# Patient Record
Sex: FEMALE | Race: WHITE | Employment: OTHER | URBAN - METROPOLITAN AREA
[De-identification: names, ages, dates, MRNs, and addresses within clinical notes are randomized per-mention and may not be internally consistent; named-entity substitution may affect disease eponyms.]

---

## 2017-04-04 ENCOUNTER — OFFICE VISIT (OUTPATIENT)
Dept: ORTHOPEDIC SURGERY | Age: 62
End: 2017-04-04

## 2017-04-04 VITALS
DIASTOLIC BLOOD PRESSURE: 98 MMHG | WEIGHT: 159.2 LBS | HEIGHT: 66 IN | HEART RATE: 76 BPM | BODY MASS INDEX: 25.58 KG/M2 | TEMPERATURE: 97.2 F | SYSTOLIC BLOOD PRESSURE: 136 MMHG

## 2017-04-04 DIAGNOSIS — M19.171 POST-TRAUMATIC OSTEOARTHRITIS OF RIGHT FOOT: Primary | ICD-10-CM

## 2017-04-04 RX ORDER — ASCORBIC ACID 500 MG
1000 TABLET ORAL DAILY
COMMUNITY

## 2017-04-04 RX ORDER — CHOLECALCIFEROL TAB 125 MCG (5000 UNIT) 125 MCG
1000 TAB ORAL DAILY
COMMUNITY

## 2017-04-04 RX ORDER — ATENOLOL 25 MG/1
25 TABLET ORAL DAILY
COMMUNITY
End: 2017-04-28 | Stop reason: DRUGHIGH

## 2017-04-04 RX ORDER — GUAIFENESIN 100 MG/5ML
81 LIQUID (ML) ORAL DAILY
COMMUNITY
End: 2020-10-07

## 2017-04-04 RX ORDER — DOXYCYCLINE 40 MG/1
40 CAPSULE ORAL
COMMUNITY
End: 2017-05-16 | Stop reason: SDUPTHER

## 2017-04-04 RX ORDER — GLUCOSAMINE/CHONDROITIN/C/MANG 500-400 MG
CAPSULE ORAL DAILY
COMMUNITY
End: 2018-11-06

## 2017-04-04 RX ORDER — MICONAZOLE NITRATE 2 %
CREAM WITH APPLICATOR VAGINAL DAILY
COMMUNITY

## 2017-04-04 RX ORDER — RIZATRIPTAN BENZOATE 10 MG/1
10 TABLET, ORALLY DISINTEGRATING ORAL
COMMUNITY
End: 2017-05-16 | Stop reason: SDUPTHER

## 2017-04-04 RX ORDER — LANOLIN ALCOHOL/MO/W.PET/CERES
2500 CREAM (GRAM) TOPICAL DAILY
COMMUNITY
End: 2018-11-06

## 2017-04-04 RX ORDER — ROSUVASTATIN CALCIUM 10 MG/1
10 TABLET, COATED ORAL
COMMUNITY
End: 2017-07-28

## 2017-04-04 RX ORDER — BISMUTH SUBSALICYLATE 262 MG
1 TABLET,CHEWABLE ORAL DAILY
COMMUNITY

## 2017-04-04 NOTE — MR AVS SNAPSHOT
Visit Information Date & Time Provider Department Dept. Phone Encounter #  
 4/4/2017 10:30 AM Yadira Arango, [address] [facility] [phone] [id number] Upcoming Health Maintenance Date Due Hepatitis C Screening 1955 DTaP/Tdap/Td series (1 - Tdap) 3/5/1976 PAP AKA CERVICAL CYTOLOGY 3/5/1976 BREAST CANCER SCRN MAMMOGRAM 3/5/2005 FOBT Q 1 YEAR AGE 50-75 3/5/2005 ZOSTER VACCINE AGE 60> 3/5/2015 INFLUENZA AGE 9 TO ADULT 8/1/2016 Allergies as of 4/4/2017  Never Reviewed Severity Noted Reaction Type Reactions Penicillin G  04/04/2017    Hives Current Immunizations  Never Reviewed No immunizations on file. Not reviewed this visit Vitals BP Pulse Temp Height(growth percentile) Weight(growth percentile) BMI  
 (!) 136/98 76 97.2 °F (36.2 °C) (Oral) 5' 6\" (1.676 m) 159 lb 3.2 oz (72.2 kg) 25.7 kg/m2 Smoking Status Never Smoker BMI and BSA Data Body Mass Index Body Surface Area 25.7 kg/m 2 1.83 m 2 Your Updated Medication List  
  
   
This list is accurate as of: 4/4/17 11:05 AM.  Always use your most recent med list.  
  
  
  
  
 ascorbic acid (vitamin C) 500 mg tablet Commonly known as:  VITAMIN C Take 1,000 mg by mouth two (2) times a day. aspirin 81 mg chewable tablet Take 81 mg by mouth daily. atenolol 25 mg tablet Commonly known as:  TENORMIN Take 25 mg by mouth daily. cholecalciferol (VITAMIN D3) 5,000 unit Tab tablet Commonly known as:  VITAMIN D3 Take  by mouth daily. Citracal + D tablet Generic drug:  calcium citrate-vitamin D3 Take  by mouth two (2) times a day. CRESTOR 10 mg tablet Generic drug:  rosuvastatin Take 10 mg by mouth nightly. cyanocobalamin 500 mcg tablet Commonly known as:  VITAMIN B12 Take 2,500 mcg by mouth daily. GLUCOSAMINE 1500 COMPLEX 500-400 mg capsule Generic drug:  glucosamine-chondroit-vit c-mn Take  by mouth daily. MAXALT-MLT 10 mg disintegrating tablet Generic drug:  rizatriptan Take 10 mg by mouth once as needed for Migraine. multivitamin tablet Commonly known as:  ONE A DAY Take 1 Tab by mouth daily. ORACEA 40 mg capsule Generic drug:  doxycycline monohydrate Take 40 mg by mouth every morning. PROBIOTIC 4X 10-15 mg Tbec Generic drug:  B.infantis-B.ani-B.long-B.bifi Take  by mouth. Patient Instructions Please follow up in 3 weeks. You are advised to contact us if your condition worsens. Foot Pain: Care Instructions Your Care Instructions Foot injuries that cause pain and swelling are fairly common. Almost all sports or home repair projects can cause a misstep that ends up as foot pain. Normal wear and tear, especially as you get older, also can cause foot pain. Most minor foot injuries will heal on their own, and home treatment is usually all you need to do. If you have a severe injury, you may need tests and treatment. Follow-up care is a key part of your treatment and safety. Be sure to make and go to all appointments, and call your doctor if you are having problems. Its also a good idea to know your test results and keep a list of the medicines you take. How can you care for yourself at home? · Take pain medicines exactly as directed. ¨ If the doctor gave you a prescription medicine for pain, take it as prescribed. ¨ If you are not taking a prescription pain medicine, ask your doctor if you can take an over-the-counter medicine. · Rest and protect your foot. Take a break from any activity that may cause pain. · Put ice or a cold pack on your foot for 10 to 20 minutes at a time. Put a thin cloth between the ice and your skin. · Prop up the sore foot on a pillow when you ice it or anytime you sit or lie down during the next 3 days.  Try to keep it above the level of your heart. This will help reduce swelling. · Your doctor may recommend that you wrap your foot with an elastic bandage. Keep your foot wrapped for as long as your doctor advises. · If your doctor recommends crutches, use them as directed. · Wear roomy footwear. · As soon as pain and swelling end, begin gentle exercises of your foot. Your doctor can tell you which exercises will help. When should you call for help? Call 911 anytime you think you may need emergency care. For example, call if: 
· Your foot turns pale, white, blue, or cold. Call your doctor now or seek immediate medical care if: 
· You cannot move or stand on your foot. · Your foot looks twisted or out of its normal position. · Your foot is not stable when you step down. · You have signs of infection, such as: 
¨ Increased pain, swelling, warmth, or redness. ¨ Red streaks leading from the sore area. ¨ Pus draining from a place on your foot. ¨ A fever. · Your foot is numb or tingly. Watch closely for changes in your health, and be sure to contact your doctor if: 
· You do not get better as expected. · You have bruises from an injury that last longer than 2 weeks. Where can you learn more? Go to http://kendrick-emerald.info/. Enter M569 in the search box to learn more about \"Foot Pain: Care Instructions. \" Current as of: May 23, 2016 Content Version: 11.2 © 5875-5372 [employer]. Care instructions adapted under license by [employer] (which disclaims liability or warranty for this information). If you have questions about a medical condition or this instruction, always ask your healthcare professional. [address] any warranty or liability for your use of this information. Introducing [facility]! Madhav Madsen introduces [employer] patient portal. Now you can access parts of your medical record, email your doctor's office, and request medication refills online. 1. In your internet browser, go to [email or web address] 2. Click on the First Time User? Click Here link in the Sign In box. You will see the New Member Sign Up page. 3. Enter your Re[employer] Access Code exactly as it appears below. You will not need to use this code after youve completed the sign-up process. If you do not sign up before the expiration date, you must request a new code. · [employer] Access Code: [email or web address] Expires: 7/3/2017 11:05 AM 
 
4. Enter the last four digits of your Social Security Number (xxxx) and Date of Birth (mm/dd/yyyy) as indicated and click Submit. You will be taken to the next sign-up page. 5. Create a Metal[employer] ID. This will be your Re[employer]Mu login ID and cannot be changed, so think of one that is secure and easy to remember. 6. Create a Re[employer]Mu password. You can change your password at any time. 7. Enter your Password Reset Question and Answer. This can be used at a later time if you forget your password. 8. Enter your e-mail address. You will receive e-mail notification when new information is available in [address] 9. Click Sign Up. You can now view and download portions of your medical record. 10. Click the Download Summary menu link to download a portable copy of your medical information. If you have questions, please visit the Frequently Asked Questions section of the [employer] website. Remember, [employer] is NOT to be used for urgent needs. For medical emergencies, dial 911. Now available from your iPhone and Android! Please provide this summary of care documentation to your next provider. If you have any questions after today's visit, please call [phone].

## 2017-04-04 NOTE — PATIENT INSTRUCTIONS
Please follow up in 3 weeks. You are advised to contact us if your condition worsens. Foot Pain: Care Instructions  Your Care Instructions  Foot injuries that cause pain and swelling are fairly common. Almost all sports or home repair projects can cause a misstep that ends up as foot pain. Normal wear and tear, especially as you get older, also can cause foot pain. Most minor foot injuries will heal on their own, and home treatment is usually all you need to do. If you have a severe injury, you may need tests and treatment. Follow-up care is a key part of your treatment and safety. Be sure to make and go to all appointments, and call your doctor if you are having problems. Its also a good idea to know your test results and keep a list of the medicines you take. How can you care for yourself at home? · Take pain medicines exactly as directed. ¨ If the doctor gave you a prescription medicine for pain, take it as prescribed. ¨ If you are not taking a prescription pain medicine, ask your doctor if you can take an over-the-counter medicine. · Rest and protect your foot. Take a break from any activity that may cause pain. · Put ice or a cold pack on your foot for 10 to 20 minutes at a time. Put a thin cloth between the ice and your skin. · Prop up the sore foot on a pillow when you ice it or anytime you sit or lie down during the next 3 days. Try to keep it above the level of your heart. This will help reduce swelling. · Your doctor may recommend that you wrap your foot with an elastic bandage. Keep your foot wrapped for as long as your doctor advises. · If your doctor recommends crutches, use them as directed. · Wear roomy footwear. · As soon as pain and swelling end, begin gentle exercises of your foot. Your doctor can tell you which exercises will help. When should you call for help? Call 911 anytime you think you may need emergency care.  For example, call if:  · Your foot turns pale, white, blue, or cold. Call your doctor now or seek immediate medical care if:  · You cannot move or stand on your foot. · Your foot looks twisted or out of its normal position. · Your foot is not stable when you step down. · You have signs of infection, such as:  ¨ Increased pain, swelling, warmth, or redness. ¨ Red streaks leading from the sore area. ¨ Pus draining from a place on your foot. ¨ A fever. · Your foot is numb or tingly. Watch closely for changes in your health, and be sure to contact your doctor if:  · You do not get better as expected. · You have bruises from an injury that last longer than 2 weeks. Where can you learn more? Go to http://kendrick-emerald.info/. Enter V963 in the search box to learn more about \"Foot Pain: Care Instructions. \"  Current as of: May 23, 2016  Content Version: 11.2  © 3536-8776 [employer]. Care instructions adapted under license by [employer] (which disclaims liability or warranty for this information). If you have questions about a medical condition or this instruction, always ask your healthcare professional. [place] [address] any warranty or liability for your use of this information.

## 2017-04-04 NOTE — PROGRESS NOTES
AMBULATORY PROGRESS NOTE      Patient: Negra Greene             MRN: [id number]     SSN: [id number] Body mass index is 25.7 kg/(m^2). YOB: 1955     AGE: 58 y.o. EX: female    PCP: No primary care provider on file. IMPRESSION/DIAGNOSIS AND TREATMENT PLAN     DIAGNOSES  1. Post-traumatic osteoarthritis of right foot         Nanette Luevano understands her diagnoses and the proposed plan. Plan:    1) Continue to wear the left tall CAM boot (do not wear while sleeping)  2) Intermittent standing and walking for showering  3) Anticipate : hard soled shoe on next OV    RTO - 3 weeks; XR at next visit (LEFT FOOT)    HPI AND EXAMINATION     Nanette Luevano IS A 58 y.o. female who presents to my outpatient office complaining of: prior left foot fusions. Patient states she had left first MTP joint fusion and second TMT joint fusion as well as a second hammer toe procedure on 1/16/2017. Patient states she has been using a walking boot all day, and she does not currently have pain. She is currently 11 weeks and one day; surgery was completed by Dr. Jose Franco in [place], [place]. She is here to follow up with her previous procedure and assess her progress. Patient has intermittent standing and walking when showering. As such, my overall impression is a 26-year-old female who is seen here today having had surgery in [place] by Amor Fish M.D. She had undergone a left great toe first MTP joint arthrodesis, a left second tarsometatarsal joint arthrodesis, as well as a left number two toe hammertoe procedure. She arrives here with her . She has been wearing a tall CAM walker boot. She admits to having no pain in her foot at all to the left foot. She is quite pleased with her overall alignment having stated that she has had at least two or three surgeries on her left foot in the past, as well as three or four surgeries on her right foot.    She had a CD/DVD, for which she had x-rays done on March 21, 2017, that she brought with her. These films show a dorsal plate with an interfragmentary screw at the first MTP joint, as well as a dorsal plate traversing the second TMT joint. The forefoot part of her films to assess her number two toe were hard to visualize. Only the lateral film did capture the second toe in its entirety. Patient and her  recently moved here to be closer to their daughter. Notes surgical h/o six procedure on her feet. ANKLE/FOOT left    Psychiatry: Alert, Oriented x 3; Speech normal in context and clarity,            Memory intact grossly, no involuntary movements - tremors, no dementia  Gait: slow  Tenderness: moderate tenderness across the first MTP joint and third TMT joint  Cutaneous: Well healed surgical scars at the great toe, midfoot, and number two toe. Joint Motion: Ankle and Hindfoot motion normal; reduced forefoot motion s/p fusion procedures  Joint / Tendon Stability: No Ankle or Subtalar instability or joint laxity. No peroneal sublux ability or dislocation  Alignment: Pes Planus moderate and Semi Rigid  Neuro Motor/Sensory: Diminished sensation at the medial sesamoid location near the plantar medial portion of her great toe  Vascular: NL foot/ankle pulses  Lymphatics: No extremity lymphedema, No calf swelling, no tenderness to calf muscles. CHART REVIEW     No past medical history on file. Current Outpatient Prescriptions   Medication Sig    doxycycline monohydrate (ORACEA) 40 mg capsule Take 40 mg by mouth every morning.  atenolol (TENORMIN) 25 mg tablet Take 25 mg by mouth daily.  rizatriptan (MAXALT-MLT) 10 mg disintegrating tablet Take 10 mg by mouth once as needed for Migraine.  rosuvastatin (CRESTOR) 10 mg tablet Take 10 mg by mouth nightly.  ascorbic acid, vitamin C, (VITAMIN C) 500 mg tablet Take 1,000 mg by mouth two (2) times a day.     calcium citrate-vitamin D3 (CITRACAL + D) tablet Take  by mouth two (2) times a day.  multivitamin (ONE A DAY) tablet Take 1 Tab by mouth daily.  cholecalciferol, VITAMIN D3, (VITAMIN D3) 5,000 unit tab tablet Take  by mouth daily.  cyanocobalamin (VITAMIN B12) 500 mcg tablet Take 2,500 mcg by mouth daily.  aspirin 81 mg chewable tablet Take 81 mg by mouth daily.  glucosamine-chondroit-vit c-mn (GLUCOSAMINE 1500 COMPLEX) 500-400 mg capsule Take  by mouth daily.  B.infantis-B.ani-B.long-B.bifi (PROBIOTIC 4X) 10-15 mg TbEC Take  by mouth. No current facility-administered medications for this visit. Allergies   Allergen Reactions    Penicillin G Hives     Past Surgical History:   Procedure Laterality Date    FOOT/TOES SURGERY PROC UNLISTED      HX BACK SURGERY      HX KNEE ARTHROSCOPY       Social History     Occupational History    Not on file. Social History Main Topics    Smoking status: Never Smoker    Smokeless tobacco: Never Used    Alcohol use No    Drug use: Yes    Sexual activity: Not on file     No family history on file. REVIEW OF SYSTEMS : 4/4/2017  ALL BELOW ARE Negative except : SEE HPI       Constitutional: Negative for fever, chills and weight loss. Neg Weigh Loss  Cardiovascular: Negative for chest pain, claudication and leg swelling. SOB, KIM   Gastrointestinal: Negative for  pain, N/V/D/C, Blood in stool or urine,dysuria, hematuria,        Incontinence, pelvic pain  Musculoskeletal: see HPI. Neurological: Negative for dizziness and weakness. Negative for headaches,Visual Changes, Confusion, Seizures,   Psychiatric/Behavioral: Negative for depression, memory loss and substance abuse. Extremities:  Negative for  hair changes, rash or skin lesion changes. Hematologic: Negative for Bleeding problems, bruising, pallor or swollen lymph nodes.   Peripheral Vascular: No calf pain, vascular vein tenderness to calf pain              No calf throbbing, posterior knee throbbing pain    DIAGNOSTIC IMAGING     No notes on file    Written by Chip Putnam, as dictated by Graham Stewart MD. I, , Graham Stewart MD, confirm that all documentation is accurate.

## 2017-04-10 ENCOUNTER — HOSPITAL ENCOUNTER (OUTPATIENT)
Dept: LAB | Age: 62
Discharge: HOME | End: 2017-04-10
Payer: COMMERCIAL

## 2017-04-10 ENCOUNTER — OFFICE VISIT (OUTPATIENT)
Dept: FAMILY MEDICINE CLINIC | Age: 62
End: 2017-04-10

## 2017-04-10 VITALS
HEART RATE: 62 BPM | BODY MASS INDEX: 25.39 KG/M2 | WEIGHT: 158 LBS | TEMPERATURE: 95.8 F | SYSTOLIC BLOOD PRESSURE: 145 MMHG | HEIGHT: 66 IN | OXYGEN SATURATION: 100 % | RESPIRATION RATE: 18 BRPM | DIASTOLIC BLOOD PRESSURE: 95 MMHG

## 2017-04-10 DIAGNOSIS — Z11.59 NEED FOR HEPATITIS C SCREENING TEST: ICD-10-CM

## 2017-04-10 DIAGNOSIS — M25.511 RIGHT SHOULDER PAIN, UNSPECIFIED CHRONICITY: Primary | ICD-10-CM

## 2017-04-10 DIAGNOSIS — G43.909 MIGRAINE WITHOUT STATUS MIGRAINOSUS, NOT INTRACTABLE, UNSPECIFIED MIGRAINE TYPE: ICD-10-CM

## 2017-04-10 DIAGNOSIS — M19.071 PRIMARY OSTEOARTHRITIS OF RIGHT FOOT: ICD-10-CM

## 2017-04-10 PROCEDURE — 86803 HEPATITIS C AB TEST: CPT | Performed by: FAMILY MEDICINE

## 2017-04-10 NOTE — PROGRESS NOTES
Chief Complaint   Patient presents with    Establish Care    Medication Refill    Shoulder Pain     right x 6 weeks      1. Have you been to the ER, urgent care clinic since your last visit? Hospitalized since your last visit? No    2. Have you seen or consulted any other health care providers outside of the [address] [place] [address] since your last visit? Include any pap smears or colon screening. No     HPI  Camila Manzano comes in to establish care. 1) Shoulder pain: patient has right shoulder pain. Ongoing for past 6 weeks. She has been doing a lot of movement and lifting with the extremity. Pain at the [facility] joint anterior aspect and is mainly with activity. No swelling, no trauma. Patient requests ortho referral. Will request xray of the shoulder and refer to ortho for evaluation. 2) Foot surgery: patient is s/p left foot ORIF and is seen by the specialist. She is in a cam walker boot and healing is good. Not in pain. 3) Dermatology: patient has rosacea and takes doxycycline. She has some medication. Will refill as needed. 4) Migraine headache: takes atenolol for migraine prophylaxis. Also on maxalt as needed. Past Medical History  Past Medical History:   Diagnosis Date    Arthritis     Hx of migraines        Surgical History  Past Surgical History:   Procedure Laterality Date    FOOT/TOES SURGERY PROC UNLISTED      HX BACK SURGERY      HX KNEE ARTHROSCOPY      HX PARTIAL HYSTERECTOMY          Medications  Current Outpatient Prescriptions   Medication Sig Dispense Refill    doxycycline monohydrate (ORACEA) 40 mg capsule Take 40 mg by mouth every morning.  atenolol (TENORMIN) 25 mg tablet Take 25 mg by mouth daily.  rizatriptan (MAXALT-MLT) 10 mg disintegrating tablet Take 10 mg by mouth once as needed for Migraine.  ascorbic acid, vitamin C, (VITAMIN C) 500 mg tablet Take 1,000 mg by mouth two (2) times a day.       calcium citrate-vitamin D3 (CITRACAL + D) tablet Take  by mouth two (2) times a day.  multivitamin (ONE A DAY) tablet Take 1 Tab by mouth daily.  cholecalciferol, VITAMIN D3, (VITAMIN D3) 5,000 unit tab tablet Take  by mouth daily.  cyanocobalamin (VITAMIN B12) 500 mcg tablet Take 2,500 mcg by mouth daily.  aspirin 81 mg chewable tablet Take 81 mg by mouth daily.  glucosamine-chondroit-vit c-mn (GLUCOSAMINE 1500 COMPLEX) 500-400 mg capsule Take  by mouth daily.  B.infantis-B.ani-B.long-B.bifi (PROBIOTIC 4X) 10-15 mg TbEC Take  by mouth.  rosuvastatin (CRESTOR) 10 mg tablet Take 10 mg by mouth nightly. Allergies  Allergies   Allergen Reactions    Penicillin G Hives       Family History  No family history on file. Social History  Social History     Social History    Marital status:      Spouse name: N/A    Number of children: N/A    Years of education: N/A     Occupational History    Not on file. Social History Main Topics    Smoking status: Never Smoker    Smokeless tobacco: Never Used    Alcohol use No    Drug use:  Yes    Sexual activity: Yes     Partners: Male     Other Topics Concern     Service No    Blood Transfusions Yes    Caffeine Concern No    Occupational Exposure No    Hobby Hazards No    Sleep Concern No    Stress Concern No    Weight Concern No    Special Diet No    Back Care No    Exercise Yes    Bike Helmet No    Seat Belt Yes    Self-Exams Yes     Social History Narrative       Review of Systems  Review of Systems - History obtained from the patient  General ROS:negative for - chills or fever  Psychological ROS: negative  Ophthalmic ROS: negative  ENT ROS: negative for - nasal congestion, nasal discharge, sneezing or sore throat  Allergy and Immunology ROS: negative  Respiratory ROS: no cough, shortness of breath, or wheezing  Cardiovascular ROS: no chest pain or dyspnea on exertion  Gastrointestinal ROS: no abdominal pain, change in bowel habits, or black or bloody stools  Genito-Urinary ROS: no dysuria, trouble voiding, or hematuria  Musculoskeletal ROS: positive for - pain in shoulder - right  Neurological ROS: positive for - headaches  Dermatological ROS: positive for - rosacea    Vital Signs  Visit Vitals    BP (!) 145/95 (BP 1 Location: Right arm, BP Patient Position: Sitting)    Pulse 62    Temp 95.8 °F (35.4 °C) (Oral)    Resp 18    Ht 5' 6\" (1.676 m)    Wt 158 lb (71.7 kg)    LMP  (Approximate)    SpO2 100%    BMI 25.5 kg/m2         Physical Exam  Physical Examination: General appearance - alert, well appearing, and in no distress, oriented to person, place, and time and acyanotic, in no respiratory distress  Mental status - alert, oriented to person, place, and time, normal mood, behavior, speech, dress, motor activity, and thought processes  Mouth - mucous membranes moist, pharynx normal without lesions  Neck - supple, no significant adenopathy  Chest - clear to auscultation, no wheezes, rales or rhonchi, symmetric air entry, no tachypnea, retractions or cyanosis  Heart - normal rate, regular rhythm, S1, S2 heard, systolic murmur  Back exam - limited range of motion  Neurological - alert, oriented, normal speech, no focal findings or movement disorder noted  Musculoskeletal - limited ROM right shoulder due to discomfort, pain on palpaiton at the [facility] joint, no swellingor crepitus  Extremities - no pedal edema noted    Diagnostics  Orders Placed This Encounter    XR SHOULDER RT AP/LAT MIN 2 V     Standing Status:   Future     Standing Expiration Date:   4/11/2018     Order Specific Question:   Reason for Exam     Answer:   shoulder pain     Order Specific Question:   Is Patient Allergic to Contrast Dye? Answer:   Unknown    HEPATITIS C AB     Standing Status:   Future     Standing Expiration Date:   4/11/2018         Results  No results found for this or any previous visit. ASSESSMENT and PLAN    ICD-10-CM ICD-9-CM    1.  Right shoulder pain, unspecified chronicity M25.511 719.41 XR SHOULDER RT AP/LAT MIN 2 V      REFERRAL TO ORTHOPEDICS   2. Need for hepatitis C screening test Z11.59 V73.89 HEPATITIS C AB      UT COLLECTION VENOUS BLOOD,VENIPUNCTURE   3. Migraine without status migrainosus, not intractable, unspecified migraine type G43.909 346.90    4. Primary osteoarthritis of right foot M19.071 715.17      current treatment plan is effective, no change in therapy  lab results and schedule of future lab studies reviewed with patient  reviewed diet, exercise and weight control  reviewed medications and side effects in detail  radiology results and schedule of future radiology studies reviewed with patient      I have discussed the diagnosis with the patient and the intended plan of care as seen in the above orders. The patient has received an after-visit summary and questions were answered concerning future plans. I have discussed medication, side effects, and warnings with the patient in detail. The patient verbalized understanding and is in agreement with the plan of care. The patient will contact the office with any additional concerns.     Bree Hernandez MD

## 2017-04-10 NOTE — PATIENT INSTRUCTIONS
Shoulder Pain: Care Instructions  Your Care Instructions    You can hurt your shoulder by using it too much during an activity, such as fishing or baseball. It can also happen as part of the everyday wear and tear of getting older. Shoulder injuries can be slow to heal, but your shoulder should get better with time. Your doctor may recommend a sling to rest your shoulder. If you have injured your shoulder, you may need testing and treatment. Follow-up care is a key part of your treatment and safety. Be sure to make and go to all appointments, and call your doctor if you are having problems. It's also a good idea to know your test results and keep a list of the medicines you take. How can you care for yourself at home? · Take pain medicines exactly as directed. ¨ If the doctor gave you a prescription medicine for pain, take it as prescribed. ¨ If you are not taking a prescription pain medicine, ask your doctor if you can take an over-the-counter medicine. ¨ Do not take two or more pain medicines at the same time unless the doctor told you to. Many pain medicines contain acetaminophen, which is Tylenol. Too much acetaminophen (Tylenol) can be harmful. · If your doctor recommends that you wear a sling, use it as directed. Do not take it off before your doctor tells you to. · Put ice or a cold pack on the sore area for 10 to 20 minutes at a time. Put a thin cloth between the ice and your skin. · If there is no swelling, you can put moist heat, a heating pad, or a warm cloth on your shoulder. Some doctors suggest alternating between hot and cold. · Rest your shoulder for a few days. If your doctor recommends it, you can then begin gentle exercise of the shoulder, but do not lift anything heavy. When should you call for help? Call 911 anytime you think you may need emergency care. For example, call if:  · You have chest pain or pressure. This may occur with:  ¨ Sweating. ¨ Shortness of breath.   ¨ Nausea or vomiting. ¨ Pain that spreads from the chest to the neck, jaw, or one or both shoulders or arms. ¨ Dizziness or lightheadedness. ¨ A fast or uneven pulse. After calling 911, chew 1 adult-strength aspirin. Wait for an ambulance. Do not try to drive yourself. · Your arm or hand is cool or pale or changes color. Call your doctor now or seek immediate medical care if:  · You have signs of infection, such as:  ¨ Increased pain, swelling, warmth, or redness in your shoulder. ¨ Red streaks leading from a place on your shoulder. ¨ Pus draining from an area of your shoulder. ¨ Swollen lymph nodes in your neck, armpits, or groin. ¨ A fever. Watch closely for changes in your health, and be sure to contact your doctor if:  · You cannot use your shoulder. · Your shoulder does not get better as expected. Where can you learn more? Go to http://kendrickDATYemerald.info/. Enter V030 in the search box to learn more about \"Shoulder Pain: Care Instructions. \"  Current as of: May 23, 2016  Content Version: 11.2  © 4022-8329 [employer]. Care instructions adapted under license by [employer] (which disclaims liability or warranty for this information). If you have questions about a medical condition or this instruction, always ask your healthcare professional. [address] any warranty or liability for your use of this information. Shoulder Stretches: Exercises  Your Care Instructions  Here are some examples of exercises for your shoulder. Start each exercise slowly. Ease off the exercise if you start to have pain. Your doctor or physical therapist will tell you when you can start these exercises and which ones will work best for you. How to do the exercises  Note: These exercises should cause you to feel a gentle stretch, but no pain. Shoulder stretch    1.  a doorway and place one arm against the door frame.  Your elbow should be a little higher than your shoulder. 2. Relax your shoulders as you lean forward, allowing your chest and shoulder muscles to stretch. You can also turn your body slightly away from your arm to stretch the muscles even more. 3. Hold for 15 to 30 seconds. 4. Repeat 2 to 4 times with each arm. Shoulder and chest stretch    Shoulder and chest stretch  1. While sitting, relax your upper body so you slump slightly in your chair. 2. As you breathe in, straighten your back and open your arms out to the sides. 3. Gently pull your shoulder blades back and downward. 4. Hold for 15 to 30 seconds as your breathe normally. 5. Repeat 2 to 4 times. Overhead stretch    1. Reach up over your head with both arms. 2. Hold for 15 to 30 seconds. 3. Repeat 2 to 4 times. Follow-up care is a key part of your treatment and safety. Be sure to make and go to all appointments, and call your doctor if you are having problems. It's also a good idea to know your test results and keep a list of the medicines you take. Where can you learn more? Go to http://kendrick-emerald.info/. Enter S254 in the search box to learn more about \"Shoulder Stretches: Exercises. \"  Current as of: May 23, 2016  Content Version: 11.2  © 6894-7944 [employer]. Care instructions adapted under license by [employer] (which disclaims liability or warranty for this information). If you have questions about a medical condition or this instruction, always ask your healthcare professional. [place] [address] any warranty or liability for your use of this information.

## 2017-04-10 NOTE — MR AVS SNAPSHOT
Visit Information Date & Time Provider Department Dept. Phone Encounter #  
 4/10/2017  9:15 AM Hesed Yohana Cartwright MD [facility] [phone] [id number] Follow-up Instructions Return in about 1 month (around 5/10/2017), or if symptoms worsen or fail to improve, for shoulder pain. Your Appointments 4/26/2017  9:10 AM  
Follow Up with Vitaliy Ordaz MD  
[facility] Appt Note: LT FT 3 [address] [facility], [address] [facility]  
[phone] [address] [place] [address] Upcoming Health Maintenance Date Due Hepatitis C Screening 1955 DTaP/Tdap/Td series (1 - Tdap) 3/5/1976 PAP AKA CERVICAL CYTOLOGY 3/5/1976 FOBT Q 1 YEAR AGE 50-75 3/5/2005 BREAST CANCER SCRN MAMMOGRAM 3/16/2019 Allergies as of 4/10/2017  Review Complete On: 4/10/2017 By: Analy Box MD  
  
 Severity Noted Reaction Type Reactions Penicillin G  04/04/2017    Hives Current Immunizations  Never Reviewed No immunizations on file. Not reviewed this visit You Were Diagnosed With   
  
 Codes Comments Right shoulder pain, unspecified chronicity    -  Primary ICD-10-CM: M25.511 ICD-9-CM: 719.41 Need for hepatitis C screening test     ICD-10-CM: Z11.59 
ICD-9-CM: V73.89 Vitals BP Pulse Temp Resp Height(growth percentile) Weight(growth percentile) (!) 145/95 (BP 1 Location: Right arm, BP Patient Position: Sitting) 62 95.8 °F (35.4 °C) (Oral) 18 5' 6\" (1.676 m) 158 lb (71.7 kg) LMP SpO2 BMI Smoking Status (Approximate) 100% 25.5 kg/m2 Never Smoker Vitals History BMI and BSA Data Body Mass Index Body Surface Area 25.5 kg/m 2 1.83 m 2 Your Updated Medication List  
  
   
This list is accurate as of: 4/10/17 10:00 AM.  Always use your most recent med list.  
  
  
  
  
 ascorbic acid (vitamin C) 500 mg tablet Commonly known as:  VITAMIN C Take 1,000 mg by mouth two (2) times a day. aspirin 81 mg chewable tablet Take 81 mg by mouth daily. atenolol 25 mg tablet Commonly known as:  TENORMIN Take 25 mg by mouth daily. cholecalciferol (VITAMIN D3) 5,000 unit Tab tablet Commonly known as:  VITAMIN D3 Take  by mouth daily. Citracal + D tablet Generic drug:  calcium citrate-vitamin D3 Take  by mouth two (2) times a day. CRESTOR 10 mg tablet Generic drug:  rosuvastatin Take 10 mg by mouth nightly. cyanocobalamin 500 mcg tablet Commonly known as:  VITAMIN B12 Take 2,500 mcg by mouth daily. GLUCOSAMINE 1500 COMPLEX 500-400 mg capsule Generic drug:  glucosamine-chondroit-vit c-mn Take  by mouth daily. MAXALT-MLT 10 mg disintegrating tablet Generic drug:  rizatriptan Take 10 mg by mouth once as needed for Migraine. multivitamin tablet Commonly known as:  ONE A DAY Take 1 Tab by mouth daily. ORACEA 40 mg capsule Generic drug:  doxycycline monohydrate Take 40 mg by mouth every morning. PROBIOTIC 4X 10-15 mg Tbec Generic drug:  B.infantis-B.ani-B.long-B.bifi Take  by mouth. We Performed the Following REFERRAL TO ORTHOPEDICS [SOZ466 Custom] Comments:  
 Right shoulder pain Follow-up Instructions Return in about 1 month (around 5/10/2017), or if symptoms worsen or fail to improve, for shoulder pain. To-Do List   
 04/10/2017 Lab:  HEPATITIS C AB   
  
 04/10/2017 Imaging:  XR SHOULDER RT AP/LAT MIN 2 V Referral Information Referral ID Referred By Referred To  
  
 [id number] Amelia NASCIMENTO Not Available Visits Status Start Date End Date 1 New Request 4/10/17 4/10/18 If your referral has a status of pending review or denied, additional information will be sent to support the outcome of this decision. Patient Instructions Shoulder Pain: Care Instructions Your Care Instructions You can hurt your shoulder by using it too much during an activity, such as fishing or baseball. It can also happen as part of the everyday wear and tear of getting older. Shoulder injuries can be slow to heal, but your shoulder should get better with time. Your doctor may recommend a sling to rest your shoulder. If you have injured your shoulder, you may need testing and treatment. Follow-up care is a key part of your treatment and safety. Be sure to make and go to all appointments, and call your doctor if you are having problems. It's also a good idea to know your test results and keep a list of the medicines you take. How can you care for yourself at home? · Take pain medicines exactly as directed. ¨ If the doctor gave you a prescription medicine for pain, take it as prescribed. ¨ If you are not taking a prescription pain medicine, ask your doctor if you can take an over-the-counter medicine. ¨ Do not take two or more pain medicines at the same time unless the doctor told you to. Many pain medicines contain acetaminophen, which is Tylenol. Too much acetaminophen (Tylenol) can be harmful. · If your doctor recommends that you wear a sling, use it as directed. Do not take it off before your doctor tells you to. · Put ice or a cold pack on the sore area for 10 to 20 minutes at a time. Put a thin cloth between the ice and your skin. · If there is no swelling, you can put moist heat, a heating pad, or a warm cloth on your shoulder. Some doctors suggest alternating between hot and cold. · Rest your shoulder for a few days. If your doctor recommends it, you can then begin gentle exercise of the shoulder, but do not lift anything heavy. When should you call for help? Call 911 anytime you think you may need emergency care. For example, call if: 
· You have chest pain or pressure. This may occur with: ¨ Sweating. ¨ Shortness of breath. ¨ Nausea or vomiting. ¨ Pain that spreads from the chest to the neck, jaw, or one or both shoulders or arms. ¨ Dizziness or lightheadedness. ¨ A fast or uneven pulse. After calling 911, chew 1 adult-strength aspirin. Wait for an ambulance. Do not try to drive yourself. · Your arm or hand is cool or pale or changes color. Call your doctor now or seek immediate medical care if: 
· You have signs of infection, such as: 
¨ Increased pain, swelling, warmth, or redness in your shoulder. ¨ Red streaks leading from a place on your shoulder. ¨ Pus draining from an area of your shoulder. ¨ Swollen lymph nodes in your neck, armpits, or groin. ¨ A fever. Watch closely for changes in your health, and be sure to contact your doctor if: 
· You cannot use your shoulder. · Your shoulder does not get better as expected. Where can you learn more? Go to http://kendrick-emerald.info/. Enter B855 in the search box to learn more about \"Shoulder Pain: Care Instructions. \" Current as of: May 23, 2016 Content Version: 11.2 © 4808-8190 [employer]. Care instructions adapted under license by [employer] (which disclaims liability or warranty for this information). If you have questions about a medical condition or this instruction, always ask your healthcare professional. [place] [address] any warranty or liability for your use of this information. Shoulder Stretches: Exercises Your Care Instructions Here are some examples of exercises for your shoulder. Start each exercise slowly. Ease off the exercise if you start to have pain. Your doctor or physical therapist will tell you when you can start these exercises and which ones will work best for you. How to do the exercises Note: These exercises should cause you to feel a gentle stretch, but no pain. Shoulder stretch 1.  a doorway and place one arm against the door frame. Your elbow should be a little higher than your shoulder. 2. Relax your shoulders as you lean forward, allowing your chest and shoulder muscles to stretch. You can also turn your body slightly away from your arm to stretch the muscles even more. 3. Hold for 15 to 30 seconds. 4. Repeat 2 to 4 times with each arm. Shoulder and chest stretch Shoulder and chest stretch 1. While sitting, relax your upper body so you slump slightly in your chair. 2. As you breathe in, straighten your back and open your arms out to the sides. 3. Gently pull your shoulder blades back and downward. 4. Hold for 15 to 30 seconds as your breathe normally. 5. Repeat 2 to 4 times. Overhead stretch 1. Reach up over your head with both arms. 2. Hold for 15 to 30 seconds. 3. Repeat 2 to 4 times. Follow-up care is a key part of your treatment and safety. Be sure to make and go to all appointments, and call your doctor if you are having problems. It's also a good idea to know your test results and keep a list of the medicines you take. Where can you learn more? Go to http://kendrick-emerald.info/. Enter S254 in the search box to learn more about \"Shoulder Stretches: Exercises. \" Current as of: May 23, 2016 Content Version: 11.2 © 2103-8558 [employer]. Care instructions adapted under license by [employer] (which disclaims liability or warranty for this information). If you have questions about a medical condition or this instruction, always ask your healthcare professional. [address] any warranty or liability for your use of this information. Introducing [facility]! Noreen Ayers introduces Scannx patient portal. Now you can access parts of your medical record, email your doctor's office, and request medication refills online.    
 
1. In your internet browser, go to [email or web address] 2. Click on the First Time User? Click Here link in the Sign In box. You will see the New Member Sign Up page. 3. Enter your Jive Bike Access Code exactly as it appears below. You will not need to use this code after youve completed the sign-up process. If you do not sign up before the expiration date, you must request a new code. · Jive Bike Access Code: [email or web address] Expires: 7/3/2017 11:05 AM 
 
4. Enter the last four digits of your Social Security Number (xxxx) and Date of Birth (mm/dd/yyyy) as indicated and click Submit. You will be taken to the next sign-up page. 5. Create a 365Scorest ID. This will be your Jive Bike login ID and cannot be changed, so think of one that is secure and easy to remember. 6. Create a Jive Bike password. You can change your password at any time. 7. Enter your Password Reset Question and Answer. This can be used at a later time if you forget your password. 8. Enter your e-mail address. You will receive e-mail notification when new information is available in [address] 9. Click Sign Up. You can now view and download portions of your medical record. 10. Click the Download Summary menu link to download a portable copy of your medical information. If you have questions, please visit the Frequently Asked Questions section of the Jive Bike website. Remember, Jive Bike is NOT to be used for urgent needs. For medical emergencies, dial 911. Now available from your iPhone and Android! Please provide this summary of care documentation to your next provider. If you have any questions after today's visit, please call [phone].

## 2017-04-11 LAB
HCV AB SER IA-ACNC: 0.09 INDEX
HCV AB SERPL QL IA: NEGATIVE
HCV COMMENT,HCGAC: NORMAL

## 2017-04-17 ENCOUNTER — OFFICE VISIT (OUTPATIENT)
Dept: ORTHOPEDIC SURGERY | Age: 62
End: 2017-04-17

## 2017-04-17 VITALS
SYSTOLIC BLOOD PRESSURE: 152 MMHG | HEIGHT: 66 IN | WEIGHT: 159.8 LBS | HEART RATE: 61 BPM | DIASTOLIC BLOOD PRESSURE: 95 MMHG | BODY MASS INDEX: 25.68 KG/M2 | TEMPERATURE: 96.1 F

## 2017-04-17 DIAGNOSIS — G89.29 CHRONIC RIGHT SHOULDER PAIN: Primary | ICD-10-CM

## 2017-04-17 DIAGNOSIS — M25.511 CHRONIC RIGHT SHOULDER PAIN: Primary | ICD-10-CM

## 2017-04-17 NOTE — PROGRESS NOTES
Patient: Kaylyn Ordonez                MRN: [id number]       SSN: [id number]  YOB: 1955        AGE: 58 y.o. SEX: female  Body mass index is 25.79 kg/(m^2). PCP: No primary care provider on file. 04/17/17      Chief Complaint   Patient presents with    Shoulder Pain     Right       HISTORY OF PRESENT ILLNESS: Kaylyn Ordonez is a 58 y.o. female who presents to the office for 6 week hx of right shoulder pain. The only activity she cant think of that would contribute to it would be she moved here in December and was packing her things. She was recently on crutches because of a foot surgery. She has had no previous trauma. The pain occassionally radiates into her arm. Ibuprofen helps the pain but also causes stomach upset. Review of Systems   Constitutional: Negative. HENT: Negative. Eyes: Negative. Respiratory: Negative. Cardiovascular: Negative. Gastrointestinal: Negative. Genitourinary: Negative. Musculoskeletal: Positive for joint pain (right shoulder). Skin: Negative. Neurological: Negative. Endo/Heme/Allergies: Negative. Psychiatric/Behavioral: Negative. Social History     Social History    Marital status:      Spouse name: N/A    Number of children: N/A    Years of education: N/A     Occupational History    Not on file. Social History Main Topics    Smoking status: Never Smoker    Smokeless tobacco: Never Used    Alcohol use No    Drug use:  Yes    Sexual activity: Yes     Partners: Male     Other Topics Concern     Service No    Blood Transfusions Yes    Caffeine Concern No    Occupational Exposure No    Hobby Hazards No    Sleep Concern No    Stress Concern No    Weight Concern No    Special Diet No    Back Care No    Exercise Yes    Bike Helmet No    Seat Belt Yes    Self-Exams Yes     Social History Narrative        Past Medical History:   Diagnosis Date    Arthritis     Hx of migraines Allergies   Allergen Reactions    Penicillin G Hives         Current Outpatient Prescriptions   Medication Sig    doxycycline monohydrate (ORACEA) 40 mg capsule Take 40 mg by mouth every morning.  atenolol (TENORMIN) 25 mg tablet Take 25 mg by mouth daily.  rizatriptan (MAXALT-MLT) 10 mg disintegrating tablet Take 10 mg by mouth once as needed for Migraine.  ascorbic acid, vitamin C, (VITAMIN C) 500 mg tablet Take 1,000 mg by mouth two (2) times a day.  calcium citrate-vitamin D3 (CITRACAL + D) tablet Take  by mouth two (2) times a day.  multivitamin (ONE A DAY) tablet Take 1 Tab by mouth daily.  cholecalciferol, VITAMIN D3, (VITAMIN D3) 5,000 unit tab tablet Take  by mouth daily.  cyanocobalamin (VITAMIN B12) 500 mcg tablet Take 2,500 mcg by mouth daily.  aspirin 81 mg chewable tablet Take 81 mg by mouth daily.  glucosamine-chondroit-vit c-mn (GLUCOSAMINE 1500 COMPLEX) 500-400 mg capsule Take  by mouth daily.  B.infantis-B.ani-B.long-B.bifi (PROBIOTIC 4X) 10-15 mg TbEC Take  by mouth.  rosuvastatin (CRESTOR) 10 mg tablet Take 10 mg by mouth nightly. No current facility-administered medications for this visit. Physical Exam  Constitutional: she is oriented to person, place, and time and well-developed, well-nourished, and in no distress. No distress. HENT:   Head: Normocephalic and atraumatic. Right Ear: Hearing normal.   Left Ear: Hearing normal.   Nose: Nose normal.   Eyes: Conjunctivae, EOM and lids are normal. Pupils are equal, round, and reactive to light. Neck: Trachea normal.   Pulmonary/Chest: Effort normal and breath sounds normal. No respiratory distress. Abdominal: Soft. Neurological: she is alert and oriented to person, place, and time. Skin: Skin is warm, dry and intact. she is not diaphoretic. Psychiatric: Affect normal.   Nursing note and vitals reviewed.     Ortho Exam   Shows full flexion and abduction of the right shoulder with a level 4 pain. External rotation is normal and causes slight pain. Internal rotation and is normal and causes a level 4 pain. RADIOGRAPHS:   2 views of the shoulder show no bony abnormalities. IMPRESSION & PLAN: Because of the ,movinbg and crutches and pain at night while sleeping,  Rip concerned she may have a rotator cuff tear. We will proceed with an MRI. I will see her back after the results of the MRI. Written by Cara Elkins, as dictated by Dr. Middleton Rather, Dr. Desi Ortiz, confirm that all documentation is accurate.

## 2017-04-20 ENCOUNTER — HOSPITAL ENCOUNTER (OUTPATIENT)
Age: 62
Discharge: HOME | End: 2017-04-20
Attending: ORTHOPAEDIC SURGERY
Payer: COMMERCIAL

## 2017-04-20 DIAGNOSIS — G89.29 CHRONIC RIGHT SHOULDER PAIN: ICD-10-CM

## 2017-04-20 DIAGNOSIS — M25.511 CHRONIC RIGHT SHOULDER PAIN: ICD-10-CM

## 2017-04-20 PROCEDURE — 73221 MRI JOINT UPR EXTREM W/O DYE: CPT

## 2017-04-24 ENCOUNTER — OFFICE VISIT (OUTPATIENT)
Dept: ORTHOPEDIC SURGERY | Age: 62
End: 2017-04-24

## 2017-04-24 VITALS
BODY MASS INDEX: 25.75 KG/M2 | SYSTOLIC BLOOD PRESSURE: 143 MMHG | HEIGHT: 66 IN | HEART RATE: 58 BPM | TEMPERATURE: 95.5 F | DIASTOLIC BLOOD PRESSURE: 104 MMHG | WEIGHT: 160.2 LBS

## 2017-04-24 DIAGNOSIS — M75.51 BURSITIS OF RIGHT SHOULDER: ICD-10-CM

## 2017-04-24 DIAGNOSIS — M25.511 CHRONIC RIGHT SHOULDER PAIN: Primary | ICD-10-CM

## 2017-04-24 DIAGNOSIS — G89.29 CHRONIC RIGHT SHOULDER PAIN: Primary | ICD-10-CM

## 2017-04-24 RX ORDER — TRIAMCINOLONE ACETONIDE 40 MG/ML
60 INJECTION, SUSPENSION INTRA-ARTICULAR; INTRAMUSCULAR ONCE
Qty: 2 ML | Refills: 0
Start: 2017-04-24 | End: 2017-04-24

## 2017-04-24 RX ORDER — LIDOCAINE HYDROCHLORIDE 10 MG/ML
6 INJECTION INFILTRATION; PERINEURAL ONCE
Qty: 6 ML | Refills: 0
Start: 2017-04-24 | End: 2017-04-24

## 2017-04-24 NOTE — PROGRESS NOTES
Patient: Dudley Enamorado                MRN: [id number]       SSN: [id number]  YOB: 1955        AGE: 58 y.o. SEX: female  Body mass index is 25.86 kg/(m^2). PCP: PROVIDER UNKNOWN  04/24/17      Chief Complaint   Patient presents with    Shoulder Pain     Right       HISTORY OF PRESENT ILLNESS: Dudley Enamorado is a 58 y.o. female who presents to the office for management of her right shoulder pain and to review her MRI results. Switching to the aleve has helped the reduce the pain and recureduced the stomach upset. MRI shows a surface tear of the supraspinatus but no full thickness tear. Review of Systems   Constitutional: Negative. HENT: Negative. Eyes: Negative. Respiratory: Negative. Cardiovascular: Negative. Gastrointestinal: Negative. Genitourinary: Negative. Musculoskeletal: Positive for joint pain (right shoulder). Skin: Negative. Neurological: Negative. Endo/Heme/Allergies: Negative. Psychiatric/Behavioral: Negative. Social History     Social History    Marital status:      Spouse name: N/A    Number of children: N/A    Years of education: N/A     Occupational History    Not on file. Social History Main Topics    Smoking status: Never Smoker    Smokeless tobacco: Never Used    Alcohol use No    Drug use:  Yes    Sexual activity: Yes     Partners: Male     Other Topics Concern     Service No    Blood Transfusions Yes    Caffeine Concern No    Occupational Exposure No    Hobby Hazards No    Sleep Concern No    Stress Concern No    Weight Concern No    Special Diet No    Back Care No    Exercise Yes    Bike Helmet No    Seat Belt Yes    Self-Exams Yes     Social History Narrative        Past Medical History:   Diagnosis Date    Arthritis     Hx of migraines         Allergies   Allergen Reactions    Penicillin G Hives         Current Outpatient Prescriptions   Medication Sig    doxycycline monohydrate (ORACEA) 40 mg capsule Take 40 mg by mouth every morning.  atenolol (TENORMIN) 25 mg tablet Take 25 mg by mouth daily.  rizatriptan (MAXALT-MLT) 10 mg disintegrating tablet Take 10 mg by mouth once as needed for Migraine.  ascorbic acid, vitamin C, (VITAMIN C) 500 mg tablet Take 1,000 mg by mouth two (2) times a day.  calcium citrate-vitamin D3 (CITRACAL + D) tablet Take  by mouth two (2) times a day.  multivitamin (ONE A DAY) tablet Take 1 Tab by mouth daily.  cholecalciferol, VITAMIN D3, (VITAMIN D3) 5,000 unit tab tablet Take  by mouth daily.  cyanocobalamin (VITAMIN B12) 500 mcg tablet Take 2,500 mcg by mouth daily.  aspirin 81 mg chewable tablet Take 81 mg by mouth daily.  glucosamine-chondroit-vit c-mn (GLUCOSAMINE 1500 COMPLEX) 500-400 mg capsule Take  by mouth daily.  B.infantis-B.ani-B.long-B.bifi (PROBIOTIC 4X) 10-15 mg TbEC Take  by mouth.  rosuvastatin (CRESTOR) 10 mg tablet Take 10 mg by mouth nightly. No current facility-administered medications for this visit. Physical Exam  Constitutional: she is oriented to person, place, and time and well-developed, well-nourished, and in no distress. No distress. HENT:   Head: Normocephalic and atraumatic. Right Ear: Hearing normal.   Left Ear: Hearing normal.   Nose: Nose normal.   Eyes: Conjunctivae, EOM and lids are normal. Pupils are equal, round, and reactive to light. Neck: Trachea normal.   Pulmonary/Chest: Effort normal and breath sounds normal. No respiratory distress. Abdominal: Soft. Neurological: she is alert and oriented to person, place, and time. Skin: Skin is warm, dry and intact. she is not diaphoretic. Psychiatric: Affect normal.   Nursing note and vitals reviewed. Ortho Exam   Still shows full flexion and abduction with a level 4 pain. Internal and external are similarly  unchanged and less painful.      Procedure: After timeout and under sterile conditions, patient's right shoulder was injected with 6 cc of Xylocaine and 1.5 cc of Kenalog. [facility]  OFFICE PROCEDURE PROGRESS NOTE        Chart reviewed for the following:   Cheli Viveros MD, have reviewed the History, Physical and updated the Allergic reactions for Nanette C [address] performed immediately prior to start of procedure:   Cheli Viveros MD, have performed the following reviews on [address] prior to the start of the procedure:            * Patient was identified by name and date of birth   * Agreement on procedure being performed was verified  * Risks and Benefits explained to the patient  * Procedure site verified and marked as necessary  * Patient was positioned for comfort  * Consent was signed and verified     Time: 7:49 AM        Date of procedure: 4/24/2017    Procedure performed by: Vianney Hall MD    Provider assisted by: None     Patient assisted by: self    How tolerated by patient: tolerated the procedure well with no complications    Comments: none      RADIOGRAPHS: MRI Results (most recent):    Results from [place] encounter on 04/20/17   MRI SHOULDER RT WO CONT   Narrative MR right shoulder. TECHNIQUE: Coronal, sagittal T1 and T2 fat-saturated, axial T2 fat-saturated and  axial proton density images of the shoulder were obtained without the  administration of IV contrast.    INDICATIONS: Pain, decreased range of motion. COMPARISONS: Right shoulder x-ray 4/17. FINDINGS:   Mild degenerative changes of the acromioclavicular joint. Downsloping of the  acromioclavicular joint impinging on underlying supraspinatus. Mild degenerative changes of the glenohumeral joint. Mild/moderate glenohumeral joint effusion. Trace fluid within the subacromial subdeltoid bursae. Moderate supraspinatus tendinosis. Partial-thickness undersurface tear of  anterior supraspinatus tendon at its insertion.  Probable 3 mm partial thickness  tear at the insertion of the infraspinatus tendon. Teres minor tendon is intact. Mild subscapularis tendinosis. No atrophy or abnormal signal within the muscle bellies. Abnormal signal and irregularity of the superior labrum representing tear. Mild  increased signal of the inferior labrum likely related to degeneration. Moderate biceps tenosynovitis. Impression IMPRESSION:    1. Partial thickness undersurface tear of anterior supraspinatus tendon at its  insertion. 2. Probable small partial thickness tear of infraspinatus tendon. 3. Tear of the superior labrum. 4. Mild/moderate osteoarthritis of the acromioclavicular and glenohumeral  joints. Thank you for this referral.          IMPRESSION & PLAN:  Subacromial bursitis of the shoulder as a result of her packing and unpacking with a recent move. After discussing treatment she agrees to undergo a cortisone injection. She will continue with the Aleve. I will see her back prn. Written by Rand Ortega, as dictated by Dr. Christal Onofre, Dr. Zia Shanks, confirm that all documentation is accurate.

## 2017-04-26 ENCOUNTER — OFFICE VISIT (OUTPATIENT)
Dept: ORTHOPEDIC SURGERY | Age: 62
End: 2017-04-26

## 2017-04-26 ENCOUNTER — TELEPHONE (OUTPATIENT)
Dept: ORTHOPEDIC SURGERY | Age: 62
End: 2017-04-26

## 2017-04-26 VITALS
HEART RATE: 72 BPM | TEMPERATURE: 96.9 F | DIASTOLIC BLOOD PRESSURE: 107 MMHG | HEIGHT: 66 IN | SYSTOLIC BLOOD PRESSURE: 163 MMHG

## 2017-04-26 DIAGNOSIS — M79.672 LEFT FOOT PAIN: ICD-10-CM

## 2017-04-26 DIAGNOSIS — M19.072 PRIMARY OSTEOARTHRITIS OF LEFT FOOT: Primary | ICD-10-CM

## 2017-04-26 NOTE — PROGRESS NOTES
AMBULATORY PROGRESS NOTE      Patient: Camila Manzano             MRN: [id number]     SSN: [id number] There is no height or weight on file to calculate BMI. YOB: 1955     AGE: 58 y.o. EX: female    PCP: PROVIDER UNKNOWN    IMPRESSION/DIAGNOSIS AND TREATMENT PLAN     DIAGNOSES  1. Primary osteoarthritis of left foot    2. Left foot pain        Orders Placed This Encounter    [[id number]] Foot Min 3V      Nanette Noyola understands her diagnoses and the proposed plan. Plan:    1) Discontinue the left tall CAM boot  2) Wear supportive tennis shoe and create report card of activities  3) Walking within the home with her [employer] shoes    RTO - 3 weeks    HPI AND EXAMINATION     Nanette Luevano IS A 58 y.o. female who presents to my outpatient office for follow up of left foot post-traumatic osteoarthritis. Patient had a procedure At last visit, I recommended the continuation of the left tall CAM boot, and I anticipated a left black hard-soled shoe at the next office visit. The patient presents to the office today wearing a left tall CAM boot. Patient states that she she is ready to be transition to the black hard-soled shoe. She states that she does not have pain currently provided a rating of 0/10. Patient is happy with the current treatment plan. Notes surgical h/o six procedures on her feet.     ANKLE/FOOT left     Psychiatry: Alert, Oriented x 3; Speech normal in context and clarity   Memory intact grossly, no involuntary movements - tremors, no dementia  Gait: slow  Tenderness: No tenderness across the first MTP joint and third TMT joint  Cutaneous: Well healed surgical scars at the great toe, midfoot, and number two toe. Joint Motion: Ankle and Hindfoot motion normal; reduced forefoot motion s/p fusion procedures  Joint / Tendon Stability: No Ankle or Subtalar instability or joint laxity.     No peroneal sublux ability or dislocation  Alignment: Pes Planus moderate and Semi Rigid  Neuro Motor/Sensory: Diminished sensation at the medial sesamoid location near the plantar medial portion of her great toe  Vascular: NL foot/ankle pulses  Lymphatics: No extremity lymphedema, No calf swelling, no tenderness to calf muscles. CHART REVIEW     Past Medical History:   Diagnosis Date    Arthritis     Hx of migraines      Current Outpatient Prescriptions   Medication Sig    doxycycline monohydrate (ORACEA) 40 mg capsule Take 40 mg by mouth every morning.  atenolol (TENORMIN) 25 mg tablet Take 25 mg by mouth daily.  rizatriptan (MAXALT-MLT) 10 mg disintegrating tablet Take 10 mg by mouth once as needed for Migraine.  rosuvastatin (CRESTOR) 10 mg tablet Take 10 mg by mouth nightly.  ascorbic acid, vitamin C, (VITAMIN C) 500 mg tablet Take 1,000 mg by mouth two (2) times a day.  calcium citrate-vitamin D3 (CITRACAL + D) tablet Take  by mouth two (2) times a day.  multivitamin (ONE A DAY) tablet Take 1 Tab by mouth daily.  cholecalciferol, VITAMIN D3, (VITAMIN D3) 5,000 unit tab tablet Take  by mouth daily.  cyanocobalamin (VITAMIN B12) 500 mcg tablet Take 2,500 mcg by mouth daily.  aspirin 81 mg chewable tablet Take 81 mg by mouth daily.  glucosamine-chondroit-vit c-mn (GLUCOSAMINE 1500 COMPLEX) 500-400 mg capsule Take  by mouth daily.  B.infantis-B.ani-B.long-B.bifi (PROBIOTIC 4X) 10-15 mg TbEC Take  by mouth. No current facility-administered medications for this visit. Allergies   Allergen Reactions    Penicillin G Hives     Past Surgical History:   Procedure Laterality Date    FOOT/TOES SURGERY PROC UNLISTED      HX BACK SURGERY      HX KNEE ARTHROSCOPY      HX PARTIAL HYSTERECTOMY       Social History     Occupational History    Not on file. Social History Main Topics    Smoking status: Never Smoker    Smokeless tobacco: Never Used    Alcohol use No    Drug use: Yes    Sexual activity: Yes     Partners: Male     History reviewed.  No pertinent family history. REVIEW OF SYSTEMS : 4/26/2017  ALL BELOW ARE Negative except : SEE HPI       Constitutional: Negative for fever, chills and weight loss. Neg Weigh Loss  Cardiovascular: Negative for chest pain, claudication and leg swelling. SOB, KIM   Gastrointestinal: Negative for  pain, N/V/D/C, Blood in stool or urine,dysuria, hematuria,        Incontinence, pelvic pain  Musculoskeletal: see HPI. Neurological: Negative for dizziness and weakness. Negative for headaches,Visual Changes, Confusion, Seizures,   Psychiatric/Behavioral: Negative for depression, memory loss and substance abuse. Extremities:  Negative for  hair changes, rash or skin lesion changes. Hematologic: Negative for Bleeding problems, bruising, pallor or swollen lymph nodes. Peripheral Vascular: No calf pain, vascular vein tenderness to calf pain              No calf throbbing, posterior knee throbbing pain    DIAGNOSTIC IMAGING       Dictation on: 04/26/2017 10:16 AM by: Rama Markham [[id number]]         Written by Miranda Ac, as dictated by Ceasar Moody MD. IDr., Ceasar Moody MD, confirm that all documentation is accurate.

## 2017-04-26 NOTE — TELEPHONE ENCOUNTER
PATIENT CALLED FOR DR. NORTON. PATIENT SAW  TODAY 4/26/17. SAID SHE FORGOT TO ASK DR. NORTON IF IT IS OKAY FOR HER TO WALK FOR EXERCISE. PATIENT TEL. [phone].

## 2017-04-26 NOTE — MR AVS SNAPSHOT
Visit Information Date & Time Provider Department Dept. Phone Encounter #  
 4/26/2017  9:10 AM Vitaliy Ordaz MD [facility] [phone] [id number] Your Appointments 4/28/2017  9:15 AM  
Follow Up with Karlee Cartwright MD  
[facility] ([facility]) Appt Note: f/u for shoulder pain [address] [place] [address] [address]  
  
   
 [address] [facility] Upcoming Health Maintenance Date Due DTaP/Tdap/Td series (1 - Tdap) 3/5/1976 PAP AKA CERVICAL CYTOLOGY 3/5/1976 FOBT Q 1 YEAR AGE 50-75 3/5/2005 BREAST CANCER SCRN MAMMOGRAM 3/16/2019 Allergies as of 4/26/2017  Review Complete On: 4/26/2017 By: Marylen Marcus Severity Noted Reaction Type Reactions Penicillin G  04/04/2017    Hives Current Immunizations  Never Reviewed No immunizations on file. Not reviewed this visit You Were Diagnosed With   
  
 Codes Comments Primary osteoarthritis of left foot    -  Primary ICD-10-CM: V56.884 ICD-9-CM: 715.17 Left foot pain     ICD-10-CM: A19.770 ICD-9-CM: 729.5 Vitals BP Pulse Temp Height(growth percentile) LMP Smoking Status (!) 163/107 72 96.9 °F (36.1 °C) (Oral) 5' 6\" (1.676 m) (Approximate) Never Smoker Your Updated Medication List  
  
   
This list is accurate as of: 4/26/17 10:12 AM.  Always use your most recent med list.  
  
  
  
  
 ascorbic acid (vitamin C) 500 mg tablet Commonly known as:  VITAMIN C Take 1,000 mg by mouth two (2) times a day. aspirin 81 mg chewable tablet Take 81 mg by mouth daily. atenolol 25 mg tablet Commonly known as:  TENORMIN Take 25 mg by mouth daily. cholecalciferol (VITAMIN D3) 5,000 unit Tab tablet Commonly known as:  VITAMIN D3 Take  by mouth daily. Citracal + D tablet Generic drug:  calcium citrate-vitamin D3  
 Take  by mouth two (2) times a day. CRESTOR 10 mg tablet Generic drug:  rosuvastatin Take 10 mg by mouth nightly. cyanocobalamin 500 mcg tablet Commonly known as:  VITAMIN B12 Take 2,500 mcg by mouth daily. GLUCOSAMINE 1500 COMPLEX 500-400 mg capsule Generic drug:  glucosamine-chondroit-vit c-mn Take  by mouth daily. MAXALT-MLT 10 mg disintegrating tablet Generic drug:  rizatriptan Take 10 mg by mouth once as needed for Migraine. multivitamin tablet Commonly known as:  ONE A DAY Take 1 Tab by mouth daily. ORACEA 40 mg capsule Generic drug:  doxycycline monohydrate Take 40 mg by mouth every morning. PROBIOTIC 4X 10-15 mg Tbec Generic drug:  B.infantis-B.ani-B.long-B.bifi Take  by mouth. We Performed the Following AMB POC XRAY, FOOT; COMPLETE, 3+ VIEW [16298 CPT(R)] Patient Instructions Please follow up in 3 weeks. You are advised to contact us if your condition worsens. Foot Pain: Care Instructions Your Care Instructions Foot injuries that cause pain and swelling are fairly common. Almost all sports or home repair projects can cause a misstep that ends up as foot pain. Normal wear and tear, especially as you get older, also can cause foot pain. Most minor foot injuries will heal on their own, and home treatment is usually all you need to do. If you have a severe injury, you may need tests and treatment. Follow-up care is a key part of your treatment and safety. Be sure to make and go to all appointments, and call your doctor if you are having problems. Its also a good idea to know your test results and keep a list of the medicines you take. How can you care for yourself at home? · Take pain medicines exactly as directed. ¨ If the doctor gave you a prescription medicine for pain, take it as prescribed.  
¨ If you are not taking a prescription pain medicine, ask your doctor if you can take an over-the-counter medicine. · Rest and protect your foot. Take a break from any activity that may cause pain. · Put ice or a cold pack on your foot for 10 to 20 minutes at a time. Put a thin cloth between the ice and your skin. · Prop up the sore foot on a pillow when you ice it or anytime you sit or lie down during the next 3 days. Try to keep it above the level of your heart. This will help reduce swelling. · Your doctor may recommend that you wrap your foot with an elastic bandage. Keep your foot wrapped for as long as your doctor advises. · If your doctor recommends crutches, use them as directed. · Wear roomy footwear. · As soon as pain and swelling end, begin gentle exercises of your foot. Your doctor can tell you which exercises will help. When should you call for help? Call 911 anytime you think you may need emergency care. For example, call if: 
· Your foot turns pale, white, blue, or cold. Call your doctor now or seek immediate medical care if: 
· You cannot move or stand on your foot. · Your foot looks twisted or out of its normal position. · Your foot is not stable when you step down. · You have signs of infection, such as: 
¨ Increased pain, swelling, warmth, or redness. ¨ Red streaks leading from the sore area. ¨ Pus draining from a place on your foot. ¨ A fever. · Your foot is numb or tingly. Watch closely for changes in your health, and be sure to contact your doctor if: 
· You do not get better as expected. · You have bruises from an injury that last longer than 2 weeks. Where can you learn more? Go to http://kendrick-emerald.info/. Enter R961 in the search box to learn more about \"Foot Pain: Care Instructions. \" Current as of: May 23, 2016 Content Version: 11.2 © 3059-9024 Tindie.  Care instructions adapted under license by [employer] (which disclaims liability or warranty for this information). If you have questions about a medical condition or this instruction, always ask your healthcare professional. [address] any warranty or liability for your use of this information. Introducing \[facility] [place] [facility]\"" & [facility] SERVICES! Angelique Thompson introduces Nephera patient portal. Now you can access parts of your medical record, email your doctor's office, and request medication refills online. 1. In your internet browser, go to [email or web address] 2. Click on the First Time User? Click Here link in the Sign In box. You will see the New Member Sign Up page. 3. Enter your Nephera Access Code exactly as it appears below. You will not need to use this code after youve completed the sign-up process. If you do not sign up before the expiration date, you must request a new code. · Nephera Access Code: [email or web address] Expires: 7/3/2017 11:05 AM 
 
4. Enter the last four digits of your Social Security Number (xxxx) and Date of Birth (mm/dd/yyyy) as indicated and click Submit. You will be taken to the next sign-up page. 5. Create a Nephera ID. This will be your Nephera login ID and cannot be changed, so think of one that is secure and easy to remember. 6. Create a Nephera password. You can change your password at any time. 7. Enter your Password Reset Question and Answer. This can be used at a later time if you forget your password. 8. Enter your e-mail address. You will receive e-mail notification when new information is available in [address] 9. Click Sign Up. You can now view and download portions of your medical record. 10. Click the Download Summary menu link to download a portable copy of your medical information. If you have questions, please visit the Frequently Asked Questions section of the Nephera website. Remember, Nephera is NOT to be used for urgent needs. For medical emergencies, dial 911. Now available from your iPhone and Android! Please provide this summary of care documentation to your next provider. Your primary care clinician is listed as PROVIDER UNKNOWN. If you have any questions after today's visit, please call [phone].

## 2017-04-26 NOTE — PROCEDURES
DIAGNOSTIC STUDIES: X-rays of the left foot, three views, non-weightbearing: These films reveal postop changes from a left great toe first MTP joint arthrodesis, as well as a left number two MTP joint arthrodesis. The proposed arthrodesis to the left number two MTP looks solid on all three views. The MTP proposed arthrodesis site and the MTP shows still a slight lucency to the lateral portion of this proposed arthrodesis site. There is no hardware failure. The toe is in good position. In the lateral film, the proposed arthrodesis site at the MTP looks quite good. IMPRESSION: Status post midfoot fusion, selective midfoot number two TMT fusion with great toe MTP joint arthrodesis.

## 2017-04-26 NOTE — PATIENT INSTRUCTIONS
Please follow up in 3 weeks. You are advised to contact us if your condition worsens. Foot Pain: Care Instructions  Your Care Instructions  Foot injuries that cause pain and swelling are fairly common. Almost all sports or home repair projects can cause a misstep that ends up as foot pain. Normal wear and tear, especially as you get older, also can cause foot pain. Most minor foot injuries will heal on their own, and home treatment is usually all you need to do. If you have a severe injury, you may need tests and treatment. Follow-up care is a key part of your treatment and safety. Be sure to make and go to all appointments, and call your doctor if you are having problems. Its also a good idea to know your test results and keep a list of the medicines you take. How can you care for yourself at home? · Take pain medicines exactly as directed. ¨ If the doctor gave you a prescription medicine for pain, take it as prescribed. ¨ If you are not taking a prescription pain medicine, ask your doctor if you can take an over-the-counter medicine. · Rest and protect your foot. Take a break from any activity that may cause pain. · Put ice or a cold pack on your foot for 10 to 20 minutes at a time. Put a thin cloth between the ice and your skin. · Prop up the sore foot on a pillow when you ice it or anytime you sit or lie down during the next 3 days. Try to keep it above the level of your heart. This will help reduce swelling. · Your doctor may recommend that you wrap your foot with an elastic bandage. Keep your foot wrapped for as long as your doctor advises. · If your doctor recommends crutches, use them as directed. · Wear roomy footwear. · As soon as pain and swelling end, begin gentle exercises of your foot. Your doctor can tell you which exercises will help. When should you call for help? Call 911 anytime you think you may need emergency care.  For example, call if:  · Your foot turns pale, white, blue, or cold. Call your doctor now or seek immediate medical care if:  · You cannot move or stand on your foot. · Your foot looks twisted or out of its normal position. · Your foot is not stable when you step down. · You have signs of infection, such as:  ¨ Increased pain, swelling, warmth, or redness. ¨ Red streaks leading from the sore area. ¨ Pus draining from a place on your foot. ¨ A fever. · Your foot is numb or tingly. Watch closely for changes in your health, and be sure to contact your doctor if:  · You do not get better as expected. · You have bruises from an injury that last longer than 2 weeks. Where can you learn more? Go to http://kendrick-emerald.info/. Enter A899 in the search box to learn more about \"Foot Pain: Care Instructions. \"  Current as of: May 23, 2016  Content Version: 11.2  © 5144-0244 [employer]unglass. Care instructions adapted under license by [employer] (which disclaims liability or warranty for this information). If you have questions about a medical condition or this instruction, always ask your healthcare professional. [place] [address] any warranty or liability for your use of this information.

## 2017-04-28 ENCOUNTER — OFFICE VISIT (OUTPATIENT)
Dept: FAMILY MEDICINE CLINIC | Age: 62
End: 2017-04-28

## 2017-04-28 VITALS
RESPIRATION RATE: 18 BRPM | OXYGEN SATURATION: 100 % | DIASTOLIC BLOOD PRESSURE: 93 MMHG | HEIGHT: 66 IN | HEART RATE: 66 BPM | BODY MASS INDEX: 25.23 KG/M2 | WEIGHT: 157 LBS | SYSTOLIC BLOOD PRESSURE: 156 MMHG

## 2017-04-28 DIAGNOSIS — M79.672 LEFT FOOT PAIN: ICD-10-CM

## 2017-04-28 DIAGNOSIS — G43.909 MIGRAINE WITHOUT STATUS MIGRAINOSUS, NOT INTRACTABLE, UNSPECIFIED MIGRAINE TYPE: Primary | ICD-10-CM

## 2017-04-28 DIAGNOSIS — M25.511 RIGHT SHOULDER PAIN, UNSPECIFIED CHRONICITY: ICD-10-CM

## 2017-04-28 DIAGNOSIS — R03.0 ELEVATED BP WITHOUT DIAGNOSIS OF HYPERTENSION: ICD-10-CM

## 2017-04-28 DIAGNOSIS — E78.5 DYSLIPIDEMIA: ICD-10-CM

## 2017-04-28 RX ORDER — ATENOLOL 50 MG/1
50 TABLET ORAL DAILY
Qty: 90 TAB | Refills: 3 | Status: SHIPPED | OUTPATIENT
Start: 2017-04-28 | End: 2017-05-16 | Stop reason: SDUPTHER

## 2017-04-28 NOTE — PATIENT INSTRUCTIONS
Migraine Headache: Care Instructions  Your Care Instructions  Migraines are painful, throbbing headaches that often start on one side of the head. They may cause nausea and vomiting and make you sensitive to light, sound, or smell. Without treatment, migraines can last from 4 hours to a few days. Medicines can help prevent migraines or stop them after they have started. Your doctor can help you find which ones work best for you. Follow-up care is a key part of your treatment and safety. Be sure to make and go to all appointments, and call your doctor if you are having problems. It's also a good idea to know your test results and keep a list of the medicines you take. How can you care for yourself at home? · Do not drive if you have taken a prescription pain medicine. · Rest in a quiet, dark room until your headache is gone. Close your eyes, and try to relax or go to sleep. Don't watch TV or read. · Put a cold, moist cloth or cold pack on the painful area for 10 to 20 minutes at a time. Put a thin cloth between the cold pack and your skin. · Use a warm, moist towel or a heating pad set on low to relax tight shoulder and neck muscles. · Have someone gently massage your neck and shoulders. · Take your medicines exactly as prescribed. Call your doctor if you think you are having a problem with your medicine. You will get more details on the specific medicines your doctor prescribes. · Be careful not to take pain medicine more often than the instructions allow. You could get worse or more frequent headaches when the medicine wears off. To prevent migraines  · Keep a headache diary so you can figure out what triggers your headaches. Avoiding triggers may help you prevent headaches. Record when each headache began, how long it lasted, and what the pain was like.  (Was it throbbing, aching, stabbing, or dull?) Write down any other symptoms you had with the headache, such as nausea, flashing lights or dark spots, or sensitivity to bright light or loud noise. Note if the headache occurred near your period. List anything that might have triggered the headache. Triggers may include certain foods (chocolate, cheese, wine) or odors, smoke, bright light, stress, or lack of sleep. · If your doctor has prescribed medicine for your migraines, take it as directed. You may have medicine that you take only when you get a migraine and medicine that you take all the time to help prevent migraines. ¨ If your doctor has prescribed medicine for when you get a headache, take it at the first sign of a migraine, unless your doctor has given you other instructions. ¨ If your doctor has prescribed medicine to prevent migraines, take it exactly as prescribed. Call your doctor if you think you are having a problem with your medicine. · Find healthy ways to deal with stress. Migraines are most common during or right after stressful times. Take time to relax before and after you do something that has caused a migraine in the past.  · Try to keep your muscles relaxed by keeping good posture. Check your jaw, face, neck, and shoulder muscles for tension. Try to relax them. When you sit at a desk, change positions often. And make sure to stretch for 30 seconds each hour. · Get plenty of sleep and exercise. · Eat meals on a regular schedule. Avoid foods and drinks that often trigger migraines. These include chocolate, alcohol (especially red wine and port), aspartame, monosodium glutamate (MSG), and some additives found in foods (such as hot dogs, hilario, cold cuts, aged cheeses, and pickled foods). · Limit caffeine. Don't drink too much coffee, tea, or soda. But don't quit caffeine suddenly. That can also give you migraines. · Do not smoke or allow others to smoke around you. If you need help quitting, talk to your doctor about stop-smoking programs and medicines. These can increase your chances of quitting for good.   · If you are taking birth control pills or hormone therapy, talk to your doctor about whether they are triggering your migraines. When should you call for help? Call 911 anytime you think you may need emergency care. For example, call if:  · You have signs of a stroke. These may include:  ¨ Sudden numbness, paralysis, or weakness in your face, arm, or leg, especially on only one side of your body. ¨ Sudden vision changes. ¨ Sudden trouble speaking. ¨ Sudden confusion or trouble understanding simple statements. ¨ Sudden problems with walking or balance. ¨ A sudden, severe headache that is different from past headaches. Call your doctor now or seek immediate medical care if:  · You have new or worse nausea and vomiting. · You have a new or higher fever. · Your headache gets much worse. Watch closely for changes in your health, and be sure to contact your doctor if:  · You are not getting better after 2 days (48 hours). Where can you learn more? Go to http://kendrick-emerald.info/. Enter L514 in the search box to learn more about \"Migraine Headache: Care Instructions. \"  Current as of: October 14, 2016  Content Version: 11.2  © 9889-5781 [employer]. Care instructions adapted under license by [employer] (which disclaims liability or warranty for this information). If you have questions about a medical condition or this instruction, always ask your healthcare professional. [address] any warranty or liability for your use of this information. Elevated Blood Pressure: Care Instructions  Your Care Instructions    Blood pressure is a measure of how hard the blood pushes against the walls of your arteries. It's normal for blood pressure to go up and down throughout the day. But if it stays up over time, you have high blood pressure. Two numbers tell you your blood pressure. The first number is the systolic pressure.  It shows how hard the blood pushes when your heart is pumping. The second number is the diastolic pressure. It shows how hard the blood pushes between heartbeats, when your heart is relaxed and filling with blood. An ideal blood pressure in adults is less than 120/80 (say \"120 over 80\"). High blood pressure is 140/90 or higher. You have high blood pressure if your top number is 140 or higher or your bottom number is 90 or higher, or both. The main test for high blood pressure is simple, fast, and painless. To diagnose high blood pressure, your doctor will test your blood pressure at different times. After testing your blood pressure, your doctor may ask you to test it again when you are home. If you are diagnosed with high blood pressure, you can work with your doctor to make a long-term plan to manage it. Follow-up care is a key part of your treatment and safety. Be sure to make and go to all appointments, and call your doctor if you are having problems. It's also a good idea to know your test results and keep a list of the medicines you take. How can you care for yourself at home? · Do not smoke. Smoking increases your risk for heart attack and stroke. If you need help quitting, talk to your doctor about stop-smoking programs and medicines. These can increase your chances of quitting for good. · Stay at a healthy weight. · Try to limit how much sodium you eat to less than 2,300 milligrams (mg) a day. Your doctor may ask you to try to eat less than 1,500 mg a day. · Be physically active. Get at least 30 minutes of exercise on most days of the week. Walking is a good choice. You also may want to do other activities, such as running, swimming, cycling, or playing tennis or team sports. · Avoid or limit alcohol. Talk to your doctor about whether you can drink any alcohol. · Eat plenty of fruits, vegetables, and low-fat dairy products. Eat less saturated and total fats. · Learn how to check your blood pressure at home.   When should you call for help?  Call your doctor now or seek immediate medical care if:  · Your blood pressure is much higher than normal (such as 180/110 or higher). · You think high blood pressure is causing symptoms such as:  ¨ Severe headache. ¨ Blurry vision. Watch closely for changes in your health, and be sure to contact your doctor if:  · You do not get better as expected. Where can you learn more? Go to http://kendrick-emerald.info/. Enter S977 in the search box to learn more about \"Elevated Blood Pressure: Care Instructions. \"  Current as of: October 19, 2016  Content Version: 11.2  © 7210-2929 [employer]. Care instructions adapted under license by [employer] (which disclaims liability or warranty for this information). If you have questions about a medical condition or this instruction, always ask your healthcare professional. [address] any warranty or liability for your use of this information.

## 2017-04-28 NOTE — MR AVS SNAPSHOT
Visit Information Date & Time Provider Department Dept. Phone Encounter #  
 4/28/2017  9:15 AM Karlee Mcclain MD [facility] [phone] [id number] Follow-up Instructions Return in about 3 months (around 7/28/2017), or if symptoms worsen or fail to improve, for HTN, migraine. Your Appointments 5/17/2017  9:20 AM  
Follow Up with Mercedes Gaytan MD  
4 [facility], [address] and [facility] ([address]) Appt Note: 3wk fu  
 [address], [address] [facility]  
[phone] [address] [facility], [facility] [address] Upcoming Health Maintenance Date Due DTaP/Tdap/Td series (1 - Tdap) 3/5/1976 PAP AKA CERVICAL CYTOLOGY 3/5/1976 FOBT Q 1 YEAR AGE 50-75 3/5/2005 BREAST CANCER SCRN MAMMOGRAM 3/16/2019 Allergies as of 4/28/2017  Review Complete On: 4/28/2017 By: Torres Zaman MD  
  
 Severity Noted Reaction Type Reactions Penicillin G  04/04/2017    Hives Current Immunizations  Never Reviewed No immunizations on file. Not reviewed this visit You Were Diagnosed With   
  
 Codes Comments Migraine without status migrainosus, not intractable, unspecified migraine type    -  Primary ICD-10-CM: F82.106 ICD-9-CM: 346.90 Elevated BP without diagnosis of hypertension     ICD-10-CM: R03.0 ICD-9-CM: 796.2 Vitals BP Pulse Resp Height(growth percentile) Weight(growth percentile) LMP  
 (!) 156/93 (BP 1 Location: Left arm, BP Patient Position: Sitting) 66 18 5' 6\" (1.676 m) 157 lb (71.2 kg) (Approximate) SpO2 BMI Smoking Status 100% 25.34 kg/m2 Never Smoker Vitals History BMI and BSA Data Body Mass Index Body Surface Area  
 25.34 kg/m 2 1.82 m 2 Preferred Pharmacy Pharmacy Name Phone [facility] [address] [phone] Your Updated Medication List  
  
   
This list is accurate as of: 4/28/17 10:00 AM.  Always use your most recent med list.  
  
  
  
  
 ascorbic acid (vitamin C) 500 mg tablet Commonly known as:  VITAMIN C Take 1,000 mg by mouth two (2) times a day. aspirin 81 mg chewable tablet Take 81 mg by mouth daily. atenolol 50 mg tablet Commonly known as:  TENORMIN Take 1 Tab by mouth daily. cholecalciferol (VITAMIN D3) 5,000 unit Tab tablet Commonly known as:  VITAMIN D3 Take  by mouth daily. Citracal + D tablet Generic drug:  calcium citrate-vitamin D3 Take  by mouth two (2) times a day. CRESTOR 10 mg tablet Generic drug:  rosuvastatin Take 10 mg by mouth nightly. cyanocobalamin 500 mcg tablet Commonly known as:  VITAMIN B12 Take 2,500 mcg by mouth daily. GLUCOSAMINE 1500 COMPLEX 500-400 mg capsule Generic drug:  glucosamine-chondroit-vit c-mn Take  by mouth daily. MAXALT-MLT 10 mg disintegrating tablet Generic drug:  rizatriptan Take 10 mg by mouth once as needed for Migraine. multivitamin tablet Commonly known as:  ONE A DAY Take 1 Tab by mouth daily. ORACEA 40 mg capsule Generic drug:  doxycycline monohydrate Take 40 mg by mouth every morning. PROBIOTIC 4X 10-15 mg Tbec Generic drug:  B.infantis-B.ani-B.long-B.bifi Take  by mouth. Prescriptions Sent to Pharmacy Refills  
 atenolol (TENORMIN) 50 mg tablet 3 Sig: Take 1 Tab by mouth daily. Class: Normal  
 Pharmacy: 1 [facility]  #: [phone] Route: Oral  
  
Follow-up Instructions Return in about 3 months (around 7/28/2017), or if symptoms worsen or fail to improve, for HTN, migraine. Patient Instructions Migraine Headache: Care Instructions Your Care Instructions Migraines are painful, throbbing headaches that often start on one side of the head. They may cause nausea and vomiting and make you sensitive to light, sound, or smell. Without treatment, migraines can last from 4 hours to a few days. Medicines can help prevent migraines or stop them after they have started. Your doctor can help you find which ones work best for you. Follow-up care is a key part of your treatment and safety. Be sure to make and go to all appointments, and call your doctor if you are having problems. It's also a good idea to know your test results and keep a list of the medicines you take. How can you care for yourself at home? · Do not drive if you have taken a prescription pain medicine. · Rest in a quiet, dark room until your headache is gone. Close your eyes, and try to relax or go to sleep. Don't watch TV or read. · Put a cold, moist cloth or cold pack on the painful area for 10 to 20 minutes at a time. Put a thin cloth between the cold pack and your skin. · Use a warm, moist towel or a heating pad set on low to relax tight shoulder and neck muscles. · Have someone gently massage your neck and shoulders. · Take your medicines exactly as prescribed. Call your doctor if you think you are having a problem with your medicine. You will get more details on the specific medicines your doctor prescribes. · Be careful not to take pain medicine more often than the instructions allow. You could get worse or more frequent headaches when the medicine wears off. To prevent migraines · Keep a headache diary so you can figure out what triggers your headaches. Avoiding triggers may help you prevent headaches. Record when each headache began, how long it lasted, and what the pain was like. (Was it throbbing, aching, stabbing, or dull?) Write down any other symptoms you had with the headache, such as nausea, flashing lights or dark spots, or sensitivity to bright light or loud noise. Note if the headache occurred near your period.  List anything that might have triggered the headache. Triggers may include certain foods (chocolate, cheese, wine) or odors, smoke, bright light, stress, or lack of sleep. · If your doctor has prescribed medicine for your migraines, take it as directed. You may have medicine that you take only when you get a migraine and medicine that you take all the time to help prevent migraines. ¨ If your doctor has prescribed medicine for when you get a headache, take it at the first sign of a migraine, unless your doctor has given you other instructions. ¨ If your doctor has prescribed medicine to prevent migraines, take it exactly as prescribed. Call your doctor if you think you are having a problem with your medicine. · Find healthy ways to deal with stress. Migraines are most common during or right after stressful times. Take time to relax before and after you do something that has caused a migraine in the past. 
· Try to keep your muscles relaxed by keeping good posture. Check your jaw, face, neck, and shoulder muscles for tension. Try to relax them. When you sit at a desk, change positions often. And make sure to stretch for 30 seconds each hour. · Get plenty of sleep and exercise. · Eat meals on a regular schedule. Avoid foods and drinks that often trigger migraines. These include chocolate, alcohol (especially red wine and port), aspartame, monosodium glutamate (MSG), and some additives found in foods (such as hot dogs, hilario, cold cuts, aged cheeses, and pickled foods). · Limit caffeine. Don't drink too much coffee, tea, or soda. But don't quit caffeine suddenly. That can also give you migraines. · Do not smoke or allow others to smoke around you. If you need help quitting, talk to your doctor about stop-smoking programs and medicines. These can increase your chances of quitting for good. · If you are taking birth control pills or hormone therapy, talk to your doctor about whether they are triggering your migraines. When should you call for help? Call 911 anytime you think you may need emergency care. For example, call if: 
· You have signs of a stroke. These may include: 
¨ Sudden numbness, paralysis, or weakness in your face, arm, or leg, especially on only one side of your body. ¨ Sudden vision changes. ¨ Sudden trouble speaking. ¨ Sudden confusion or trouble understanding simple statements. ¨ Sudden problems with walking or balance. ¨ A sudden, severe headache that is different from past headaches. Call your doctor now or seek immediate medical care if: 
· You have new or worse nausea and vomiting. · You have a new or higher fever. · Your headache gets much worse. Watch closely for changes in your health, and be sure to contact your doctor if: 
· You are not getting better after 2 days (48 hours). Where can you learn more? Go to http://kendrick-emerald.info/. Enter P803 in the search box to learn more about \"Migraine Headache: Care Instructions. \" Current as of: October 14, 2016 Content Version: 11.2 © 5739-6485 [employer]. Care instructions adapted under license by [employer] (which disclaims liability or warranty for this information). If you have questions about a medical condition or this instruction, always ask your healthcare professional. [place] [address] any warranty or liability for your use of this information. Elevated Blood Pressure: Care Instructions Your Care Instructions Blood pressure is a measure of how hard the blood pushes against the walls of your arteries. It's normal for blood pressure to go up and down throughout the day. But if it stays up over time, you have high blood pressure. Two numbers tell you your blood pressure. The first number is the systolic pressure. It shows how hard the blood pushes when your heart is pumping. The second number is the diastolic pressure.  It shows how hard the blood pushes between heartbeats, when your heart is relaxed and filling with blood. An ideal blood pressure in adults is less than 120/80 (say \"120 over 80\"). High blood pressure is 140/90 or higher. You have high blood pressure if your top number is 140 or higher or your bottom number is 90 or higher, or both. The main test for high blood pressure is simple, fast, and painless. To diagnose high blood pressure, your doctor will test your blood pressure at different times. After testing your blood pressure, your doctor may ask you to test it again when you are home. If you are diagnosed with high blood pressure, you can work with your doctor to make a long-term plan to manage it. Follow-up care is a key part of your treatment and safety. Be sure to make and go to all appointments, and call your doctor if you are having problems. It's also a good idea to know your test results and keep a list of the medicines you take. How can you care for yourself at home? · Do not smoke. Smoking increases your risk for heart attack and stroke. If you need help quitting, talk to your doctor about stop-smoking programs and medicines. These can increase your chances of quitting for good. · Stay at a healthy weight. · Try to limit how much sodium you eat to less than 2,300 milligrams (mg) a day. Your doctor may ask you to try to eat less than 1,500 mg a day. · Be physically active. Get at least 30 minutes of exercise on most days of the week. Walking is a good choice. You also may want to do other activities, such as running, swimming, cycling, or playing tennis or team sports. · Avoid or limit alcohol. Talk to your doctor about whether you can drink any alcohol. · Eat plenty of fruits, vegetables, and low-fat dairy products. Eat less saturated and total fats. · Learn how to check your blood pressure at home. When should you call for help? Call your doctor now or seek immediate medical care if: · Your blood pressure is much higher than normal (such as 180/110 or higher). · You think high blood pressure is causing symptoms such as: ¨ Severe headache. ¨ Blurry vision. Watch closely for changes in your health, and be sure to contact your doctor if: 
· You do not get better as expected. Where can you learn more? Go to http://kendrick-emerald.info/. Enter G804 in the search box to learn more about \"Elevated Blood Pressure: Care Instructions. \" Current as of: October 19, 2016 Content Version: 11.2 © 2313-3007 [employer]. Care instructions adapted under license by [employer] (which disclaims liability or warranty for this information). If you have questions about a medical condition or this instruction, always ask your healthcare professional. [address] any warranty or liability for your use of this information. Introducing [facility]! Mariela Rodriguez introduces [facility] patient portal. Now you can access parts of your medical record, email your doctor's office, and request medication refills online. 1. In your internet browser, go to [email or web address] 2. Click on the First Time User? Click Here link in the Sign In box. You will see the New Member Sign Up page. 3. Enter your [facility] Access Code exactly as it appears below. You will not need to use this code after youve completed the sign-up process. If you do not sign up before the expiration date, you must request a new code. · [facility] Access Code: [email or web address] Expires: 7/3/2017 11:05 AM 
 
4. Enter the last four digits of your Social Security Number (xxxx) and Date of Birth (mm/dd/yyyy) as indicated and click Submit. You will be taken to the next sign-up page. 5. Create a [facility] ID. This will be your [facility] login ID and cannot be changed, so think of one that is secure and easy to remember. 6. Create a DocTree password. You can change your password at any time. 7. Enter your Password Reset Question and Answer. This can be used at a later time if you forget your password. 8. Enter your e-mail address. You will receive e-mail notification when new information is available in [address] 9. Click Sign Up. You can now view and download portions of your medical record. 10. Click the Download Summary menu link to download a portable copy of your medical information. If you have questions, please visit the Frequently Asked Questions section of the DocTree website. Remember, DocTree is NOT to be used for urgent needs. For medical emergencies, dial 911. Now available from your iPhone and Android! Please provide this summary of care documentation to your next provider. Your primary care clinician is listed as PROVIDER UNKNOWN. If you have any questions after today's visit, please call [phone].

## 2017-04-28 NOTE — PROGRESS NOTES
Chief Complaint   Patient presents with    Follow-up     Shoulder pain, has seen Dr. Brant Hyde and received a injection and MRI      1. Have you been to the ER, urgent care clinic since your last visit? Hospitalized since your last visit? No    2. Have you seen or consulted any other health care providers outside of the [address] [place] [address] since your last visit? Include any pap smears or colon screening. No     HPI  [facility] comes in for f/u care. 1) Elevated BP: Patient's BP is elevated. Has been up last few times she went to the doctor. However at home with her BP cuff the readings are normal. She even bought a different arm cuff that has also shown normal readings. She is on atenolol. Will increase this to 50 mg daily. 2) Shoulder pain: Patient has right shoulder pain. Has had MRI and received steroid injection. Seems to be getting better. 3) Migraine headache: on atenolol for migraine prophylaxis. Been getting headaches at times in the morning. Will increase the atenolol to 50 mg daily. 4) Patient seen by ortho for her left foot and had arthrodesis done. Recovering well. Will continue with ortho recommendations. 5) Dyslipidemia: patient has a h/o dyslipidemia. Would prefer to get labs checked at next visit. Past Medical History  Past Medical History:   Diagnosis Date    Arthritis     Hx of migraines        Surgical History  Past Surgical History:   Procedure Laterality Date    FOOT/TOES SURGERY PROC UNLISTED      HX BACK SURGERY      HX KNEE ARTHROSCOPY      HX PARTIAL HYSTERECTOMY          Medications  Current Outpatient Prescriptions   Medication Sig Dispense Refill    atenolol (TENORMIN) 50 mg tablet Take 1 Tab by mouth daily. 90 Tab 3    doxycycline monohydrate (ORACEA) 40 mg capsule Take 40 mg by mouth every morning.  rizatriptan (MAXALT-MLT) 10 mg disintegrating tablet Take 10 mg by mouth once as needed for Migraine.       ascorbic acid, vitamin C, (VITAMIN C) 500 mg tablet Take 1,000 mg by mouth two (2) times a day.  calcium citrate-vitamin D3 (CITRACAL + D) tablet Take  by mouth two (2) times a day.  multivitamin (ONE A DAY) tablet Take 1 Tab by mouth daily.  cholecalciferol, VITAMIN D3, (VITAMIN D3) 5,000 unit tab tablet Take  by mouth daily.  cyanocobalamin (VITAMIN B12) 500 mcg tablet Take 2,500 mcg by mouth daily.  aspirin 81 mg chewable tablet Take 81 mg by mouth daily.  glucosamine-chondroit-vit c-mn (GLUCOSAMINE 1500 COMPLEX) 500-400 mg capsule Take  by mouth daily.  B.infantis-B.ani-B.long-B.bifi (PROBIOTIC 4X) 10-15 mg TbEC Take  by mouth.  rosuvastatin (CRESTOR) 10 mg tablet Take 10 mg by mouth nightly. Allergies  Allergies   Allergen Reactions    Penicillin G Hives       Family History  No family history on file. Social History  Social History     Social History    Marital status:      Spouse name: N/A    Number of children: N/A    Years of education: N/A     Occupational History    Not on file. Social History Main Topics    Smoking status: Never Smoker    Smokeless tobacco: Never Used    Alcohol use No    Drug use:  Yes    Sexual activity: Yes     Partners: Male     Other Topics Concern     Service No    Blood Transfusions Yes    Caffeine Concern No    Occupational Exposure No    Hobby Hazards No    Sleep Concern No    Stress Concern No    Weight Concern No    Special Diet No    Back Care No    Exercise Yes    Bike Helmet No    Seat Belt Yes    Self-Exams Yes     Social History Narrative       Review of Systems  Review of Systems - History obtained from chart review and the patient  General ROS: negative  Psychological ROS: negative  Ophthalmic ROS: positive for - uses glasses  ENT ROS: negative  Respiratory ROS: no cough, shortness of breath, or wheezing  Cardiovascular ROS: no chest pain or dyspnea on exertion  Gastrointestinal ROS: no abdominal pain, change in bowel habits, or black or bloody stools  Genito-Urinary ROS: no dysuria, trouble voiding, or hematuria  Musculoskeletal ROS: positive for - pain in shoulder - right  Neurological ROS: negative    Vital Signs  Visit Vitals    BP (!) 156/93 (BP 1 Location: Left arm, BP Patient Position: Sitting)    Pulse 66    Resp 18    Ht 5' 6\" (1.676 m)    Wt 157 lb (71.2 kg)    LMP  (Approximate)    SpO2 100%    BMI 25.34 kg/m2         Physical Exam  Physical Examination: General appearance - alert, well appearing, and in no distress, oriented to person, place, and time, normal appearing weight and acyanotic, in no respiratory distress  Mental status - alert, oriented to person, place, and time, normal mood, behavior, speech, dress, motor activity, and thought processes  Neck - supple, no significant adenopathy  Chest - clear to auscultation, no wheezes, rales or rhonchi, symmetric air entry  Heart - normal rate, regular rhythm, normal S1, S2, no murmurs, rubs, clicks or gallops  Back exam - limited range of motion  Neurological - alert, oriented, normal speech, no focal findings or movement disorder noted  Musculoskeletal - no muscular tenderness noted    Diagnostics  Orders Placed This Encounter    atenolol (TENORMIN) 50 mg tablet     Sig: Take 1 Tab by mouth daily. Dispense:  90 Tab     Refill:  3         Results  Results for orders placed or performed during the hospital encounter of 04/10/17   HEPATITIS C AB   Result Value Ref Range    Hepatitis C virus Ab 0.09 <0.80 Index    Hep C  virus Ab Interp. NEGATIVE  NEG      Hep C  virus Ab comment           ASSESSMENT and PLAN    ICD-10-CM ICD-9-CM    1. Migraine without status migrainosus, not intractable, unspecified migraine type G43.909 346.90 atenolol (TENORMIN) 50 mg tablet   2. Elevated BP without diagnosis of hypertension R03.0 796.2 atenolol (TENORMIN) 50 mg tablet   3. Right shoulder pain, unspecified chronicity M25.511 719.41    4. Left foot pain M79.672 729.5    5. Dyslipidemia E78.5 272.4      reviewed diet, exercise and weight control  reviewed medications and side effects in detail    I have discussed the diagnosis with the patient and the intended plan of care as seen in the above orders. The patient has received an after-visit summary and questions were answered concerning future plans. I have discussed medication, side effects, and warnings with the patient in detail. The patient verbalized understanding and is in agreement with the plan of care. The patient will contact the office with any additional concerns.     Analy Box MD

## 2017-05-16 DIAGNOSIS — G43.909 MIGRAINE WITHOUT STATUS MIGRAINOSUS, NOT INTRACTABLE, UNSPECIFIED MIGRAINE TYPE: ICD-10-CM

## 2017-05-16 DIAGNOSIS — R03.0 ELEVATED BP WITHOUT DIAGNOSIS OF HYPERTENSION: ICD-10-CM

## 2017-05-16 RX ORDER — DOXYCYCLINE 40 MG/1
40 CAPSULE ORAL
Qty: 30 CAP | Refills: 3 | Status: SHIPPED | OUTPATIENT
Start: 2017-05-16 | End: 2017-07-28 | Stop reason: ALTCHOICE

## 2017-05-16 RX ORDER — RIZATRIPTAN BENZOATE 10 MG/1
10 TABLET, ORALLY DISINTEGRATING ORAL
Qty: 30 TAB | Refills: 2 | Status: SHIPPED | OUTPATIENT
Start: 2017-05-16 | End: 2018-01-01 | Stop reason: SDUPTHER

## 2017-05-16 RX ORDER — ATENOLOL 50 MG/1
50 TABLET ORAL DAILY
Qty: 90 TAB | Refills: 3 | Status: SHIPPED | OUTPATIENT
Start: 2017-05-16 | End: 2018-03-27 | Stop reason: SDUPTHER

## 2017-05-16 NOTE — TELEPHONE ENCOUNTER
Patient requesting the following medication refill     Medication requested:  Rizatriptan 10 mg, doxycycline monohydrate 40 mg, Atenolol 50 mg last prescribed 4/28/2017     Date last prescribed: Historical provider for Rizatriptan, Doxycycline monohydrate     Date patient last seen: 4/28/17    Follow up appointment scheduled: 07/28/17      Please Advise . George Lagos [facility]

## 2017-05-17 ENCOUNTER — OFFICE VISIT (OUTPATIENT)
Dept: ORTHOPEDIC SURGERY | Age: 62
End: 2017-05-17

## 2017-05-17 VITALS
TEMPERATURE: 95.9 F | HEIGHT: 66 IN | DIASTOLIC BLOOD PRESSURE: 96 MMHG | BODY MASS INDEX: 25.55 KG/M2 | SYSTOLIC BLOOD PRESSURE: 155 MMHG | HEART RATE: 78 BPM | WEIGHT: 159 LBS

## 2017-05-17 DIAGNOSIS — M19.072 PRIMARY OSTEOARTHRITIS OF LEFT FOOT: Primary | ICD-10-CM

## 2017-05-17 NOTE — MR AVS SNAPSHOT
Visit Information Date & Time Provider Department Dept. Phone Encounter #  
 5/17/2017  9:20 AM Marlene Patrick MD [facility] [id number] Your Appointments 7/28/2017  9:15 AM  
ROUTINE CARE with Karlee Lindquist MD  
[facility] ([address]) Appt Note: rov for htn, migraine [address] [address] [address] [facility]  
  
   
 [address] Upcoming Health Maintenance Date Due DTaP/Tdap/Td series (1 - Tdap) 3/5/1976 PAP AKA CERVICAL CYTOLOGY 3/5/1976 FOBT Q 1 YEAR AGE 50-75 3/5/2005 INFLUENZA AGE 9 TO ADULT 8/1/2017 BREAST CANCER SCRN MAMMOGRAM 3/16/2019 Allergies as of 5/17/2017  Review Complete On: 5/17/2017 By: Maura Rea Severity Noted Reaction Type Reactions Penicillin G  04/04/2017    Hives Current Immunizations  Never Reviewed No immunizations on file. Not reviewed this visit Vitals BP Pulse Temp Height(growth percentile) Weight(growth percentile) BMI  
 (!) 155/96 78 95.9 °F (35.5 °C) (Oral) 5' 6\" (1.676 m) 159 lb (72.1 kg) 25.66 kg/m2 Smoking Status Never Smoker BMI and BSA Data Body Mass Index Body Surface Area  
 25.66 kg/m 2 1.83 m 2 Preferred Pharmacy Pharmacy Name Phone Maximo Kincaid, [facility] [phone] Your Updated Medication List  
  
   
This list is accurate as of: 5/17/17 10:41 AM.  Always use your most recent med list.  
  
  
  
  
 ascorbic acid (vitamin C) 500 mg tablet Commonly known as:  VITAMIN C Take 1,000 mg by mouth two (2) times a day. aspirin 81 mg chewable tablet Take 81 mg by mouth daily. atenolol 50 mg tablet Commonly known as:  TENORMIN Take 1 Tab by mouth daily. cholecalciferol (VITAMIN D3) 5,000 unit Tab tablet Commonly known as:  VITAMIN D3 Take  by mouth daily. Citracal + D tablet Generic drug:  calcium citrate-vitamin D3 Take  by mouth two (2) times a day. CRESTOR 10 mg tablet Generic drug:  rosuvastatin Take 10 mg by mouth nightly. cyanocobalamin 500 mcg tablet Commonly known as:  VITAMIN B12 Take 2,500 mcg by mouth daily. doxycycline monohydrate 40 mg capsule Commonly known as:  [facility] Gl[employer]of[employer] Take 40 mg by mouth every morning. GLUCOSAMINE 1500 COMPLEX 500-400 mg capsule Generic drug:  glucosamine-chondroit-vit c-mn Take  by mouth daily. multivitamin tablet Commonly known as:  ONE A DAY Take 1 Tab by mouth daily. PROBIOTIC 4X 10-15 mg Tbec Generic drug:  B.infantis-B.ani-B.long-B.bifi Take  by mouth.  
  
 rizatriptan 10 mg disintegrating tablet Commonly known as:  MAXALT-MLT Take 1 Tab by mouth once as needed for Migraine. Patient Instructions Please follow up in 3 months. You are advised to contact us if your condition worsens. Osteoarthritis: Care Instructions Your Care Instructions Arthritis is a common health problem in which the joints are inflamed. There are several kinds of arthritis. Osteoarthritis is caused by a breakdown of cartilage, the hard, thick tissue that cushions the joints. It causes pain, stiffness, and swelling, often in the spine, fingers, hips, and knees. Osteoarthritis can happen at any age, but it is most common in older people. Osteoarthritis never goes away completely, but it can be controlled. Medicine and home treatment can reduce the pain and prevent the arthritis from getting worse. Follow-up care is a key part of your treatment and safety. Be sure to make and go to all appointments, and call your doctor if you are having problems. It's also a good idea to know your test results and keep a list of the medicines you take. How can you care for yourself at home? · Take a warm shower or bath in the morning to relieve stiffness. Avoid sitting still afterwards. · If the joint is not swollen, use moist heat, like a warm, damp towel, for 20 to 30 minutes, 2 or 3 times a day. Do not use heat on a swollen joint. · If the joint is swollen, use ice or cold packs for 10 to 20 minutes, once an hour. Cold will help relieve pain and reduce inflammation. Put a thin cloth between the ice and your skin. · To prevent stiffness, gently move the joint through its full range of motion several times a day. · If the joint hurts, avoid activities that put a strain on it for a few days. Take rest breaks throughout the day. · Get regular exercise. Walking, swimming, yoga, biking, christi chi, and water aerobics are good exercises that are gentle on the joints. · Reach and stay at a healthy weight. If you need to lose or maintain weight, regular exercise and a healthy diet will help. Extra weight can strain the joints, especially the knees and hips, and make the pain worse. Losing even a few pounds may help. · Take pain medicines exactly as directed. ¨ If the doctor gave you a prescription medicine for pain, take it as prescribed. ¨ If you are not taking a prescription pain medicine, ask your doctor if you can take an over-the-counter medicine. When should you call for help? Call your doctor now or seek immediate medical care if: · The pain is so bad that you cannot use the joint. · You have sudden back pain with weakness in your legs or loss of bowel or bladder control. · Your stools are black and tarlike or have streaks of blood. · You have severe pain and swelling in more than one joint. Watch closely for changes in your health, and be sure to contact your doctor if: 
· You have side effects from the medicines, like belly pain, ongoing heartburn, or nausea. · Joint pain continues for more than 6 weeks, and home treatment is not helping. Where can you learn more? Go to http://kendrick-emerald.info/. Enter C405 in the search box to learn more about \"Osteoarthritis: Care Instructions. \" Current as of: November 28, 2016 Content Version: 11.2 © 1197-9902 [employer]. Care instructions adapted under license by [employer] (which disclaims liability or warranty for this information). If you have questions about a medical condition or this instruction, always ask your healthcare professional. [address] any warranty or liability for your use of this information. Introducing [facility]! Kayla Moreno introduces Satiety patient portal. Now you can access parts of your medical record, email your doctor's office, and request medication refills online. 1. In your internet browser, go to [email or web address] Fourth 2. Click on the First Time User? Click Here link in the Sign In box. You will see the New Member Sign Up page. 3. Enter your Satiety Access Code exactly as it appears below. You will not need to use this code after youve completed the sign-up process. If you do not sign up before the expiration date, you must request a new code. · Satiety Access Code: [email or web address] Expires: 7/3/2017 11:05 AM 
 
4. Enter the last four digits of your Social Security Number (xxxx) and Date of Birth (mm/dd/yyyy) as indicated and click Submit. You will be taken to the next sign-up page. 5. Create a Satiety ID. This will be your Satiety login ID and cannot be changed, so think of one that is secure and easy to remember. 6. Create a Satiety password. You can change your password at any time. 7. Enter your Password Reset Question and Answer. This can be used at a later time if you forget your password. 8. Enter your e-mail address. You will receive e-mail notification when new information is available in [address] 9. Click Sign Up. You can now view and download portions of your medical record. 10. Click the Download Summary menu link to download a portable copy of your medical information. If you have questions, please visit the Frequently Asked Questions section of the Transmension website. Remember, Transmension is NOT to be used for urgent needs. For medical emergencies, dial 911. Now available from your iPhone and Android! Please provide this summary of care documentation to your next provider. Your primary care clinician is listed as PROVIDER UNKNOWN. If you have any questions after today's visit, please call [phone].

## 2017-05-17 NOTE — PATIENT INSTRUCTIONS
Please follow up in 3 months. You are advised to contact us if your condition worsens. Osteoarthritis: Care Instructions  Your Care Instructions    Arthritis is a common health problem in which the joints are inflamed. There are several kinds of arthritis. Osteoarthritis is caused by a breakdown of cartilage, the hard, thick tissue that cushions the joints. It causes pain, stiffness, and swelling, often in the spine, fingers, hips, and knees. Osteoarthritis can happen at any age, but it is most common in older people. Osteoarthritis never goes away completely, but it can be controlled. Medicine and home treatment can reduce the pain and prevent the arthritis from getting worse. Follow-up care is a key part of your treatment and safety. Be sure to make and go to all appointments, and call your doctor if you are having problems. It's also a good idea to know your test results and keep a list of the medicines you take. How can you care for yourself at home? · Take a warm shower or bath in the morning to relieve stiffness. Avoid sitting still afterwards. · If the joint is not swollen, use moist heat, like a warm, damp towel, for 20 to 30 minutes, 2 or 3 times a day. Do not use heat on a swollen joint. · If the joint is swollen, use ice or cold packs for 10 to 20 minutes, once an hour. Cold will help relieve pain and reduce inflammation. Put a thin cloth between the ice and your skin. · To prevent stiffness, gently move the joint through its full range of motion several times a day. · If the joint hurts, avoid activities that put a strain on it for a few days. Take rest breaks throughout the day. · Get regular exercise. Walking, swimming, yoga, biking, christi chi, and water aerobics are good exercises that are gentle on the joints. · Reach and stay at a healthy weight. If you need to lose or maintain weight, regular exercise and a healthy diet will help.  Extra weight can strain the joints, especially the knees and hips, and make the pain worse. Losing even a few pounds may help. · Take pain medicines exactly as directed. ¨ If the doctor gave you a prescription medicine for pain, take it as prescribed. ¨ If you are not taking a prescription pain medicine, ask your doctor if you can take an over-the-counter medicine. When should you call for help? Call your doctor now or seek immediate medical care if:  · The pain is so bad that you cannot use the joint. · You have sudden back pain with weakness in your legs or loss of bowel or bladder control. · Your stools are black and tarlike or have streaks of blood. · You have severe pain and swelling in more than one joint. Watch closely for changes in your health, and be sure to contact your doctor if:  · You have side effects from the medicines, like belly pain, ongoing heartburn, or nausea. · Joint pain continues for more than 6 weeks, and home treatment is not helping. Where can you learn more? Go to http://kendrick-emerald.info/. Enter B858 in the search box to learn more about \"Osteoarthritis: Care Instructions. \"  Current as of: November 28, 2016  Content Version: 11.2  © 7188-9572 [employer]. Care instructions adapted under license by "[employer]" (which disclaims liability or warranty for this information). If you have questions about a medical condition or this instruction, always ask your healthcare professional. [place] [address] any warranty or liability for your use of this information.

## 2017-05-17 NOTE — PROGRESS NOTES
AMBULATORY PROGRESS NOTE      Patient: Domenic Capone             MRN: [id number]     SSN: [id number] Body mass index is 25.66 kg/(m^2). YOB: 1955     AGE: 58 y.o. EX: female    PCP: PROVIDER UNKNOWN    IMPRESSION/DIAGNOSIS AND TREATMENT PLAN     DIAGNOSES  1. Primary osteoarthritis of left foot        No orders of the defined types were placed in this encounter. Domenic Capone understands her diagnoses and the proposed plan. Plan:    1) [employer] Tape was supplied to the patient  2) [employer] [address] for metal hardware  3) [employer]    RTO - 3 months    HPI AND EXAMINATION     Nanette Luevano IS A 58 y.o. female who presents to my outpatient office for follow up of left foot post-traumatic osteoarthritis. Her surgery was performed in [place] on 1/16/2017. At last visit, I discontinued the left tall CAM boot, instructed the patient to wear supportive tennis shoes, and I instructed the patient to wear shoes within the home. The patient presents to the office today wearing hiking style shoes. Patient states that she does not have pain or discomfort within her left foot. She rates her pain 0/10. Patient is reports that she is happy with her current recovery at this time. She inquired if she could train her toes to prevent a crossover from occurring. Patient has been wearing her CAM boot intermittently when discomfort occurs. She endorses some swelling and pain when ambulate on hard surfaces such as concrete. Notes surgical h/o six procedures on her feet.      ANKLE/FOOT left      Psychiatry: Alert, Oriented x 3; Speech normal in context and clarity   Memory intact grossly, no involuntary movements - tremors, no dementia  Gait: slow  Tenderness: No tenderness across the first MTP joint and third TMT joint  Cutaneous: Well healed surgical scars at the great toe, midfoot, and number two toe.   Joint Motion: Ankle and Hindfoot motion normal; reduced forefoot motion s/p fusion procedures  Joint / Tendon Stability: No Ankle or Subtalar instability or joint laxity. No peroneal sublux ability or dislocation  Alignment: Pes Planus moderate and Semi Rigid  Neuro Motor/Sensory: Diminished sensation at the medial sesamoid location near the plantar medial portion of her great toe  Vascular: NL foot/ankle pulses  Lymphatics: No extremity lymphedema, No calf swelling, no tenderness to calf muscles. CHART REVIEW     Past Medical History:   Diagnosis Date    Arthritis     Hx of migraines      Current Outpatient Prescriptions   Medication Sig    rizatriptan (MAXALT-MLT) 10 mg disintegrating tablet Take 1 Tab by mouth once as needed for Migraine.  doxycycline monohydrate (ORACEA) 40 mg capsule Take 40 mg by mouth every morning.  atenolol (TENORMIN) 50 mg tablet Take 1 Tab by mouth daily.  ascorbic acid, vitamin C, (VITAMIN C) 500 mg tablet Take 1,000 mg by mouth two (2) times a day.  calcium citrate-vitamin D3 (CITRACAL + D) tablet Take  by mouth two (2) times a day.  multivitamin (ONE A DAY) tablet Take 1 Tab by mouth daily.  cholecalciferol, VITAMIN D3, (VITAMIN D3) 5,000 unit tab tablet Take  by mouth daily.  cyanocobalamin (VITAMIN B12) 500 mcg tablet Take 2,500 mcg by mouth daily.  aspirin 81 mg chewable tablet Take 81 mg by mouth daily.  B.infantis-B.ani-B.long-B.bifi (PROBIOTIC 4X) 10-15 mg TbEC Take  by mouth.  rosuvastatin (CRESTOR) 10 mg tablet Take 10 mg by mouth nightly.  glucosamine-chondroit-vit c-mn (GLUCOSAMINE 1500 COMPLEX) 500-400 mg capsule Take  by mouth daily. No current facility-administered medications for this visit. Allergies   Allergen Reactions    Penicillin G Hives     Past Surgical History:   Procedure Laterality Date    FOOT/TOES SURGERY PROC UNLISTED      HX BACK SURGERY      HX KNEE ARTHROSCOPY      HX PARTIAL HYSTERECTOMY       Social History     Occupational History    Not on file.      Social History Main Topics  Smoking status: Never Smoker    Smokeless tobacco: Never Used    Alcohol use No    Drug use: Yes    Sexual activity: Yes     Partners: Male     History reviewed. No pertinent family history. REVIEW OF SYSTEMS : 5/17/2017  ALL BELOW ARE Negative except : SEE HPI       Constitutional: Negative for fever, chills and weight loss. Neg Weigh Loss  Cardiovascular: Negative for chest pain, claudication and leg swelling. SOB, KIM   Gastrointestinal: Negative for  pain, N/V/D/C, Blood in stool or urine,dysuria, hematuria,        Incontinence, pelvic pain  Musculoskeletal: see HPI. Neurological: Negative for dizziness and weakness. Negative for headaches,Visual Changes, Confusion, Seizures,   Psychiatric/Behavioral: Negative for depression, memory loss and substance abuse. Extremities:  Negative for  hair changes, rash or skin lesion changes. Hematologic: Negative for Bleeding problems, bruising, pallor or swollen lymph nodes. Peripheral Vascular: No calf pain, vascular vein tenderness to calf pain              No calf throbbing, posterior knee throbbing pain    DIAGNOSTIC IMAGING     No notes on file    Written by Yuliya Davis, as dictated by Juan Patterson MD. Dr. VALERIE, Juan Patterson MD, confirm that all documentation is accurate.

## 2017-05-23 ENCOUNTER — TELEPHONE (OUTPATIENT)
Dept: ORTHOPEDIC SURGERY | Age: 62
End: 2017-05-23

## 2017-05-23 NOTE — TELEPHONE ENCOUNTER
Form completed with Dr. Radha Ybarra and given to patient at the [facility] office.

## 2017-05-23 NOTE — TELEPHONE ENCOUNTER
Pt needs a handicap placard but lives in [facility] and they require it to be on their form. Pt has brought form with her and wanted to know if there was any way possible it could be completed this morning while she is here with her  in the building for another appt so that she can save a trip here. She [place] hour away.

## 2017-07-28 ENCOUNTER — HOSPITAL ENCOUNTER (OUTPATIENT)
Dept: LAB | Age: 62
Discharge: HOME | End: 2017-07-28
Payer: OTHER GOVERNMENT

## 2017-07-28 ENCOUNTER — OFFICE VISIT (OUTPATIENT)
Dept: FAMILY MEDICINE CLINIC | Age: 62
End: 2017-07-28

## 2017-07-28 VITALS
HEIGHT: 66 IN | TEMPERATURE: 96.2 F | HEART RATE: 64 BPM | OXYGEN SATURATION: 100 % | BODY MASS INDEX: 24.72 KG/M2 | SYSTOLIC BLOOD PRESSURE: 136 MMHG | WEIGHT: 153.8 LBS | RESPIRATION RATE: 18 BRPM | DIASTOLIC BLOOD PRESSURE: 94 MMHG

## 2017-07-28 DIAGNOSIS — L71.9 ROSACEA: ICD-10-CM

## 2017-07-28 DIAGNOSIS — E55.9 HYPOVITAMINOSIS D: ICD-10-CM

## 2017-07-28 DIAGNOSIS — I10 ESSENTIAL HYPERTENSION: ICD-10-CM

## 2017-07-28 DIAGNOSIS — I10 ESSENTIAL HYPERTENSION: Primary | ICD-10-CM

## 2017-07-28 DIAGNOSIS — H60.93 OTITIS EXTERNA OF BOTH EARS, UNSPECIFIED CHRONICITY, UNSPECIFIED TYPE: ICD-10-CM

## 2017-07-28 LAB
ALBUMIN SERPL BCP-MCNC: 3.7 G/DL (ref 3.4–5)
ALBUMIN/GLOB SERPL: 1.2 {RATIO} (ref 0.8–1.7)
ALP SERPL-CCNC: 102 U/L (ref 45–117)
ALT SERPL-CCNC: 22 U/L (ref 13–56)
ANION GAP BLD CALC-SCNC: 8 MMOL/L (ref 3–18)
AST SERPL W P-5'-P-CCNC: 21 U/L (ref 15–37)
BASOPHILS # BLD AUTO: 0 K/UL (ref 0–0.06)
BASOPHILS # BLD: 1 % (ref 0–2)
BILIRUB SERPL-MCNC: 0.7 MG/DL (ref 0.2–1)
BUN SERPL-MCNC: 16 MG/DL (ref 7–18)
BUN/CREAT SERPL: 27 (ref 12–20)
CALCIUM SERPL-MCNC: 9.4 MG/DL (ref 8.5–10.1)
CHLORIDE SERPL-SCNC: 105 MMOL/L (ref 100–108)
CHOLEST SERPL-MCNC: 228 MG/DL
CO2 SERPL-SCNC: 29 MMOL/L (ref 21–32)
CREAT SERPL-MCNC: 0.59 MG/DL (ref 0.6–1.3)
DIFFERENTIAL METHOD BLD: NORMAL
EOSINOPHIL # BLD: 0.2 K/UL (ref 0–0.4)
EOSINOPHIL NFR BLD: 3 % (ref 0–5)
ERYTHROCYTE [DISTWIDTH] IN BLOOD BY AUTOMATED COUNT: 13.8 % (ref 11.6–14.5)
GLOBULIN SER CALC-MCNC: 3.2 G/DL (ref 2–4)
GLUCOSE SERPL-MCNC: 80 MG/DL (ref 74–99)
HCT VFR BLD AUTO: 41.4 % (ref 35–45)
HDLC SERPL-MCNC: 91 MG/DL (ref 40–60)
HDLC SERPL: 2.5 {RATIO} (ref 0–5)
HGB BLD-MCNC: 13.6 G/DL (ref 12–16)
LDLC SERPL CALC-MCNC: 124.8 MG/DL (ref 0–100)
LIPID PROFILE,FLP: ABNORMAL
LYMPHOCYTES # BLD AUTO: 30 % (ref 21–52)
LYMPHOCYTES # BLD: 1.6 K/UL (ref 0.9–3.6)
MCH RBC QN AUTO: 29.1 PG (ref 24–34)
MCHC RBC AUTO-ENTMCNC: 32.9 G/DL (ref 31–37)
MCV RBC AUTO: 88.5 FL (ref 74–97)
MONOCYTES # BLD: 0.5 K/UL (ref 0.05–1.2)
MONOCYTES NFR BLD AUTO: 9 % (ref 3–10)
NEUTS SEG # BLD: 3.2 K/UL (ref 1.8–8)
NEUTS SEG NFR BLD AUTO: 57 % (ref 40–73)
PLATELET # BLD AUTO: 251 K/UL (ref 135–420)
PMV BLD AUTO: 10.7 FL (ref 9.2–11.8)
POTASSIUM SERPL-SCNC: 4.5 MMOL/L (ref 3.5–5.5)
PROT SERPL-MCNC: 6.9 G/DL (ref 6.4–8.2)
RBC # BLD AUTO: 4.68 M/UL (ref 4.2–5.3)
SODIUM SERPL-SCNC: 142 MMOL/L (ref 136–145)
TRIGL SERPL-MCNC: 61 MG/DL (ref ?–150)
VLDLC SERPL CALC-MCNC: 12.2 MG/DL
WBC # BLD AUTO: 5.5 K/UL (ref 4.6–13.2)

## 2017-07-28 PROCEDURE — 85025 COMPLETE CBC W/AUTO DIFF WBC: CPT | Performed by: FAMILY MEDICINE

## 2017-07-28 PROCEDURE — 80061 LIPID PANEL: CPT | Performed by: FAMILY MEDICINE

## 2017-07-28 PROCEDURE — 82306 VITAMIN D 25 HYDROXY: CPT | Performed by: FAMILY MEDICINE

## 2017-07-28 PROCEDURE — 80053 COMPREHEN METABOLIC PANEL: CPT | Performed by: FAMILY MEDICINE

## 2017-07-28 RX ORDER — DOXYCYCLINE 100 MG/1
100 CAPSULE ORAL 3 TIMES WEEKLY
Qty: 40 CAP | Refills: 3 | COMMUNITY
Start: 2017-07-28 | End: 2017-08-08 | Stop reason: SDUPTHER

## 2017-07-28 RX ORDER — OFLOXACIN 3 MG/ML
5 SOLUTION AURICULAR (OTIC) DAILY
Qty: 5 ML | Refills: 0 | Status: SHIPPED | OUTPATIENT
Start: 2017-07-28 | End: 2017-08-02

## 2017-07-28 NOTE — MR AVS SNAPSHOT
Visit Information Date & Time Provider Department Dept. Phone Encounter #  
 7/28/2017  9:15 AM Steveed Malika Chris MD [facility] [phone] [id number] Follow-up Instructions Return in about 3 months (around 10/28/2017), or if symptoms worsen or fail to improve, for htn, otitis externa. Your Appointments 8/15/2017  9:10 AM  
Follow Up with Ady Salinas MD  
[facility] ([address]) Appt Note: LT FT 3 MO FU  
 [address] [facility], [address] [facility]  
[phone] [address] [facility], [facility] [address] Upcoming Health Maintenance Date Due DTaP/Tdap/Td series (1 - Tdap) 3/5/1976 PAP AKA CERVICAL CYTOLOGY 3/5/1976 FOBT Q 1 YEAR AGE 50-75 3/5/2005 INFLUENZA AGE 9 TO ADULT 8/1/2017 BREAST CANCER SCRN MAMMOGRAM 3/16/2019 Allergies as of 7/28/2017  Review Complete On: 7/28/2017 By: Adán Bradshaw MD  
  
 Severity Noted Reaction Type Reactions Penicillin G  04/04/2017    Hives Current Immunizations  Never Reviewed No immunizations on file. Not reviewed this visit You Were Diagnosed With   
  
 Codes Comments Essential hypertension    -  Primary ICD-10-CM: I10 
ICD-9-CM: 401.9 Hypovitaminosis D     ICD-10-CM: E55.9 ICD-9-CM: 268.9 Otitis externa of both ears, unspecified chronicity, unspecified type     ICD-10-CM: H60.93 
ICD-9-CM: 380.10 Vitals BP Pulse Temp Resp Height(growth percentile) Weight(growth percentile) (!) 136/94 (BP 1 Location: Left arm, BP Patient Position: Sitting) 64 96.2 °F (35.7 °C) (Oral) 18 5' 6\" (1.676 m) 153 lb 12.8 oz (69.8 kg) SpO2 BMI Smoking Status 100% 24.82 kg/m2 Never Smoker BMI and BSA Data Body Mass Index Body Surface Area  
 24.82 kg/m 2 1.8 m 2 Preferred Pharmacy Pharmacy Name Phone [address] [facility] [phone] Your Updated Medication List  
  
   
This list is accurate as of: 7/28/17  9:31 AM.  Always use your most recent med list.  
  
  
  
  
 ascorbic acid (vitamin C) 500 mg tablet Commonly known as:  VITAMIN C Take 1,000 mg by mouth two (2) times a day. aspirin 81 mg chewable tablet Take 81 mg by mouth daily. atenolol 50 mg tablet Commonly known as:  TENORMIN Take 1 Tab by mouth daily. cholecalciferol (VITAMIN D3) 5,000 unit Tab tablet Commonly known as:  VITAMIN D3 Take  by mouth daily. Citracal + D tablet Generic drug:  calcium citrate-vitamin D3 Take  by mouth two (2) times a day. cyanocobalamin 500 mcg tablet Commonly known as:  VITAMIN B12 Take 2,500 mcg by mouth daily. doxycycline 100 mg capsule Commonly known as:  Martha Shuck Take 1 Cap by mouth Three (3) times a week. GLUCOSAMINE 1500 COMPLEX 500-400 mg capsule Generic drug:  glucosamine-chondroit-vit c-mn Take  by mouth daily. multivitamin tablet Commonly known as:  ONE A DAY Take 1 Tab by mouth daily. ofloxacin 0.3 % otic solution Commonly known as:  FLOXIN Administer 5 Drops into each ear daily for 5 days. PROBIOTIC 4X 10-15 mg Tbec Generic drug:  B.infantis-B.ani-B.long-B.bifi Take  by mouth.  
  
 rizatriptan 10 mg disintegrating tablet Commonly known as:  MAXALT-MLT Take 1 Tab by mouth once as needed for Migraine. Prescriptions Sent to Pharmacy Refills  
 ofloxacin (FLOXIN) 0.3 % otic solution 0 Sig: Administer 5 Drops into each ear daily for 5 days. Class: Normal  
 Pharmacy: [address], [address] [place] [address] Ph #: [phone] Route: Both Ears Follow-up Instructions Return in about 3 months (around 10/28/2017), or if symptoms worsen or fail to improve, for htn, otitis externa. To-Do List   
 07/28/2017 Lab:  CBC WITH AUTOMATED DIFF   
  
 07/28/2017 Lab:  LIPID PANEL   
  
 07/28/2017 Lab:  METABOLIC PANEL, COMPREHENSIVE   
  
 07/28/2017 Lab:  VITAMIN D, 25 HYDROXY Patient Instructions DASH Diet: Care Instructions Your Care Instructions The DASH diet is an eating plan that can help lower your blood pressure. DASH stands for Dietary Approaches to Stop Hypertension. Hypertension is high blood pressure. The DASH diet focuses on eating foods that are high in calcium, potassium, and magnesium. These nutrients can lower blood pressure. The foods that are highest in these nutrients are fruits, vegetables, low-fat dairy products, nuts, seeds, and legumes. But taking calcium, potassium, and magnesium supplements instead of eating foods that are high in those nutrients does not have the same effect. The DASH diet also includes whole grains, fish, and poultry. The DASH diet is one of several lifestyle changes your doctor may recommend to lower your high blood pressure. Your doctor may also want you to decrease the amount of sodium in your diet. Lowering sodium while following the DASH diet can lower blood pressure even further than just the DASH diet alone. Follow-up care is a key part of your treatment and safety. Be sure to make and go to all appointments, and call your doctor if you are having problems. It's also a good idea to know your test results and keep a list of the medicines you take. How can you care for yourself at home? Following the DASH diet · Eat 4 to 5 servings of fruit each day. A serving is 1 medium-sized piece of fruit, ½ cup chopped or canned fruit, 1/4 cup dried fruit, or 4 ounces (½ cup) of fruit juice. Choose fruit more often than fruit juice. · Eat 4 to 5 servings of vegetables each day.  A serving is 1 cup of lettuce or raw leafy vegetables, ½ cup of chopped or cooked vegetables, or 4 ounces (½ cup) of vegetable juice. Choose vegetables more often than vegetable juice. · Get 2 to 3 servings of low-fat and fat-free dairy each day. A serving is 8 ounces of milk, 1 cup of yogurt, or 1 ½ ounces of cheese. · Eat 6 to 8 servings of grains each day. A serving is 1 slice of bread, 1 ounce of dry cereal, or ½ cup of cooked rice, pasta, or cooked cereal. Try to choose whole-grain products as much as possible. · Limit lean meat, poultry, and fish to 2 servings each day. A serving is 3 ounces, about the size of a deck of cards. · Eat 4 to 5 servings of nuts, seeds, and legumes (cooked dried beans, lentils, and split peas) each week. A serving is 1/3 cup of nuts, 2 tablespoons of seeds, or ½ cup of cooked beans or peas. · Limit fats and oils to 2 to 3 servings each day. A serving is 1 teaspoon of vegetable oil or 2 tablespoons of salad dressing. · Limit sweets and added sugars to 5 servings or less a week. A serving is 1 tablespoon jelly or jam, ½ cup sorbet, or 1 cup of lemonade. · Eat less than 2,300 milligrams (mg) of sodium a day. If you limit your sodium to 1,500 mg a day, you can lower your blood pressure even more. Tips for success · Start small. Do not try to make dramatic changes to your diet all at once. You might feel that you are missing out on your favorite foods and then be more likely to not follow the plan. Make small changes, and stick with them. Once those changes become habit, add a few more changes. · Try some of the following: ¨ Make it a goal to eat a fruit or vegetable at every meal and at snacks. This will make it easy to get the recommended amount of fruits and vegetables each day. ¨ Try yogurt topped with fruit and nuts for a snack or healthy dessert. ¨ Add lettuce, tomato, cucumber, and onion to sandwiches. ¨ Combine a ready-made pizza crust with low-fat mozzarella cheese and lots of vegetable toppings.  Try using tomatoes, squash, spinach, broccoli, carrots, cauliflower, and onions. ¨ Have a variety of cut-up vegetables with a low-fat dip as an appetizer instead of chips and dip. ¨ Sprinkle sunflower seeds or chopped almonds over salads. Or try adding chopped walnuts or almonds to cooked vegetables. ¨ Try some vegetarian meals using beans and peas. Add garbanzo or kidney beans to salads. Make burritos and tacos with mashed hunter beans or black beans. Where can you learn more? Go to http://kendrickI-Standemerald.info/. Enter H357 in the search box to learn more about \"DASH Diet: Care Instructions. \" Current as of: April 3, 2017 Content Version: 11.3 © 7296-3809 [employer]. Care instructions adapted under license by [employer] (which disclaims liability or warranty for this information). If you have questions about a medical condition or this instruction, always ask your healthcare professional. [place] [address] any warranty or liability for your use of this information. Swimmer's Ear: Care Instructions Your Care Instructions Swimmer's ear (otitis externa) is inflammation or infection of the ear canal. This is the passage that leads from the outer ear to the eardrum. Any water, sand, or other debris that gets into the ear canal and stays there can cause swimmer's ear. Putting cotton swabs or other items in the ear to clean it can also cause this problem. Swimmer's ear can be very painful. But you can treat the pain and infection with medicines. You should feel better in a few days. Follow-up care is a key part of your treatment and safety. Be sure to make and go to all appointments, and call your doctor if you are having problems. It's also a good idea to know your test results and keep a list of the medicines you take. How can you care for yourself at home? Cleaning and care · Use antibiotic drops as your doctor directs.  
· Do not insert ear drops (other than the antibiotic ear drops) or anything else into the ear unless your doctor has told you to. · Avoid getting water in the ear until the problem clears up. Use cotton lightly coated with petroleum jelly as an earplug. Do not use plastic earplugs. · Use a hair dryer set on low to carefully dry the ear after you shower. · To ease ear pain, hold a warm washcloth against your ear. · Take pain medicines exactly as directed. ¨ If the doctor gave you a prescription medicine for pain, take it as prescribed. ¨ If you are not taking a prescription pain medicine, ask your doctor if you can take an over-the-counter medicine. Inserting ear drops · Warm the drops to body temperature by rolling the container in your hands. Or you can place it in a cup of warm water for a few minutes. · Lie down, with your ear facing up. · Place drops inside the ear. Follow your doctor's instructions (or the directions on the label) for how many drops to use. Gently wiggle the outer ear or pull the ear up and back to help the drops get into the ear. · It's important to keep the liquid in the ear canal for 3 to 5 minutes. When should you call for help? Call your doctor now or seek immediate medical care if: 
· You have a new or higher fever. · You have new or worse pain, swelling, warmth, or redness around or behind your ear. · You have new or increasing pus or blood draining from your ear. Watch closely for changes in your health, and be sure to contact your doctor if: 
· You are not getting better after 2 days (48 hours). Where can you learn more? Go to http://kendrick-emerald.info/. Enter C706 in the search box to learn more about \"Swimmer's Ear: Care Instructions. \" Current as of: July 29, 2016 Content Version: 11.3 © 8299-8791 [employer]. Care instructions adapted under license by [employer] (which disclaims liability or warranty for this information).  If you have questions about a medical condition or this instruction, always ask your healthcare professional. [address]  any warranty or liability for your use of this information. Introducing [facility]! [facility] introduces [employer] patient portal. Now you can access parts of your medical record, email your doctor's office, and request medication refills online. 1. In your internet browser, go to [email or web address] 2. Click on the First Time User? Click Here link in the Sign In box. You will see the New Member Sign Up page. 3. Enter your [employer] Access Code exactly as it appears below. You will not need to use this code after youve completed the sign-up process. If you do not sign up before the expiration date, you must request a new code. · [employer] Access Code: [email or web address] Expires: 10/26/2017  9:18 AM 
 
4. Enter the last four digits of your Social Security Number (xxxx) and Date of Birth (mm/dd/yyyy) as indicated and click Submit. You will be taken to the next sign-up page. 5. Create a [employer] ID. This will be your [employer] login ID and cannot be changed, so think of one that is secure and easy to remember. 6. Create a [employer] password. You can change your password at any time. 7. Enter your Password Reset Question and Answer. This can be used at a later time if you forget your password. 8. Enter your e-mail address. You will receive e-mail notification when new information is available in [address] 9. Click Sign Up. You can now view and download portions of your medical record. 10. Click the Download Summary menu link to download a portable copy of your medical information. If you have questions, please visit the Frequently Asked Questions section of the [employer] website. Remember, [employer] is NOT to be used for urgent needs. For medical emergencies, dial 911. Now available from your iPhone and Android! Please provide this summary of care documentation to your next provider. Your primary care clinician is listed as PROVIDER UNKNOWN. If you have any questions after today's visit, please call [phone].

## 2017-07-28 NOTE — PATIENT INSTRUCTIONS
DASH Diet: Care Instructions  Your Care Instructions  The DASH diet is an eating plan that can help lower your blood pressure. DASH stands for Dietary Approaches to Stop Hypertension. Hypertension is high blood pressure. The DASH diet focuses on eating foods that are high in calcium, potassium, and magnesium. These nutrients can lower blood pressure. The foods that are highest in these nutrients are fruits, vegetables, low-fat dairy products, nuts, seeds, and legumes. But taking calcium, potassium, and magnesium supplements instead of eating foods that are high in those nutrients does not have the same effect. The DASH diet also includes whole grains, fish, and poultry. The DASH diet is one of several lifestyle changes your doctor may recommend to lower your high blood pressure. Your doctor may also want you to decrease the amount of sodium in your diet. Lowering sodium while following the DASH diet can lower blood pressure even further than just the DASH diet alone. Follow-up care is a key part of your treatment and safety. Be sure to make and go to all appointments, and call your doctor if you are having problems. It's also a good idea to know your test results and keep a list of the medicines you take. How can you care for yourself at home? Following the DASH diet  · Eat 4 to 5 servings of fruit each day. A serving is 1 medium-sized piece of fruit, ½ cup chopped or canned fruit, 1/4 cup dried fruit, or 4 ounces (½ cup) of fruit juice. Choose fruit more often than fruit juice. · Eat 4 to 5 servings of vegetables each day. A serving is 1 cup of lettuce or raw leafy vegetables, ½ cup of chopped or cooked vegetables, or 4 ounces (½ cup) of vegetable juice. Choose vegetables more often than vegetable juice. · Get 2 to 3 servings of low-fat and fat-free dairy each day. A serving is 8 ounces of milk, 1 cup of yogurt, or 1 ½ ounces of cheese. · Eat 6 to 8 servings of grains each day.  A serving is 1 slice of bread, 1 ounce of dry cereal, or ½ cup of cooked rice, pasta, or cooked cereal. Try to choose whole-grain products as much as possible. · Limit lean meat, poultry, and fish to 2 servings each day. A serving is 3 ounces, about the size of a deck of cards. · Eat 4 to 5 servings of nuts, seeds, and legumes (cooked dried beans, lentils, and split peas) each week. A serving is 1/3 cup of nuts, 2 tablespoons of seeds, or ½ cup of cooked beans or peas. · Limit fats and oils to 2 to 3 servings each day. A serving is 1 teaspoon of vegetable oil or 2 tablespoons of salad dressing. · Limit sweets and added sugars to 5 servings or less a week. A serving is 1 tablespoon jelly or jam, ½ cup sorbet, or 1 cup of lemonade. · Eat less than 2,300 milligrams (mg) of sodium a day. If you limit your sodium to 1,500 mg a day, you can lower your blood pressure even more. Tips for success  · Start small. Do not try to make dramatic changes to your diet all at once. You might feel that you are missing out on your favorite foods and then be more likely to not follow the plan. Make small changes, and stick with them. Once those changes become habit, add a few more changes. · Try some of the following:  ¨ Make it a goal to eat a fruit or vegetable at every meal and at snacks. This will make it easy to get the recommended amount of fruits and vegetables each day. ¨ Try yogurt topped with fruit and nuts for a snack or healthy dessert. ¨ Add lettuce, tomato, cucumber, and onion to sandwiches. ¨ Combine a ready-made pizza crust with low-fat mozzarella cheese and lots of vegetable toppings. Try using tomatoes, squash, spinach, broccoli, carrots, cauliflower, and onions. ¨ Have a variety of cut-up vegetables with a low-fat dip as an appetizer instead of chips and dip. ¨ Sprinkle sunflower seeds or chopped almonds over salads. Or try adding chopped walnuts or almonds to cooked vegetables. ¨ Try some vegetarian meals using beans and peas. Add garbanzo or kidney beans to salads. Make burritos and tacos with mashed hunter beans or black beans. Where can you learn more? Go to http://kendrick-emerald.info/. Enter T601 in the search box to learn more about \"DASH Diet: Care Instructions. \"  Current as of: April 3, 2017  Content Version: 11.3  © 0902-0210 [employer]. Care instructions adapted under license by [employer] (which disclaims liability or warranty for this information). If you have questions about a medical condition or this instruction, always ask your healthcare professional. [place] [address] any warranty or liability for your use of this information. Swimmer's Ear: Care Instructions  Your Care Instructions    Swimmer's ear (otitis externa) is inflammation or infection of the ear canal. This is the passage that leads from the outer ear to the eardrum. Any water, sand, or other debris that gets into the ear canal and stays there can cause swimmer's ear. Putting cotton swabs or other items in the ear to clean it can also cause this problem. Swimmer's ear can be very painful. But you can treat the pain and infection with medicines. You should feel better in a few days. Follow-up care is a key part of your treatment and safety. Be sure to make and go to all appointments, and call your doctor if you are having problems. It's also a good idea to know your test results and keep a list of the medicines you take. How can you care for yourself at home? Cleaning and care  · Use antibiotic drops as your doctor directs. · Do not insert ear drops (other than the antibiotic ear drops) or anything else into the ear unless your doctor has told you to. · Avoid getting water in the ear until the problem clears up. Use cotton lightly coated with petroleum jelly as an earplug. Do not use plastic earplugs. · Use a hair dryer set on low to carefully dry the ear after you shower.   · To ease ear pain, hold a warm washcloth against your ear. · Take pain medicines exactly as directed. ¨ If the doctor gave you a prescription medicine for pain, take it as prescribed. ¨ If you are not taking a prescription pain medicine, ask your doctor if you can take an over-the-counter medicine. Inserting ear drops  · Warm the drops to body temperature by rolling the container in your hands. Or you can place it in a cup of warm water for a few minutes. · Lie down, with your ear facing up. · Place drops inside the ear. Follow your doctor's instructions (or the directions on the label) for how many drops to use. Gently wiggle the outer ear or pull the ear up and back to help the drops get into the ear. · It's important to keep the liquid in the ear canal for 3 to 5 minutes. When should you call for help? Call your doctor now or seek immediate medical care if:  · You have a new or higher fever. · You have new or worse pain, swelling, warmth, or redness around or behind your ear. · You have new or increasing pus or blood draining from your ear. Watch closely for changes in your health, and be sure to contact your doctor if:  · You are not getting better after 2 days (48 hours). Where can you learn more? Go to http://kendrick-emerald.info/. Enter C706 in the search box to learn more about \"Swimmer's Ear: Care Instructions. \"  Current as of: July 29, 2016  Content Version: 11.3  © 2671-5930 [employer]. Care instructions adapted under license by [employer] (which disclaims liability or warranty for this information). If you have questions about a medical condition or this instruction, always ask your healthcare professional. [place] [address] any warranty or liability for your use of this information.

## 2017-07-28 NOTE — PROGRESS NOTES
Chief Complaint   Patient presents with    Follow-up     HTN, Migraines, Shoulder pain      1. Have you been to the ER, urgent care clinic since your last visit? Hospitalized since your last visit? No    2. Have you seen or consulted any other health care providers outside of the [address] [place] [address] since your last visit? Include any pap smears or colon screening. No     HPI  Lc Perry comes in for follow-up care. 1) HTN: Patient has elevated blood pressure. She is on atenolol. At home she takes her blood pressure and it is always normal.  States her systolic blood pressure is in the 359E and the diastolic in the 95U. We will not adjust medication. She will keep a blood pressure log and will come in at next visit with this. 2) Ear fullness: Patient has sensation of fullness and fluid in both ears. Hearing is normal.  She denies any discharge from the ears. She tries to clean the ears with a towel well daily. No fever no chills. Exam did reveal some fluid  in the ears with the erythema of the ear canals. This is otitis externa. Will give some topical eardrops to use. 3) Shoulder pain. Patient has right shoulder pain. Has bursitis also of her right shoulder. She did recently did get shoulder injection by the orthopedic doctor. Still has pain on and off and some crepitus but her range of motion is better. She would like to hold off for now before follow-up or getting any other procedures done. 4) Rosacea: Patient has rosacea. She takes doxycycline. Would like prescription of the generic doxycycline sent into the pharmacy. Takes the doxycycline 3 times a week. 5) Migraine headache: Patient has a history of migraine headaches but these have been rare lately. She does have Maxalt that she takes as needed for the migraine headache. 6) Dyslipidemia; patient has a history of dyslipidemia. She was on Crestor but discontinued due to muscle cramps. We will recheck labs today.       Past Medical History  Past Medical History:   Diagnosis Date    Arthritis     Hx of migraines        Surgical History  Past Surgical History:   Procedure Laterality Date    FOOT/TOES SURGERY PROC UNLISTED      HX BACK SURGERY      HX KNEE ARTHROSCOPY      HX PARTIAL HYSTERECTOMY          Medications  Current Outpatient Prescriptions   Medication Sig Dispense Refill    doxycycline (MONODOX) 100 mg capsule Take 1 Cap by mouth Three (3) times a week. 40 Cap 3    rizatriptan (MAXALT-MLT) 10 mg disintegrating tablet Take 1 Tab by mouth once as needed for Migraine. 30 Tab 2    atenolol (TENORMIN) 50 mg tablet Take 1 Tab by mouth daily. 90 Tab 3    ascorbic acid, vitamin C, (VITAMIN C) 500 mg tablet Take 1,000 mg by mouth two (2) times a day.  calcium citrate-vitamin D3 (CITRACAL + D) tablet Take  by mouth two (2) times a day.  multivitamin (ONE A DAY) tablet Take 1 Tab by mouth daily.  cholecalciferol, VITAMIN D3, (VITAMIN D3) 5,000 unit tab tablet Take  by mouth daily.  cyanocobalamin (VITAMIN B12) 500 mcg tablet Take 2,500 mcg by mouth daily.  aspirin 81 mg chewable tablet Take 81 mg by mouth daily.  glucosamine-chondroit-vit c-mn (GLUCOSAMINE 1500 COMPLEX) 500-400 mg capsule Take  by mouth daily.  B.infantis-B.ani-B.long-B.bifi (PROBIOTIC 4X) 10-15 mg TbEC Take  by mouth. Allergies  Allergies   Allergen Reactions    Penicillin G Hives       Family History  No family history on file. Social History  Social History     Social History    Marital status:      Spouse name: N/A    Number of children: N/A    Years of education: N/A     Occupational History    Not on file. Social History Main Topics    Smoking status: Never Smoker    Smokeless tobacco: Never Used    Alcohol use No    Drug use:  Yes    Sexual activity: Yes     Partners: Male     Other Topics Concern     Service No    Blood Transfusions Yes    Caffeine Concern No    Occupational Exposure No    Hobby Hazards No    Sleep Concern No    Stress Concern No    Weight Concern No    Special Diet No    Back Care No    Exercise Yes    Bike Helmet No    Seat Belt Yes    Self-Exams Yes     Social History Narrative       Review of Systems  Review of Systems - History obtained from chart review and the patient  General ROS: negative  Psychological ROS: negative  Ophthalmic ROS: positive for - uses glasses  ENT ROS: positive for -ear fullness  Allergy and Immunology ROS: negative  Hematological and Lymphatic ROS: negative  Respiratory ROS: no cough, shortness of breath, or wheezing  Cardiovascular ROS: no chest pain or dyspnea on exertion  Gastrointestinal ROS: no abdominal pain, change in bowel habits, or black or bloody stools  Genito-Urinary ROS: no dysuria, trouble voiding, or hematuria  Musculoskeletal ROS: positive for - joint pain and pain in shoulder - right  Neurological ROS: positive for - headaches  Dermatological ROS: h/o rosecea    Vital Signs  Visit Vitals    BP (!) 136/94 (BP 1 Location: Left arm, BP Patient Position: Sitting)    Pulse 64    Temp 96.2 °F (35.7 °C) (Oral)    Resp 18    Ht 5' 6\" (1.676 m)    Wt 153 lb 12.8 oz (69.8 kg)    SpO2 100%    BMI 24.82 kg/m2         Physical Exam  Physical Examination: General appearance - alert, well appearing, and in no distress, oriented to person, place, and time and acyanotic, in no respiratory distress  Mental status - alert, oriented to person, place, and time, normal mood, behavior, speech, dress, motor activity, and thought processes  Eyes - sclera anicteric  Ears - right external canal inflamed, left external canal inflamed  Nose - normal and patent, no erythema, discharge or polyps  Mouth - mucous membranes moist, pharynx normal without lesions  Neck - supple, no significant adenopathy  Lymphatics - no palpable lymphadenopathy  Chest - clear to auscultation, no wheezes, rales or rhonchi, symmetric air entry, no tachypnea, retractions or cyanosis  Heart - normal rate, regular rhythm, normal S1, S2, no murmurs, rubs, clicks or gallops  Back exam - limited range of motion  Neurological - alert, oriented, normal speech, no focal findings or movement disorder noted  Extremities - no pedal edema noted, intact peripheral pulses    Diagnostics  Orders Placed This Encounter    doxycycline (MONODOX) 100 mg capsule     Sig: Take 1 Cap by mouth Three (3) times a week. Dispense:  40 Cap     Refill:  3         Results  Results for orders placed or performed during the hospital encounter of 04/10/17   HEPATITIS C AB   Result Value Ref Range    Hepatitis C virus Ab 0.09 <0.80 Index    Hep C  virus Ab Interp. NEGATIVE  NEG      Hep C  virus Ab comment           ASSESSMENT and PLAN    ICD-10-CM ICD-9-CM    1. Essential hypertension I10 401.9 CBC WITH AUTOMATED DIFF      LIPID PANEL      METABOLIC PANEL, COMPREHENSIVE      MD COLLECTION VENOUS BLOOD,VENIPUNCTURE   2. Hypovitaminosis D E55.9 268.9 VITAMIN D, 25 HYDROXY      MD COLLECTION VENOUS BLOOD,VENIPUNCTURE   3. Otitis externa of both ears, unspecified chronicity, unspecified type H60.93 380.10 ofloxacin (FLOXIN) 0.3 % otic solution   4. Rosacea L71.9 695.3      lab results and schedule of future lab studies reviewed with patient  reviewed diet, exercise and weight control  cardiovascular risk and specific lipid/LDL goals reviewed  reviewed medications and side effects in detail      I have discussed the diagnosis with the patient and the intended plan of care as seen in the above orders. The patient has received an after-visit summary and questions were answered concerning future plans. I have discussed medication, side effects, and warnings with the patient in detail. The patient verbalized understanding and is in agreement with the plan of care. The patient will contact the office with any additional concerns.     Naveen Mitchell MD

## 2017-07-29 LAB — 25(OH)D3 SERPL-MCNC: 38.4 NG/ML (ref 30–100)

## 2017-07-31 NOTE — PROGRESS NOTES
If patient is willing to try cholesterol lowering medication I will send in script, please let us know.   Carrington Lucero MD

## 2017-07-31 NOTE — PROGRESS NOTES
Please let patient know her cholesterol level is elevated. I would recommend diet low in polysaturated fats, exercise and taking a cholesterol lowering medication daily. Recheck in 3 months. I will send in script for cholesterol lowering medication.   Les Lopez MD

## 2017-08-08 RX ORDER — DOXYCYCLINE 100 MG/1
100 TABLET ORAL
Qty: 40 TAB | Refills: 2 | Status: SHIPPED | OUTPATIENT
Start: 2017-08-08 | End: 2018-05-07 | Stop reason: SDUPTHER

## 2017-08-08 RX ORDER — ROSUVASTATIN CALCIUM 10 MG/1
10 TABLET, COATED ORAL
Qty: 90 TAB | Refills: 1 | Status: SHIPPED | OUTPATIENT
Start: 2017-08-08 | End: 2018-05-03 | Stop reason: SDUPTHER

## 2017-08-08 RX ORDER — CIPROFLOXACIN 0.5 MG/.25ML
0.25 SOLUTION/ DROPS AURICULAR (OTIC) EVERY 12 HOURS
Qty: 14 EACH | Refills: 0 | Status: SHIPPED | OUTPATIENT
Start: 2017-08-08 | End: 2017-10-30 | Stop reason: ALTCHOICE

## 2017-09-05 ENCOUNTER — OFFICE VISIT (OUTPATIENT)
Dept: ORTHOPEDIC SURGERY | Age: 62
End: 2017-09-05

## 2017-09-05 VITALS
HEIGHT: 66 IN | DIASTOLIC BLOOD PRESSURE: 105 MMHG | BODY MASS INDEX: 25.52 KG/M2 | WEIGHT: 158.8 LBS | SYSTOLIC BLOOD PRESSURE: 151 MMHG | TEMPERATURE: 95.5 F | HEART RATE: 61 BPM

## 2017-09-05 DIAGNOSIS — M19.072 PRIMARY OSTEOARTHRITIS OF LEFT FOOT: Primary | ICD-10-CM

## 2017-09-05 NOTE — PATIENT INSTRUCTIONS
Please follow up as needed. You are advised to contact us if your condition worsens. Exercises  Your Care Instructions  How to do the exercises  Calf stretch (knees straight)    1. Place a book on the floor a few inches from a wall or countertop, and put the balls of your feet on it. Your heels should be on the floor. The book needs to be thick enough so that you can feel a gentle stretch in your calf. If you are not steady on your feet, hold on to a chair, counter, or wall while you do this stretch. 2. Keep your knees straight, and lean forward until you feel a stretch in your calf. 3. To get more stretch, add another book or use a thicker book, such as a phone book, a dictionary, or an encyclopedia. 4. Hold the stretch for at least 15 to 30 seconds. 5. Repeat 2 to 4 times. Calf stretch (knees bent)    1. Place a book on the floor a few inches from a wall or countertop, and put the balls of your feet on it. Your heels should be on the floor. The book needs to be thick enough so that you can feel a gentle stretch in your calf. If you are not steady on your feet, hold on to a chair, counter, or wall while you do this stretch. 2. Bend your knees, and lean forward until you feel a stretch in your calf. 3. To get more stretch, add another book or use a thicker book, such as a phone book, a dictionary, or an encyclopedia. 4. Hold the stretch for at least 15 to 30 seconds. 5. Repeat 2 to 4 times. Great toe extension stretch    1. Sit in a chair, and put your affected foot across your other knee. 2. Grasp your heel with one hand and then slowly pull your big toe back with your other hand. Pull your toe back toward your ankle until you feel a stretch along the bottom of your foot. 3. Hold the stretch for at least 15 to 30 seconds. 4. Repeat 2 to 4 times. 5. Switch feet and repeat steps 1 through 4, even if only one foot is sore. Great toe flexion stretch    1.  Sit in a chair, and put your affected foot across your other knee. 2. Grasp your heel with one hand and then slowly push your big toe down with your other hand. Push your toe down and away from your ankle until you feel a stretch along the top of your foot. 3. Hold the stretch for at least 15 to 30 seconds. 4. Repeat 2 to 4 times. 5. Switch feet and repeat steps 1 through 4, even if only one foot is sore. Ankle alphabet    1. Sit in a chair with your feet flat on the floor. (You can also do this exercise lying on your back with your affected leg propped up on a pillow). 2. Lift the heel of your sore foot off the floor, and slowly trace the letters of the alphabet. 3. Switch feet and repeat steps 1 through 2, even if only one foot is sore. Resisted ankle dorsiflexion    1. Tie the ends of an exercise band together to form a loop. Attach one end of the loop to a secure object or shut a door on it to hold it in place. (Or you can have someone hold one end of the loop to provide resistance.)  2. While sitting on the floor or in a chair, loop the other end of the band over the top of your affected foot. 3. Keeping your knee and leg straight, slowly flex your foot to pull back on the exercise band, and then slowly relax. 4. Repeat 8 to 12 times. 5. Switch feet and repeat steps 1 through 4, even if only one foot is sore. Towel curl    1. While sitting, place your affected foot on a towel on the floor, and scrunch the towel toward you with your toes. 2. Then use your toes to push the towel away from you. 3. Repeat 8 to 12 times. 4. Switch feet and repeat steps 1 through 3, even if only one foot is sore. Resisted ankle eversion    1. Sit on the floor with your legs straight. 2. Hold both ends of an exercise band and loop the band around the outside of your affected foot. Then press your other foot against the band.   3. Keeping your leg straight, slowly push your affected foot outward against the band and away from your other foot without letting your leg rotate. Then slowly relax. 4. Repeat 8 to 12 times. 5. Switch feet and repeat steps 1 through 4, even if only one foot is sore. Resisted ankle inversion    1. Sit on the floor with your good leg crossed over your other leg. 2. Hold both ends of an exercise band and loop the band around the inside of your affected foot. Then press your other foot against the band. 3. Keeping your legs crossed, slowly push your affected foot against the band so that foot moves away from your other foot. Then slowly relax. 4. Repeat 8 to 12 times. 5. Switch feet and repeat steps 1 through 4, even if only one foot is sore. Follow-up care is a key part of your treatment and safety. Be sure to make and go to all appointments, and call your doctor if you are having problems. It's also a good idea to know your test results and keep a list of the medicines you take. Where can you learn more? Go to http://kendrick-emerald.info/. Enter K113 in the search box to learn more about \"Foot Arthritis: Exercises. \"  Current as of: March 21, 2017  Content Version: 11.3  © 7967-8194 [employer]. Care instructions adapted under license by [employer] (which disclaims liability or warranty for this information). If you have questions about a medical condition or this instruction, always ask your healthcare professional. [address] any warranty or liability for your use of this information.

## 2017-09-05 NOTE — MR AVS SNAPSHOT
Visit Information Date & Time Provider Department Dept. Phone Encounter #  
 9/5/2017  8:50 AM May Ceron, [address] [facility] [phone] Your Appointments 10/30/2017  8:30 AM  
[facility] Appt Note: rov for  htn, otitis externa. [address] [place] [address]  
  
   
 [address] [facility] Upcoming Health Maintenance Date Due DTaP/Tdap/Td series (1 - Tdap) 3/5/1976 PAP AKA CERVICAL CYTOLOGY 3/5/1976 FOBT Q 1 YEAR AGE 50-75 3/5/2005 INFLUENZA AGE 9 TO ADULT 8/1/2017 BREAST CANCER SCRN MAMMOGRAM 3/16/2019 Allergies as of 9/5/2017  Review Complete On: 9/5/2017 By: May Ceron MD  
  
 Severity Noted Reaction Type Reactions Penicillin G  04/04/2017    Hives Current Immunizations  Never Reviewed No immunizations on file. Not reviewed this visit You Were Diagnosed With   
  
 Codes Comments Primary osteoarthritis of left foot    -  Primary ICD-10-CM: I63.525 ICD-9-CM: 715.17 Vitals BP Pulse Temp Height(growth percentile) Weight(growth percentile) BMI  
 (!) 151/105 (BP 1 Location: Left arm, BP Patient Position: Sitting) 61 95.5 °F (35.3 °C) (Oral) 5' 6\" (1.676 m) 158 lb 12.8 oz (72 kg) 25.63 kg/m2 Smoking Status Never Smoker BMI and BSA Data Body Mass Index Body Surface Area  
 25.63 kg/m 2 1.83 m 2 Preferred Pharmacy Pharmacy Name Phone [address] [facility] [phone] Your Updated Medication List  
  
   
This list is accurate as of: 9/5/17  8:58 AM.  Always use your most recent med list.  
  
  
  
  
 ascorbic acid (vitamin C) 500 mg tablet Commonly known as:  VITAMIN C  
 Take 1,000 mg by mouth two (2) times a day. aspirin 81 mg chewable tablet Take 81 mg by mouth daily. atenolol 50 mg tablet Commonly known as:  TENORMIN Take 1 Tab by mouth daily. cholecalciferol (VITAMIN D3) 5,000 unit Tab tablet Commonly known as:  VITAMIN D3 Take  by mouth daily. ciprofloxacin 0.2 % otic solution Commonly known as:  CETRAXAL Administer 0.25 mL into each ear every twelve (12) hours. Citracal + D tablet Generic drug:  calcium citrate-vitamin D3 Take  by mouth two (2) times a day. cyanocobalamin 500 mcg tablet Commonly known as:  VITAMIN B12 Take 2,500 mcg by mouth daily. doxycycline 100 mg tablet Commonly known as:  ADOXA Take 1 Tab by mouth Q TU, TH & SAT. GLUCOSAMINE 1500 COMPLEX 500-400 mg capsule Generic drug:  glucosamine-chondroit-vit c-mn Take  by mouth daily. multivitamin tablet Commonly known as:  ONE A DAY Take 1 Tab by mouth daily. PROBIOTIC 4X 10-15 mg Tbec Generic drug:  B.infantis-B.ani-B.long-B.bifi Take  by mouth.  
  
 rizatriptan 10 mg disintegrating tablet Commonly known as:  MAXALT-MLT Take 1 Tab by mouth once as needed for Migraine. rosuvastatin 10 mg tablet Commonly known as:  CRESTOR Take 1 Tab by mouth nightly. Patient Instructions Please follow up as needed. You are advised to contact us if your condition worsens. Exercises Your Care Instructions How to do the exercises Calf stretch (knees straight) 1. Place a book on the floor a few inches from a wall or countertop, and put the balls of your feet on it. Your heels should be on the floor. The book needs to be thick enough so that you can feel a gentle stretch in your calf. If you are not steady on your feet, hold on to a chair, counter, or wall while you do this stretch. 2. Keep your knees straight, and lean forward until you feel a stretch in your calf. 3. To get more stretch, add another book or use a thicker book, such as a phone book, a dictionary, or an encyclopedia. 4. Hold the stretch for at least 15 to 30 seconds. 5. Repeat 2 to 4 times. Calf stretch (knees bent) 1. Place a book on the floor a few inches from a wall or countertop, and put the balls of your feet on it. Your heels should be on the floor. The book needs to be thick enough so that you can feel a gentle stretch in your calf. If you are not steady on your feet, hold on to a chair, counter, or wall while you do this stretch. 2. Bend your knees, and lean forward until you feel a stretch in your calf. 3. To get more stretch, add another book or use a thicker book, such as a phone book, a dictionary, or an encyclopedia. 4. Hold the stretch for at least 15 to 30 seconds. 5. Repeat 2 to 4 times. Great toe extension stretch 1. Sit in a chair, and put your affected foot across your other knee. 2. Grasp your heel with one hand and then slowly pull your big toe back with your other hand. Pull your toe back toward your ankle until you feel a stretch along the bottom of your foot. 3. Hold the stretch for at least 15 to 30 seconds. 4. Repeat 2 to 4 times. 5. Switch feet and repeat steps 1 through 4, even if only one foot is sore. Great toe flexion stretch 1. Sit in a chair, and put your affected foot across your other knee. 2. Grasp your heel with one hand and then slowly push your big toe down with your other hand. Push your toe down and away from your ankle until you feel a stretch along the top of your foot. 3. Hold the stretch for at least 15 to 30 seconds. 4. Repeat 2 to 4 times. 5. Switch feet and repeat steps 1 through 4, even if only one foot is sore. Ankle alphabet 1. Sit in a chair with your feet flat on the floor. (You can also do this exercise lying on your back with your affected leg propped up on a pillow). 2. Lift the heel of your sore foot off the floor, and slowly trace the letters of the alphabet. 3. Switch feet and repeat steps 1 through 2, even if only one foot is sore. Resisted ankle dorsiflexion 1. Tie the ends of an exercise band together to form a loop. Attach one end of the loop to a secure object or shut a door on it to hold it in place. (Or you can have someone hold one end of the loop to provide resistance.) 2. While sitting on the floor or in a chair, loop the other end of the band over the top of your affected foot. 3. Keeping your knee and leg straight, slowly flex your foot to pull back on the exercise band, and then slowly relax. 4. Repeat 8 to 12 times. 5. Switch feet and repeat steps 1 through 4, even if only one foot is sore. Towel curl 1. While sitting, place your affected foot on a towel on the floor, and scrunch the towel toward you with your toes. 2. Then use your toes to push the towel away from you. 3. Repeat 8 to 12 times. 4. Switch feet and repeat steps 1 through 3, even if only one foot is sore. Resisted ankle eversion 1. Sit on the floor with your legs straight. 2. Hold both ends of an exercise band and loop the band around the outside of your affected foot. Then press your other foot against the band. 3. Keeping your leg straight, slowly push your affected foot outward against the band and away from your other foot without letting your leg rotate. Then slowly relax. 4. Repeat 8 to 12 times. 5. Switch feet and repeat steps 1 through 4, even if only one foot is sore. Resisted ankle inversion 1. Sit on the floor with your good leg crossed over your other leg. 2. Hold both ends of an exercise band and loop the band around the inside of your affected foot. Then press your other foot against the band. 3. Keeping your legs crossed, slowly push your affected foot against the band so that foot moves away from your other foot. Then slowly relax. 4. Repeat 8 to 12 times. 5. Switch feet and repeat steps 1 through 4, even if only one foot is sore. Follow-up care is a key part of your treatment and safety. Be sure to make and go to all appointments, and call your doctor if you are having problems. It's also a good idea to know your test results and keep a list of the medicines you take. Where can you learn more? Go to http://kendrick-emerald.info/. Enter K113 in the search box to learn more about \"Foot Arthritis: Exercises. \" Current as of: March 21, 2017 Content Version: 11.3 © 0082-3524 [employer]. Care instructions adapted under license by [employer] (which disclaims liability or warranty for this information). If you have questions about a medical condition or this instruction, always ask your healthcare professional. [address] any warranty or liability for your use of this information. Introducing [facility]! [facility] introduces [employer] patient portal. Now you can access parts of your medical record, email your doctor's office, and request medication refills online. 1. In your internet browser, go to [email or web address] 2. Click on the First Time User? Click Here link in the Sign In box. You will see the New Member Sign Up page. 3. Enter your [employer] Access Code exactly as it appears below. You will not need to use this code after youve completed the sign-up process. If you do not sign up before the expiration date, you must request a new code. · [employer] Access Code: [email or web address] Expires: 10/26/2017  9:18 AM 
 
4. Enter the last four digits of your Social Security Number (xxxx) and Date of Birth (mm/dd/yyyy) as indicated and click Submit. You will be taken to the next sign-up page. 5. Create a [employer] Gr[employer] ID. This will be your [employer] login ID and cannot be changed, so think of one that is secure and easy to remember. 6. Create a Xuba password. You can change your password at any time. 7. Enter your Password Reset Question and Answer. This can be used at a later time if you forget your password. 8. Enter your e-mail address. You will receive e-mail notification when new information is available in [address] 9. Click Sign Up. You can now view and download portions of your medical record. 10. Click the Download Summary menu link to download a portable copy of your medical information. If you have questions, please visit the Frequently Asked Questions section of the [employer] website. Remember, [employer] is NOT to be used for urgent needs. For medical emergencies, dial 911. Now available from your iPhone and Android! Please provide this summary of care documentation to your next provider. Your primary care clinician is listed as PROVIDER UNKNOWN. If you have any questions after today's visit, please call [phone].

## 2017-09-05 NOTE — PROGRESS NOTES
AMBULATORY PROGRESS NOTE      Patient: Luis Elam             MRN: [id number]     SSN: [id number] Body mass index is 25.63 kg/(m^2). YOB: 1955     AGE: 58 y.o. EX: female    PCP: PROVIDER UNKNOWN    IMPRESSION/DIAGNOSIS AND TREATMENT PLAN     DIAGNOSES  1. Primary osteoarthritis of left foot        No orders of the defined types were placed in this encounter. Luis Elam understands her diagnoses and the proposed plan. Plan:    1) Digital Silicone Sleeve for Second Toe of Left   2) Continue Activity as Tolerated    RTO - PRN    HPI AND EXAMINATION     Nanette Luevano IS A 58 y.o. female who presents to my outpatient office for follow up of left foot post-traumatic osteoarthritis. Her surgery was performed in [place] on 1/16/2017. At last visit, I recommended to use [employer] tape, [employer] for metal hardware, and a [employer] pass. She mentions skin callus 2/3 toes plantar region after walking (3 miles)    The patient presents to the office today wearing support tennis shoes. Patient states that she has been able to determine what her limit of walking without discomfort to about 3 miles. She reports that she has been using a Vitamin cream that has helped. Notes surgical h/o six procedures on her feet.      ANKLE/FOOT left      Psychiatry: Alert, Oriented x 3; Speech normal in context and clarity                      Memory intact grossly, no involuntary movements - tremors, no dementia  Gait: slow  Tenderness: No tenderness across the first MTP joint and third TMT joint  Cutaneous: Well healed surgical scars at the great toe, midfoot, and number two toe. Slight Skin Callus to the second and third toe distal tip of the toes.  No oozing/no bleeding  Joint Motion: Ankle and Hindfoot motion normal; reduced forefoot motion s/p fusion procedures    Limited motion to second toe IP                          No motion to great toe MTP  Joint / Tendon Stability: No Ankle or Subtalar instability or joint laxity. No peroneal sublux ability or dislocation  Alignment: Pes Planus moderate and Semi Rigid  Neuro Motor/Sensory: Diminished sensation at the medial sesamoid location near the plantar medial portion of her great toe  Vascular: NL foot/ankle pulses  Lymphatics: No extremity lymphedema, No calf swelling, no tenderness to calf muscles. CHART REVIEW     Past Medical History:   Diagnosis Date    Arthritis     Hx of migraines      Current Outpatient Prescriptions   Medication Sig    doxycycline (ADOXA) 100 mg tablet Take 1 Tab by mouth Q TU, TH & SAT.  rosuvastatin (CRESTOR) 10 mg tablet Take 1 Tab by mouth nightly.  ciprofloxacin (CETRAXAL) 0.2 % otic solution Administer 0.25 mL into each ear every twelve (12) hours.  rizatriptan (MAXALT-MLT) 10 mg disintegrating tablet Take 1 Tab by mouth once as needed for Migraine.  atenolol (TENORMIN) 50 mg tablet Take 1 Tab by mouth daily.  ascorbic acid, vitamin C, (VITAMIN C) 500 mg tablet Take 1,000 mg by mouth two (2) times a day.  calcium citrate-vitamin D3 (CITRACAL + D) tablet Take  by mouth two (2) times a day.  multivitamin (ONE A DAY) tablet Take 1 Tab by mouth daily.  cholecalciferol, VITAMIN D3, (VITAMIN D3) 5,000 unit tab tablet Take  by mouth daily.  cyanocobalamin (VITAMIN B12) 500 mcg tablet Take 2,500 mcg by mouth daily.  aspirin 81 mg chewable tablet Take 81 mg by mouth daily.  glucosamine-chondroit-vit c-mn (GLUCOSAMINE 1500 COMPLEX) 500-400 mg capsule Take  by mouth daily.  B.infantis-B.ani-B.long-B.bifi (PROBIOTIC 4X) 10-15 mg TbEC Take  by mouth. No current facility-administered medications for this visit.       Allergies   Allergen Reactions    Penicillin G Hives     Past Surgical History:   Procedure Laterality Date    FOOT/TOES SURGERY PROC UNLISTED      HX BACK SURGERY      HX KNEE ARTHROSCOPY      HX PARTIAL HYSTERECTOMY       Social History     Occupational History    Not on file. Social History Main Topics    Smoking status: Never Smoker    Smokeless tobacco: Never Used    Alcohol use No    Drug use: No    Sexual activity: Yes     Partners: Male     History reviewed. No pertinent family history. REVIEW OF SYSTEMS : 9/5/2017  ALL BELOW ARE Negative except : SEE HPI       Constitutional: Negative for fever, chills and weight loss. Neg Weigh Loss  Cardiovascular: Negative for chest pain, claudication and leg swelling. SOB, KIM   Gastrointestinal: Negative for  pain, N/V/D/C, Blood in stool or urine,dysuria, hematuria,        Incontinence, pelvic pain  Musculoskeletal: see HPI. Neurological: Negative for dizziness and weakness. Negative for headaches,Visual Changes, Confusion, Seizures,   Psychiatric/Behavioral: Negative for depression, memory loss and substance abuse. Extremities:  Negative for  hair changes, rash or skin lesion changes. Hematologic: Negative for Bleeding problems, bruising, pallor or swollen lymph nodes. Peripheral Vascular: No calf pain, vascular vein tenderness to calf pain              No calf throbbing, posterior knee throbbing pain    DIAGNOSTIC IMAGING     No notes on file    Written by Claudia Andres, as dictated by Erika Worrell MD. IDr., Erika Worrell MD, confirm that all documentation is accurate.

## 2017-10-30 ENCOUNTER — HOSPITAL ENCOUNTER (OUTPATIENT)
Dept: LAB | Age: 62
Discharge: HOME | End: 2017-10-30
Payer: OTHER GOVERNMENT

## 2017-10-30 ENCOUNTER — OFFICE VISIT (OUTPATIENT)
Dept: FAMILY MEDICINE CLINIC | Age: 62
End: 2017-10-30

## 2017-10-30 VITALS
DIASTOLIC BLOOD PRESSURE: 65 MMHG | WEIGHT: 156 LBS | SYSTOLIC BLOOD PRESSURE: 160 MMHG | OXYGEN SATURATION: 99 % | RESPIRATION RATE: 18 BRPM | HEIGHT: 66 IN | TEMPERATURE: 95.5 F | BODY MASS INDEX: 25.07 KG/M2 | HEART RATE: 65 BPM

## 2017-10-30 DIAGNOSIS — I10 ESSENTIAL HYPERTENSION: Primary | ICD-10-CM

## 2017-10-30 DIAGNOSIS — M70.71 BURSITIS OF OTHER BURSA OF BOTH HIPS: ICD-10-CM

## 2017-10-30 DIAGNOSIS — M70.72 BURSITIS OF OTHER BURSA OF BOTH HIPS: ICD-10-CM

## 2017-10-30 DIAGNOSIS — E78.5 DYSLIPIDEMIA: ICD-10-CM

## 2017-10-30 DIAGNOSIS — Z23 ENCOUNTER FOR IMMUNIZATION: ICD-10-CM

## 2017-10-30 DIAGNOSIS — I10 ESSENTIAL HYPERTENSION: ICD-10-CM

## 2017-10-30 DIAGNOSIS — H92.03 OTALGIA OF BOTH EARS: ICD-10-CM

## 2017-10-30 DIAGNOSIS — G43.909 MIGRAINE WITHOUT STATUS MIGRAINOSUS, NOT INTRACTABLE, UNSPECIFIED MIGRAINE TYPE: ICD-10-CM

## 2017-10-30 LAB
ALBUMIN SERPL-MCNC: 4.2 G/DL (ref 3.4–5)
ALBUMIN/GLOB SERPL: 1.4 {RATIO} (ref 0.8–1.7)
ALP SERPL-CCNC: 115 U/L (ref 45–117)
ALT SERPL-CCNC: 28 U/L (ref 13–56)
ANION GAP SERPL CALC-SCNC: 9 MMOL/L (ref 3–18)
AST SERPL-CCNC: 24 U/L (ref 15–37)
BASOPHILS # BLD: 0 K/UL (ref 0–0.06)
BASOPHILS NFR BLD: 0 % (ref 0–2)
BILIRUB SERPL-MCNC: 0.4 MG/DL (ref 0.2–1)
BUN SERPL-MCNC: 16 MG/DL (ref 7–18)
BUN/CREAT SERPL: 25 (ref 12–20)
CALCIUM SERPL-MCNC: 9.2 MG/DL (ref 8.5–10.1)
CHLORIDE SERPL-SCNC: 105 MMOL/L (ref 100–108)
CHOLEST SERPL-MCNC: 179 MG/DL
CO2 SERPL-SCNC: 28 MMOL/L (ref 21–32)
CREAT SERPL-MCNC: 0.64 MG/DL (ref 0.6–1.3)
DIFFERENTIAL METHOD BLD: NORMAL
EOSINOPHIL # BLD: 0.2 K/UL (ref 0–0.4)
EOSINOPHIL NFR BLD: 3 % (ref 0–5)
ERYTHROCYTE [DISTWIDTH] IN BLOOD BY AUTOMATED COUNT: 13.4 % (ref 11.6–14.5)
GLOBULIN SER CALC-MCNC: 3.1 G/DL (ref 2–4)
GLUCOSE SERPL-MCNC: 91 MG/DL (ref 74–99)
HCT VFR BLD AUTO: 43.1 % (ref 35–45)
HDLC SERPL-MCNC: 103 MG/DL (ref 40–60)
HDLC SERPL: 1.7 {RATIO} (ref 0–5)
HGB BLD-MCNC: 13.4 G/DL (ref 12–16)
LDLC SERPL CALC-MCNC: 62.6 MG/DL (ref 0–100)
LIPID PROFILE,FLP: ABNORMAL
LYMPHOCYTES # BLD: 2 K/UL (ref 0.9–3.6)
LYMPHOCYTES NFR BLD: 28 % (ref 21–52)
MCH RBC QN AUTO: 27.8 PG (ref 24–34)
MCHC RBC AUTO-ENTMCNC: 31.1 G/DL (ref 31–37)
MCV RBC AUTO: 89.4 FL (ref 74–97)
MONOCYTES # BLD: 0.7 K/UL (ref 0.05–1.2)
MONOCYTES NFR BLD: 9 % (ref 3–10)
NEUTS SEG # BLD: 4.3 K/UL (ref 1.8–8)
NEUTS SEG NFR BLD: 60 % (ref 40–73)
PLATELET # BLD AUTO: 276 K/UL (ref 135–420)
PMV BLD AUTO: 11.7 FL (ref 9.2–11.8)
POTASSIUM SERPL-SCNC: 4.2 MMOL/L (ref 3.5–5.5)
PROT SERPL-MCNC: 7.3 G/DL (ref 6.4–8.2)
RBC # BLD AUTO: 4.82 M/UL (ref 4.2–5.3)
SODIUM SERPL-SCNC: 142 MMOL/L (ref 136–145)
TRIGL SERPL-MCNC: 67 MG/DL (ref ?–150)
VLDLC SERPL CALC-MCNC: 13.4 MG/DL
WBC # BLD AUTO: 7.1 K/UL (ref 4.6–13.2)

## 2017-10-30 PROCEDURE — 80061 LIPID PANEL: CPT | Performed by: FAMILY MEDICINE

## 2017-10-30 PROCEDURE — 85025 COMPLETE CBC W/AUTO DIFF WBC: CPT | Performed by: FAMILY MEDICINE

## 2017-10-30 PROCEDURE — 80053 COMPREHEN METABOLIC PANEL: CPT | Performed by: FAMILY MEDICINE

## 2017-10-30 RX ORDER — PIROXICAM 20 MG/1
20 CAPSULE ORAL DAILY
Qty: 90 CAP | Refills: 1 | Status: SHIPPED | OUTPATIENT
Start: 2017-10-30 | End: 2018-03-22 | Stop reason: ALTCHOICE

## 2017-10-30 RX ORDER — OFLOXACIN 3 MG/ML
5 SOLUTION AURICULAR (OTIC) DAILY
Qty: 10 ML | Refills: 0 | Status: SHIPPED | OUTPATIENT
Start: 2017-10-30 | End: 2018-04-19

## 2017-10-30 RX ORDER — LISINOPRIL 10 MG/1
10 TABLET ORAL DAILY
Qty: 90 TAB | Refills: 1 | Status: SHIPPED | OUTPATIENT
Start: 2017-10-30 | End: 2018-01-18 | Stop reason: SDUPTHER

## 2017-10-30 NOTE — PROGRESS NOTES
Martínez Hale is a 58 y.o. female who presents for routine immunizations. She denies any symptoms , reactions or allergies that would exclude them from being immunized today. Risks and adverse reactions were discussed and the VIS was given to them. All questions were addressed. She was observed for 15min post injection. There were no reactions observed.     Valeria Dolan LPN

## 2017-10-30 NOTE — PROGRESS NOTES
Chief Complaint   Patient presents with    Follow-up     HTN     1. Have you been to the ER, urgent care clinic since your last visit? Hospitalized since your last visit? No    2. Have you seen or consulted any other health care providers outside of the [address] [place] [address] since your last visit? Include any pap smears or colon screening. No     HPI  Svetlana Hsu comes in for f/u care. HTN: Patient has a history of hypertension. She takes atenolol. Her blood pressure has been elevated lately. Atenolol is also used for her migraine headache. I will add lisinopril 10 mg daily. She will keep a blood  pressure log and I will follow-up at the next visit. Back pain: Patient has chronic low back pain and has used piroxicam for this. This helps with the pain and she would like a refill of medication. Prescription was sent in. Ear pain: Patient has plugged up sensation and pain left ear. No hearing loss. Does have otitis externa petechia. Will give ofloxacin eardrops to instill. Dyslipidemia: Patient has dyslipidemia and is on Crestor. Takes 5 mg at bedtime. Will check her lipid panel today. Acne: Patient has acne and is on doxycycline. We will have her continue with this medication. Migraine headache: Patient is on atenolol for migraine prophylaxis. This has helped greatly. We will continue with this medication. Bursitis: patient has bursitis of the hip joints both hips. Takes piroxicam with relief. Past Medical History  Past Medical History:   Diagnosis Date    Arthritis     Hx of migraines        Surgical History  Past Surgical History:   Procedure Laterality Date    FOOT/TOES SURGERY PROC UNLISTED      HX BACK SURGERY      HX KNEE ARTHROSCOPY      HX PARTIAL HYSTERECTOMY          Medications  Current Outpatient Prescriptions   Medication Sig Dispense Refill    piroxicam (FELDENE) 20 mg capsule Take 1 Cap by mouth daily.  90 Cap 1    lisinopril (PRINIVIL, ZESTRIL) 10 mg tablet Take 1 Tab by mouth daily. 90 Tab 1    ofloxacin (FLOXIN) 0.3 % otic solution Administer 5 Drops into each ear daily. 10 mL 0    doxycycline (ADOXA) 100 mg tablet Take 1 Tab by mouth Q TU, TH & SAT. 40 Tab 2    rosuvastatin (CRESTOR) 10 mg tablet Take 1 Tab by mouth nightly. 90 Tab 1    rizatriptan (MAXALT-MLT) 10 mg disintegrating tablet Take 1 Tab by mouth once as needed for Migraine. 30 Tab 2    atenolol (TENORMIN) 50 mg tablet Take 1 Tab by mouth daily. 90 Tab 3    ascorbic acid, vitamin C, (VITAMIN C) 500 mg tablet Take 1,000 mg by mouth two (2) times a day.  calcium citrate-vitamin D3 (CITRACAL + D) tablet Take  by mouth two (2) times a day.  multivitamin (ONE A DAY) tablet Take 1 Tab by mouth daily.  cholecalciferol, VITAMIN D3, (VITAMIN D3) 5,000 unit tab tablet Take  by mouth daily.  cyanocobalamin (VITAMIN B12) 500 mcg tablet Take 2,500 mcg by mouth daily.  aspirin 81 mg chewable tablet Take 81 mg by mouth daily.  glucosamine-chondroit-vit c-mn (GLUCOSAMINE 1500 COMPLEX) 500-400 mg capsule Take  by mouth daily.  B.infantis-B.ani-B.long-B.bifi (PROBIOTIC 4X) 10-15 mg TbEC Take  by mouth. Allergies  Allergies   Allergen Reactions    Penicillin G Hives       Family History  No family history on file. Social History  Social History     Social History    Marital status:      Spouse name: N/A    Number of children: N/A    Years of education: N/A     Occupational History    Not on file.      Social History Main Topics    Smoking status: Never Smoker    Smokeless tobacco: Never Used    Alcohol use No    Drug use: No    Sexual activity: Yes     Partners: Male     Other Topics Concern     Service No    Blood Transfusions Yes    Caffeine Concern No    Occupational Exposure No    Hobby Hazards No    Sleep Concern No    Stress Concern No    Weight Concern No    Special Diet No    Back Care No    Exercise Yes    Bike Helmet No    Seat Belt Yes    Self-Exams Yes     Social History Narrative       Review of Systems  Review of Systems - History obtained from chart review and the patient  General ROS: negative for - chills, fatigue, fever or malaise  Psychological ROS: negative  Ophthalmic ROS: negative  ENT ROS: negative  Allergy and Immunology ROS: negative  Hematological and Lymphatic ROS: negative  Endocrine ROS: negative  Respiratory ROS: no cough, shortness of breath, or wheezing  Cardiovascular ROS: no chest pain or dyspnea on exertion  Gastrointestinal ROS: no abdominal pain, change in bowel habits, or black or bloody stools  Genito-Urinary ROS: no dysuria, trouble voiding, or hematuria  Musculoskeletal ROS: positive for - pain in back - lower  Neurological ROS: no TIA or stroke symptoms    Vital Signs  Visit Vitals    /65 (BP 1 Location: Left arm, BP Patient Position: Sitting)    Pulse 65    Temp 95.5 °F (35.3 °C) (Oral)    Resp 18    Ht 5' 6\" (1.676 m)    Wt 156 lb (70.8 kg)    SpO2 99%    BMI 25.18 kg/m2         Physical Exam  Physical Examination: General appearance - alert, well appearing, and in no distress, oriented to person, place, and time, acyanotic, in no respiratory distress and well hydrated  Mental status - normal mood, behavior, speech, dress, motor activity, and thought processes, affect appropriate to mood  Mouth - mucous membranes moist, pharynx normal without lesions  Neck - supple, no significant adenopathy  Lymphatics - no palpable lymphadenopathy, no hepatosplenomegaly  Chest - clear to auscultation, no wheezes, rales or rhonchi, symmetric air entry  Heart - normal rate, regular rhythm, normal S1, S2, no murmurs, rubs, clicks or gallops  Abdomen - soft, nontender, nondistended, no masses or organomegaly  Back exam - limited range of motion, pain with motion noted during exam  Neurological - motor and sensory grossly normal bilaterally  Musculoskeletal - osteoarthritic changes noted in both hands  Extremities - no pedal edema noted, intact peripheral pulses    Diagnostics  Orders Placed This Encounter    piroxicam (FELDENE) 20 mg capsule     Sig: Take 1 Cap by mouth daily. Dispense:  90 Cap     Refill:  1    lisinopril (PRINIVIL, ZESTRIL) 10 mg tablet     Sig: Take 1 Tab by mouth daily. Dispense:  90 Tab     Refill:  1    ofloxacin (FLOXIN) 0.3 % otic solution     Sig: Administer 5 Drops into each ear daily. Dispense:  10 mL     Refill:  0         Results  Results for orders placed or performed during the hospital encounter of 07/28/17   CBC WITH AUTOMATED DIFF   Result Value Ref Range    WBC 5.5 4.6 - 13.2 K/uL    RBC 4.68 4.20 - 5.30 M/uL    HGB 13.6 12.0 - 16.0 g/dL    HCT 41.4 35.0 - 45.0 %    MCV 88.5 74.0 - 97.0 FL    MCH 29.1 24.0 - 34.0 PG    MCHC 32.9 31.0 - 37.0 g/dL    RDW 13.8 11.6 - 14.5 %    PLATELET 591 302 - 492 K/uL    MPV 10.7 9.2 - 11.8 FL    NEUTROPHILS 57 40 - 73 %    LYMPHOCYTES 30 21 - 52 %    MONOCYTES 9 3 - 10 %    EOSINOPHILS 3 0 - 5 %    BASOPHILS 1 0 - 2 %    ABS. NEUTROPHILS 3.2 1.8 - 8.0 K/UL    ABS. LYMPHOCYTES 1.6 0.9 - 3.6 K/UL    ABS. MONOCYTES 0.5 0.05 - 1.2 K/UL    ABS. EOSINOPHILS 0.2 0.0 - 0.4 K/UL    ABS.  BASOPHILS 0.0 0.0 - 0.06 K/UL    DF AUTOMATED     LIPID PANEL   Result Value Ref Range    LIPID PROFILE          Cholesterol, total 228 (H) <200 MG/DL    Triglyceride 61 <150 MG/DL    HDL Cholesterol 91 (H) 40 - 60 MG/DL    LDL, calculated 124.8 (H) 0 - 100 MG/DL    VLDL, calculated 12.2 MG/DL    CHOL/HDL Ratio 2.5 0 - 5.0     METABOLIC PANEL, COMPREHENSIVE   Result Value Ref Range    Sodium 142 136 - 145 mmol/L    Potassium 4.5 3.5 - 5.5 mmol/L    Chloride 105 100 - 108 mmol/L    CO2 29 21 - 32 mmol/L    Anion gap 8 3.0 - 18 mmol/L    Glucose 80 74 - 99 mg/dL    BUN 16 7.0 - 18 MG/DL    Creatinine 0.59 (L) 0.6 - 1.3 MG/DL    BUN/Creatinine ratio 27 (H) 12 - 20      GFR est AA >60 >60 ml/min/1.73m2    GFR est non-AA >60 >60 ml/min/1.73m2    Calcium 9.4 8.5 - 10.1 MG/DL    Bilirubin, total 0.7 0.2 - 1.0 MG/DL    ALT (SGPT) 22 13 - 56 U/L    AST (SGOT) 21 15 - 37 U/L    Alk. phosphatase 102 45 - 117 U/L    Protein, total 6.9 6.4 - 8.2 g/dL    Albumin 3.7 3.4 - 5.0 g/dL    Globulin 3.2 2.0 - 4.0 g/dL    A-G Ratio 1.2 0.8 - 1.7     VITAMIN D, 25 HYDROXY   Result Value Ref Range    Vitamin D 25-Hydroxy 38.4 30 - 100 ng/mL     ASSESSMENT and PLAN    ICD-10-CM ICD-9-CM    1. Essential hypertension I10 401.9 lisinopril (PRINIVIL, ZESTRIL) 10 mg tablet      LIPID PANEL      METABOLIC PANEL, COMPREHENSIVE      CBC WITH AUTOMATED DIFF      MS COLLECTION VENOUS BLOOD,VENIPUNCTURE   2. Bursitis of other bursa of both hips M70.71 726. 5 piroxicam (FELDENE) 20 mg capsule    M70.72     3. Otalgia of both ears H92.03 388.70 ofloxacin (FLOXIN) 0.3 % otic solution   4. Encounter for immunization Z23 V03.89 INFLUENZA VIRUS VAC QUAD,SPLIT,PRESV FREE SYRINGE IM      ADMIN INFLUENZA VIRUS VAC   5. Migraine without status migrainosus, not intractable, unspecified migraine type G43.909 346.90    6. Dyslipidemia E78.5 272.4      lab results and schedule of future lab studies reviewed with patient  reviewed diet, exercise and weight control  cardiovascular risk and specific lipid/LDL goals reviewed  reviewed medications and side effects in detail      I have discussed the diagnosis with the patient and the intended plan of care as seen in the above orders. The patient has received an after-visit summary and questions were answered concerning future plans. I have discussed medication, side effects, and warnings with the patient in detail. The patient verbalized understanding and is in agreement with the plan of care. The patient will contact the office with any additional concerns.     Candelaria Myrick MD

## 2017-10-30 NOTE — MR AVS SNAPSHOT
Visit Information Date & Time Provider Department Dept. Phone Encounter #  
 10/30/2017  8:30 AM Karlee Wallace MD [facility] [phone] [id number] Follow-up Instructions Return in about 3 months (around 1/30/2018), or if symptoms worsen or fail to improve, for htn, otalgia. Upcoming Health Maintenance Date Due DTaP/Tdap/Td series (1 - Tdap) 3/5/1976 PAP AKA CERVICAL CYTOLOGY 3/5/1976 FOBT Q 1 YEAR AGE 50-75 3/5/2005 INFLUENZA AGE 9 TO ADULT 8/1/2017 BREAST CANCER SCRN MAMMOGRAM 3/16/2019 Allergies as of 10/30/2017  Review Complete On: 10/30/2017 By: Bonifacio Hutchison MD  
  
 Severity Noted Reaction Type Reactions Penicillin G  04/04/2017    Hives Current Immunizations  Never Reviewed No immunizations on file. Not reviewed this visit You Were Diagnosed With   
  
 Codes Comments Essential hypertension    -  Primary ICD-10-CM: I10 
ICD-9-CM: 401.9 Bursitis of other bursa of both hips     ICD-10-CM: M70.71, M70.72 ICD-9-CM: 726.5 Otalgia of both ears     ICD-10-CM: H92.03 
ICD-9-CM: 388.70 Vitals BP Pulse Temp Resp Height(growth percentile) Weight(growth percentile) 160/65 (BP 1 Location: Left arm, BP Patient Position: Sitting) 65 95.5 °F (35.3 °C) (Oral) 18 5' 6\" (1.676 m) 156 lb (70.8 kg) SpO2 BMI Smoking Status 99% 25.18 kg/m2 Never Smoker Vitals History BMI and BSA Data Body Mass Index Body Surface Area  
 25.18 kg/m 2 1.82 m 2 Preferred Pharmacy Pharmacy Name Phone [facility] [address] [place] [address] [phone] Your Updated Medication List  
  
   
This list is accurate as of: 10/30/17  8:57 AM.  Always use your most recent med list.  
  
  
  
  
 ascorbic acid (vitamin C) 500 mg tablet Commonly known as:  VITAMIN C Take 1,000 mg by mouth two (2) times a day. aspirin 81 mg chewable tablet Take 81 mg by mouth daily. atenolol 50 mg tablet Commonly known as:  TENORMIN Take 1 Tab by mouth daily. cholecalciferol (VITAMIN D3) 5,000 unit Tab tablet Commonly known as:  VITAMIN D3 Take  by mouth daily. Citracal + D tablet Generic drug:  calcium citrate-vitamin D3 Take  by mouth two (2) times a day. cyanocobalamin 500 mcg tablet Commonly known as:  VITAMIN B12 Take 2,500 mcg by mouth daily. doxycycline 100 mg tablet Commonly known as:  ADOXA Take 1 Tab by mouth Q TU, TH & SAT. GLUCOSAMINE 1500 COMPLEX 500-400 mg capsule Generic drug:  glucosamine-chondroit-vit c-mn Take  by mouth daily. lisinopril 10 mg tablet Commonly known as:  Thersia Kubas Take 1 Tab by mouth daily. multivitamin tablet Commonly known as:  ONE A DAY Take 1 Tab by mouth daily. ofloxacin 0.3 % otic solution Commonly known as:  FLOXIN Administer 5 Drops into each ear daily. piroxicam 20 mg capsule Commonly known as:  Audrea Lush Take 1 Cap by mouth daily. PROBIOTIC 4X 10-15 mg Tbec Generic drug:  B.infantis-B.ani-B.long-B.bifi Take  by mouth.  
  
 rizatriptan 10 mg disintegrating tablet Commonly known as:  MAXALT-MLT Take 1 Tab by mouth once as needed for Migraine. rosuvastatin 10 mg tablet Commonly known as:  CRESTOR Take 1 Tab by mouth nightly. Prescriptions Sent to Pharmacy Refills  
 piroxicam (FELDENE) 20 mg capsule 1 Sig: Take 1 Cap by mouth daily. Class: Normal  
 Pharmacy: [address], [address] Ph #: [phone] Route: Oral  
 lisinopril (PRINIVIL, ZESTRIL) 10 mg tablet 1 Sig: Take 1 Tab by mouth daily. Class: Normal  
 Pharmacy: [address], [address] Ph #: [phone] Route: Oral  
 ofloxacin (FLOXIN) 0.3 % otic solution 0 Sig: Administer 5 Drops into each ear daily. Class: Normal  
 Pharmacy: [address] Ph #: [phone] Route: Both Ears Follow-up Instructions Return in about 3 months (around 1/30/2018), or if symptoms worsen or fail to improve, for htn, otalgia. To-Do List   
 10/30/2017 Lab:  CBC WITH AUTOMATED DIFF   
  
 10/30/2017 Lab:  LIPID PANEL   
  
 10/30/2017 Lab:  METABOLIC PANEL, COMPREHENSIVE Patient Instructions Migraine Headaches in Children: Care Instructions Your Care Instructions Migraines are severe, throbbing headaches that usually occur on one side of the head, but they can move from side to side or affect both sides. They often occur with nausea, vomiting, and extreme sensitivity to light, noise, and smells. Changes in vision such as flashing lights or dark spots may happen before the headache. Kids get migraine headaches too. Migraine headaches often run in families. Migraine headaches can be caused-or \"triggered\"-by a variety of things. This can include certain foods (chocolate, cheese, fast food) or odors, smoke, bright light, stress, dehydration, hunger, or lack of sleep. Without treatment, your child's migraine headache can last 4 to 72 hours. For most children, migraine headaches return from time to time. Home treatment can help reduce how often and how uncomfortable the migraine headaches are. Follow-up care is a key part of your child's treatment and safety. Be sure to make and go to all appointments, and call your doctor if your child is having problems. It's also a good idea to know your child's test results and keep a list of the medicines your child takes. How can you care for your child at home? · Begin home treatment at the first sign of a migraine. Your child should go to a quiet, dark place and relax. Most headaches will go away after rest or sleep.  
· Let your child know that watching TV or reading while he or she has a headache can make the headache worse. · If your doctor has prescribed medicine to stop your child's migraines, have your child take it at the first sign of a migraine. This can help stop the headache before it gets worse. If your doctor has prescribed medicine to be taken daily, make sure that your child takes it every day even if he or she does not have a headache. · If your doctor has not prescribed medicine for your child's migraines, give your child a pain reliever, such as children's acetaminophen or ibuprofen. Be safe with medicines. Read and follow all instructions on the label. · Put a cold, moist cloth or ice pack on the part of the head that hurts. Put a thin cloth between the ice and your child's skin. Do not use heat-it can make the pain worse. · Gently massage your child's neck and shoulders. · Do not ignore new symptoms that occur with a headache, such as a fever, weakness or numbness, vision changes, or confusion. These may be signs of a more serious problem. To prevent migraine headaches: · Keep a headache diary so that you can figure out what triggers your child's headaches. Record when each headache begins, how long it lasts, where it hurts, and what the pain is like (throbbing, aching, stabbing, or dull). Write down any other symptoms your child has with the headache, such as nausea, flashing lights or dark spots, or sensitivity to bright light or loud noise. List anything that might have triggered the headache. When you know what things trigger your child's headaches, try to avoid them. · Make sure that your child drinks 4 to 8 glasses of fluid a day. Avoid drinks that have caffeine. Many popular soda drinks contain caffeine. · Make sure that your child gets plenty of sleep. Most children need to sleep 8 to 10 hours each night. · Encourage your child to get plenty of exercise. · Make sure that your child does not skip meals. Provide regular, healthy meals. · Keep your child away from smoke. Do not smoke or let anyone else smoke around your child or in your house. · Find healthy ways to deal with stress. Do not overbook your child's time. · Seek help if you think your child may be depressed or anxious. Treating these problems may reduce the number of migraines your child has. · Limit the amount of time your child spends in front of the TV and computer. When should you call for help? Call your doctor now or seek immediate medical care if: 
? · Your child has a very painful, sudden headache that is unlike any he or she has had before. ? · Your child has a fever with a stiff neck. ? · Your child has a headache with sudden weakness, numbness, inability to move parts of his or her body, visual problems, slurred speech, confusion, or behavior changes. ? · Your child has headaches after a recent fall or blow to the head. ? Watch closely for changes in your child's health, and be sure to contact your doctor if: 
? · Your child's headaches become more painful or frequent. ? · Your child's headache does not go away as expected. Where can you learn more? Go to http://kendrick-emerald.info/. Enter J120 in the search box to learn more about \"Migraine Headaches in Children: Care Instructions. \" Current as of: October 14, 2016 Content Version: 11.4 © 9933-3131 CupomNow. Care instructions adapted under license by [employer] (which disclaims liability or warranty for this information). If you have questions about a medical condition or this instruction, always ask your healthcare professional. [place] [address] any warranty or liability for your use of this information. Bursitis: Care Instructions Your Care Instructions A bursa is a small sac of fluid that helps the tissues around a joint slide over one another easily.  Injury or overuse of a joint can cause pain, redness, and inflammation in the bursa (bursitis). Bursitis usually gets better if you avoid the activity that caused it. You can help prevent bursitis from coming back by doing stretching and strengthening exercises. You may also need to change the way you do some activities. Follow-up care is a key part of your treatment and safety. Be sure to make and go to all appointments, and call your doctor if you are having problems. It's also a good idea to know your test results and keep a list of the medicines you take. How can you care for yourself at home? · Put ice or a cold pack on the area for 10 to 20 minutes at a time. Try to do this every 1 to 2 hours for the next 3 days (when you are awake) or until the swelling goes down. Put a thin cloth between the ice and your skin. · After the 3 days of using ice, you may use heat on the area. You can use a hot water bottle; a warm, moist towel; or a heating pad set on low. You can also try alternating heat and ice. · Rest the area where you have pain. Stop any activities that cause pain. Switch to activities that do not stress the area. · Take pain medicines exactly as directed. ¨ If the doctor gave you a prescription medicine for pain, take it as prescribed. ¨ If you are not taking a prescription pain medicine, ask your doctor if you can take an over-the-counter medicine. ¨ Do not take two or more pain medicines at the same time unless the doctor told you to. Many pain medicines have acetaminophen, which is Tylenol. Too much acetaminophen (Tylenol) can be harmful. · To prevent stiffness, gently move the joint as much as you can without pain every day. As the pain gets better, keep doing range-of-motion exercises. Ask your doctor for exercises that will make the muscles around the joint stronger. Do these as directed.  
· You can slowly return to the activity that caused the pain, but do it with less effort until you can do it without pain or swelling. Be sure to warm up before and stretch after you do the activity. When should you call for help? Call your doctor now or seek immediate medical care if: 
? · You have new or worse symptoms of infection, such as: 
¨ Increased pain, swelling, warmth, or redness. ¨ Red streaks leading from the area. ¨ Pus draining from the area. ¨ A fever. ? Watch closely for changes in your health, and be sure to contact your doctor if: 
? · You do not get better as expected. Where can you learn more? Go to http://kendrick-emerald.info/. Enter L750 in the search box to learn more about \"Bursitis: Care Instructions. \" Current as of: March 21, 2017 Content Version: 11.4 © 4893-4156 MiniLuxe. Care instructions adapted under license by coresystems (which disclaims liability or warranty for this information). If you have questions about a medical condition or this instruction, always ask your healthcare professional. [place] [address] any warranty or liability for your use of this information. Introducing \[facility] [place] [facility]\"" & HEALTH SERVICES! Yvette Conrad introduces [employer]rug[employer] patient portal. Now you can access parts of your medical record, email your doctor's office, and request medication refills online. 1. In your internet browser, go to [email or web address] 2. Click on the First Time User? Click Here link in the Sign In box. You will see the New Member Sign Up page. 3. Enter your [employer] Access Code exactly as it appears below. You will not need to use this code after youve completed the sign-up process. If you do not sign up before the expiration date, you must request a new code. · [employer] Access Code: [email or web address] Expires: 1/28/2018  8:57 AM 
 
4. Enter the last four digits of your Social Security Number (xxxx) and Date of Birth (mm/dd/yyyy) as indicated and click Submit.  You will be taken to the next sign-up page. 5. Create a [employer] ID. This will be your [employer] login ID and cannot be changed, so think of one that is secure and easy to remember. 6. Create a [employer] password. You can change your password at any time. 7. Enter your Password Reset Question and Answer. This can be used at a later time if you forget your password. 8. Enter your e-mail address. You will receive e-mail notification when new information is available in [address]. 9. Click Sign Up. You can now view and download portions of your medical record. 10. Click the Download Summary menu link to download a portable copy of your medical information. If you have questions, please visit the Frequently Asked Questions section of the Amb[employer] website. Remember, [employer] is NOT to be used for urgent needs. For medical emergencies, dial 911. Now available from your iPhone and Android! Please provide this summary of care documentation to your next provider. Your primary care clinician is listed as PROVIDER UNKNOWN. If you have any questions after today's visit, please call [phone].

## 2017-10-30 NOTE — PATIENT INSTRUCTIONS
Migraine Headaches in Children: Care Instructions  Your Care Instructions  Migraines are severe, throbbing headaches that usually occur on one side of the head, but they can move from side to side or affect both sides. They often occur with nausea, vomiting, and extreme sensitivity to light, noise, and smells. Changes in vision such as flashing lights or dark spots may happen before the headache. Kids get migraine headaches too. Migraine headaches often run in families. Migraine headaches can be caused-or \"triggered\"-by a variety of things. This can include certain foods (chocolate, cheese, fast food) or odors, smoke, bright light, stress, dehydration, hunger, or lack of sleep. Without treatment, your child's migraine headache can last 4 to 72 hours. For most children, migraine headaches return from time to time. Home treatment can help reduce how often and how uncomfortable the migraine headaches are. Follow-up care is a key part of your child's treatment and safety. Be sure to make and go to all appointments, and call your doctor if your child is having problems. It's also a good idea to know your child's test results and keep a list of the medicines your child takes. How can you care for your child at home? · Begin home treatment at the first sign of a migraine. Your child should go to a quiet, dark place and relax. Most headaches will go away after rest or sleep. · Let your child know that watching TV or reading while he or she has a headache can make the headache worse. · If your doctor has prescribed medicine to stop your child's migraines, have your child take it at the first sign of a migraine. This can help stop the headache before it gets worse. If your doctor has prescribed medicine to be taken daily, make sure that your child takes it every day even if he or she does not have a headache.   · If your doctor has not prescribed medicine for your child's migraines, give your child a pain reliever, such as children's acetaminophen or ibuprofen. Be safe with medicines. Read and follow all instructions on the label. · Put a cold, moist cloth or ice pack on the part of the head that hurts. Put a thin cloth between the ice and your child's skin. Do not use heat-it can make the pain worse. · Gently massage your child's neck and shoulders. · Do not ignore new symptoms that occur with a headache, such as a fever, weakness or numbness, vision changes, or confusion. These may be signs of a more serious problem. To prevent migraine headaches:  · Keep a headache diary so that you can figure out what triggers your child's headaches. Record when each headache begins, how long it lasts, where it hurts, and what the pain is like (throbbing, aching, stabbing, or dull). Write down any other symptoms your child has with the headache, such as nausea, flashing lights or dark spots, or sensitivity to bright light or loud noise. List anything that might have triggered the headache. When you know what things trigger your child's headaches, try to avoid them. · Make sure that your child drinks 4 to 8 glasses of fluid a day. Avoid drinks that have caffeine. Many popular soda drinks contain caffeine. · Make sure that your child gets plenty of sleep. Most children need to sleep 8 to 10 hours each night. · Encourage your child to get plenty of exercise. · Make sure that your child does not skip meals. Provide regular, healthy meals. · Keep your child away from smoke. Do not smoke or let anyone else smoke around your child or in your house. · Find healthy ways to deal with stress. Do not overbook your child's time. · Seek help if you think your child may be depressed or anxious. Treating these problems may reduce the number of migraines your child has. · Limit the amount of time your child spends in front of the TV and computer. When should you call for help?   Call your doctor now or seek immediate medical care if:  ? · Your child has a very painful, sudden headache that is unlike any he or she has had before. ? · Your child has a fever with a stiff neck. ? · Your child has a headache with sudden weakness, numbness, inability to move parts of his or her body, visual problems, slurred speech, confusion, or behavior changes. ? · Your child has headaches after a recent fall or blow to the head. ? Watch closely for changes in your child's health, and be sure to contact your doctor if:  ? · Your child's headaches become more painful or frequent. ? · Your child's headache does not go away as expected. Where can you learn more? Go to http://kendrick-emerald.info/. Enter J120 in the search box to learn more about \"Migraine Headaches in Children: Care Instructions. \"  Current as of: October 14, 2016  Content Version: 11.4  © 0669-2627 Softheon. Care instructions adapted under license by [employer]ur[employer] (which disclaims liability or warranty for this information). If you have questions about a medical condition or this instruction, always ask your healthcare professional. [address] any warranty or liability for your use of this information. Bursitis: Care Instructions  Your Care Instructions    A bursa is a small sac of fluid that helps the tissues around a joint slide over one another easily. Injury or overuse of a joint can cause pain, redness, and inflammation in the bursa (bursitis). Bursitis usually gets better if you avoid the activity that caused it. You can help prevent bursitis from coming back by doing stretching and strengthening exercises. You may also need to change the way you do some activities. Follow-up care is a key part of your treatment and safety. Be sure to make and go to all appointments, and call your doctor if you are having problems. It's also a good idea to know your test results and keep a list of the medicines you take.   How can you care for yourself at home? · Put ice or a cold pack on the area for 10 to 20 minutes at a time. Try to do this every 1 to 2 hours for the next 3 days (when you are awake) or until the swelling goes down. Put a thin cloth between the ice and your skin. · After the 3 days of using ice, you may use heat on the area. You can use a hot water bottle; a warm, moist towel; or a heating pad set on low. You can also try alternating heat and ice. · Rest the area where you have pain. Stop any activities that cause pain. Switch to activities that do not stress the area. · Take pain medicines exactly as directed. ¨ If the doctor gave you a prescription medicine for pain, take it as prescribed. ¨ If you are not taking a prescription pain medicine, ask your doctor if you can take an over-the-counter medicine. ¨ Do not take two or more pain medicines at the same time unless the doctor told you to. Many pain medicines have acetaminophen, which is Tylenol. Too much acetaminophen (Tylenol) can be harmful. · To prevent stiffness, gently move the joint as much as you can without pain every day. As the pain gets better, keep doing range-of-motion exercises. Ask your doctor for exercises that will make the muscles around the joint stronger. Do these as directed. · You can slowly return to the activity that caused the pain, but do it with less effort until you can do it without pain or swelling. Be sure to warm up before and stretch after you do the activity. When should you call for help? Call your doctor now or seek immediate medical care if:  ? · You have new or worse symptoms of infection, such as:  ¨ Increased pain, swelling, warmth, or redness. ¨ Red streaks leading from the area. ¨ Pus draining from the area. ¨ A fever. ? Watch closely for changes in your health, and be sure to contact your doctor if:  ? · You do not get better as expected. Where can you learn more?   Go to http://kendrick-emerald.info/. Enter W442 in the search box to learn more about \"Bursitis: Care Instructions. \"  Current as of: March 21, 2017  Content Version: 11.4  © 7469-7199 Healthwise, [facility]. Care instructions adapted under license by ParQnow (which disclaims liability or warranty for this information). If you have questions about a medical condition or this instruction, always ask your healthcare professional. [place] [address] any warranty or liability for your use of this information.

## 2018-01-01 DIAGNOSIS — I10 ESSENTIAL HYPERTENSION: ICD-10-CM

## 2018-01-05 RX ORDER — LISINOPRIL 10 MG/1
TABLET ORAL
Qty: 90 TAB | Refills: 1 | OUTPATIENT
Start: 2018-01-05

## 2018-01-05 RX ORDER — RIZATRIPTAN BENZOATE 10 MG/1
TABLET, ORALLY DISINTEGRATING ORAL
Qty: 30 TAB | Refills: 0 | Status: SHIPPED | OUTPATIENT
Start: 2018-01-05 | End: 2018-08-27 | Stop reason: SDUPTHER

## 2018-01-05 NOTE — TELEPHONE ENCOUNTER
1/5/2018  12:06 PM    Chief Complaint   Patient presents with    Medication Refill       Noted refill request for below medications. PCP PROVIDER UNKNOWN according to EMR. Noted that she has been seeing Dr Denver Citrin with last visit 10/2017 and to follow up in 3 months- upcoming appointment noted in a few weeks. Noted that Lisinopril was sent to [facility] for 90 day supply and a refill in 10/2017- this refill is not needed. Refilled on Maxalt.        Requested Prescriptions     Pending Prescriptions Disp Refills    lisinopril (PRINIVIL, ZESTRIL) 10 mg tablet [Pharmacy Med Name: LISINOPRIL TABS 10MG] 90 Tab 1     Sig: TAKE 1 TABLET DAILY    rizatriptan (MAXALT-MLT) 10 mg disintegrating tablet [Pharmacy Med Name: RIZATRIPTAN ADRI ODT TAB 1'S 10MG] 30 Tab 2     Sig: PLACE 1 TABLET ON TONGUE ONCE AS NEEDED FOR MIGRAINE       Lab Results   Component Value Date/Time    Sodium 142 10/30/2017 09:15 AM    Potassium 4.2 10/30/2017 09:15 AM    Chloride 105 10/30/2017 09:15 AM    CO2 28 10/30/2017 09:15 AM    Anion gap 9 10/30/2017 09:15 AM    Glucose 91 10/30/2017 09:15 AM    BUN 16 10/30/2017 09:15 AM    Creatinine 0.64 10/30/2017 09:15 AM    BUN/Creatinine ratio 25 10/30/2017 09:15 AM    GFR est AA >60 10/30/2017 09:15 AM    GFR est non-AA >60 10/30/2017 09:15 AM    Calcium 9.2 10/30/2017 09:15 AM

## 2018-01-16 ENCOUNTER — TELEPHONE (OUTPATIENT)
Dept: FAMILY MEDICINE CLINIC | Age: 63
End: 2018-01-16

## 2018-01-18 DIAGNOSIS — I10 ESSENTIAL HYPERTENSION: ICD-10-CM

## 2018-01-18 NOTE — TELEPHONE ENCOUNTER
Spoke with patient at this time and patient request for refills. No other concerns noted at this time.  Request submitted

## 2018-01-18 NOTE — TELEPHONE ENCOUNTER
Patient is calling in regards of her Lisinipril, states she is almost out and needs for Dr Mariela Seirra to put it in for her. Stated she was told that someone would call her back about it at the beginning of the week but she had not receives a call about it.  Informed patient that I would submit her request and stacy as urgent

## 2018-01-19 RX ORDER — LISINOPRIL 10 MG/1
10 TABLET ORAL DAILY
Qty: 90 TAB | Refills: 1 | Status: SHIPPED | OUTPATIENT
Start: 2018-01-19 | End: 2018-06-11 | Stop reason: SDUPTHER

## 2018-01-30 ENCOUNTER — HOSPITAL ENCOUNTER (OUTPATIENT)
Dept: LAB | Age: 63
Discharge: HOME | End: 2018-01-30
Payer: OTHER GOVERNMENT

## 2018-01-30 ENCOUNTER — OFFICE VISIT (OUTPATIENT)
Dept: FAMILY MEDICINE CLINIC | Age: 63
End: 2018-01-30

## 2018-01-30 VITALS
HEIGHT: 66 IN | WEIGHT: 156 LBS | SYSTOLIC BLOOD PRESSURE: 134 MMHG | TEMPERATURE: 96.3 F | DIASTOLIC BLOOD PRESSURE: 89 MMHG | OXYGEN SATURATION: 99 % | RESPIRATION RATE: 18 BRPM | BODY MASS INDEX: 25.07 KG/M2 | HEART RATE: 75 BPM

## 2018-01-30 DIAGNOSIS — E78.5 DYSLIPIDEMIA: ICD-10-CM

## 2018-01-30 DIAGNOSIS — I10 ESSENTIAL HYPERTENSION: ICD-10-CM

## 2018-01-30 DIAGNOSIS — Z78.9 CMV (CYTOMEGALOVIRUS INFECTION) STATUS UNKNOWN: ICD-10-CM

## 2018-01-30 DIAGNOSIS — I10 ESSENTIAL HYPERTENSION: Primary | ICD-10-CM

## 2018-01-30 DIAGNOSIS — E66.3 OVERWEIGHT (BMI 25.0-29.9): ICD-10-CM

## 2018-01-30 LAB
ALBUMIN SERPL-MCNC: 4.1 G/DL (ref 3.4–5)
ALBUMIN/GLOB SERPL: 1.3 {RATIO} (ref 0.8–1.7)
ALP SERPL-CCNC: 99 U/L (ref 45–117)
ALT SERPL-CCNC: 28 U/L (ref 13–56)
ANION GAP SERPL CALC-SCNC: 8 MMOL/L (ref 3–18)
AST SERPL-CCNC: 27 U/L (ref 15–37)
BASOPHILS # BLD: 0 K/UL (ref 0–0.06)
BASOPHILS NFR BLD: 0 % (ref 0–2)
BILIRUB SERPL-MCNC: 0.5 MG/DL (ref 0.2–1)
BUN SERPL-MCNC: 16 MG/DL (ref 7–18)
BUN/CREAT SERPL: 31 (ref 12–20)
CALCIUM SERPL-MCNC: 9.1 MG/DL (ref 8.5–10.1)
CHLORIDE SERPL-SCNC: 103 MMOL/L (ref 100–108)
CHOLEST SERPL-MCNC: 190 MG/DL
CO2 SERPL-SCNC: 27 MMOL/L (ref 21–32)
CREAT SERPL-MCNC: 0.52 MG/DL (ref 0.6–1.3)
DIFFERENTIAL METHOD BLD: NORMAL
EOSINOPHIL # BLD: 0.1 K/UL (ref 0–0.4)
EOSINOPHIL NFR BLD: 2 % (ref 0–5)
ERYTHROCYTE [DISTWIDTH] IN BLOOD BY AUTOMATED COUNT: 14.3 % (ref 11.6–14.5)
GLOBULIN SER CALC-MCNC: 3.1 G/DL (ref 2–4)
GLUCOSE SERPL-MCNC: 72 MG/DL (ref 74–99)
HCT VFR BLD AUTO: 42.8 % (ref 35–45)
HDLC SERPL-MCNC: 81 MG/DL (ref 40–60)
HDLC SERPL: 2.3 {RATIO} (ref 0–5)
HGB BLD-MCNC: 14 G/DL (ref 12–16)
LDLC SERPL CALC-MCNC: 95.2 MG/DL (ref 0–100)
LIPID PROFILE,FLP: ABNORMAL
LYMPHOCYTES # BLD: 1.5 K/UL (ref 0.9–3.6)
LYMPHOCYTES NFR BLD: 31 % (ref 21–52)
MCH RBC QN AUTO: 28.3 PG (ref 24–34)
MCHC RBC AUTO-ENTMCNC: 32.7 G/DL (ref 31–37)
MCV RBC AUTO: 86.6 FL (ref 74–97)
MONOCYTES # BLD: 0.4 K/UL (ref 0.05–1.2)
MONOCYTES NFR BLD: 7 % (ref 3–10)
NEUTS SEG # BLD: 2.9 K/UL (ref 1.8–8)
NEUTS SEG NFR BLD: 60 % (ref 40–73)
PLATELET # BLD AUTO: 218 K/UL (ref 135–420)
PMV BLD AUTO: 11.1 FL (ref 9.2–11.8)
POTASSIUM SERPL-SCNC: 4.1 MMOL/L (ref 3.5–5.5)
PROT SERPL-MCNC: 7.2 G/DL (ref 6.4–8.2)
RBC # BLD AUTO: 4.94 M/UL (ref 4.2–5.3)
SODIUM SERPL-SCNC: 138 MMOL/L (ref 136–145)
TRIGL SERPL-MCNC: 69 MG/DL (ref ?–150)
VLDLC SERPL CALC-MCNC: 13.8 MG/DL
WBC # BLD AUTO: 4.9 K/UL (ref 4.6–13.2)

## 2018-01-30 PROCEDURE — 85025 COMPLETE CBC W/AUTO DIFF WBC: CPT | Performed by: FAMILY MEDICINE

## 2018-01-30 PROCEDURE — 80053 COMPREHEN METABOLIC PANEL: CPT | Performed by: FAMILY MEDICINE

## 2018-01-30 PROCEDURE — 80061 LIPID PANEL: CPT | Performed by: FAMILY MEDICINE

## 2018-01-30 PROCEDURE — 86644 CMV ANTIBODY: CPT | Performed by: FAMILY MEDICINE

## 2018-01-30 NOTE — MR AVS SNAPSHOT
[facility] [address] [facility] 
[phone] Patient: Iris Murdock MRN: [id number] CMF:[id number] Visit Information Date & Time Provider Department Dept. Phone Encounter #  
 1/30/2018  9:30 AM Karlee Whitt MD [facility] [phone] [id number] Follow-up Instructions Return in about 3 months (around 4/30/2018), or if symptoms worsen or fail to improve, for well woman exam with Lisandro Chen, will need pap smear. Upcoming Health Maintenance Date Due DTaP/Tdap/Td series (1 - Tdap) 3/5/1976 PAP AKA CERVICAL CYTOLOGY 3/5/1976 BREAST CANCER SCRN MAMMOGRAM 3/16/2019 COLONOSCOPY 10/14/2021 Allergies as of 1/30/2018  Review Complete On: 1/30/2018 By: Laura Le MD  
  
 Severity Noted Reaction Type Reactions Penicillin G  04/04/2017    Hives Current Immunizations  Never Reviewed Name Date Influenza Vaccine (Quad) PF 10/30/2017 Not reviewed this visit You Were Diagnosed With   
  
 Codes Comments Essential hypertension    -  Primary ICD-10-CM: I10 
ICD-9-CM: 401.9 Dyslipidemia     ICD-10-CM: E78.5 ICD-9-CM: 272.4 CMV (cytomegalovirus infection) status unknown ([facility] 75.)     ICD-10-CM: B25.9 ICD-9-CM: 078.5 Vitals BP Pulse Temp Resp Height(growth percentile) Weight(growth percentile) 134/89 (BP 1 Location: Left arm, BP Patient Position: Sitting) 75 96.3 °F (35.7 °C) (Oral) 18 5' 6\" (1.676 m) 156 lb (70.8 kg) SpO2 BMI Smoking Status 99% 25.18 kg/m2 Never Smoker BMI and BSA Data Body Mass Index Body Surface Area  
 25.18 kg/m 2 1.82 m 2 Preferred Pharmacy Pharmacy Name Phone [address] [facility] [phone] Your Updated Medication List  
  
   
This list is accurate as of: 1/30/18 10:09 AM.  Always use your most recent med list.  
  
  
  
 ascorbic acid (vitamin C) 500 mg tablet Commonly known as:  VITAMIN C Take 1,000 mg by mouth two (2) times a day. aspirin 81 mg chewable tablet Take 81 mg by mouth daily. atenolol 50 mg tablet Commonly known as:  TENORMIN Take 1 Tab by mouth daily. cholecalciferol (VITAMIN D3) 5,000 unit Tab tablet Commonly known as:  VITAMIN D3 Take  by mouth daily. Citracal + D tablet Generic drug:  calcium citrate-vitamin D3 Take  by mouth two (2) times a day. cyanocobalamin 500 mcg tablet Commonly known as:  VITAMIN B12 Take 2,500 mcg by mouth daily. doxycycline 100 mg tablet Commonly known as:  ADOXA Take 1 Tab by mouth Q TU, TH & SAT. GLUCOSAMINE 1500 COMPLEX 500-400 mg capsule Generic drug:  glucosamine-chondroit-vit c-mn Take  by mouth daily. lisinopril 10 mg tablet Commonly known as:  Madonna Cliche Take 1 Tab by mouth daily. multivitamin tablet Commonly known as:  ONE A DAY Take 1 Tab by mouth daily. ofloxacin 0.3 % otic solution Commonly known as:  FLOXIN Administer 5 Drops into each ear daily. piroxicam 20 mg capsule Commonly known as:  Merrily Chiang Take 1 Cap by mouth daily. PROBIOTIC 4X 10-15 mg Tbec Generic drug:  B.infantis-B.ani-B.long-B.bifi Take  by mouth.  
  
 rizatriptan 10 mg disintegrating tablet Commonly known as:  MAXALT-MLT PLACE 1 TABLET ON TONGUE ONCE AS NEEDED FOR MIGRAINE  
  
 rosuvastatin 10 mg tablet Commonly known as:  CRESTOR Take 1 Tab by mouth nightly. Follow-up Instructions Return in about 3 months (around 4/30/2018), or if symptoms worsen or fail to improve, for well woman exam with Rosa Bai, will need pap smear. To-Do List   
 01/30/2018 Lab:  CBC WITH AUTOMATED DIFF   
  
 01/30/2018 Lab:  CMV AB, IGG/IGM   
  
 01/30/2018 Lab:  LIPID PANEL   
  
 01/30/2018   Lab:  METABOLIC PANEL, COMPREHENSIVE   
  
  
 Introducing [facility] & HEALTH SERVICES! [facility] introduces [employer] patient portal. Now you can access parts of your medical record, email your doctor's office, and request medication refills online. 1. In your internet browser, go to [email or web address] 2. Click on the First Time User? Click Here link in the Sign In box. You will see the New Member Sign Up page. 3. Enter your [employer] Access Code exactly as it appears below. You will not need to use this code after youve completed the sign-up process. If you do not sign up before the expiration date, you must request a new code. · [employer] Access Code: [email or web address] Expires: 4/30/2018 10:07 AM 
 
4. Enter the last four digits of your Social Security Number (xxxx) and Date of Birth (mm/dd/yyyy) as indicated and click Submit. You will be taken to the next sign-up page. 5. Create a [employer] ID. This will be your [employer] login ID and cannot be changed, so think of one that is secure and easy to remember. 6. Create a [employer] password. You can change your password at any time. 7. Enter your Password Reset Question and Answer. This can be used at a later time if you forget your password. 8. Enter your e-mail address. You will receive e-mail notification when new information is available in [address] 9. Click Sign Up. You can now view and download portions of your medical record. 10. Click the Download Summary menu link to download a portable copy of your medical information. If you have questions, please visit the Frequently Asked Questions section of the [employer] website. Remember, [employer] is NOT to be used for urgent needs. For medical emergencies, dial 911. Now available from your iPhone and Android! Please provide this summary of care documentation to your next provider. Your primary care clinician is listed as PROVIDER UNKNOWN. If you have any questions after today's visit, please call [phone].

## 2018-01-30 NOTE — PROGRESS NOTES
Chief Complaint   Patient presents with    Hypertension    Ear Pain     Patient in today for htn and ear pain follow-up. She states that her left ear is feeling better. 1. Have you been to the ER, urgent care clinic since your last visit? Hospitalized since your last visit? No    2. Have you seen or consulted any other health care providers outside of the [address] [place] [address] since your last visit? Include any pap smears or colon screening. No     HPI  Alex Lin comes in for follow-up care. Hypertension: Patient is on lisinopril 10 mg daily. Blood pressure is stable. We will continue with the current treatment plan we will check labs today. Dyslipidemia: We will check patient's lipid panel today. We will continue with the current treatment plan. CMV status: Patient would like to find out whether or not she is immune to CMV. I will check the antibodies. I will let her know of the results. Overweight: Patient is a BMI of 25.18. She is doing the ketogenic diet. She has also instituted lifestyle modifications. Has lost 3 pounds. Will continue with current lifestyle modification. Past Medical History  Past Medical History:   Diagnosis Date    Arthritis     Hx of migraines        Surgical History  Past Surgical History:   Procedure Laterality Date    FOOT/TOES SURGERY PROC UNLISTED      HX BACK SURGERY      HX KNEE ARTHROSCOPY      HX PARTIAL HYSTERECTOMY          Medications  Current Outpatient Prescriptions   Medication Sig Dispense Refill    lisinopril (PRINIVIL, ZESTRIL) 10 mg tablet Take 1 Tab by mouth daily. 90 Tab 1    rizatriptan (MAXALT-MLT) 10 mg disintegrating tablet PLACE 1 TABLET ON TONGUE ONCE AS NEEDED FOR MIGRAINE 30 Tab 0    piroxicam (FELDENE) 20 mg capsule Take 1 Cap by mouth daily. 90 Cap 1    ofloxacin (FLOXIN) 0.3 % otic solution Administer 5 Drops into each ear daily. 10 mL 0    doxycycline (ADOXA) 100 mg tablet Take 1 Tab by mouth Q TU, TH & SAT.  36 Tab 2    rosuvastatin (CRESTOR) 10 mg tablet Take 1 Tab by mouth nightly. 90 Tab 1    atenolol (TENORMIN) 50 mg tablet Take 1 Tab by mouth daily. 90 Tab 3    ascorbic acid, vitamin C, (VITAMIN C) 500 mg tablet Take 1,000 mg by mouth two (2) times a day.  calcium citrate-vitamin D3 (CITRACAL + D) tablet Take  by mouth two (2) times a day.  multivitamin (ONE A DAY) tablet Take 1 Tab by mouth daily.  cholecalciferol, VITAMIN D3, (VITAMIN D3) 5,000 unit tab tablet Take  by mouth daily.  cyanocobalamin (VITAMIN B12) 500 mcg tablet Take 2,500 mcg by mouth daily.  aspirin 81 mg chewable tablet Take 81 mg by mouth daily.  glucosamine-chondroit-vit c-mn (GLUCOSAMINE 1500 COMPLEX) 500-400 mg capsule Take  by mouth daily.  B.infantis-B.ani-B.long-B.bifi (PROBIOTIC 4X) 10-15 mg TbEC Take  by mouth. Allergies  Allergies   Allergen Reactions    Penicillin G Hives       Family History  No family history on file. Social History  Social History     Social History    Marital status:      Spouse name: N/A    Number of children: N/A    Years of education: N/A     Occupational History    Not on file.      Social History Main Topics    Smoking status: Never Smoker    Smokeless tobacco: Never Used    Alcohol use No    Drug use: No    Sexual activity: Yes     Partners: Male     Other Topics Concern     Service No    Blood Transfusions Yes    Caffeine Concern No    Occupational Exposure No    Hobby Hazards No    Sleep Concern No    Stress Concern No    Weight Concern No    Special Diet No    Back Care No    Exercise Yes    Bike Helmet No    Seat Belt Yes    Self-Exams Yes     Social History Narrative       Review of Systems  Review of Systems - History obtained from chart review and the patient  General ROS: negative  Psychological ROS: negative  Respiratory ROS: no cough, shortness of breath, or wheezing  Cardiovascular ROS: no chest pain or dyspnea on exertion  Gastrointestinal ROS: no abdominal pain, change in bowel habits, or black or bloody stools  Genito-Urinary ROS: no dysuria, trouble voiding, or hematuria  Musculoskeletal ROS: negative  Neurological ROS: no TIA or stroke symptoms    Vital Signs  Visit Vitals    /89 (BP 1 Location: Left arm, BP Patient Position: Sitting)    Pulse 75    Temp 96.3 °F (35.7 °C) (Oral)    Resp 18    Ht 5' 6\" (1.676 m)    Wt 156 lb (70.8 kg)    SpO2 99%    BMI 25.18 kg/m2         Physical Exam  Physical Examination: General appearance - alert, well appearing, and in no distress, oriented to person, place, and time and acyanotic, in no respiratory distress  Mental status - alert, oriented to person, place, and time, affect appropriate to mood  Mouth - mucous membranes moist, pharynx normal without lesions  Neck - supple, no significant adenopathy  Lymphatics - no palpable lymphadenopathy, no hepatosplenomegaly  Chest - clear to auscultation, no wheezes, rales or rhonchi, symmetric air entry  Heart - normal rate, regular rhythm, normal S1, S2, no murmurs, rubs, clicks or gallops  Neurological - alert, oriented, normal speech, no focal findings or movement disorder noted  Musculoskeletal - osteoarthritic changes noted in both hands    Results  Results for orders placed or performed during the hospital encounter of 10/30/17   LIPID PANEL   Result Value Ref Range    LIPID PROFILE          Cholesterol, total 179 <200 MG/DL    Triglyceride 67 <150 MG/DL    HDL Cholesterol 103 (H) 40 - 60 MG/DL    LDL, calculated 62.6 0 - 100 MG/DL    VLDL, calculated 13.4 MG/DL    CHOL/HDL Ratio 1.7 0 - 5.0     METABOLIC PANEL, COMPREHENSIVE   Result Value Ref Range    Sodium 142 136 - 145 mmol/L    Potassium 4.2 3.5 - 5.5 mmol/L    Chloride 105 100 - 108 mmol/L    CO2 28 21 - 32 mmol/L    Anion gap 9 3.0 - 18 mmol/L    Glucose 91 74 - 99 mg/dL    BUN 16 7.0 - 18 MG/DL    Creatinine 0.64 0.6 - 1.3 MG/DL    BUN/Creatinine ratio 25 (H) 12 - 20      GFR est AA >60 >60 ml/min/1.73m2    GFR est non-AA >60 >60 ml/min/1.73m2    Calcium 9.2 8.5 - 10.1 MG/DL    Bilirubin, total 0.4 0.2 - 1.0 MG/DL    ALT (SGPT) 28 13 - 56 U/L    AST (SGOT) 24 15 - 37 U/L    Alk. phosphatase 115 45 - 117 U/L    Protein, total 7.3 6.4 - 8.2 g/dL    Albumin 4.2 3.4 - 5.0 g/dL    Globulin 3.1 2.0 - 4.0 g/dL    A-G Ratio 1.4 0.8 - 1.7     CBC WITH AUTOMATED DIFF   Result Value Ref Range    WBC 7.1 4.6 - 13.2 K/uL    RBC 4.82 4.20 - 5.30 M/uL    HGB 13.4 12.0 - 16.0 g/dL    HCT 43.1 35.0 - 45.0 %    MCV 89.4 74.0 - 97.0 FL    MCH 27.8 24.0 - 34.0 PG    MCHC 31.1 31.0 - 37.0 g/dL    RDW 13.4 11.6 - 14.5 %    PLATELET 506 925 - 988 K/uL    MPV 11.7 9.2 - 11.8 FL    NEUTROPHILS 60 40 - 73 %    LYMPHOCYTES 28 21 - 52 %    MONOCYTES 9 3 - 10 %    EOSINOPHILS 3 0 - 5 %    BASOPHILS 0 0 - 2 %    ABS. NEUTROPHILS 4.3 1.8 - 8.0 K/UL    ABS. LYMPHOCYTES 2.0 0.9 - 3.6 K/UL    ABS. MONOCYTES 0.7 0.05 - 1.2 K/UL    ABS. EOSINOPHILS 0.2 0.0 - 0.4 K/UL    ABS. BASOPHILS 0.0 0.0 - 0.06 K/UL    DF AUTOMATED         ASSESSMENT and PLAN    ICD-10-CM ICD-9-CM    1. Essential hypertension I10 401.9 CBC WITH AUTOMATED DIFF      METABOLIC PANEL, COMPREHENSIVE      COLLECTION VENOUS BLOOD,VENIPUNCTURE   2. Dyslipidemia E78.5 272.4 LIPID PANEL      COLLECTION VENOUS BLOOD,VENIPUNCTURE   3. CMV (cytomegalovirus infection) status unknown (HCC) B25.9 078.5 CMV AB, IGG/IGM      COLLECTION VENOUS BLOOD,VENIPUNCTURE   4. Overweight (BMI 25.0-29. 9) E66.3 278.02      Discussed the patient's BMI with her. The BMI follow up plan is as follows:     dietary management education, guidance, and counseling  encourage exercise  monitor weight  prescribed dietary intake    An After Visit Summary was printed and given to the patient.   lab results and schedule of future lab studies reviewed with patient  reviewed diet, exercise and weight control  reviewed medications and side effects in detail    I have discussed the diagnosis with the patient and the intended plan of care as seen in the above orders. The patient has received an after-visit summary and questions were answered concerning future plans. I have discussed medication, side effects, and warnings with the patient in detail. The patient verbalized understanding and is in agreement with the plan of care. The patient will contact the office with any additional concerns.     Evin Gunter MD

## 2018-01-31 NOTE — PATIENT INSTRUCTIONS
Body Mass Index: Care Instructions  Your Care Instructions    Body mass index (BMI) can help you see if your weight is raising your risk for health problems. It uses a formula to compare how much you weigh with how tall you are. · A BMI lower than 18.5 is considered underweight. · A BMI between 18.5 and 24.9 is considered healthy. · A BMI between 25 and 29.9 is considered overweight. A BMI of 30 or higher is considered obese. If your BMI is in the normal range, it means that you have a lower risk for weight-related health problems. If your BMI is in the overweight or obese range, you may be at increased risk for weight-related health problems, such as high blood pressure, heart disease, stroke, arthritis or joint pain, and diabetes. If your BMI is in the underweight range, you may be at increased risk for health problems such as fatigue, lower protection (immunity) against illness, muscle loss, bone loss, hair loss, and hormone problems. BMI is just one measure of your risk for weight-related health problems. You may be at higher risk for health problems if you are not active, you eat an unhealthy diet, or you drink too much alcohol or use tobacco products. Follow-up care is a key part of your treatment and safety. Be sure to make and go to all appointments, and call your doctor if you are having problems. It's also a good idea to know your test results and keep a list of the medicines you take. How can you care for yourself at home? · Practice healthy eating habits. This includes eating plenty of fruits, vegetables, whole grains, lean protein, and low-fat dairy. · If your doctor recommends it, get more exercise. Walking is a good choice. Bit by bit, increase the amount you walk every day. Try for at least 30 minutes on most days of the week. · Do not smoke. Smoking can increase your risk for health problems. If you need help quitting, talk to your doctor about stop-smoking programs and medicines. These can increase your chances of quitting for good. · Limit alcohol to 2 drinks a day for men and 1 drink a day for women. Too much alcohol can cause health problems. If you have a BMI higher than 25  · Your doctor may do other tests to check your risk for weight-related health problems. This may include measuring the distance around your waist. A waist measurement of more than 40 inches in men or 35 inches in women can increase the risk of weight-related health problems. · Talk with your doctor about steps you can take to stay healthy or improve your health. You may need to make lifestyle changes to lose weight and stay healthy, such as changing your diet and getting regular exercise. If you have a BMI lower than 18.5  · Your doctor may do other tests to check your risk for health problems. · Talk with your doctor about steps you can take to stay healthy or improve your health. You may need to make lifestyle changes to gain or maintain weight and stay healthy, such as getting more healthy foods in your diet and doing exercises to build muscle. Where can you learn more? Go to http://kendrick-emerald.info/. Enter S176 in the search box to learn more about \"Body Mass Index: Care Instructions. \"  Current as of: October 13, 2016  Content Version: 11.4  © 9538-0693 [employer]wise[employer]. Care instructions adapted under license by [employer] (which disclaims liability or warranty for this information). If you have questions about a medical condition or this instruction, always ask your healthcare professional. [address] any warranty or liability for your use of this information.

## 2018-02-01 LAB
CMV IGG SERPL IA-ACNC: <0.6 U/ML (ref 0–0.59)
CMV IGM SERPL IA-ACNC: <30 AU/ML (ref 0–29.9)

## 2018-02-02 NOTE — PROGRESS NOTES
Please let patient know that her CMV antibody is negative. Rest of her lab results are stable.   Jo Mccracken MD

## 2018-02-15 ENCOUNTER — OFFICE VISIT (OUTPATIENT)
Dept: FAMILY MEDICINE CLINIC | Age: 63
End: 2018-02-15

## 2018-02-15 VITALS
HEIGHT: 66 IN | WEIGHT: 155 LBS | OXYGEN SATURATION: 97 % | HEART RATE: 74 BPM | RESPIRATION RATE: 18 BRPM | SYSTOLIC BLOOD PRESSURE: 137 MMHG | DIASTOLIC BLOOD PRESSURE: 89 MMHG | BODY MASS INDEX: 24.91 KG/M2 | TEMPERATURE: 96.5 F

## 2018-02-15 DIAGNOSIS — G57.11 MERALGIA PARESTHETICA OF RIGHT SIDE: ICD-10-CM

## 2018-02-15 DIAGNOSIS — M25.551 PAIN OF RIGHT HIP JOINT: Primary | ICD-10-CM

## 2018-02-15 RX ORDER — AMITRIPTYLINE HYDROCHLORIDE 10 MG/1
10 TABLET, FILM COATED ORAL
Qty: 30 TAB | Refills: 1 | Status: SHIPPED | OUTPATIENT
Start: 2018-02-15 | End: 2018-06-18 | Stop reason: ALTCHOICE

## 2018-02-15 NOTE — PATIENT INSTRUCTIONS
Meralgia Paresthetica: Care Instructions  Your Care Instructions  Meralgia paresthetica (say \"muh-RAL-juh qmx-mfm-OGZN-ick-uh\") is pain and numbness in the outer part of your thigh. The pain might get worse after you walk or stand for a long time. This pain and numbness occur when a nerve in your thigh is pinched (compressed). Sometimes the problem is caused by wearing tight clothing or being overweight. Most of the time the problem goes away on its own in a few months. Lowering any pressure on the thigh area may help. Wear loose clothes, and lose weight if you need to. Follow-up care is a key part of your treatment and safety. Be sure to make and go to all appointments, and call your doctor if you are having problems. It's also a good idea to know your test results and keep a list of the medicines you take. How can you care for yourself at home? · Most times the problem gets better on its own. Try wearing loose clothing to see if this helps. · Lose weight if you need to. Talk with your doctor if you need help. When should you call for help? Watch closely for changes in your health, and be sure to contact your doctor if:  ? · You have new symptoms, such as pain that gets worse or new numbness in your thigh. ? · You do not get better as expected. Where can you learn more? Go to http://kendrick-emerald.info/. Enter X624 in the search box to learn more about \"Meralgia Paresthetica: Care Instructions. \"  Current as of: October 14, 2016  Content Version: 11.4  © 1642-0310 [employer]. Care instructions adapted under license by [employer] (which disclaims liability or warranty for this information). If you have questions about a medical condition or this instruction, always ask your healthcare professional. [address] any warranty or liability for your use of this information.

## 2018-02-15 NOTE — MR AVS SNAPSHOT
[facility] [address] [facility] 
[phone] Patient: Thomas Luz MRN: [id number] Visit Information Date & Time Provider Department Dept. Phone Encounter #  
 2/15/2018  2:15 PM Shakira Barillas [phone] [id number] Follow-up Instructions Return if symptoms worsen or fail to improve. Your Appointments 5/1/2018  9:00 AM  
PAP/PELVIC with GICAOMO Jones [facility] Appt Note: Well woman Dr. Tosin Gupta pt  
 [address] [id number] [address]  
  
   
 [address] [facility] Upcoming Health Maintenance Date Due DTaP/Tdap/Td series (1 - Tdap) 3/5/1976 PAP AKA CERVICAL CYTOLOGY 3/5/1976 BREAST CANCER SCRN MAMMOGRAM 3/16/2019 COLONOSCOPY 10/14/2021 Allergies as of 2/15/2018  Review Complete On: 2/15/2018 By: Shana Desir Severity Noted Reaction Type Reactions Penicillin G  04/04/2017    Hives Current Immunizations  Never Reviewed Name Date Influenza Vaccine (Quad) PF 10/30/2017 Not reviewed this visit You Were Diagnosed With   
  
 Codes Comments Pain of right hip joint    -  Primary ICD-10-CM: M25.551 ICD-9-CM: 719.45 Meralgia paresthetica of right side     ICD-10-CM: G57.11 ICD-9-CM: 355.1 Vitals BP Pulse Temp Resp Height(growth percentile) Weight(growth percentile) 137/89 (BP 1 Location: Left arm, BP Patient Position: Sitting) 74 96.5 °F (35.8 °C) (Oral) 18 5' 6\" (1.676 m) 155 lb (70.3 kg) SpO2 BMI Smoking Status 97% 25.02 kg/m2 Never Smoker BMI and BSA Data Body Mass Index Body Surface Area 25.02 kg/m 2 1.81 m 2 Preferred Pharmacy Pharmacy Name Phone [address] [facility] [phone] Your Updated Medication List  
  
   
This list is accurate as of: 2/15/18  3:11 PM.  Always use your most recent med list.  
  
  
  
  
 amitriptyline 10 mg tablet Commonly known as:  ELAVIL Take 1 Tab by mouth nightly. Indications: NEUROPATHIC PAIN  
  
 ascorbic acid (vitamin C) 500 mg tablet Commonly known as:  VITAMIN C Take 1,000 mg by mouth two (2) times a day. aspirin 81 mg chewable tablet Take 81 mg by mouth daily. atenolol 50 mg tablet Commonly known as:  TENORMIN Take 1 Tab by mouth daily. cholecalciferol (VITAMIN D3) 5,000 unit Tab tablet Commonly known as:  VITAMIN D3 Take  by mouth daily. Citracal + D tablet Generic drug:  calcium citrate-vitamin D3 Take  by mouth two (2) times a day. cyanocobalamin 500 mcg tablet Commonly known as:  VITAMIN B12 Take 2,500 mcg by mouth daily. doxycycline 100 mg tablet Commonly known as:  ADOXA Take 1 Tab by mouth Q TU, TH & SAT. GLUCOSAMINE 1500 COMPLEX 500-400 mg capsule Generic drug:  glucosamine-chondroit-vit c-mn Take  by mouth daily. lisinopril 10 mg tablet Commonly known as:  Madonna Cliche Take 1 Tab by mouth daily. multivitamin tablet Commonly known as:  ONE A DAY Take 1 Tab by mouth daily. ofloxacin 0.3 % otic solution Commonly known as:  FLOXIN Administer 5 Drops into each ear daily. piroxicam 20 mg capsule Commonly known as:  Merrily Chiang Take 1 Cap by mouth daily. PROBIOTIC 4X 10-15 mg Tbec Generic drug:  B.infantis-B.ani-B.long-B.bifi Take  by mouth.  
  
 rizatriptan 10 mg disintegrating tablet Commonly known as:  MAXALT-MLT PLACE 1 TABLET ON TONGUE ONCE AS NEEDED FOR MIGRAINE  
  
 rosuvastatin 10 mg tablet Commonly known as:  CRESTOR Take 1 Tab by mouth nightly. Prescriptions Sent to Pharmacy  Refills  
 amitriptyline (ELAVIL) 10 mg tablet 1  
 Sig: Take 1 Tab by mouth nightly. Indications: NEUROPATHIC PAIN Class: Normal  
 Pharmacy: [address] [place] [address] #: [phone] Route: Oral  
  
Follow-up Instructions Return if symptoms worsen or fail to improve. To-Do List   
 02/15/2018 Imaging:  XR HIP RT W OR WO PELV 2-3 VWS Patient Instructions Meralgia Paresthetica: Care Instructions Your Care Instructions Meralgia paresthetica (say \"muh-RAL-juh yjb-kmf-MUKL-ick-uh\") is pain and numbness in the outer part of your thigh. The pain might get worse after you walk or stand for a long time. This pain and numbness occur when a nerve in your thigh is pinched (compressed). Sometimes the problem is caused by wearing tight clothing or being overweight. Most of the time the problem goes away on its own in a few months. Lowering any pressure on the thigh area may help. Wear loose clothes, and lose weight if you need to. Follow-up care is a key part of your treatment and safety. Be sure to make and go to all appointments, and call your doctor if you are having problems. It's also a good idea to know your test results and keep a list of the medicines you take. How can you care for yourself at home? · Most times the problem gets better on its own. Try wearing loose clothing to see if this helps. · Lose weight if you need to. Talk with your doctor if you need help. When should you call for help? Watch closely for changes in your health, and be sure to contact your doctor if: 
? · You have new symptoms, such as pain that gets worse or new numbness in your thigh. ? · You do not get better as expected. Where can you learn more? Go to http://kendrick-emerald.info/. Enter M456 in the search box to learn more about \"Meralgia Paresthetica: Care Instructions. \" Current as of: October 14, 2016 Content Version: 11.4 © 7772-7741 Healthwise, Incorporated. Care instructions adapted under license by [employer] (which disclaims liability or warranty for this information). If you have questions about a medical condition or this instruction, always ask your healthcare professional. [address] any warranty or liability for your use of this information. Introducing [facility]! Dear Familia Velasquez: 
Thank you for requesting a [employer] account. Our records indicate that you already have an active [employer] account. You can access your account anytime at [email or web address]. [email or web address]/[email or web address] Did you know that you can access your hospital and ER discharge instructions at any time in [employer]? You can also review all of your test results from your hospital stay or ER visit. Additional Information If you have questions, please visit the Frequently Asked Questions section of the [employer] website at [email or web address] Remember, [employer] is NOT to be used for urgent needs. For medical emergencies, dial 911. Now available from your iPhone and Android! Please provide this summary of care documentation to your next provider. Your primary care clinician is listed as PROVIDER UNKNOWN. If you have any questions after today's visit, please call [phone].

## 2018-02-16 NOTE — PROGRESS NOTES
HISTORY OF PRESENT ILLNESS  Nanettebren Ruiz is a 58 y.o. female.   HPI    ROS    Physical Exam    ASSESSMENT and PLAN  {ASSESSMENT/PLAN:[id number]}

## 2018-02-16 NOTE — PROGRESS NOTES
NIHARIKA Su Night comes in for follow-up care. Hip pain: Patient has right-sided hip pain. This has been ongoing on and off for some days now. At times feels that the pain is more on the thigh and not in the hip. Pain feels more like a burning sensation. It is mainly at the lateral aspect of the thigh. There is some numbness and tingling towards the lower lateral aspect of the thigh. She denies trauma, fall or twisting of the hip. No swelling of the lower extremity. She comes in for evaluation. .  I did do a hip x-ray that is negative as per my read but will await radiologist read. Patient has mainly numbness and tingling on the lateral aspect of the thigh. This is meralgia paresthetica. She has tried taking NSAIDs and over-the-counter pain medication without much relief. I will start her on amitriptyline for neuropathic pain. We discussed supportive care measures that can help with the condition. A printout on meralgia paresthetica was also given. I will follow-up in a month or sooner as needed. Past Medical History  Past Medical History:   Diagnosis Date    Arthritis     Hx of migraines        Surgical History  Past Surgical History:   Procedure Laterality Date    FOOT/TOES SURGERY PROC UNLISTED      HX BACK SURGERY      HX KNEE ARTHROSCOPY      HX PARTIAL HYSTERECTOMY          Medications  Current Outpatient Prescriptions   Medication Sig Dispense Refill    amitriptyline (ELAVIL) 10 mg tablet Take 1 Tab by mouth nightly. Indications: NEUROPATHIC PAIN 30 Tab 1    lisinopril (PRINIVIL, ZESTRIL) 10 mg tablet Take 1 Tab by mouth daily. 90 Tab 1    rizatriptan (MAXALT-MLT) 10 mg disintegrating tablet PLACE 1 TABLET ON TONGUE ONCE AS NEEDED FOR MIGRAINE 30 Tab 0    piroxicam (FELDENE) 20 mg capsule Take 1 Cap by mouth daily. 90 Cap 1    ofloxacin (FLOXIN) 0.3 % otic solution Administer 5 Drops into each ear daily.  10 mL 0    doxycycline (ADOXA) 100 mg tablet Take 1 Tab by mouth Q TU, TH & SAT. 40 Tab 2    rosuvastatin (CRESTOR) 10 mg tablet Take 1 Tab by mouth nightly. 90 Tab 1    atenolol (TENORMIN) 50 mg tablet Take 1 Tab by mouth daily. 90 Tab 3    ascorbic acid, vitamin C, (VITAMIN C) 500 mg tablet Take 1,000 mg by mouth two (2) times a day.  calcium citrate-vitamin D3 (CITRACAL + D) tablet Take  by mouth two (2) times a day.  multivitamin (ONE A DAY) tablet Take 1 Tab by mouth daily.  cholecalciferol, VITAMIN D3, (VITAMIN D3) 5,000 unit tab tablet Take  by mouth daily.  cyanocobalamin (VITAMIN B12) 500 mcg tablet Take 2,500 mcg by mouth daily.  aspirin 81 mg chewable tablet Take 81 mg by mouth daily.  glucosamine-chondroit-vit c-mn (GLUCOSAMINE 1500 COMPLEX) 500-400 mg capsule Take  by mouth daily.  B.infantis-B.ani-B.long-B.bifi (PROBIOTIC 4X) 10-15 mg TbEC Take  by mouth. Allergies  Allergies   Allergen Reactions    Penicillin G Hives       Family History  No family history on file. Social History  Social History     Social History    Marital status:      Spouse name: N/A    Number of children: N/A    Years of education: N/A     Occupational History    Not on file.      Social History Main Topics    Smoking status: Never Smoker    Smokeless tobacco: Never Used    Alcohol use No    Drug use: No    Sexual activity: Yes     Partners: Male     Other Topics Concern     Service No    Blood Transfusions Yes    Caffeine Concern No    Occupational Exposure No    Hobby Hazards No    Sleep Concern No    Stress Concern No    Weight Concern No    Special Diet No    Back Care No    Exercise Yes    Bike Helmet No    Seat Belt Yes    Self-Exams Yes     Social History Narrative       Review of Systems  Review of Systems - History obtained from chart review and the patient  General ROS: negative  Ophthalmic ROS: positive for - uses glasses  Respiratory ROS: no cough, shortness of breath, or wheezing  Cardiovascular ROS: no chest pain or dyspnea on exertion  Musculoskeletal ROS: positive for - joint pain and pain in hip - right  Neurological ROS: positive for - numbness/tingling    Vital Signs  Visit Vitals    /89 (BP 1 Location: Left arm, BP Patient Position: Sitting)    Pulse 74    Temp 96.5 °F (35.8 °C) (Oral)    Resp 18    Ht 5' 6\" (1.676 m)    Wt 155 lb (70.3 kg)    SpO2 97%    BMI 25.02 kg/m2         Physical Exam  Physical Examination: General appearance - alert, well appearing, and in no distress, oriented to person, place, and time, acyanotic, in no respiratory distress and well hydrated  Mental status - alert, oriented to person, place, and time, affect appropriate to mood  Mouth - mucous membranes moist, pharynx normal without lesions  Chest - clear to auscultation, no wheezes, rales or rhonchi, symmetric air entry, no tachypnea, retractions or cyanosis  Heart - normal rate and regular rhythm, S1 and S2 normal  Neurological -numbness and tingling on the lateral cutaneous nerve of the thigh right side distribution  Musculoskeletal - no joint tenderness, deformity or swelling, no muscular tenderness noted  Extremities - no pedal edema noted, intact peripheral pulses    Results  Results for orders placed or performed during the hospital encounter of 01/30/18   CMV AB, IGG/IGM   Result Value Ref Range    Cytomegalovirus Ab, IgG <0.60 0.00 - 0.59 U/mL    CMV Ab, IgM <30.0 0.0 - 29.9 AU/mL   CBC WITH AUTOMATED DIFF   Result Value Ref Range    WBC 4.9 4.6 - 13.2 K/uL    RBC 4.94 4.20 - 5.30 M/uL    HGB 14.0 12.0 - 16.0 g/dL    HCT 42.8 35.0 - 45.0 %    MCV 86.6 74.0 - 97.0 FL    MCH 28.3 24.0 - 34.0 PG    MCHC 32.7 31.0 - 37.0 g/dL    RDW 14.3 11.6 - 14.5 %    PLATELET 722 747 - 435 K/uL    MPV 11.1 9.2 - 11.8 FL    NEUTROPHILS 60 40 - 73 %    LYMPHOCYTES 31 21 - 52 %    MONOCYTES 7 3 - 10 %    EOSINOPHILS 2 0 - 5 %    BASOPHILS 0 0 - 2 %    ABS. NEUTROPHILS 2.9 1.8 - 8.0 K/UL    ABS.  LYMPHOCYTES 1.5 0.9 - 3.6 K/UL ABS. MONOCYTES 0.4 0.05 - 1.2 K/UL    ABS. EOSINOPHILS 0.1 0.0 - 0.4 K/UL    ABS. BASOPHILS 0.0 0.0 - 0.06 K/UL    DF AUTOMATED     LIPID PANEL   Result Value Ref Range    LIPID PROFILE          Cholesterol, total 190 <200 MG/DL    Triglyceride 69 <150 MG/DL    HDL Cholesterol 81 (H) 40 - 60 MG/DL    LDL, calculated 95.2 0 - 100 MG/DL    VLDL, calculated 13.8 MG/DL    CHOL/HDL Ratio 2.3 0 - 5.0     METABOLIC PANEL, COMPREHENSIVE   Result Value Ref Range    Sodium 138 136 - 145 mmol/L    Potassium 4.1 3.5 - 5.5 mmol/L    Chloride 103 100 - 108 mmol/L    CO2 27 21 - 32 mmol/L    Anion gap 8 3.0 - 18 mmol/L    Glucose 72 (L) 74 - 99 mg/dL    BUN 16 7.0 - 18 MG/DL    Creatinine 0.52 (L) 0.6 - 1.3 MG/DL    BUN/Creatinine ratio 31 (H) 12 - 20      GFR est AA >60 >60 ml/min/1.73m2    GFR est non-AA >60 >60 ml/min/1.73m2    Calcium 9.1 8.5 - 10.1 MG/DL    Bilirubin, total 0.5 0.2 - 1.0 MG/DL    ALT (SGPT) 28 13 - 56 U/L    AST (SGOT) 27 15 - 37 U/L    Alk. phosphatase 99 45 - 117 U/L    Protein, total 7.2 6.4 - 8.2 g/dL    Albumin 4.1 3.4 - 5.0 g/dL    Globulin 3.1 2.0 - 4.0 g/dL    A-G Ratio 1.3 0.8 - 1.7         ASSESSMENT and PLAN    ICD-10-CM ICD-9-CM    1. Pain of right hip joint M25.551 719.45 XR HIP RT W OR WO PELV 2-3 VWS   2. Meralgia paresthetica of right side G57.11 355.1 amitriptyline (ELAVIL) 10 mg tablet     reviewed diet, exercise and weight control  reviewed medications and side effects in detail  radiology results and schedule of future radiology studies reviewed with patient    I have discussed the diagnosis with the patient and the intended plan of care as seen in the above orders. The patient has received an after-visit summary and questions were answered concerning future plans. I have discussed medication, side effects, and warnings with the patient in detail. The patient verbalized understanding and is in agreement with the plan of care.  The patient will contact the office with any additional concerns.     Stephanie Mccracken MD

## 2018-03-14 ENCOUNTER — TELEPHONE (OUTPATIENT)
Dept: FAMILY MEDICINE CLINIC | Age: 63
End: 2018-03-14

## 2018-03-14 NOTE — TELEPHONE ENCOUNTER
Patient called stating Dr. Chicho Warren told her call if the pain in her right hip has not dissipated. Patient states she's still in pain and would like a referral.    I explained that patient should receive a call back within 24-48 hours.

## 2018-03-15 DIAGNOSIS — M25.551 HIP PAIN, RIGHT: Primary | ICD-10-CM

## 2018-03-22 ENCOUNTER — OFFICE VISIT (OUTPATIENT)
Dept: ORTHOPEDIC SURGERY | Age: 63
End: 2018-03-22

## 2018-03-22 VITALS
DIASTOLIC BLOOD PRESSURE: 88 MMHG | TEMPERATURE: 96.4 F | SYSTOLIC BLOOD PRESSURE: 157 MMHG | HEART RATE: 65 BPM | WEIGHT: 150.6 LBS | OXYGEN SATURATION: 100 % | BODY MASS INDEX: 24.2 KG/M2 | HEIGHT: 66 IN

## 2018-03-22 DIAGNOSIS — S76.011A HIP STRAIN, RIGHT, INITIAL ENCOUNTER: ICD-10-CM

## 2018-03-22 DIAGNOSIS — M70.61 GREATER TROCHANTERIC BURSITIS OF RIGHT HIP: Primary | ICD-10-CM

## 2018-03-22 DIAGNOSIS — M51.16 LUMBAR DISC DISEASE WITH RADICULOPATHY: ICD-10-CM

## 2018-03-22 RX ORDER — CELECOXIB 200 MG/1
200 CAPSULE ORAL DAILY
Qty: 30 CAP | Refills: 2 | Status: SHIPPED | OUTPATIENT
Start: 2018-03-22 | End: 2018-06-20

## 2018-03-22 RX ORDER — TRIAMCINOLONE ACETONIDE 40 MG/ML
40 INJECTION, SUSPENSION INTRA-ARTICULAR; INTRAMUSCULAR ONCE
Qty: 1 ML | Refills: 0
Start: 2018-03-22 | End: 2018-03-22

## 2018-03-22 NOTE — PROGRESS NOTES
HISTORY OF PRESENT ILLNESS: Ms. Lenin Leiva is a 80-year-old female who is here for consultation regarding right hip pain. On further questioning, she has had symptoms for about eight weeks. She saw her family physician about five weeks ago who put her on some Amitriptyline. At that time she pointed to pain along the lateral aspect of the right hip which goes into the right thigh and she noted a burning type pain. She had similar symptoms in the left leg a number of years ago, but it went away after a Cortisone shot. She notes slight pain in the right groin. Her pain is aggravated by weightbearing activities. She is not utilizing any ambulatory assist.  She does not take medication for the discomfort. She does not feel the Amitriptyline helped her. She denies bowel or bladder dysfunction. She does not complain of back pain. PHYSICAL EXAMINATION:  Reveals a healthy-appearing, 80-year-old female in minimal discomfort. She currently ambulates without an antalgic gait. With reference to her left hip, she is able to straight leg raise today to 90º without discomfort. She has a normal active and passive range of motion of the left hip without discomfort. Left hip roll test is negative. Ariess test is negative. With reference to her right hip, she is able to straight leg raise to 90º with minimal discomfort. She has a normal active and passive range of motion of the right hip without discomfort. Right hip roll test is negative. Her point of maximal tenderness is over the right greater trochanter. Her pain is slightly aggravated by resisted abduction of the right hip. Ariess test results in pain in the region of the right buttock and lateral aspect of the right hip. Neurovascular testing is intact to the lower extremities, including motor strength and sensation. RADIOGRAPHS:  X-rays of her pelvis and right hip recently reveal no acute osseus pathology.   She has minimal degenerative changes. She has good joint space remaining in the weightbearing portion of the right hip. IMPRESSION:   1. Right greater trochanteric bursitis. 2. Right leg radiculopathy by history. RECOMMENDATIONS:  At this point I would recommend a Cortisone injection to the right greater trochanter. She had good success with this in the past and she really is point tender over the greater trochanter. I told her however some of her symptoms may be originating from a pinched nerve also. In view of this, I will also start her on Celebrex 200 mg po qd with food. I will check her back in about one month. If this does not help her she is to notify me. All of her questions were answered today. Vitals:    03/22/18 0854   BP: 157/88   Pulse: 65   Temp: 96.4 °F (35.8 °C)   SpO2: 100%   Weight: 68.3 kg (150 lb 9.6 oz)   Height: 5' 6\" (1.676 m)   PainSc:   9       Patient Active Problem List   Diagnosis Code    Migraine without status migrainosus, not intractable G43.909    Elevated BP without diagnosis of hypertension R03.0    Essential hypertension I10    Rosacea L71.9    Dyslipidemia E78.5     Patient Active Problem List    Diagnosis Date Noted    Dyslipidemia 10/30/2017    Essential hypertension 07/28/2017    Rosacea 07/28/2017    Elevated BP without diagnosis of hypertension 04/28/2017    Migraine without status migrainosus, not intractable 04/10/2017     Current Outpatient Prescriptions   Medication Sig Dispense Refill    amitriptyline (ELAVIL) 10 mg tablet Take 1 Tab by mouth nightly. Indications: NEUROPATHIC PAIN 30 Tab 1    lisinopril (PRINIVIL, ZESTRIL) 10 mg tablet Take 1 Tab by mouth daily. 90 Tab 1    rizatriptan (MAXALT-MLT) 10 mg disintegrating tablet PLACE 1 TABLET ON TONGUE ONCE AS NEEDED FOR MIGRAINE 30 Tab 0    piroxicam (FELDENE) 20 mg capsule Take 1 Cap by mouth daily. 90 Cap 1    ofloxacin (FLOXIN) 0.3 % otic solution Administer 5 Drops into each ear daily.  10 mL 0    doxycycline (ADOXA) 100 mg tablet Take 1 Tab by mouth Q TU, TH & SAT. 40 Tab 2    rosuvastatin (CRESTOR) 10 mg tablet Take 1 Tab by mouth nightly. 90 Tab 1    atenolol (TENORMIN) 50 mg tablet Take 1 Tab by mouth daily. 90 Tab 3    ascorbic acid, vitamin C, (VITAMIN C) 500 mg tablet Take 1,000 mg by mouth two (2) times a day.  calcium citrate-vitamin D3 (CITRACAL + D) tablet Take  by mouth two (2) times a day.  multivitamin (ONE A DAY) tablet Take 1 Tab by mouth daily.  cholecalciferol, VITAMIN D3, (VITAMIN D3) 5,000 unit tab tablet Take  by mouth daily.  cyanocobalamin (VITAMIN B12) 500 mcg tablet Take 2,500 mcg by mouth daily.  aspirin 81 mg chewable tablet Take 81 mg by mouth daily.  glucosamine-chondroit-vit c-mn (GLUCOSAMINE 1500 COMPLEX) 500-400 mg capsule Take  by mouth daily.  B.infantis-B.ani-B.long-B.bifi (PROBIOTIC 4X) 10-15 mg TbEC Take  by mouth.        Allergies   Allergen Reactions    Penicillin G Hives     Past Medical History:   Diagnosis Date    Arthritis     Hx of migraines      Past Surgical History:   Procedure Laterality Date    FOOT/TOES SURGERY PROC UNLISTED      HX BACK SURGERY      HX KNEE ARTHROSCOPY      HX PARTIAL HYSTERECTOMY       Family History   Problem Relation Age of Onset    Diabetes Father      Social History   Substance Use Topics    Smoking status: Never Smoker    Smokeless tobacco: Never Used    Alcohol use No

## 2018-03-22 NOTE — MR AVS SNAPSHOT
[address], [address] 706 [facility] 
[phone] Patient: Iris Murdock MRN: [id number] YZE:9[birth year] Visit Information Date & Time Provider Department Dept. Phone Encounter #  
 3/22/2018  9:30 AM Garima Bland  [facility], [address] and [facility] [phone] Your Appointments 3/22/2018  9:30 AM  
New Patient with Garima Bland MD  
914 [facility], [address] and [facility] - [facility] ([facility]) Appt Note: right hip pain/pt req injection/Dr. Flores ref/bring Id, ins card, list of meds/HV [address], [address] 768 [facility]  
[phone] 2300 [facility], [address]  
  
    
 5/1/2018  9:00 AM  
PAP/PELVIC with GIACOMO Rivero ([facility]) Appt Note: Well woman Dr. Luís Ta pt  
 [address] [address] [address]  
  
   
 [address] 700 [facility] Upcoming Health Maintenance Date Due DTaP/Tdap/Td series (1 - Tdap) 3/5/1976 PAP AKA CERVICAL CYTOLOGY 3/5/1976 BREAST CANCER SCRN MAMMOGRAM 3/16/2019 COLONOSCOPY 10/14/2021 Allergies as of 3/22/2018  Review Complete On: 3/22/2018 By: Garima Bland MD  
  
 Severity Noted Reaction Type Reactions Penicillin G  04/04/2017    Hives Current Immunizations  Never Reviewed Name Date Influenza Vaccine (Quad) PF 10/30/2017 Not reviewed this visit Vitals BP Pulse Temp Height(growth percentile) Weight(growth percentile) SpO2  
 157/88 65 96.4 °F (35.8 °C) 5' 6\" (1.676 m) 150 lb 9.6 oz (68.3 kg) 100% BMI OB Status Smoking Status 24.31 kg/m2 Hysterectomy Never Smoker BMI and BSA Data Body Mass Index Body Surface Area  
 24.31 kg/m 2 1.78 m 2 Preferred Pharmacy Pharmacy Name Phone [address] [phone] Your Updated Medication List  
  
   
This list is accurate as of 3/22/18  9:11 AM.  Always use your most recent med list.  
  
  
  
  
 amitriptyline 10 mg tablet Commonly known as:  ELAVIL Take 1 Tab by mouth nightly. Indications: NEUROPATHIC PAIN  
  
 ascorbic acid (vitamin C) 500 mg tablet Commonly known as:  VITAMIN C Take 1,000 mg by mouth two (2) times a day. aspirin 81 mg chewable tablet Take 81 mg by mouth daily. atenolol 50 mg tablet Commonly known as:  TENORMIN Take 1 Tab by mouth daily. cholecalciferol (VITAMIN D3) 5,000 unit Tab tablet Commonly known as:  VITAMIN D3 Take  by mouth daily. Citracal + D tablet Generic drug:  calcium citrate-vitamin D3 Take  by mouth two (2) times a day. cyanocobalamin 500 mcg tablet Commonly known as:  VITAMIN B12 Take 2,500 mcg by mouth daily. doxycycline 100 mg tablet Commonly known as:  ADOXA Take 1 Tab by mouth Q TU, TH & SAT. GLUCOSAMINE 1500 COMPLEX 500-400 mg capsule Generic drug:  glucosamine-chondroit-vit c-mn Take  by mouth daily. lisinopril 10 mg tablet Commonly known as:  Lien Hummer Take 1 Tab by mouth daily. multivitamin tablet Commonly known as:  ONE A DAY Take 1 Tab by mouth daily. ofloxacin 0.3 % otic solution Commonly known as:  FLOXIN Administer 5 Drops into each ear daily. piroxicam 20 mg capsule Commonly known as:  Arloa Pae Take 1 Cap by mouth daily. PROBIOTIC 4X 10-15 mg Tbec Generic drug:  B.infantis-B.ani-B.long-B.bifi Take  by mouth.  
  
 rizatriptan 10 mg disintegrating tablet Commonly known as:  MAXALT-MLT PLACE 1 TABLET ON TONGUE ONCE AS NEEDED FOR MIGRAINE  
  
 rosuvastatin 10 mg tablet Commonly known as:  CRESTOR Take 1 Tab by mouth nightly. Introducing [facility] & HEALTH SERVICES!    
 Dear Renee Smith: 
 Thank you for requesting a Me[employer] account. Our records indicate that you already have an active Measy account. You can access your account anytime at [email or web address]. [email or web address] Did you know that you can access your hospital and ER discharge instructions at any time in Measy? You can also review all of your test results from your hospital stay or ER visit. Additional Information If you have questions, please visit the Frequently Asked Questions section of the Measy website at [email or web address]/. Remember, Measy is NOT to be used for urgent needs. For medical emergencies, dial 911. Now available from your iPhone and Android! Please provide this summary of care documentation to your next provider. Your primary care clinician is listed as PROVIDER UNKNOWN. If you have any questions after today's visit, please call [phone].

## 2018-03-27 DIAGNOSIS — G43.909 MIGRAINE WITHOUT STATUS MIGRAINOSUS, NOT INTRACTABLE, UNSPECIFIED MIGRAINE TYPE: ICD-10-CM

## 2018-03-27 DIAGNOSIS — R03.0 ELEVATED BP WITHOUT DIAGNOSIS OF HYPERTENSION: ICD-10-CM

## 2018-03-27 RX ORDER — ATENOLOL 50 MG/1
TABLET ORAL
Qty: 90 TAB | Refills: 3 | Status: SHIPPED | OUTPATIENT
Start: 2018-03-27 | End: 2019-03-22 | Stop reason: SDUPTHER

## 2018-04-05 DIAGNOSIS — M70.72 BURSITIS OF OTHER BURSA OF BOTH HIPS: ICD-10-CM

## 2018-04-05 DIAGNOSIS — M70.71 BURSITIS OF OTHER BURSA OF BOTH HIPS: ICD-10-CM

## 2018-04-05 RX ORDER — PIROXICAM 20 MG/1
CAPSULE ORAL
Qty: 90 CAP | Refills: 1 | Status: SHIPPED | OUTPATIENT
Start: 2018-04-05 | End: 2018-06-18 | Stop reason: ALTCHOICE

## 2018-04-19 ENCOUNTER — OFFICE VISIT (OUTPATIENT)
Dept: ORTHOPEDIC SURGERY | Age: 63
End: 2018-04-19

## 2018-04-19 VITALS
BODY MASS INDEX: 23.63 KG/M2 | RESPIRATION RATE: 16 BRPM | DIASTOLIC BLOOD PRESSURE: 82 MMHG | TEMPERATURE: 97 F | SYSTOLIC BLOOD PRESSURE: 125 MMHG | OXYGEN SATURATION: 98 % | HEART RATE: 69 BPM | WEIGHT: 147 LBS | HEIGHT: 66 IN

## 2018-04-19 DIAGNOSIS — M51.16 LUMBAR DISC DISEASE WITH RADICULOPATHY: ICD-10-CM

## 2018-04-19 DIAGNOSIS — M54.31 BILATERAL SCIATICA: ICD-10-CM

## 2018-04-19 DIAGNOSIS — M54.32 BILATERAL SCIATICA: ICD-10-CM

## 2018-04-19 DIAGNOSIS — M70.61 GREATER TROCHANTERIC BURSITIS OF RIGHT HIP: Primary | ICD-10-CM

## 2018-04-19 NOTE — MR AVS SNAPSHOT
[address], [address] 200 [facility] 
[phone] Patient: Marli Obrien MRN: [id number]:[id number]/6723 Visit Information Date & Time Provider Department Dept. Phone Encounter #  
 4/19/2018  1:00 PM Eduardo Crandall  [facility], [address] and [facility] [phone] [id number] Your Appointments 5/1/2018  9:00 AM  
PAP/PELVIC with Jabari Contreras NP [address] ([facility] Appt Note: Well woman Dr. bEer Gore pt  
 [address] [facility] 49  
  
   
 [address] 700 [facility] Upcoming Health Maintenance Date Due DTaP/Tdap/Td series (1 - Tdap) 3/5/1976 PAP AKA CERVICAL CYTOLOGY 3/5/1976 BREAST CANCER SCRN MAMMOGRAM 3/16/2019 COLONOSCOPY 10/14/2021 Allergies as of 4/19/2018  Review Complete On: 3/22/2018 By: Eduardo Crandall MD  
  
 Severity Noted Reaction Type Reactions Penicillin G  04/04/2017    Hives Current Immunizations  Never Reviewed Name Date Influenza Vaccine (Quad) PF 10/30/2017 Not reviewed this visit Vitals BP Pulse Temp Resp Height(growth percentile) Weight(growth percentile) 125/82 69 97 °F (36.1 °C) (Oral) 16 5' 6\" (1.676 m) 147 lb (66.7 kg) SpO2 BMI OB Status Smoking Status 98% 23.73 kg/m2 Hysterectomy Never Smoker BMI and BSA Data Body Mass Index Body Surface Area  
 23.73 kg/m 2 1.76 m 2 Preferred Pharmacy Pharmacy Name Phone Elli Alvarez, [facility] [phone] Your Updated Medication List  
  
   
This list is accurate as of 4/19/18  1:22 PM.  Always use your most recent med list.  
  
  
  
  
 amitriptyline 10 mg tablet Commonly known as:  ELAVIL Take 1 Tab by mouth nightly.  Indications: NEUROPATHIC PAIN  
  
 ascorbic acid (vitamin C) 500 mg tablet Commonly known as:  VITAMIN C Take 1,000 mg by mouth two (2) times a day. aspirin 81 mg chewable tablet Take 81 mg by mouth daily. atenolol 50 mg tablet Commonly known as:  TENORMIN  
TAKE 1 TABLET DAILY  
  
 celecoxib 200 mg capsule Commonly known as:  CeleBREX Take 1 Cap by mouth daily for 90 days. cholecalciferol (VITAMIN D3) 5,000 unit Tab tablet Commonly known as:  VITAMIN D3 Take  by mouth daily. Citracal + D tablet Generic drug:  calcium citrate-vitamin D3 Take  by mouth two (2) times a day. cyanocobalamin 500 mcg tablet Commonly known as:  VITAMIN B12 Take 2,500 mcg by mouth daily. doxycycline 100 mg tablet Commonly known as:  ADOXA Take 1 Tab by mouth Q TU, TH & SAT. GLUCOSAMINE 1500 COMPLEX 500-400 mg capsule Generic drug:  glucosamine-chondroit-vit c-mn Take  by mouth daily. lisinopril 10 mg tablet Commonly known as:  Lenny Peek Take 1 Tab by mouth daily. multivitamin tablet Commonly known as:  ONE A DAY Take 1 Tab by mouth daily. piroxicam 20 mg capsule Commonly known as:  FELDENE  
TAKE 1 CAPSULE DAILY PROBIOTIC 4X 10-15 mg Tbec Generic drug:  B.infantis-B.ani-B.long-B.bifi Take  by mouth.  
  
 rizatriptan 10 mg disintegrating tablet Commonly known as:  MAXALT-MLT PLACE 1 TABLET ON TONGUE ONCE AS NEEDED FOR MIGRAINE  
  
 rosuvastatin 10 mg tablet Commonly known as:  CRESTOR Take 1 Tab by mouth nightly. Introducing [facility] & HEALTH SERVICES! Dear Yuval Franco: 
Thank you for requesting a [employer] account. Our records indicate that you already have an active [employer] account. You can access your account anytime at [email or web address] Did you know that you can access your hospital and ER discharge instructions at any time in [employer]? You can also review all of your test results from your hospital stay or ER visit. Additional Information If you have questions, please visit the Frequently Asked Questions section of the [employer] website at [email or web address] | [email or web address]. [employer]. [email or web address] Remember, [employer] is NOT to be used for urgent needs. For medical emergencies, dial 911. Now available from your iPhone and Android! Please provide this summary of care documentation to your next provider. Your primary care clinician is listed as PROVIDER UNKNOWN. If you have any questions after today's visit, please call [phone].

## 2018-04-19 NOTE — PROGRESS NOTES
HISTORY OF PRESENT ILLNESS: Ms. Iker Dorantes is here for follow-up with reference to her hips and her back. She has had previous back surgery and I saw her for greater trochanteric bursitis and also a little bit of sciatica on the right side. She states she is feeling much better. The Cortisone shot I gave her in the right greater trochanter helped her after a couple of days, and really only for about one week to 10 days. After about one week after the Cortisone shot she started taking the Celebrex medication and she states she is doing much better. She now notices slight discomfort in the same area in the left hip but not bad. She has started using her Cristin Lincoln again to exercise for the past couple of weeks and doing fine. She denies radiating leg pain. She denies numbness or tingling in the lower extremities. She does not utilize any ambulatory assist.    PHYSICAL EXAMINATION:  Reveals she ambulates without an antalgic gait. She is not utilizing any ambulatory assist.  With reference to her lower extremities, she is able to straight leg raise bilaterally to 90º+ without discomfort. Hamstrings are fine. This does not reproduce any pain for her. She has excellent active and passive range of motion of both hips without discomfort. Right and left hip roll tests are negative. Neurovascular testing is intact to the lower extremities, including motor strength and sensation, proximally and distally. IMPRESSION:   1. Resolved trochanteric bursitis. 2. Sciatica by history. 3. Status post lumbar disc surgery. RECOMMENDATIONS:  At this point she is to minimize the use of Celebrex medication after about one month. She will use it only as needed but no more than one pill a day. If she has recurrence of her symptoms she is to notify me and consideration could be made for a brief course of therapy.   At this point since the Cortisone shot only helped for a week or so I would not necessarily proceed with further Cortisone shots unless absolutely necessary. All of her questions were answered today. Vitals:    04/19/18 1256   BP: 125/82   Pulse: 69   Resp: 16   Temp: 97 °F (36.1 °C)   TempSrc: Oral   SpO2: 98%   Weight: 66.7 kg (147 lb)   Height: 5' 6\" (1.676 m)   PainSc:   1   PainLoc: Hip       Patient Active Problem List   Diagnosis Code    Migraine without status migrainosus, not intractable G43.909    Elevated BP without diagnosis of hypertension R03.0    Essential hypertension I10    Rosacea L71.9    Dyslipidemia E78.5     Patient Active Problem List    Diagnosis Date Noted    Dyslipidemia 10/30/2017    Essential hypertension 07/28/2017    Rosacea 07/28/2017    Elevated BP without diagnosis of hypertension 04/28/2017    Migraine without status migrainosus, not intractable 04/10/2017     Current Outpatient Prescriptions   Medication Sig Dispense Refill    atenolol (TENORMIN) 50 mg tablet TAKE 1 TABLET DAILY 90 Tab 3    celecoxib (CELEBREX) 200 mg capsule Take 1 Cap by mouth daily for 90 days. 30 Cap 2    lisinopril (PRINIVIL, ZESTRIL) 10 mg tablet Take 1 Tab by mouth daily. 90 Tab 1    doxycycline (ADOXA) 100 mg tablet Take 1 Tab by mouth Q TU, TH & SAT. 40 Tab 2    ascorbic acid, vitamin C, (VITAMIN C) 500 mg tablet Take 1,000 mg by mouth two (2) times a day.  calcium citrate-vitamin D3 (CITRACAL + D) tablet Take  by mouth two (2) times a day.  multivitamin (ONE A DAY) tablet Take 1 Tab by mouth daily.  cholecalciferol, VITAMIN D3, (VITAMIN D3) 5,000 unit tab tablet Take  by mouth daily.  cyanocobalamin (VITAMIN B12) 500 mcg tablet Take 2,500 mcg by mouth daily.  aspirin 81 mg chewable tablet Take 81 mg by mouth daily.  glucosamine-chondroit-vit c-mn (GLUCOSAMINE 1500 COMPLEX) 500-400 mg capsule Take  by mouth daily.  B.infantis-B.ani-B.long-B.bifi (PROBIOTIC 4X) 10-15 mg TbEC Take  by mouth.       piroxicam (FELDENE) 20 mg capsule TAKE 1 CAPSULE DAILY 90 Cap 1    amitriptyline (ELAVIL) 10 mg tablet Take 1 Tab by mouth nightly. Indications: NEUROPATHIC PAIN 30 Tab 1    rizatriptan (MAXALT-MLT) 10 mg disintegrating tablet PLACE 1 TABLET ON TONGUE ONCE AS NEEDED FOR MIGRAINE 30 Tab 0    rosuvastatin (CRESTOR) 10 mg tablet Take 1 Tab by mouth nightly.  90 Tab 1     Allergies   Allergen Reactions    Penicillin G Hives     Past Medical History:   Diagnosis Date    Arthritis     Hx of migraines      Past Surgical History:   Procedure Laterality Date    FOOT/TOES SURGERY PROC UNLISTED      HX BACK SURGERY      HX KNEE ARTHROSCOPY      HX PARTIAL HYSTERECTOMY       Family History   Problem Relation Age of Onset    Heart Attack Mother     Stroke Mother     Other Mother      CHF    Alcohol abuse Father     Other Father      CHF    Liver Disease Father      Social History   Substance Use Topics    Smoking status: Never Smoker    Smokeless tobacco: Never Used    Alcohol use No

## 2018-05-01 ENCOUNTER — OFFICE VISIT (OUTPATIENT)
Dept: FAMILY MEDICINE CLINIC | Age: 63
End: 2018-05-01

## 2018-05-01 ENCOUNTER — HOSPITAL ENCOUNTER (OUTPATIENT)
Dept: LAB | Age: 63
Discharge: HOME | End: 2018-05-01
Payer: OTHER GOVERNMENT

## 2018-05-01 VITALS
OXYGEN SATURATION: 100 % | DIASTOLIC BLOOD PRESSURE: 88 MMHG | RESPIRATION RATE: 20 BRPM | BODY MASS INDEX: 23.46 KG/M2 | WEIGHT: 146 LBS | HEART RATE: 71 BPM | HEIGHT: 66 IN | SYSTOLIC BLOOD PRESSURE: 125 MMHG | TEMPERATURE: 95.6 F

## 2018-05-01 DIAGNOSIS — E78.5 DYSLIPIDEMIA: Primary | ICD-10-CM

## 2018-05-01 DIAGNOSIS — E78.5 DYSLIPIDEMIA: ICD-10-CM

## 2018-05-01 DIAGNOSIS — Z01.89 ENCOUNTER FOR LABORATORY EXAMINATION: ICD-10-CM

## 2018-05-01 LAB
ANION GAP SERPL CALC-SCNC: 11 MMOL/L (ref 3–18)
BUN SERPL-MCNC: 16 MG/DL (ref 7–18)
BUN/CREAT SERPL: 30 (ref 12–20)
CALCIUM SERPL-MCNC: 9.2 MG/DL (ref 8.5–10.1)
CHLORIDE SERPL-SCNC: 103 MMOL/L (ref 100–108)
CHOLEST SERPL-MCNC: 327 MG/DL
CO2 SERPL-SCNC: 28 MMOL/L (ref 21–32)
CREAT SERPL-MCNC: 0.54 MG/DL (ref 0.6–1.3)
GLUCOSE SERPL-MCNC: 89 MG/DL (ref 74–99)
HDLC SERPL-MCNC: 99 MG/DL (ref 40–60)
HDLC SERPL: 3.3 {RATIO} (ref 0–5)
LDLC SERPL CALC-MCNC: 216.2 MG/DL (ref 0–100)
LIPID PROFILE,FLP: ABNORMAL
POTASSIUM SERPL-SCNC: 4.2 MMOL/L (ref 3.5–5.5)
SODIUM SERPL-SCNC: 142 MMOL/L (ref 136–145)
TRIGL SERPL-MCNC: 59 MG/DL (ref ?–150)
VLDLC SERPL CALC-MCNC: 11.8 MG/DL

## 2018-05-01 PROCEDURE — 80048 BASIC METABOLIC PNL TOTAL CA: CPT | Performed by: NURSE PRACTITIONER

## 2018-05-01 PROCEDURE — 80061 LIPID PANEL: CPT | Performed by: NURSE PRACTITIONER

## 2018-05-01 NOTE — PROGRESS NOTES
5/1/2018  9:00 AM    Chief Complaint   Patient presents with    Well Woman     pt here for well woman exam       Patient here today for well woman. PCP Karlee Leong MD. Patient with history of hysterectomy due to heavy menstrual bleeding in the past. She denies any history of abnormal PAPs and denies any GYN concerns today. Discussed with patient about current ACOG guidelines, which states that PAP smears are no longer necessary s/p hysterectomy except for certain medical conditions and she does not meet these criteria. She decided not to proceed with pelvic exam and clinical breast exam as offered. She requests recheck on lipids as her and her  have been working on lifestyle changes. She is currently on on ketotic diet. Will check lipids and BMP. She is aware her last lipids were in 1/2018 and WNL. *Plan of care reviewed with patient. Patient in agreement with plan and expresses understanding. All questions answered and patient encouraged to call or RTO if further questions or concerns.

## 2018-05-02 NOTE — PROGRESS NOTES
5/2/18- [employer] message sent to patient. She has had an increase in her LDL from 95 to 216 since starting on a ketotic diet with high fat intake. Discouraged her continuation of this high fat diet. She will continue with her PCP and monitoring continued to see if improved after discontinuation of high fat diet.

## 2018-05-04 RX ORDER — ROSUVASTATIN CALCIUM 10 MG/1
TABLET, COATED ORAL
Qty: 90 TAB | Refills: 1 | Status: SHIPPED | OUTPATIENT
Start: 2018-05-04 | End: 2018-10-13 | Stop reason: SDUPTHER

## 2018-05-07 RX ORDER — DOXYCYCLINE 100 MG/1
TABLET ORAL
Qty: 40 TAB | Refills: 2 | Status: SHIPPED | OUTPATIENT
Start: 2018-05-07 | End: 2018-12-17 | Stop reason: SDUPTHER

## 2018-05-15 ENCOUNTER — OFFICE VISIT (OUTPATIENT)
Dept: ORTHOPEDIC SURGERY | Age: 63
End: 2018-05-15

## 2018-05-15 VITALS
HEIGHT: 66 IN | OXYGEN SATURATION: 100 % | DIASTOLIC BLOOD PRESSURE: 100 MMHG | HEART RATE: 66 BPM | SYSTOLIC BLOOD PRESSURE: 157 MMHG | BODY MASS INDEX: 23.46 KG/M2 | TEMPERATURE: 98 F | WEIGHT: 146 LBS

## 2018-05-15 DIAGNOSIS — M51.16 LUMBAR DISC DISEASE WITH RADICULOPATHY: Primary | ICD-10-CM

## 2018-05-15 RX ORDER — METHYLPREDNISOLONE 4 MG/1
TABLET ORAL
Qty: 1 DOSE PACK | Refills: 0 | Status: SHIPPED | OUTPATIENT
Start: 2018-05-15 | End: 2018-06-18 | Stop reason: ALTCHOICE

## 2018-05-15 NOTE — PROGRESS NOTES
HISTORY OF PRESENT ILLNESS: Alexadner Cm is here for consultation regarding radiating pain in the right lower extremity. I saw her back in March, as well as in April 2018. She is being treated for lower extremity radiculopathy, degenerative disc disease of the lumbar spine, as well as some trochanteric bursitis. She states that clearly at this point this is coming from her back and radiating down her leg. This began over the past couple of weeks. She has been taking some Tylenol for discomfort. It has not been helping her. She has been on Celebrex in the past when I helped her, but she did not take it this time. She took some of her s Voltaren, which did not help her. She has had one back surgery in the past. She denies bowel or bladder dysfunction. She denies numbness or tingling in the lower extremities. Pain goes all the way down her leg down to her foot, more towards the top of her foot. PHYSICAL EXAMINATION:  Clinical examination today reveals a healthy-appearing, thin, 70-year-old female in discomfort. She moves slowly on and off the examination table and when going to a sitting to a supine position. With reference to her left leg, she is able to straight leg raise to 90? today without discomfort. Left hip roll test is negative. Ariess test is negative on the left side. With reference to the right side, she is able to straight leg raise to 80?, but this does reproduce some of her symptoms in the right buttock. Ariess test is positive on the right side for right buttock discomfort. She has a normal hip examination with normal active and passive range of motion. Right hip roll test is negative. Neurovascular testing is intact to the lower extremities proximal and distal to motor strength and sensation. IMPRESSION:   1. 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 2. Status post lumbar disc surgery.      RECOMMENDATIONS:  The patient also agrees this is coming from her back. She has a trip planned to [place] next week, leaving one week from today. I started her on a Medrol Dosepak. After she completes that, she is to resume her Celebrex medication 200 mg q day. When she returns from [place] she is to notify me and let me know if she is better. If she is not, I would proceed with an MRI scan with contrast and then refer her to The [address] [place] [address]. All of her questions were answered today. Vitals:    05/15/18 1532   BP: (!) 157/100   Pulse: 66   Temp: 98 °F (36.7 °C)   TempSrc: Oral   SpO2: 100%   Weight: 146 lb (66.2 kg)   Height: 5' 6\" (1.676 m)   PainSc:  10 - Worst pain ever   PainLoc: Hip       Patient Active Problem List   Diagnosis Code    Migraine without status migrainosus, not intractable G43.909    Elevated BP without diagnosis of hypertension R03.0    Essential hypertension I10    Rosacea L71.9    Dyslipidemia E78.5     Patient Active Problem List    Diagnosis Date Noted    Dyslipidemia 10/30/2017    Essential hypertension 07/28/2017    Rosacea 07/28/2017    Elevated BP without diagnosis of hypertension 04/28/2017    Migraine without status migrainosus, not intractable 04/10/2017     Current Outpatient Prescriptions   Medication Sig Dispense Refill    doxycycline (ADOXA) 100 mg tablet TAKE 1 TABLET EVERY TUESDAY, THURSDAY AND SATURDAY 40 Tab 2    rosuvastatin (CRESTOR) 10 mg tablet TAKE 1 TABLET NIGHTLY 90 Tab 1    piroxicam (FELDENE) 20 mg capsule TAKE 1 CAPSULE DAILY 90 Cap 1    atenolol (TENORMIN) 50 mg tablet TAKE 1 TABLET DAILY 90 Tab 3    amitriptyline (ELAVIL) 10 mg tablet Take 1 Tab by mouth nightly. Indications: NEUROPATHIC PAIN 30 Tab 1    lisinopril (PRINIVIL, ZESTRIL) 10 mg tablet Take 1 Tab by mouth daily.  90 Tab 1    rizatriptan (MAXALT-MLT) 10 mg disintegrating tablet PLACE 1 TABLET ON TONGUE ONCE AS NEEDED FOR MIGRAINE 30 Tab 0    ascorbic acid, vitamin C, (VITAMIN C) 500 mg tablet Take 1,000 mg by mouth two (2) times a day.  calcium citrate-vitamin D3 (CITRACAL + D) tablet Take  by mouth two (2) times a day.  multivitamin (ONE A DAY) tablet Take 1 Tab by mouth daily.  cholecalciferol, VITAMIN D3, (VITAMIN D3) 5,000 unit tab tablet Take  by mouth daily.  cyanocobalamin (VITAMIN B12) 500 mcg tablet Take 2,500 mcg by mouth daily.  aspirin 81 mg chewable tablet Take 81 mg by mouth daily.  glucosamine-chondroit-vit c-mn (GLUCOSAMINE 1500 COMPLEX) 500-400 mg capsule Take  by mouth daily.  B.infantis-B.ani-B.long-B.bifi (PROBIOTIC 4X) 10-15 mg TbEC Take  by mouth.  celecoxib (CELEBREX) 200 mg capsule Take 1 Cap by mouth daily for 90 days.  30 Cap 2     Allergies   Allergen Reactions    Penicillin G Hives     Past Medical History:   Diagnosis Date    Arthritis     Hx of migraines      Past Surgical History:   Procedure Laterality Date    FOOT/TOES SURGERY PROC UNLISTED      HX BACK SURGERY      HX HYSTERECTOMY      due to heavy bleeding    HX KNEE ARTHROSCOPY       Family History   Problem Relation Age of Onset    Heart Attack Mother     Stroke Mother     Other Mother      CHF    Alcohol abuse Father     Other Father      CHF    Liver Disease Father      Social History   Substance Use Topics    Smoking status: Never Smoker    Smokeless tobacco: Never Used    Alcohol use No

## 2018-06-08 ENCOUNTER — HOSPITAL ENCOUNTER (OUTPATIENT)
Age: 63
Discharge: HOME | End: 2018-06-08
Attending: ORTHOPAEDIC SURGERY
Payer: OTHER GOVERNMENT

## 2018-06-08 ENCOUNTER — OFFICE VISIT (OUTPATIENT)
Dept: ORTHOPEDIC SURGERY | Age: 63
End: 2018-06-08

## 2018-06-08 VITALS
TEMPERATURE: 95.7 F | BODY MASS INDEX: 23.3 KG/M2 | DIASTOLIC BLOOD PRESSURE: 92 MMHG | HEIGHT: 66 IN | RESPIRATION RATE: 16 BRPM | SYSTOLIC BLOOD PRESSURE: 151 MMHG | WEIGHT: 145 LBS | HEART RATE: 73 BPM | OXYGEN SATURATION: 100 %

## 2018-06-08 DIAGNOSIS — M54.31 BILATERAL SCIATICA: ICD-10-CM

## 2018-06-08 DIAGNOSIS — M54.32 BILATERAL SCIATICA: ICD-10-CM

## 2018-06-08 DIAGNOSIS — M51.16 LUMBAR DISC DISEASE WITH RADICULOPATHY: ICD-10-CM

## 2018-06-08 DIAGNOSIS — M51.16 LUMBAR DISC DISEASE WITH RADICULOPATHY: Primary | ICD-10-CM

## 2018-06-08 LAB — CREAT UR-MCNC: 0.6 MG/DL (ref 0.6–1.3)

## 2018-06-08 PROCEDURE — 72158 MRI LUMBAR SPINE W/O & W/DYE: CPT

## 2018-06-08 PROCEDURE — 82565 ASSAY OF CREATININE: CPT

## 2018-06-08 PROCEDURE — 74011250636 HC RX REV CODE- 250/636: Performed by: ORTHOPAEDIC SURGERY

## 2018-06-08 PROCEDURE — A9575 INJ GADOTERATE MEGLUMI 0.1ML: HCPCS | Performed by: ORTHOPAEDIC SURGERY

## 2018-06-08 RX ORDER — GADOTERATE MEGLUMINE 376.9 MG/ML
13 INJECTION INTRAVENOUS
Status: COMPLETED | OUTPATIENT
Start: 2018-06-08 | End: 2018-06-08

## 2018-06-08 RX ORDER — VITAMIN E 268 MG
400 CAPSULE ORAL DAILY
COMMUNITY
End: 2021-04-05

## 2018-06-08 RX ADMIN — GADOTERATE MEGLUMINE 13 ML: 376.9 INJECTION INTRAVENOUS at 14:20

## 2018-06-08 NOTE — PROGRESS NOTES
HISTORY OF PRESENT ILLNESS:  Everett Sosa is here for follow up of her right lower extremity pain. She points to pain in her low back, posterior buttock and radiation all the way down to her right leg down to her foot. She did make her trip to [place] and the [employer]/ [address] did help her when she did that and her trip was okay. She then restarted the Celebrex medication which she states takes a couple hours to work when she takes it in the morning with food. She still however notes pain radiating down that right leg. She has to limit some of her daily activities because of this. PHYSICAL EXAMINATION:  Clinical evaluation is unchanged. Straight leg raise test is currently today is negative on both sides. Ariess test is positive on the right side for pain more in the right buttock and low back. Neurovascularly intact in the lower extremities and proximal and distal motor strength and sensation. IMPRESSION:  Degenerative disc disease of the lumbar spine with radiculopathy. RECOMMENDATIONS:  At this point, we will proceed as previously discussed with the patient with an MRI of her lumbar spine with contrast as she has had previous back surgery. She has had epidural steroid injections in the past which have helped her. I will review this MRI scan with her and refer her to the [address] [place] [address] for further treatment. In the meantime, she is fine with just using the Celebrex medication. All of her questions were answered today.                  Vitals:    06/08/18 0846   BP: (!) 151/92   Pulse: 73   Resp: 16   Temp: 95.7 °F (35.4 °C)   TempSrc: Oral   SpO2: 100%   Weight: 145 lb (65.8 kg)   Height: 5' 6\" (1.676 m)       Patient Active Problem List   Diagnosis Code    Migraine without status migrainosus, not intractable G43.909    Elevated BP without diagnosis of hypertension R03.0    Essential hypertension I10    Rosacea L71.9    Dyslipidemia E78.5     Patient Active Problem List    Diagnosis Date Noted    Dyslipidemia 10/30/2017    Essential hypertension 07/28/2017    Rosacea 07/28/2017    Elevated BP without diagnosis of hypertension 04/28/2017    Migraine without status migrainosus, not intractable 04/10/2017     Current Outpatient Prescriptions   Medication Sig Dispense Refill    vitamin E (AQUA GEMS) 400 unit capsule Take  by mouth daily.  methylPREDNISolone (MEDROL DOSEPACK) 4 mg tablet Per dose pack instructions 1 Dose Pack 0    doxycycline (ADOXA) 100 mg tablet TAKE 1 TABLET EVERY TUESDAY, THURSDAY AND SATURDAY 40 Tab 2    rosuvastatin (CRESTOR) 10 mg tablet TAKE 1 TABLET NIGHTLY 90 Tab 1    piroxicam (FELDENE) 20 mg capsule TAKE 1 CAPSULE DAILY 90 Cap 1    atenolol (TENORMIN) 50 mg tablet TAKE 1 TABLET DAILY 90 Tab 3    celecoxib (CELEBREX) 200 mg capsule Take 1 Cap by mouth daily for 90 days. 30 Cap 2    amitriptyline (ELAVIL) 10 mg tablet Take 1 Tab by mouth nightly. Indications: NEUROPATHIC PAIN 30 Tab 1    lisinopril (PRINIVIL, ZESTRIL) 10 mg tablet Take 1 Tab by mouth daily. 90 Tab 1    rizatriptan (MAXALT-MLT) 10 mg disintegrating tablet PLACE 1 TABLET ON TONGUE ONCE AS NEEDED FOR MIGRAINE 30 Tab 0    ascorbic acid, vitamin C, (VITAMIN C) 500 mg tablet Take 1,000 mg by mouth two (2) times a day.  calcium citrate-vitamin D3 (CITRACAL + D) tablet Take  by mouth two (2) times a day.  multivitamin (ONE A DAY) tablet Take 1 Tab by mouth daily.  cholecalciferol, VITAMIN D3, (VITAMIN D3) 5,000 unit tab tablet Take  by mouth daily.  cyanocobalamin (VITAMIN B12) 500 mcg tablet Take 2,500 mcg by mouth daily.  aspirin 81 mg chewable tablet Take 81 mg by mouth daily.  glucosamine-chondroit-vit c-mn (GLUCOSAMINE 1500 COMPLEX) 500-400 mg capsule Take  by mouth daily.  B.infantis-B.ani-B.long-B.bifi (PROBIOTIC 4X) 10-15 mg TbEC Take  by mouth.        Allergies   Allergen Reactions    Penicillin G Hives     Past Medical History:   Diagnosis Date    Arthritis     Hx of migraines      Past Surgical History:   Procedure Laterality Date    FOOT/TOES SURGERY PROC UNLISTED      HX BACK SURGERY      HX HYSTERECTOMY      due to heavy bleeding    HX KNEE ARTHROSCOPY       Family History   Problem Relation Age of Onset    Heart Attack Mother     Stroke Mother     Other Mother      CHF    Alcohol abuse Father     Other Father      CHF    Liver Disease Father      Social History   Substance Use Topics    Smoking status: Never Smoker    Smokeless tobacco: Never Used    Alcohol use No

## 2018-06-11 DIAGNOSIS — I10 ESSENTIAL HYPERTENSION: ICD-10-CM

## 2018-06-12 ENCOUNTER — TELEPHONE (OUTPATIENT)
Dept: ORTHOPEDIC SURGERY | Age: 63
End: 2018-06-12

## 2018-06-12 NOTE — TELEPHONE ENCOUNTER
Patient had MRi done 6-8-18. Patient needs a follow up appointment, does she need to see Dr. Dempsey Render or Spine?

## 2018-06-13 RX ORDER — LISINOPRIL 10 MG/1
TABLET ORAL
Qty: 90 TAB | Refills: 0 | Status: SHIPPED | OUTPATIENT
Start: 2018-06-13 | End: 2018-08-27 | Stop reason: SDUPTHER

## 2018-06-13 NOTE — TELEPHONE ENCOUNTER
6/13/2018  11:11 AM    Chief Complaint   Patient presents with    Medication Refill       Noted refill request for Lisinopril. PCP Hesed Gustavo Olivia MD who is currently out of office. Last office visit 5/2018. Refill completed.        Lab Results   Component Value Date/Time    Sodium 142 05/01/2018 09:12 AM    Potassium 4.2 05/01/2018 09:12 AM    Chloride 103 05/01/2018 09:12 AM    CO2 28 05/01/2018 09:12 AM    Anion gap 11 05/01/2018 09:12 AM    Glucose 89 05/01/2018 09:12 AM    BUN 16 05/01/2018 09:12 AM    Creatinine 0.54 (L) 05/01/2018 09:12 AM    BUN/Creatinine ratio 30 (H) 05/01/2018 09:12 AM    GFR est AA >60 05/01/2018 09:12 AM    GFR est non-AA >60 05/01/2018 09:12 AM    Calcium 9.2 05/01/2018 09:12 AM

## 2018-06-15 ENCOUNTER — OFFICE VISIT (OUTPATIENT)
Dept: ORTHOPEDIC SURGERY | Age: 63
End: 2018-06-15

## 2018-06-15 VITALS
TEMPERATURE: 97 F | HEART RATE: 68 BPM | HEIGHT: 66 IN | RESPIRATION RATE: 16 BRPM | DIASTOLIC BLOOD PRESSURE: 88 MMHG | BODY MASS INDEX: 23.14 KG/M2 | SYSTOLIC BLOOD PRESSURE: 143 MMHG | OXYGEN SATURATION: 96 % | WEIGHT: 144 LBS

## 2018-06-15 DIAGNOSIS — M71.30 SYNOVIAL CYST: ICD-10-CM

## 2018-06-15 DIAGNOSIS — M51.16 LUMBAR DISC DISEASE WITH RADICULOPATHY: Primary | ICD-10-CM

## 2018-06-15 NOTE — PROGRESS NOTES
HISTORY OF PRESENT ILLNESS:  Danny Matta is here today for follow-up of results of MRI scan of her lumbosacral spine. She is on the Celebrex medication but not sure it is really helping her very much. At least we did get her to [place] and back without a lot of pain. PHYSICAL EXAMINATION:  Her clinical examination is unchanged. She still has radicular pain in the right lower extremity. Neurologic testing intact in the lower extremities proximal and distal.  Normal motor, strength, and sensation. IMAGING: MRI scan was reviewed with the patient and her  today and does reveal previous surgical intervention at the L5-S1 level. At the L4-5 level though, she does have a synovial cyst interfering with exiting of the L5 nerve root at this level. She has ligamentum hypertrophy at the L5-S1 level, particularly on the right side. IMPRESSION:    1. Degenerative disk disease, lumbar spine with right leg radiculopathy. 2. Synovial cyst, L4-5. RECOMMENDATIONS:  Certainly at this point she needs to go to The [address] [place] [address] for evaluation. She actually has an appointment on Monday. She will continue her Celebrex until then. Again, she has had previous epidural steroid injections which has helped her in the past but I have told her this will probably not be able to eradicate the synovial cyst that is there. Her questions were answered and she will follow up with The [address] [place] [address] as scheduled on Monday.                    Vitals:    06/15/18 1533   BP: 143/88   Pulse: 68   Resp: 16   Temp: 97 °F (36.1 °C)   TempSrc: Oral   SpO2: 96%   Weight: 144 lb (65.3 kg)   Height: 5' 6\" (1.676 m)       Patient Active Problem List   Diagnosis Code    Migraine without status migrainosus, not intractable G43.909    Elevated BP without diagnosis of hypertension R03.0    Essential hypertension I10    Rosacea L71.9    Dyslipidemia E78.5     Patient Active Problem List    Diagnosis Date Noted    Dyslipidemia 10/30/2017    Essential hypertension 07/28/2017    Rosacea 07/28/2017    Elevated BP without diagnosis of hypertension 04/28/2017    Migraine without status migrainosus, not intractable 04/10/2017     Current Outpatient Prescriptions   Medication Sig Dispense Refill    lisinopril (PRINIVIL, ZESTRIL) 10 mg tablet TAKE 1 TABLET DAILY 90 Tab 0    vitamin E (AQUA GEMS) 400 unit capsule Take  by mouth daily.  methylPREDNISolone (MEDROL DOSEPACK) 4 mg tablet Per dose pack instructions 1 Dose Pack 0    doxycycline (ADOXA) 100 mg tablet TAKE 1 TABLET EVERY TUESDAY, THURSDAY AND SATURDAY 40 Tab 2    rosuvastatin (CRESTOR) 10 mg tablet TAKE 1 TABLET NIGHTLY 90 Tab 1    atenolol (TENORMIN) 50 mg tablet TAKE 1 TABLET DAILY 90 Tab 3    celecoxib (CELEBREX) 200 mg capsule Take 1 Cap by mouth daily for 90 days. 30 Cap 2    rizatriptan (MAXALT-MLT) 10 mg disintegrating tablet PLACE 1 TABLET ON TONGUE ONCE AS NEEDED FOR MIGRAINE 30 Tab 0    ascorbic acid, vitamin C, (VITAMIN C) 500 mg tablet Take 1,000 mg by mouth two (2) times a day.  calcium citrate-vitamin D3 (CITRACAL + D) tablet Take  by mouth two (2) times a day.  multivitamin (ONE A DAY) tablet Take 1 Tab by mouth daily.  cholecalciferol, VITAMIN D3, (VITAMIN D3) 5,000 unit tab tablet Take  by mouth daily.  cyanocobalamin (VITAMIN B12) 500 mcg tablet Take 2,500 mcg by mouth daily.  aspirin 81 mg chewable tablet Take 81 mg by mouth daily.  glucosamine-chondroit-vit c-mn (GLUCOSAMINE 1500 COMPLEX) 500-400 mg capsule Take  by mouth daily.  B.infantis-B.ani-B.long-B.bifi (PROBIOTIC 4X) 10-15 mg TbEC Take  by mouth.  piroxicam (FELDENE) 20 mg capsule TAKE 1 CAPSULE DAILY 90 Cap 1    amitriptyline (ELAVIL) 10 mg tablet Take 1 Tab by mouth nightly.  Indications: NEUROPATHIC PAIN 30 Tab 1     Allergies   Allergen Reactions    Penicillin G Hives     Past Medical History:   Diagnosis Date    Arthritis     Hx of migraines Past Surgical History:   Procedure Laterality Date    FOOT/TOES SURGERY PROC UNLISTED      HX BACK SURGERY      HX HYSTERECTOMY      due to heavy bleeding    HX KNEE ARTHROSCOPY       Family History   Problem Relation Age of Onset    Heart Attack Mother     Stroke Mother     Other Mother      CHF    Alcohol abuse Father     Other Father      CHF    Liver Disease Father      Social History   Substance Use Topics    Smoking status: Never Smoker    Smokeless tobacco: Never Used    Alcohol use No

## 2018-06-18 ENCOUNTER — OFFICE VISIT (OUTPATIENT)
Dept: ORTHOPEDIC SURGERY | Age: 63
End: 2018-06-18

## 2018-06-18 VITALS
SYSTOLIC BLOOD PRESSURE: 129 MMHG | OXYGEN SATURATION: 98 % | WEIGHT: 144 LBS | HEIGHT: 66 IN | HEART RATE: 70 BPM | BODY MASS INDEX: 23.14 KG/M2 | RESPIRATION RATE: 18 BRPM | DIASTOLIC BLOOD PRESSURE: 85 MMHG | TEMPERATURE: 97.7 F

## 2018-06-18 DIAGNOSIS — M79.2 NEURITIS: ICD-10-CM

## 2018-06-18 DIAGNOSIS — M48.061 LUMBAR FORAMINAL STENOSIS: Primary | ICD-10-CM

## 2018-06-18 DIAGNOSIS — M71.38 SYNOVIAL CYST OF LUMBAR FACET JOINT: ICD-10-CM

## 2018-06-18 RX ORDER — LANOLIN ALCOHOL/MO/W.PET/CERES
CREAM (GRAM) TOPICAL
COMMUNITY
End: 2019-10-28

## 2018-06-18 RX ORDER — GABAPENTIN 300 MG/1
CAPSULE ORAL
Qty: 90 CAP | Refills: 1 | Status: SHIPPED | OUTPATIENT
Start: 2018-06-18 | End: 2018-07-30 | Stop reason: SDUPTHER

## 2018-06-18 NOTE — PROGRESS NOTES
[facility]  [address], [facility] [address]  Phone: [phone]  Fax: [phone]    NEW PATIENT  Pt's YOB: 1955    ASSESSMENT   Diagnoses and all orders for this visit:    1. Lumbar foraminal stenosis  -     gabapentin (NEURONTIN) 300 mg capsule; 1 in the am and 2 in the evening as directed  Indications: NEUROPATHIC PAIN    2. Neuritis  -     gabapentin (NEURONTIN) 300 mg capsule; 1 in the am and 2 in the evening as directed  Indications: NEUROPATHIC PAIN    3. Synovial cyst of lumbar facet joint       IMPRESSION AND PLAN:  Young Ford is a 61 y.o. female with history of low back pain. She complains of progressive pain in the lower back that radiates down the right leg to the foot. Pt states that she has experienced right hip bursitis several times in the past but over the last 3 months her lower back pain has been more severe. She denies ever trying Neurontin or physical therapy. 1) Pt was given information on herniated disc exercises. 2) Discussed treatment options with the patient including physical therapy, medication, and steroid injections. 3) I recommended the patient try water exercise. 4) She was prescribed Neurontin 300 mg 2 tabs QHS, tapering up as directed. 5) Ms. Charisse Fang has a reminder for a \"due or due soon\" health maintenance. I have asked that she contact her primary care provider, Sherri Rogers MD, for follow-up on this health maintenance. 6)  demonstrated consistency with prescribing. 7) Pt will follow-up in 1 month or sooner if needed. HISTORY OF PRESENT ILLNESS:  Young Ford is a 61 y.o. female with history of low back pain. Pt presents to the office today as a new patient referred by Dr. Naif Atkinson. She complains of progressive pain in the lower back that radiates down the right leg to the foot.  Pt states that her majority of her pain in located in the right thigh but she does occasionally experience numbness extending from the right knee to the foot. Pt admits to difficulty finding a comfortable position at night and notes that her pain interferes with her sleep. She likes to cook often but states that her pain worsens with increased activity. Pt denies any pain radiating down the left leg at this time. Pt states that she experienced some relief when trying an injection with Dr. Norberto Arnold but this was temporary. She was placed on Celebrex and notes that she tried a Medrol Dosepak without relief. Pt admits that she drove 13 hours to [place], [place] for an old coworker's half-way party so she is unsure if the Medrol Dosepak helped during her drive. Pt's pain is worse when standing for prolonged periods of time, sitting, bending forward and lifting and better when walking. Pt states that her symptoms started in 2010 after a fall. She states that she tore her meniscus with the fall and then had lumbar surgery with Dr. Edis Ku in [place], [place]. Pt states that she has experienced right hip bursitis several times in the past but over the last 3 months her lower back pain has been more severe. She denies ever trying Neurontin or physical therapy. Pt denies any tripping or falling. Pt states that she has been taking Crestor and is unsure if it causing the frequent muscle cramping in the calves. Pt at this time desires to proceed with medication evaluation. 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 Her mother passed away at 80 and had a history of a stroke and congestive heart failure. Pt notes that she has two brothers and three sisters.      Pain Scale: 6/10     PCP: Kristi Patrick MD    Past Medical History:   Diagnosis Date    Arthritis     Hx of migraines         Social History     Social History    Marital status:      Spouse name: N/A    Number of children: N/A    Years of education: N/A     Occupational History    Not on file. Social History Main Topics    Smoking status: Never Smoker    Smokeless tobacco: Never Used    Alcohol use No    Drug use: No    Sexual activity: Yes     Partners: Male     Other Topics Concern     Service No    Blood Transfusions Yes    Caffeine Concern No    Occupational Exposure No    Hobby Hazards No    Sleep Concern No    Stress Concern No    Weight Concern No    Special Diet No    Back Care No    Exercise Yes    Bike Helmet No    Seat Belt Yes    Self-Exams Yes     Social History Narrative       Current Outpatient Prescriptions   Medication Sig Dispense Refill    ferrous sulfate 325 mg (65 mg iron) tablet Take  by mouth every seven (7) days.  potassium 99 mg tablet Take 99 mg by mouth every seven (7) days.  gabapentin (NEURONTIN) 300 mg capsule 1 in the am and 2 in the evening as directed  Indications: NEUROPATHIC PAIN 90 Cap 1    lisinopril (PRINIVIL, ZESTRIL) 10 mg tablet TAKE 1 TABLET DAILY 90 Tab 0    vitamin E (AQUA GEMS) 400 unit capsule Take  by mouth daily.  doxycycline (ADOXA) 100 mg tablet TAKE 1 TABLET EVERY TUESDAY, THURSDAY AND SATURDAY 40 Tab 2    rosuvastatin (CRESTOR) 10 mg tablet TAKE 1 TABLET NIGHTLY 90 Tab 1    atenolol (TENORMIN) 50 mg tablet TAKE 1 TABLET DAILY 90 Tab 3    celecoxib (CELEBREX) 200 mg capsule Take 1 Cap by mouth daily for 90 days. 30 Cap 2    rizatriptan (MAXALT-MLT) 10 mg disintegrating tablet PLACE 1 TABLET ON TONGUE ONCE AS NEEDED FOR MIGRAINE 30 Tab 0    ascorbic acid, vitamin C, (VITAMIN C) 500 mg tablet Take 1,000 mg by mouth two (2) times a day.  calcium citrate-vitamin D3 (CITRACAL + D) tablet Take  by mouth two (2) times a day.  multivitamin (ONE A DAY) tablet Take 1 Tab by mouth daily.  cholecalciferol, VITAMIN D3, (VITAMIN D3) 5,000 unit tab tablet Take  by mouth daily.  cyanocobalamin (VITAMIN B12) 500 mcg tablet Take 2,500 mcg by mouth daily.       aspirin 81 mg chewable tablet Take 81 mg by mouth daily.  glucosamine-chondroit-vit c-mn (GLUCOSAMINE 1500 COMPLEX) 500-400 mg capsule Take  by mouth daily.  B.infantis-B.ani-B.long-B.bifi (PROBIOTIC 4X) 10-15 mg TbEC Take  by mouth. Allergies   Allergen Reactions    Penicillin G Hives       REVIEW OF SYSTEMS    Constitutional: Negative for fever, chills, or weight change. Respiratory: Negative for cough or shortness of breath. Cardiovascular: Negative for chest pain or palpitations. Gastrointestinal: Negative for acid reflux, change in bowel habits, or constipation. Genitourinary: Negative for dysuria and flank pain. Musculoskeletal: Positive for lumbar pain. Skin: Negative for rash. Neurological: Negative for headaches, dizziness, or numbness. Endo/Heme/Allergies: Negative for increased bruising. Psychiatric/Behavioral: Positive for difficulty with sleep. PHYSICAL EXAMINATION  Visit Vitals    /85    Pulse 70    Temp 97.7 °F (36.5 °C)    Resp 18    Ht 5' 6\" (1.676 m)    Wt 144 lb (65.3 kg)    SpO2 98%    BMI 23.24 kg/m2       Constitutional: Awake, alert, and in no acute distress. HEENT: Normocephalic. Atraumatic. Oropharynx is moist and clear. PERRL. EOMI. Sclerae are nonicteric  Heart: Regular rate and rhythm  Lungs: Clear to auscultation bilaterally  Abdomen: Soft and nontender. Bowel sounds are present  Neurological: 1+ symmetrical DTRs in the upper extremities. 1+ symmetrical DTRs in the lower extremities. Sensation to light touch is intact. Negative Mekhi's sign bilaterally. Skin: warm, dry, and intact. Musculoskeletal: Tenderness to palpation in the lower lumbar region. No pain with extension and axial loading. No pain with internal or external rotation of her hips. Positive straight leg raise on the right; negative on the left. No pain with heel or toe walking.        Biceps  Triceps Deltoids Wrist Ext Wrist Flex Hand Intrin   Right +4/5 +4/5 +4/5 +4/5 +4/5 +4/5   Left +4/5 +4/5 +4/5 +4/5 +4/5 +4/5      Hip Flex  Quads Hamstrings Ankle DF EHL Ankle PF   Right +4/5 +4/5 +4/5 +4/5 +4/5 +4/5   Left +4/5 +4/5 +4/5 +4/5 +4/5 +4/5     IMAGING:    Lumbar spine MRI from 06/08/2018 was personally reviewed with the patient and demonstrated:    Results from [place] encounter on 06/08/18   MRI LUMB SPINE W WO CONT   Narrative EXAM:  MRI LUMB SPINE W WO CONT    INDICATION:   Lumbar disc disease with radiculopathy, bilateral sciatica    COMPARISON: None    TECHNIQUE:   MR imaging of the lumbar spine was performed with sagittal T1, T2, STIR;  axial  T1, T2. Patient received 13 mL Dotarem intravenously. Postcontrast images were  obtained. FINDINGS:  Trace retrolisthesis L5 on S1. Trace anterolisthesis L4 on L5. Vertebral body  heights are maintained. Multilevel disc desiccation and mild disc height loss. No suspicious bone marrow lesions. 6 mm enhancing intrinsic high T1 lesion  posterior L3 vertebral body, most likely osseous hemangioma. The conus medullaris is normal in signal intensity terminating at L1 level. Correlation of sagittal and axial images at the level of the discs demonstrates  the following:    T12/L1: Sagittal images only. Degenerative endplate changes and endplate  Schmorl's nodes. Minimal posterior disc bulge. Patent central canal and neural  foramina. L1/2:  Small superiorly 2 Schmorl's node. Minimal disc bulge. Patent central  canal and neural foramina. Subcentimeter left right foraminal perineural cyst.    L2/3:  Endplate Schmorl's nodes. No disc herniation. Patent central canal and  neural foramina. L3/4:  Mild disc bulge with mild facet and ligamentum hypertrophy. Mild mass  effect on the thecal sac but no significant central stenosis. No foraminal  stenosis. L4/5:  Trace grade 1 anterolisthesis. Mild disc bulge and right  foraminal/extraforaminal mild disc protrusion. Moderate facet and ligamentum  hypertrophy.  6 mm intraspinal fluid signal structure associated with the right  facet compatible with synovial cyst. There is associated impingement of the  right crossing L5 nerve root in the lateral recess (series 5 image 17). Otherwise no central canal stenosis. Mild right foraminal stenosis. L5/S1: Prior left hemilaminectomy. Left subarticular and foraminal disc  protrusion/extrusion. There is posterior displacement of the left crossing S1  nerve root (axial T2 series 5 image 10). No central canal stenosis. No foraminal  stenosis. No incidental abnormality in the partially imaged retroperitoneal structures. 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         Impression IMPRESSION:      1. Multilevel mild degenerative disc and facet joint disease without high-grade  central canal or foraminal stenosis. 2. Right L4/5 facet intraspinal 6 mm synovial cyst impinging the right crossing  L5 nerve root in the lateral recess. 3. Prior L5/S1 left hemilaminectomy. Left L5/S1 subarticular  protrusion/extrusion displacing the left crossing S1 nerve root. Written by Jazzy Massey, as dictated by Ander Cope MD.  I, Dr. Ander Cope confirm that all documentation is accurate.

## 2018-06-18 NOTE — MR AVS SNAPSHOT
[address] [address] [address] [place] [address] 
[phone] Patient: Vipin Fontenot MRN: [id number] PIK:7[birth year] Visit Information Date & Time Provider Department Dept. Phone Encounter #  
 6/18/2018  8:00 AM Prudencio Bae, 27 [facility] [phone] [id number] Follow-up Instructions Return in about 4 weeks (around 7/16/2018) for Medication follow up. Upcoming Health Maintenance Date Due DTaP/Tdap/Td series (1 - Tdap) 3/5/1976 Influenza Age 5 to Adult 8/1/2018 BREAST CANCER SCRN MAMMOGRAM 3/16/2019 COLONOSCOPY 10/14/2021 Allergies as of 6/18/2018  Review Complete On: 6/18/2018 By: Prudencio Bae MD  
  
 Severity Noted Reaction Type Reactions Penicillin G  04/04/2017    Hives Current Immunizations  Never Reviewed Name Date Influenza Vaccine (Quad) PF 10/30/2017 Not reviewed this visit You Were Diagnosed With   
  
 Codes Comments Lumbar foraminal stenosis    -  Primary ICD-10-CM: M99.83 ICD-9-CM: 724.02 Neuritis     ICD-10-CM: M79.2 ICD-9-CM: 729.2 Synovial cyst of lumbar facet joint     ICD-10-CM: M71.38 
ICD-9-CM: 727.40 Vitals BP Pulse Temp Resp Height(growth percentile) Weight(growth percentile) 129/85 70 97.7 °F (36.5 °C) 18 5' 6\" (1.676 m) 144 lb (65.3 kg) SpO2 BMI OB Status Smoking Status 98% 23.24 kg/m2 Hysterectomy Never Smoker BMI and BSA Data Body Mass Index Body Surface Area  
 23.24 kg/m 2 1.74 m 2 Preferred Pharmacy Pharmacy Name Phone [address] [facility] [phone] Your Updated Medication List  
  
   
This list is accurate as of 6/18/18  9:05 AM.  Always use your most recent med list.  
  
  
  
  
 ascorbic acid (vitamin C) 500 mg tablet Commonly known as:  VITAMIN C Take 1,000 mg by mouth two (2) times a day. aspirin 81 mg chewable tablet Take 81 mg by mouth daily. atenolol 50 mg tablet Commonly known as:  TENORMIN  
TAKE 1 TABLET DAILY  
  
 celecoxib 200 mg capsule Commonly known as:  CeleBREX Take 1 Cap by mouth daily for 90 days. cholecalciferol (VITAMIN D3) 5,000 unit Tab tablet Commonly known as:  VITAMIN D3 Take  by mouth daily. Citracal + D tablet Generic drug:  calcium citrate-vitamin D3 Take  by mouth two (2) times a day. cyanocobalamin 500 mcg tablet Commonly known as:  VITAMIN B12 Take 2,500 mcg by mouth daily. doxycycline 100 mg tablet Commonly known as:  ADOXA  
TAKE 1 TABLET EVERY TUESDAY, THURSDAY AND SATURDAY  
  
 ferrous sulfate 325 mg (65 mg iron) tablet Take  by mouth every seven (7) days. gabapentin 300 mg capsule Commonly known as:  NEURONTIN  
1 in the am and 2 in the evening as directed  Indications: NEUROPATHIC PAIN  
  
 GLUCOSAMINE 1500 COMPLEX 500-400 mg capsule Generic drug:  glucosamine-chondroit-vit c-mn Take  by mouth daily. lisinopril 10 mg tablet Commonly known as:  PRINIVIL, ZESTRIL  
TAKE 1 TABLET DAILY  
  
 multivitamin tablet Commonly known as:  ONE A DAY Take 1 Tab by mouth daily. potassium 99 mg tablet Take 99 mg by mouth every seven (7) days. PROBIOTIC 4X 10-15 mg Tbec Generic drug:  B.infantis-B.ani-B.long-B.bifi Take  by mouth.  
  
 rizatriptan 10 mg disintegrating tablet Commonly known as:  MAXALT-MLT PLACE 1 TABLET ON TONGUE ONCE AS NEEDED FOR MIGRAINE  
  
 rosuvastatin 10 mg tablet Commonly known as:  CRESTOR  
TAKE 1 TABLET NIGHTLY  
  
 vitamin E 400 unit capsule Commonly known as:  [address] Take  by mouth daily. Prescriptions Sent to Pharmacy Refills  
 gabapentin (NEURONTIN) 300 mg capsule 1 Si in the am and 2 in the evening as directed  Indications: NEUROPATHIC PAIN  Class: Normal  
 Pharmacy: [facility] #: [phone] Follow-up Instructions Return in about 4 weeks (around 7/16/2018) for Medication follow up. Patient Instructions Herniated Disc: Exercises Your Care Instructions Here are some examples of typical rehabilitation exercises for your condition. Start each exercise slowly. Ease off the exercise if you start to have pain. Your doctor or physical therapist will tell you when you can start these exercises and which ones will work best for you. Back Exercises These exercises can help you move easier and feel better. But when you first start doing them, you may have more pain in your back. This is normal. But it is important to pay close attention to your pain during and after each exercise. · Keep doing these exercises if your pain stays the same or moves from your leg and buttock more toward the middle of your spine. Pain moving out of your leg and buttock is a good sign. · Stop doing these exercises if your pain gets worse in your leg and buttock. Stop if you start to have pain in your leg and buttock that you didn't have before. Be sure to do these exercises in the order they appear. Note how your pain changes before you move to the next one. If your pain is much worse right after exercise and stays worse the next day, do not do any of these exercises. How to do the exercises 1. Rest on belly 1. Lie on your stomach, face down, with your head turned to the side. Place your arms beside your body. If this bothers your neck, place your hands, one on top of the other, underneath your forehead. This will help support your head and neck. 2. Try to relax your lower back muscles as much as you can. 3. Continue to lie on your stomach for 2 minutes. 4. If your pain spreads down your leg or increases down your leg, stop this exercise and do not do the next exercises. 2. Press-up 1. Lie on your stomach, face down. Keep your elbows tucked into your sides and under your shoulders. 2. Press your elbows down into the floor to raise your upper back. As you do this, relax your stomach muscles. Allow your back to arch without using your back muscles. Let your low back relax completely as you arch up. 3. Hold this position for 2 minutes. 4. Repeat 2 to 4 times. 5. If your pain spreads down your leg or increases down your leg, stop this exercise and do not do the next exercises. 3. Full press-up 1. Lie on your stomach, face down. Keep your elbows tucked into your sides and under your shoulders. 2. Straighten your elbows, and push your upper body up as far as you can. Allow your lower back to sag. Keep your hips, pelvis, and legs relaxed. 3. Hold this position for 5 seconds, and then relax. 4. Repeat 10 times. Each time, try to raise your upper body a little higher and hold your arms a bit straighter. 5. If your pain spreads down your leg or gets worse down your leg, stop this exercise and do not move to the next exercise. 6. If you can't do this exercise, you may instead try the backward bend exercise that follows. 4. Backward bend 1. Stand with your feet hip-width apart. Your toes should point forward. Do not lock your knees. 2. Place your hands in the small of your back. 3. Bend backward as far as you can, keeping your knees straight. Hold this position for 2 to 3 seconds. Then return to your starting position. 4. Repeat 2 to 4 times. Each time, try to bend backward a little farther, until you bend backward as far as you can. 5. If your pain spreads down your leg or increases down your leg, stop this exercise. Follow-up care is a key part of your treatment and safety. Be sure to make and go to all appointments, and call your doctor if you are having problems. It's also a good idea to know your test results and keep a list of the medicines you take. Where can you learn more? Go to http://kendrick-emerald.info/. Enter [id number] [phone] in the search box to learn more about \"Herniated Disc: Exercises. \" Current as of: March 21, 2017 Content Version: 11.4 © 2752-5782 Intern. Care instructions adapted under license by [employer] (which disclaims liability or warranty for this information). If you have questions about a medical condition or this instruction, always ask your healthcare professional. [address] any warranty or liability for your use of this information. Introducing [facility]! Dear Rene Paris: 
Thank you for requesting a [employer] account. Our records indicate that you already have an active [employer] account. You can access your account anytime at [email or web address] Did you know that you can access your hospital and ER discharge instructions at any time in [employer]? You can also review all of your test results from your hospital stay or ER visit. Additional Information If you have questions, please visit the Frequently Asked Questions section of the [employer] website at [email or web address] Remember, [employer] is NOT to be used for urgent needs. For medical emergencies, dial 911. Now available from your iPhone and Android! Please provide this summary of care documentation to your next provider. Your primary care clinician is listed as Karlee Flores. If you have any questions after today's visit, please call [phone].

## 2018-06-18 NOTE — PATIENT INSTRUCTIONS
Herniated Disc: Exercises  Your Care Instructions  Here are some examples of typical rehabilitation exercises for your condition. Start each exercise slowly. Ease off the exercise if you start to have pain. Your doctor or physical therapist will tell you when you can start these exercises and which ones will work best for you. Back Exercises  These exercises can help you move easier and feel better. But when you first start doing them, you may have more pain in your back. This is normal. But it is important to pay close attention to your pain during and after each exercise. · Keep doing these exercises if your pain stays the same or moves from your leg and buttock more toward the middle of your spine. Pain moving out of your leg and buttock is a good sign. · Stop doing these exercises if your pain gets worse in your leg and buttock. Stop if you start to have pain in your leg and buttock that you didn't have before. Be sure to do these exercises in the order they appear. Note how your pain changes before you move to the next one. If your pain is much worse right after exercise and stays worse the next day, do not do any of these exercises. How to do the exercises  1. Rest on belly    1. Lie on your stomach, face down, with your head turned to the side. Place your arms beside your body. If this bothers your neck, place your hands, one on top of the other, underneath your forehead. This will help support your head and neck. 2. Try to relax your lower back muscles as much as you can. 3. Continue to lie on your stomach for 2 minutes. 4. If your pain spreads down your leg or increases down your leg, stop this exercise and do not do the next exercises. 2. Press-up    1. Lie on your stomach, face down. Keep your elbows tucked into your sides and under your shoulders. 2. Press your elbows down into the floor to raise your upper back. As you do this, relax your stomach muscles.  Allow your back to arch without using your back muscles. Let your low back relax completely as you arch up. 3. Hold this position for 2 minutes. 4. Repeat 2 to 4 times. 5. If your pain spreads down your leg or increases down your leg, stop this exercise and do not do the next exercises. 3. Full press-up    1. Lie on your stomach, face down. Keep your elbows tucked into your sides and under your shoulders. 2. Straighten your elbows, and push your upper body up as far as you can. Allow your lower back to sag. Keep your hips, pelvis, and legs relaxed. 3. Hold this position for 5 seconds, and then relax. 4. Repeat 10 times. Each time, try to raise your upper body a little higher and hold your arms a bit straighter. 5. If your pain spreads down your leg or gets worse down your leg, stop this exercise and do not move to the next exercise. 6. If you can't do this exercise, you may instead try the backward bend exercise that follows. 4. Backward bend    1. Stand with your feet hip-width apart. Your toes should point forward. Do not lock your knees. 2. Place your hands in the small of your back. 3. Bend backward as far as you can, keeping your knees straight. Hold this position for 2 to 3 seconds. Then return to your starting position. 4. Repeat 2 to 4 times. Each time, try to bend backward a little farther, until you bend backward as far as you can. 5. If your pain spreads down your leg or increases down your leg, stop this exercise. Follow-up care is a key part of your treatment and safety. Be sure to make and go to all appointments, and call your doctor if you are having problems. It's also a good idea to know your test results and keep a list of the medicines you take. Where can you learn more? Go to http://kendrick-emerald.info/. Enter [id number] [phone] [id number] in the search box to learn more about \"Herniated Disc: Exercises. \"  Current as of: March 21, 2017  Content Version: 11.4  © 9130-0652 Healthwise, [employer].  Care instructions adapted under license by [employer] (which disclaims liability or warranty for this information). If you have questions about a medical condition or this instruction, always ask your healthcare professional. [address] any warranty or liability for your use of this information.

## 2018-07-19 ENCOUNTER — OFFICE VISIT (OUTPATIENT)
Dept: ORTHOPEDIC SURGERY | Age: 63
End: 2018-07-19

## 2018-07-19 VITALS
SYSTOLIC BLOOD PRESSURE: 135 MMHG | WEIGHT: 143 LBS | HEIGHT: 66 IN | DIASTOLIC BLOOD PRESSURE: 88 MMHG | BODY MASS INDEX: 22.98 KG/M2 | HEART RATE: 68 BPM | TEMPERATURE: 98.6 F | OXYGEN SATURATION: 99 %

## 2018-07-19 DIAGNOSIS — M71.38 SYNOVIAL CYST OF LUMBAR FACET JOINT: ICD-10-CM

## 2018-07-19 DIAGNOSIS — M79.2 NEURITIS: ICD-10-CM

## 2018-07-19 DIAGNOSIS — M54.16 LUMBAR RADICULOPATHY: Primary | ICD-10-CM

## 2018-07-19 NOTE — LETTER
Michaellebritany Block Request Form for [facility] Tyler Chino Ashish Fax to [phone]  Telephone: [phone] To be completed by Physician Office: 
 
Date: 2018    Requested by: William Cadet Phone No: [phone] Fax No:  [phone] Surgery Date: 2018   Requested Time: 4:00PM 
       
Provider: DR Jody Schumacher   
 
CPT CODE*  Procedure 98936/14251 SELECTIVE NERVE ROOT BLOCK RIGHT,LUMBAR 4 AND 5  
   
   
*CPT code is required for ALL procedures. Patient Information: 
 
Name: Promise Willis SSN: [id number]  : 1955   Male/Female: female Home Phone No: [phone] (home) Primary Insurance: [address] [place] [address] Number: [id number] Authorization # [id number] REQUIRED FOR [facility] Allergies: Allergies Allergen Reactions  Penicillin G Hives ICD10 code(s): M54.16/M79.2 ICD9 code:    Diagnosis:  LUMBAR RADICULOPATHY/LUMBAR NEURITIS Diabetic/finger stick to be done BS level must be <200 mg/dl Anesthesia Type Local: With Contrast 
 
 


## 2018-07-19 NOTE — PROGRESS NOTES
[facility]  [address] [place], [facility] [address]  Phone: [phone]  Fax: [phone]    Pt's YOB: 1955    ASSESSMENT   Diagnoses and all orders for this visit:    1. Lumbar radiculopathy  -     SCHEDULE SURGERY    2. Neuritis  -     SCHEDULE SURGERY    3. Synovial cyst of lumbar facet joint  -     SCHEDULE SURGERY         IMPRESSION AND PLAN:  Rai Kimborugh is a 61 y.o. female with history of lumbar pain. Pt complains of burning and numbing pain in the lower back that radiates down the right leg to the foot. She has tried Neurontin 300 mg 1 tab QAM and 1 tab QHS without benefit and notes headaches when increasing to 2 tabs QHS. Pt wishes to try injections at this time. 1) Pt was given information on lumbar arthritis exercises. 2) She will taper off the Neurontin 300 mg since it is not effective. 3) Pt was scheduled for right L4 and right L5 SNRB. 4) Ms. Ney Paulson has a reminder for a \"due or due soon\" health maintenance. I have asked that she contact her primary care provider, Bala Dia MD, for follow-up on this health maintenance. 5)  demonstrated consistency with prescribing. 6) Pt will follow-up in 1 month or sooner if needed. HISTORY OF PRESENT ILLNESS:  Rai Kimbrough is a 61 y.o. female with history of lumbar pain. Pt complains of pain in the lower back that radiates down the right leg to the foot. She describes the pain radiating down her right light as burning and numbing. Pt states that her pain improves with activity and is severe upon waking. She has tried Neurontin 300 mg since her last office visit without relief. Pt states that she woke up with a headache every morning when she increased her Neurontin 300 mg to 2 tabs QHS. She adjusted her Neurontin 300 mg to 1 tab QAM and 1 tab QHS but she has not experienced any relief with the medication.  She admits that she has a history and takes atenolol and lisinopril. Pt also takes Maxalt at the onset of her migraine headaches and aspirin. She does not tolerate Topamax. She wishes to try injections at this time and states that she tried them years ago at the [facility] in [place]. Pt likes to exercise and stretch regularly. Of note, patient is not diabetic and she is not on blood thinners. Pt at this time desires to proceed with steroid injections. Pain Scale: 8/10    PCP: Candelaria Myrick MD     Past Medical History:   Diagnosis Date    Arthritis     Hx of migraines         Social History     Social History    Marital status:      Spouse name: N/A    Number of children: N/A    Years of education: N/A     Occupational History    Not on file. Social History Main Topics    Smoking status: Never Smoker    Smokeless tobacco: Never Used    Alcohol use No    Drug use: No    Sexual activity: Yes     Partners: Male     Other Topics Concern     Service No    Blood Transfusions Yes    Caffeine Concern No    Occupational Exposure No    Hobby Hazards No    Sleep Concern No    Stress Concern No    Weight Concern No    Special Diet No    Back Care No    Exercise Yes    Bike Helmet No    Seat Belt Yes    Self-Exams Yes     Social History Narrative       Current Outpatient Prescriptions   Medication Sig Dispense Refill    ferrous sulfate 325 mg (65 mg iron) tablet Take  by mouth every seven (7) days.  potassium 99 mg tablet Take 99 mg by mouth every seven (7) days.  gabapentin (NEURONTIN) 300 mg capsule 1 in the am and 2 in the evening as directed  Indications: NEUROPATHIC PAIN 90 Cap 1    lisinopril (PRINIVIL, ZESTRIL) 10 mg tablet TAKE 1 TABLET DAILY 90 Tab 0    vitamin E (AQUA GEMS) 400 unit capsule Take  by mouth daily.       doxycycline (ADOXA) 100 mg tablet TAKE 1 TABLET EVERY TUESDAY, THURSDAY AND SATURDAY 40 Tab 2    rosuvastatin (CRESTOR) 10 mg tablet TAKE 1 TABLET NIGHTLY 90 Tab 1    atenolol (TENORMIN) 50 mg tablet TAKE 1 TABLET DAILY 90 Tab 3    rizatriptan (MAXALT-MLT) 10 mg disintegrating tablet PLACE 1 TABLET ON TONGUE ONCE AS NEEDED FOR MIGRAINE 30 Tab 0    ascorbic acid, vitamin C, (VITAMIN C) 500 mg tablet Take 1,000 mg by mouth two (2) times a day.  calcium citrate-vitamin D3 (CITRACAL + D) tablet Take  by mouth two (2) times a day.  multivitamin (ONE A DAY) tablet Take 1 Tab by mouth daily.  cholecalciferol, VITAMIN D3, (VITAMIN D3) 5,000 unit tab tablet Take  by mouth daily.  cyanocobalamin (VITAMIN B12) 500 mcg tablet Take 2,500 mcg by mouth daily.  aspirin 81 mg chewable tablet Take 81 mg by mouth daily.  glucosamine-chondroit-vit c-mn (GLUCOSAMINE 1500 COMPLEX) 500-400 mg capsule Take  by mouth daily.  B.infantis-B.ani-B.long-B.bifi (PROBIOTIC 4X) 10-15 mg TbEC Take  by mouth. Allergies   Allergen Reactions    Penicillin G Hives         REVIEW OF SYSTEMS    Constitutional: Negative for fever, chills, or weight change. Respiratory: Negative for cough or shortness of breath. Cardiovascular: Negative for chest pain or palpitations. Gastrointestinal: Negative for acid reflux, change in bowel habits, or constipation. Genitourinary: Negative for dysuria and flank pain. Musculoskeletal: Positive for lumbar pain. Skin: Negative for rash. Neurological: Negative for headaches, dizziness, or numbness. Endo/Heme/Allergies: Negative for increased bruising. Psychiatric/Behavioral: Negative for difficulty with sleep. PHYSICAL EXAMINATION  Visit Vitals    /88    Pulse 68    Temp 98.6 °F (37 °C) (Oral)    Ht 5' 6\" (1.676 m)    Wt 143 lb (64.9 kg)    SpO2 99%    BMI 23.08 kg/m2       Constitutional: Awake, alert, and in no acute distress. Neurological: 1+ symmetrical DTRs in the lower extremities. Sensation to light touch is intact. Skin: warm, dry, and intact. Musculoskeletal: Tenderness to palpation in the lower lumbar region. Moderate pain with extension and axial loading. No pain with internal or external rotation of her hips. Negative straight leg raise bilaterally. Hip Flex  Quads Hamstrings Ankle DF EHL Ankle PF   Right +4/5 +4/5 +4/5 +4/5 +4/5 +4/5   Left +4/5 +4/5 +4/5 +4/5 +4/5 +4/5     IMAGING:    Lumbar spine MRI from 06/08/2018 was personally reviewed with the patient and demonstrated:    Results from [place] encounter on 06/08/18   MRI LUMB SPINE W WO CONT   Narrative EXAM:  MRI LUMB SPINE W WO CONT    INDICATION:   Lumbar disc disease with radiculopathy, bilateral sciatica    COMPARISON: None    TECHNIQUE:   MR imaging of the lumbar spine was performed with sagittal T1, T2, STIR;  axial  T1, T2. Patient received 13 mL Dotarem intravenously. Postcontrast images were  obtained. FINDINGS:  Trace retrolisthesis L5 on S1. Trace anterolisthesis L4 on L5. Vertebral body  heights are maintained. Multilevel disc desiccation and mild disc height loss. No suspicious bone marrow lesions. 6 mm enhancing intrinsic high T1 lesion  posterior L3 vertebral body, most likely osseous hemangioma. The conus medullaris is normal in signal intensity terminating at L1 level. Correlation of sagittal and axial images at the level of the discs demonstrates  the following:    T12/L1: Sagittal images only. Degenerative endplate changes and endplate  Schmorl's nodes. Minimal posterior disc bulge. Patent central canal and neural  foramina. L1/2:  Small superiorly 2 Schmorl's node. Minimal disc bulge. Patent central  canal and neural foramina. Subcentimeter left right foraminal perineural cyst.    L2/3:  Endplate Schmorl's nodes. No disc herniation. Patent central canal and  neural foramina. L3/4:  Mild disc bulge with mild facet and ligamentum hypertrophy. Mild mass  effect on the thecal sac but no significant central stenosis. No foraminal  stenosis. L4/5:  Trace grade 1 anterolisthesis.  Mild disc bulge and right  foraminal/extraforaminal mild disc protrusion. Moderate facet and ligamentum  hypertrophy. 6 mm intraspinal fluid signal structure associated with the right  facet compatible with synovial cyst. There is associated impingement of the  right crossing L5 nerve root in the lateral recess (series 5 image 17). Otherwise no central canal stenosis. Mild right foraminal stenosis. L5/S1: Prior left hemilaminectomy. Left subarticular and foraminal disc  protrusion/extrusion. There is posterior displacement of the left crossing S1  nerve root (axial T2 series 5 image 10). No central canal stenosis. No foraminal  stenosis. No incidental abnormality in the partially imaged retroperitoneal structures. 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         Impression IMPRESSION:      1. Multilevel mild degenerative disc and facet joint disease without high-grade  central canal or foraminal stenosis. 2. Right L4/5 facet intraspinal 6 mm synovial cyst impinging the right crossing  L5 nerve root in the lateral recess. 3. Prior L5/S1 left hemilaminectomy. Left L5/S1 subarticular  protrusion/extrusion displacing the left crossing S1 nerve root. Written by Bree Booth, as dictated by Jessica Salinas MD.  I, Dr. Jessica Salinas confirm that all documentation is accurate.

## 2018-07-19 NOTE — LETTER
José Block Request Form for Yohannes Laguna [facility] Matt Pereiraita Yohannes Fax to [phone]  Telephone: [phone] To be completed by Physician Office: 
 
Date: 2018    Requested by: Candida Ramirez Phone No: [phone] Fax No:  [phone] Surgery Date:    Requested Time: 4:00PM 
       
Provider: DR Genesis Bui   
 
CPT CODE*  Procedure 50994/19964 SELECTIVE NERVE ROOT BLOCK RIGHT LUMBAR 4 AND 5  
   
   
*CPT code is required for ALL procedures. Patient Information: 
 
Name: Cherise Pettit SSN: [id number]  : 1955   Male/Female: female Home Phone No: [phone] (home) Primary Insurance: [employer]. [address] [place] [address] Number: Authorization # [id number] REQUIRED FOR [employer] Allergies: Allergies Allergen Reactions  Penicillin G Hives ICD10 code(s): M54.16/M79.2 ICD9 code:    Diagnosis:  LUMBAR RADICULOPATHY/LUMBAR NEURITIS Diabetic/finger stick to be done BS level must be <200 mg/dl Anesthesia Type Local: With Contrast 
 
 


## 2018-07-19 NOTE — MR AVS SNAPSHOT
[address] [facility] 
 
 
 [address] [facility] 
[phone] Patient: Elisa Awad MRN: [id number] EDG:[birth year] Visit Information Date & Time Provider Department Dept. Phone Encounter #  
 7/19/2018  8:45 AM Roslyn Yu MD [facility] [place] [phone] [id number] Upcoming Health Maintenance Date Due DTaP/Tdap/Td series (1 - Tdap) 3/5/1976 Influenza Age 5 to Adult 8/1/2018 BREAST CANCER SCRN MAMMOGRAM 3/16/2019 COLONOSCOPY 10/14/2021 Allergies as of 7/19/2018  Review Complete On: 7/19/2018 By: Roslyn Yu MD  
  
 Severity Noted Reaction Type Reactions Penicillin G  04/04/2017    Hives Current Immunizations  Never Reviewed Name Date Influenza Vaccine (Quad) PF 10/30/2017 Not reviewed this visit You Were Diagnosed With   
  
 Codes Comments Lumbar radiculopathy    -  Primary ICD-10-CM: M54.16 
ICD-9-CM: 724.4 Neuritis     ICD-10-CM: M79.2 ICD-9-CM: 729.2 Synovial cyst of lumbar facet joint     ICD-10-CM: M71.38 
ICD-9-CM: 727.40 Vitals BP Pulse Temp Height(growth percentile) Weight(growth percentile) SpO2  
 135/88 68 98.6 °F (37 °C) (Oral) 5' 6\" (1.676 m) 143 lb (64.9 kg) 99% BMI OB Status Smoking Status 23.08 kg/m2 Hysterectomy Never Smoker BMI and BSA Data Body Mass Index Body Surface Area 23.08 kg/m 2 1.74 m 2 Preferred Pharmacy Pharmacy Name Phone [address] [facility] [place] [address] [phone] Your Updated Medication List  
  
   
This list is accurate as of 7/19/18  9:06 AM.  Always use your most recent med list.  
  
  
  
  
 ascorbic acid (vitamin C) 500 mg tablet Commonly known as:  VITAMIN C Take 1,000 mg by mouth two (2) times a day. aspirin 81 mg chewable tablet Take 81 mg by mouth daily. atenolol 50 mg tablet Commonly known as:  TENORMIN  
TAKE 1 TABLET DAILY  
  
 cholecalciferol (VITAMIN D3) 5,000 unit Tab tablet Commonly known as:  VITAMIN D3 Take  by mouth daily. Citracal + D tablet Generic drug:  calcium citrate-vitamin D3 Take  by mouth two (2) times a day. cyanocobalamin 500 mcg tablet Commonly known as:  VITAMIN B12 Take 2,500 mcg by mouth daily. doxycycline 100 mg tablet Commonly known as:  ADOXA  
TAKE 1 TABLET EVERY TUESDAY, THURSDAY AND SATURDAY  
  
 ferrous sulfate 325 mg (65 mg iron) tablet Take  by mouth every seven (7) days. gabapentin 300 mg capsule Commonly known as:  NEURONTIN  
1 in the am and 2 in the evening as directed  Indications: NEUROPATHIC PAIN  
  
 GLUCOSAMINE 1500 COMPLEX 500-400 mg capsule Generic drug:  glucosamine-chondroit-vit c-mn Take  by mouth daily. lisinopril 10 mg tablet Commonly known as:  PRINIVIL, ZESTRIL  
TAKE 1 TABLET DAILY  
  
 multivitamin tablet Commonly known as:  ONE A DAY Take 1 Tab by mouth daily. potassium 99 mg tablet Take 99 mg by mouth every seven (7) days. PROBIOTIC 4X 10-15 mg Tbec Generic drug:  B.infantis-B.ani-B.long-B.bifi Take  by mouth.  
  
 rizatriptan 10 mg disintegrating tablet Commonly known as:  MAXALT-MLT PLACE 1 TABLET ON TONGUE ONCE AS NEEDED FOR MIGRAINE  
  
 rosuvastatin 10 mg tablet Commonly known as:  CRESTOR  
TAKE 1 TABLET NIGHTLY  
  
 vitamin E 400 unit capsule Commonly known as:  [address] Take  by mouth daily. We Performed the Following SCHEDULE SURGERY [RDX2405 Custom] Patient Instructions Low Back Arthritis: Exercises Your Care Instructions Here are some examples of typical rehabilitation exercises for your condition. Start each exercise slowly. Ease off the exercise if you start to have pain. Your doctor or physical therapist will tell you when you can start these exercises and which ones will work best for you. When you are not being active, find a comfortable position for rest. Some people are comfortable on the floor or a medium-firm bed with a small pillow under their head and another under their knees. Some people prefer to lie on their side with a pillow between their knees. Don't stay in one position for too long. Take short walks (10 to 20 minutes) every 2 to 3 hours. Avoid slopes, hills, and stairs until you feel better. Walk only distances you can manage without pain, especially leg pain. How to do the exercises Pelvic tilt 1. Lie on your back with your knees bent. 2. \"Brace\" your stomach-tighten your muscles by pulling in and imagining your belly button moving toward your spine. 3. Press your lower back into the floor. You should feel your hips and pelvis rock back. 4. Hold for 6 seconds while breathing smoothly. 5. Relax and allow your pelvis and hips to rock forward. 6. Repeat 8 to 12 times. Back stretches 1. Get down on your hands and knees on the floor. 2. Relax your head and allow it to droop. Round your back up toward the ceiling until you feel a nice stretch in your upper, middle, and lower back. Hold this stretch for as long as it feels comfortable, or about 15 to 30 seconds. 3. Return to the starting position with a flat back while you are on your hands and knees. 4. Let your back sway by pressing your stomach toward the floor. Lift your buttocks toward the ceiling. 5. Hold this position for 15 to 30 seconds. 6. Repeat 2 to 4 times. Follow-up care is a key part of your treatment and safety. Be sure to make and go to all appointments, and call your doctor if you are having problems. It's also a good idea to know your test results and keep a list of the medicines you take. Where can you learn more? Go to http://kendrick-emerald.info/. Enter V129 in the search box to learn more about \"Low Back Arthritis: Exercises. \" Current as of: November 29, 2017 Content Version: 11.7 © 9429-1478 [employer], [employer]. Care instructions adapted under license by [employer] (which disclaims liability or warranty for this information). If you have questions about a medical condition or this instruction, always ask your healthcare professional. [address] any warranty or liability for your use of this information. Introducing [facility]! Dear Marcelina Li: 
Thank you for requesting a [employer] account. Our records indicate that you already have an active [employer] account. You can access your account anytime at [email or web address] Did you know that you can access your hospital and ER discharge instructions at any time in [employer]? You can also review all of your test results from your hospital stay or ER visit. Additional Information If you have questions, please visit the Frequently Asked Questions section of the [employer] website at [email or web address] Remember, [employer] is NOT to be used for urgent needs. For medical emergencies, dial 911. Now available from your iPhone and Android! Please provide this summary of care documentation to your next provider. Your primary care clinician is listed as Kalree Flores. If you have any questions after today's visit, please call [phone].

## 2018-07-19 NOTE — PATIENT INSTRUCTIONS

## 2018-07-25 ENCOUNTER — HOSPITAL ENCOUNTER (OUTPATIENT)
Age: 63
Setting detail: OUTPATIENT SURGERY
Discharge: HOME | End: 2018-07-25
Attending: PHYSICAL MEDICINE & REHABILITATION | Admitting: PHYSICAL MEDICINE & REHABILITATION
Payer: OTHER GOVERNMENT

## 2018-07-25 ENCOUNTER — APPOINTMENT (OUTPATIENT)
Dept: GENERAL RADIOLOGY | Age: 63
End: 2018-07-25
Attending: PHYSICAL MEDICINE & REHABILITATION
Payer: OTHER GOVERNMENT

## 2018-07-25 VITALS
HEART RATE: 79 BPM | BODY MASS INDEX: 22.98 KG/M2 | WEIGHT: 143 LBS | OXYGEN SATURATION: 100 % | RESPIRATION RATE: 16 BRPM | SYSTOLIC BLOOD PRESSURE: 140 MMHG | HEIGHT: 66 IN | TEMPERATURE: 98.3 F | DIASTOLIC BLOOD PRESSURE: 81 MMHG

## 2018-07-25 PROCEDURE — 74011250636 HC RX REV CODE- 250/636

## 2018-07-25 PROCEDURE — 74011250636 HC RX REV CODE- 250/636: Performed by: PHYSICAL MEDICINE & REHABILITATION

## 2018-07-25 PROCEDURE — 74011636320 HC RX REV CODE- 636/320: Performed by: PHYSICAL MEDICINE & REHABILITATION

## 2018-07-25 PROCEDURE — 76010000009 HC PAIN MGT 0 TO 30 MIN PROC: Performed by: PHYSICAL MEDICINE & REHABILITATION

## 2018-07-25 PROCEDURE — 74011636320 HC RX REV CODE- 636/320

## 2018-07-25 PROCEDURE — 77030039433 HC TY MYLEOGRAM BD -B: Performed by: PHYSICAL MEDICINE & REHABILITATION

## 2018-07-25 PROCEDURE — 77030003669 HC NDL SPN COOK -B: Performed by: PHYSICAL MEDICINE & REHABILITATION

## 2018-07-25 PROCEDURE — 74011000250 HC RX REV CODE- 250

## 2018-07-25 RX ORDER — LIDOCAINE HYDROCHLORIDE 10 MG/ML
INJECTION, SOLUTION EPIDURAL; INFILTRATION; INTRACAUDAL; PERINEURAL AS NEEDED
Status: DISCONTINUED | OUTPATIENT
Start: 2018-07-25 | End: 2018-07-25 | Stop reason: HOSPADM

## 2018-07-25 RX ORDER — DEXAMETHASONE SODIUM PHOSPHATE 100 MG/10ML
INJECTION INTRAMUSCULAR; INTRAVENOUS AS NEEDED
Status: DISCONTINUED | OUTPATIENT
Start: 2018-07-25 | End: 2018-07-25 | Stop reason: HOSPADM

## 2018-07-25 RX ORDER — DIAZEPAM 5 MG/1
5-20 TABLET ORAL ONCE
Status: DISCONTINUED | OUTPATIENT
Start: 2018-07-25 | End: 2018-07-25 | Stop reason: HOSPADM

## 2018-07-25 NOTE — PROCEDURES
PROCEDURE NOTE      Patient Name: Joan Carrillo    Date of Procedure: July 25, 2018    Preoperative Diagnosis:  LUMBAR RADICULOPATHY  LUMBAR NEURITIS    Post Operative Diagnosis:  LUMBAR RADICULOPATHY  LUMBAR NEURITIS    Procedure :    right L4 Selective Nerve Root Block  right L5 Selective Nerve Root Block    Consent:  Informed consent was obtained prior to the procedure. The patient was given the opportunity to ask questions regarding the procedure and its associated risks. In addition to the potential risks associated with the procedure itself, the patient was informed both verbally and in writing of the potential side effects of the use of glucocorticoid. The patient appeared to comprehend the informed consent and desired to have the procedure performed. Procedure: The patient was placed in the prone position on the fluoroscopy table and the back was prepped and draped in the usual sterile manner. The exact spinal level was  identified using fluoroscopy, and Lidocaine 1 % was injected locally, a # 22 gauge spinal needle was passed to the transverse process. The depth was noted and the needle redirected to pass inferior and approximately one cm anterior to the transverse process. YES  1 cc of Isovue M-200 was used to verify positioning in the epidural and paravertebral space and outlined the course of the spinal nerve into the epidural space. The same procedure was repeated at each spinal level indicated above. A total of 30 mg of preservative free Dexamethasone and 5 cc of Lidocaine was slowly injected. The patient tolerated the procedure well. The injection area was cleaned and bandaids applied. Not excessive bleeding was noted. Patient dressed and discharged to home with instructions. Discussion: The patient tolerated the procedure well.                                               Junie Delcid MD  July 25, 2018

## 2018-07-25 NOTE — H&P
Date of Surgery Update:  Rai Kimbrough was seen and examined. History and physical has been reviewed. The patient has been examined. There have been no significant clinical changes since the last office visit.       Signed By: Shantal Valencia MD     July 25, 2018 2:49 PM

## 2018-07-25 NOTE — DISCHARGE INSTRUCTIONS
[facility]   (RACHEL)  Dr. Phoebe Lopez, Dr. Veronica Soto, Dr. Chi Chappell not drive a car, operate heavy machinery or dangerous equipment for 24 hours. * Activity as tolerated; rest for the remainder of the day. * Resume pre-block medications including those for your family doctor. * Do not drink alcoholic beverages for 24 hours. Alcohol and the medications you have received may interact and cause an adverse reaction. * You may feel better this evening and worse tomorrow, as the numbing medications wears off and the steroid has yet to begin to work. After 48 hrs the steroid should begin to release bringing you relief. * You may shower this evening and remove any bandages. * Avoid hot tubs and heating pads for 24 hours. You may use cold packs on the procedure site as tolerated for the first 24 hours. * If a headache develops, drink plenty of fluids and rest.  Take over the counter medications for headache if needed. If the headache continues longer than 24 hours, call MD at the [address] [place] [address]. [phone]    * Continue taking pain medications as needed. * You may resume your regular diet if tolerated. Otherwise, start with sips of water and advance slowly. * If Diabetic: check your blood sugar three times a day for the next 3 days. If your sugar is greater than 300 call your family doctor. If your sugar is greater than 400, have someone transport you to the nearest Emergency Room. * If you experience any of the following problems, Please Call the [address] [place] [address] at [phone].         * Shortness of Breath    * Fever of 101 or higher    * Nausea / Vomiting    * Severe Headache    * Weakness or numbness in arms or legs that is not      resolving    * Prolonged increase in pain greater than 4 days      DISCHARGE SUMMARY from Nurse      PATIENT INSTRUCTIONS:    After oral sedation, for 24 hours or while taking prescription Narcotics:  · Limit your activities  · Do not drive and operate hazardous machinery  · Do not make important personal or business decisions  · Do  not drink alcoholic beverages  · If you have not urinated within 8 hours after discharge, please contact your surgeon on call. Report the following to your surgeon:  · Excessive pain, swelling, redness or odor of or around the surgical area  · Temperature over 101  · Nausea and vomiting lasting longer than 4 hours or if unable to take medications  · Any signs of decreased circulation or nerve impairment to extremity: change in color, persistent  numbness, tingling, coldness or increase pain  · Any questions            What to do at Home:  Recommended activity: Activity as tolerated, NO DRIVING FOR 12 Hours post injection          *  Please give a list of your current medications to your Primary Care Provider. *  Please update this list whenever your medications are discontinued, doses are      changed, or new medications (including over-the-counter products) are added. *  Please carry medication information at all times in case of emergency situations. These are general instructions for a healthy lifestyle:    No smoking/ No tobacco products/ Avoid exposure to second hand smoke    Surgeon General's Warning:  Quitting smoking now greatly reduces serious risk to your health. Obesity, smoking, and sedentary lifestyle greatly increases your risk for illness    A healthy diet, regular physical exercise & weight monitoring are important for maintaining a healthy lifestyle    You may be retaining fluid if you have a history of heart failure or if you experience any of the following symptoms:  Weight gain of 3 pounds or more overnight or 5 pounds in a week, increased swelling in our hands or feet or shortness of breath while lying flat in bed.   Please call your doctor as soon as you notice any of these symptoms; do not wait until your next office visit. Recognize signs and symptoms of STROKE:    F-face looks uneven    A-arms unable to move or move unevenly    S-speech slurred or non-existent    T-time-call 911 as soon as signs and symptoms begin-DO NOT go       Back to bed or wait to see if you get better-TIME IS BRAIN.

## 2018-07-30 ENCOUNTER — TELEPHONE (OUTPATIENT)
Dept: ORTHOPEDIC SURGERY | Age: 63
End: 2018-07-30

## 2018-07-30 DIAGNOSIS — M48.061 LUMBAR FORAMINAL STENOSIS: Primary | ICD-10-CM

## 2018-07-30 DIAGNOSIS — M47.816 LUMBAR FACET ARTHROPATHY: ICD-10-CM

## 2018-07-30 DIAGNOSIS — M79.2 NEURITIS: ICD-10-CM

## 2018-07-30 RX ORDER — DICLOFENAC SODIUM 75 MG/1
75 TABLET, DELAYED RELEASE ORAL 2 TIMES DAILY
Qty: 180 TAB | Refills: 1 | Status: SHIPPED | OUTPATIENT
Start: 2018-07-30 | End: 2018-12-17 | Stop reason: SDUPTHER

## 2018-07-30 RX ORDER — GABAPENTIN 300 MG/1
CAPSULE ORAL
Qty: 270 CAP | Refills: 1 | Status: SHIPPED | OUTPATIENT
Start: 2018-07-30 | End: 2018-12-14 | Stop reason: SDUPTHER

## 2018-07-30 NOTE — TELEPHONE ENCOUNTER
It appears the patient has a refill remaining on the requested medication. I have contacted the patient's pharmacy to initiate a refill request on the patient's behalf.

## 2018-07-30 NOTE — TELEPHONE ENCOUNTER
Pt is calling to have both of these [facility]. Pt may be reached at [phone].

## 2018-07-30 NOTE — TELEPHONE ENCOUNTER
Per Dr. Vale Brumfield, refill has been sent to [facility] pharmacy. Called patient, verified , informed of above. Patient verbalized agreement/understanding. No further action required at this time.

## 2018-07-30 NOTE — TELEPHONE ENCOUNTER
Patient called requesting a refill of the prescription gabapentin (NEURONTIN) 300 mg capsule. She states the prescription Voltaren also helps her a lot so she didn't know if she would be able to get that prescribed to her as well. Please advise patient regarding this at [phone].

## 2018-07-30 NOTE — TELEPHONE ENCOUNTER
Will discuss with Dr. Kaylie Garay, as patient discussed wanting to stop gabapentin at last office visit.

## 2018-07-31 ENCOUNTER — TELEPHONE (OUTPATIENT)
Dept: ORTHOPEDIC SURGERY | Age: 63
End: 2018-07-31

## 2018-07-31 NOTE — TELEPHONE ENCOUNTER
Pharmacist with [facility] requests sig clarification on recent RX for Diclofenac. Current directions read \"Take 1 Tab by mouth two (2) times a day. Take as needed for pain and take with people (??) Please advise.      [phone]  INV: [id number]

## 2018-07-31 NOTE — TELEPHONE ENCOUNTER
Returned call to [facility] spoke with Yimi Albright, informed Yimi Albright sig should read take with food. Yimi Albright verbalized agreement/understanding. No further action required at this time.

## 2018-08-27 DIAGNOSIS — I10 ESSENTIAL HYPERTENSION: ICD-10-CM

## 2018-08-30 RX ORDER — LISINOPRIL 10 MG/1
TABLET ORAL
Qty: 90 TAB | Refills: 0 | Status: SHIPPED | OUTPATIENT
Start: 2018-08-30 | End: 2018-11-15 | Stop reason: SDUPTHER

## 2018-08-30 RX ORDER — RIZATRIPTAN BENZOATE 10 MG/1
TABLET, ORALLY DISINTEGRATING ORAL
Qty: 30 TAB | Refills: 0 | Status: SHIPPED | OUTPATIENT
Start: 2018-08-30

## 2018-09-06 ENCOUNTER — OFFICE VISIT (OUTPATIENT)
Dept: ORTHOPEDIC SURGERY | Age: 63
End: 2018-09-06

## 2018-09-06 VITALS
BODY MASS INDEX: 23.14 KG/M2 | OXYGEN SATURATION: 100 % | WEIGHT: 144 LBS | HEART RATE: 82 BPM | HEIGHT: 66 IN | DIASTOLIC BLOOD PRESSURE: 87 MMHG | SYSTOLIC BLOOD PRESSURE: 135 MMHG

## 2018-09-06 DIAGNOSIS — M71.30 SYNOVIAL CYST: ICD-10-CM

## 2018-09-06 DIAGNOSIS — M54.16 LUMBAR RADICULAR PAIN: Primary | ICD-10-CM

## 2018-09-06 DIAGNOSIS — M47.816 LUMBAR FACET ARTHROPATHY: ICD-10-CM

## 2018-09-06 NOTE — PROGRESS NOTES
[address] [place] [address] [facility]  Julienne Farias., [address] [place] [address], [facility] [address]  Phone: [phone]  Fax: [phone]    Pt's YOB: 1955    ASSESSMENT   Diagnoses and all orders for this visit:    1. Lumbar radicular pain    2. Lumbar facet arthropathy (HCC)    3. Synovial cyst         IMPRESSION AND PLAN:  Jimbo Brewer is a 61 y.o. female with history of lumbar pain. Pt complains of pain in the lower back that extends down the right leg. She tried a right L4 and right L5 SNRB on 07/25/2018 with minimal improvement. Pt takes Neurontin 300 mg to 1 tab QAM and 1 tab QHS and Voltaren 75 mg 1 tab QHS with food. 1) Pt was given information on lumbar arthritis exercises. 2) Discussed treatment options with the Pt, including medication, physical therapy, and steroid injections. 3) Pt will continue taking Neurontin 300 mg as prescribed. 4) Ms. Marshall Small has a reminder for a \"due or due soon\" health maintenance. I have asked that she contact her primary care provider, Hubert Cotton MD, for follow-up on this health maintenance. 5)  demonstrated consistency with prescribing. 6) Pt will follow-up in 6 months or sooner if needed. HISTORY OF PRESENT ILLNESS:  Jimbo Brewer is a 61 y.o. female with history of lumbar pain. She complains of pain in the lower back that extends down the right leg. Pt denies any weakness in the legs at this time. She tried a right L4 and right L5 SNRB on 07/25/2018 with minimal improvement. Pt notes that her pain returned when the numbness from the injection wore off. She notes that she tried tapering off the Neurontin 300 mg 1 tab QAM and 1 tab QHS prior to the injection but when she did not experience relief from the injection she restarted the medication. Pt notes that she tried increasing the Neurontin 300 mg to 1 tab TID but experienced a heavy sensation in her chest. She also takes Voltaren 75 mg 1 tab QHS with food.  Pt notes that she tried to discontinue the Voltaren and 2 days later she experienced increased pain. Pt admits to experiencing stomach pain over the last couple days and woke up this morning with symptoms similar to a previous sinus infection. She plans to follow up with her PCP, Dr. Omar Akhtar after her office visit today. Pt notes that she has been taking a probiotic for years and a family history of diverticulitis, indigestion, and acid reflux. She admits to relief when trying Blue Ice topical ointment and notes that she has tried numerous OTC ointments. Of note, she had prior lumbar surgery in 2011 but she wishes to avoid any additional surgery until she turns 72 y.o. Pt at this time desires to continue with current care. Pain Scale: 3/10    PCP: Dimitry Gore MD     Past Medical History:   Diagnosis Date    Arthritis     Hx of migraines         Social History     Social History    Marital status:      Spouse name: N/A    Number of children: N/A    Years of education: N/A     Occupational History    Not on file.      Social History Main Topics    Smoking status: Never Smoker    Smokeless tobacco: Never Used    Alcohol use No    Drug use: No    Sexual activity: Yes     Partners: Male     Other Topics Concern     Service No    Blood Transfusions Yes    Caffeine Concern No    Occupational Exposure No    Hobby Hazards No    Sleep Concern No    Stress Concern No    Weight Concern No    Special Diet No    Back Care No    Exercise Yes    Bike Helmet No    Seat Belt Yes    Self-Exams Yes     Social History Narrative       Current Outpatient Prescriptions   Medication Sig Dispense Refill    rizatriptan (MAXALT-MLT) 10 mg disintegrating tablet PLACE 1 TABLET ON TONGUE ONCE AS NEEDED FOR MIGRAINE 30 Tab 0    lisinopril (PRINIVIL, ZESTRIL) 10 mg tablet TAKE 1 TABLET DAILY 90 Tab 0    gabapentin (NEURONTIN) 300 mg capsule 1 in the am and 2 in the evening as directed  Indications: NEUROPATHIC PAIN 270 Cap 1    diclofenac EC (VOLTAREN) 75 mg EC tablet Take 1 Tab by mouth two (2) times a day. Take as needed for pain and take with people  Indications: OSTEOARTHRITIS 180 Tab 1    ferrous sulfate 325 mg (65 mg iron) tablet Take  by mouth every seven (7) days.  potassium 99 mg tablet Take 99 mg by mouth every seven (7) days.  vitamin E (AQUA GEMS) 400 unit capsule Take  by mouth daily.  doxycycline (ADOXA) 100 mg tablet TAKE 1 TABLET EVERY TUESDAY, THURSDAY AND SATURDAY 40 Tab 2    rosuvastatin (CRESTOR) 10 mg tablet TAKE 1 TABLET NIGHTLY 90 Tab 1    atenolol (TENORMIN) 50 mg tablet TAKE 1 TABLET DAILY 90 Tab 3    ascorbic acid, vitamin C, (VITAMIN C) 500 mg tablet Take 1,000 mg by mouth two (2) times a day.  calcium citrate-vitamin D3 (CITRACAL + D) tablet Take  by mouth two (2) times a day.  multivitamin (ONE A DAY) tablet Take 1 Tab by mouth daily.  cholecalciferol, VITAMIN D3, (VITAMIN D3) 5,000 unit tab tablet Take  by mouth daily.  cyanocobalamin (VITAMIN B12) 500 mcg tablet Take 2,500 mcg by mouth daily.  aspirin 81 mg chewable tablet Take 81 mg by mouth daily.  glucosamine-chondroit-vit c-mn (GLUCOSAMINE 1500 COMPLEX) 500-400 mg capsule Take  by mouth daily.  B.infantis-B.ani-B.long-B.bifi (PROBIOTIC 4X) 10-15 mg TbEC Take  by mouth. Allergies   Allergen Reactions    Penicillin G Hives         REVIEW OF SYSTEMS    Constitutional: Negative for fever, chills, or weight change. Respiratory: Negative for cough or shortness of breath. Cardiovascular: Negative for chest pain or palpitations. Gastrointestinal: Negative for acid reflux, change in bowel habits, or constipation. Genitourinary: Negative for dysuria and flank pain. Musculoskeletal: Positive for lumbar pain. Skin: Negative for rash. Neurological: Negative for headaches, dizziness, or numbness. Endo/Heme/Allergies: Negative for increased bruising.    Psychiatric/Behavioral: Negative for difficulty with sleep. PHYSICAL EXAMINATION  Visit Vitals    /87    Pulse 82    Ht 5' 6\" (1.676 m)    Wt 144 lb (65.3 kg)    SpO2 100%    BMI 23.24 kg/m2       Constitutional: Awake, alert, and in no acute distress. Neurological: 1+ symmetrical DTRs in the lower extremities. Sensation to light touch is intact. Skin: warm, dry, and intact. Musculoskeletal: Tenderness to palpation in the lower lumbar region. Moderate pain with extension and axial loading. No pain with internal or external rotation of her hips. Negative straight leg raise bilaterally. Hip Flex  Quads Hamstrings Ankle DF EHL Ankle PF   Right +4/5 +4/5 +4/5 +4/5 +4/5 +4/5   Left +4/5 +4/5 +4/5 +4/5 +4/5 +4/5     IMAGING:    Lumbar spine MRI from 06/08/2018 was personally reviewed with the patient and demonstrated:   Results from [facility] on 06/08/18   MRI LUMB SPINE W WO CONT    Narrative EXAM:  MRI LUMB SPINE W WO CONT    INDICATION:   Lumbar disc disease with radiculopathy, bilateral sciatica    COMPARISON: None    TECHNIQUE:   MR imaging of the lumbar spine was performed with sagittal T1, T2, STIR;  axial  T1, T2. Patient received 13 mL Dotarem intravenously. Postcontrast images were  obtained. FINDINGS:  Trace retrolisthesis L5 on S1. Trace anterolisthesis L4 on L5. Vertebral body  heights are maintained. Multilevel disc desiccation and mild disc height loss. No suspicious bone marrow lesions. 6 mm enhancing intrinsic high T1 lesion  posterior L3 vertebral body, most likely osseous hemangioma. The conus medullaris is normal in signal intensity terminating at L1 level. Correlation of sagittal and axial images at the level of the discs demonstrates  the following:    T12/L1: Sagittal images only. Degenerative endplate changes and endplate  Schmorl's nodes. Minimal posterior disc bulge. Patent central canal and neural  foramina. L1/2:  Small superiorly 2 Schmorl's node. Minimal disc bulge. Patent central  canal and neural foramina. Subcentimeter left right foraminal perineural cyst.    L2/3:  Endplate Schmorl's nodes. No disc herniation. Patent central canal and  neural foramina. L3/4:  Mild disc bulge with mild facet and ligamentum hypertrophy. Mild mass  effect on the thecal sac but no significant central stenosis. No foraminal  stenosis. L4/5:  Trace grade 1 anterolisthesis. Mild disc bulge and right  foraminal/extraforaminal mild disc protrusion. Moderate facet and ligamentum  hypertrophy. 6 mm intraspinal fluid signal structure associated with the right  facet compatible with synovial cyst. There is associated impingement of the  right crossing L5 nerve root in the lateral recess (series 5 image 17). Otherwise no central canal stenosis. Mild right foraminal stenosis. L5/S1: Prior left hemilaminectomy. Left subarticular and foraminal disc  protrusion/extrusion. There is posterior displacement of the left crossing S1  nerve root (axial T2 series 5 image 10). No central canal stenosis. No foraminal  stenosis. No incidental abnormality in the partially imaged retroperitoneal structures. 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           Impression IMPRESSION:      1. Multilevel mild degenerative disc and facet joint disease without high-grade  central canal or foraminal stenosis. 2. Right L4/5 facet intraspinal 6 mm synovial cyst impinging the right crossing  L5 nerve root in the lateral recess. 3. Prior L5/S1 left hemilaminectomy. Left L5/S1 subarticular  protrusion/extrusion displacing the left crossing S1 nerve root.        Written by Charisma Vaz, as dictated by Marilin Lawton MD.  I, Dr. Marilin Lawton confirm that all documentation is accurate.

## 2018-09-06 NOTE — MR AVS SNAPSHOT
[facility] 
 
 
 [address] [facility] 
[phone] Patient: Leanne Tripathi MRN: [id number] WDY:[id number] Visit Information Date & Time Provider Department Dept. Phone Encounter #  
 9/6/2018  9:30 AM Mark Alvarado MD [facility] [place] [phone] [id number] Follow-up Instructions Return in about 6 months (around 3/6/2019) for Medication follow up. Your Appointments 11/5/2018  7:45 AM  
ROUTINE CARE with Karlee Tavares MD  
[address] [place] [address] ([facility]) Appt Note: f/u htn; pt r/s  
 [id number]  
  
   
 [address] Upcoming Health Maintenance Date Due DTaP/Tdap/Td series (1 - Tdap) 3/5/1976 Influenza Age 5 to Adult 8/1/2018 BREAST CANCER SCRN MAMMOGRAM 3/16/2019 COLONOSCOPY 10/14/2021 Allergies as of 9/6/2018  Review Complete On: 9/6/2018 By: Mark Alvarado MD  
  
 Severity Noted Reaction Type Reactions Penicillin G  04/04/2017    Hives Current Immunizations  Never Reviewed Name Date Influenza Vaccine (Quad) PF 10/30/2017 Not reviewed this visit You Were Diagnosed With   
  
 Codes Comments Lumbar radicular pain    -  Primary ICD-10-CM: M54.16 
ICD-9-CM: 724.4 Lumbar facet arthropathy (HCC)     ICD-10-CM: M46.96 
ICD-9-CM: 721.3 Synovial cyst     ICD-10-CM: M71.30 ICD-9-CM: 727.40 Vitals BP Pulse Height(growth percentile) Weight(growth percentile) SpO2 BMI  
 135/87 82 5' 6\" (1.676 m) 144 lb (65.3 kg) 100% 23.24 kg/m2 OB Status Smoking Status Hysterectomy Never Smoker BMI and BSA Data Body Mass Index Body Surface Area  
 23.24 kg/m 2 1.74 m 2 Preferred Pharmacy Pharmacy Name Phone  [facility] [phone] Your Updated Medication List  
  
   
This list is accurate as of 9/6/18  9:39 AM.  Always use your most recent med list.  
  
  
  
  
 ascorbic acid (vitamin C) 500 mg tablet Commonly known as:  VITAMIN C Take 1,000 mg by mouth two (2) times a day. aspirin 81 mg chewable tablet Take 81 mg by mouth daily. atenolol 50 mg tablet Commonly known as:  TENORMIN  
TAKE 1 TABLET DAILY  
  
 cholecalciferol (VITAMIN D3) 5,000 unit Tab tablet Commonly known as:  VITAMIN D3 Take  by mouth daily. Citracal + D tablet Generic drug:  calcium citrate-vitamin D3 Take  by mouth two (2) times a day. cyanocobalamin 500 mcg tablet Commonly known as:  VITAMIN B12 Take 2,500 mcg by mouth daily. diclofenac EC 75 mg EC tablet Commonly known as:  VOLTAREN Take 1 Tab by mouth two (2) times a day. Take as needed for pain and take with people  Indications: OSTEOARTHRITIS  
  
 doxycycline 100 mg tablet Commonly known as:  ADOXA  
TAKE 1 TABLET EVERY TUESDAY, THURSDAY AND SATURDAY  
  
 ferrous sulfate 325 mg (65 mg iron) tablet Take  by mouth every seven (7) days. gabapentin 300 mg capsule Commonly known as:  NEURONTIN  
1 in the am and 2 in the evening as directed  Indications: NEUROPATHIC PAIN  
  
 GLUCOSAMINE 1500 COMPLEX 500-400 mg capsule Generic drug:  glucosamine-chondroit-vit c-mn Take  by mouth daily. lisinopril 10 mg tablet Commonly known as:  PRINIVIL, ZESTRIL  
TAKE 1 TABLET DAILY  
  
 multivitamin tablet Commonly known as:  ONE A DAY Take 1 Tab by mouth daily. potassium 99 mg tablet Take 99 mg by mouth every seven (7) days. PROBIOTIC 4X 10-15 mg Tbec Generic drug:  B.infantis-B.ani-B.long-B.bifi Take  by mouth.  
  
 rizatriptan 10 mg disintegrating tablet Commonly known as:  MAXALT-MLT PLACE 1 TABLET ON TONGUE ONCE AS NEEDED FOR MIGRAINE  
  
 rosuvastatin 10 mg tablet Commonly known as:  CRESTOR  
 TAKE 1 TABLET NIGHTLY  
  
 vitamin E 400 unit capsule Commonly known as:  [address] Take  by mouth daily. Follow-up Instructions Return in about 6 months (around 3/6/2019) for Medication follow up. Patient Instructions Low Back Arthritis: Exercises Your Care Instructions Here are some examples of typical rehabilitation exercises for your condition. Start each exercise slowly. Ease off the exercise if you start to have pain. Your doctor or physical therapist will tell you when you can start these exercises and which ones will work best for you. When you are not being active, find a comfortable position for rest. Some people are comfortable on the floor or a medium-firm bed with a small pillow under their head and another under their knees. Some people prefer to lie on their side with a pillow between their knees. Don't stay in one position for too long. Take short walks (10 to 20 minutes) every 2 to 3 hours. Avoid slopes, hills, and stairs until you feel better. Walk only distances you can manage without pain, especially leg pain. How to do the exercises Pelvic tilt 1. Lie on your back with your knees bent. 2. \"Brace\" your stomach-tighten your muscles by pulling in and imagining your belly button moving toward your spine. 3. Press your lower back into the floor. You should feel your hips and pelvis rock back. 4. Hold for 6 seconds while breathing smoothly. 5. Relax and allow your pelvis and hips to rock forward. 6. Repeat 8 to 12 times. Back stretches 1. Get down on your hands and knees on the floor. 2. Relax your head and allow it to droop. Round your back up toward the ceiling until you feel a nice stretch in your upper, middle, and lower back. Hold this stretch for as long as it feels comfortable, or about 15 to 30 seconds. 3. Return to the starting position with a flat back while you are on your hands and knees. 4. Let your back sway by pressing your stomach toward the floor. Lift your buttocks toward the ceiling. 5. Hold this position for 15 to 30 seconds. 6. Repeat 2 to 4 times. Follow-up care is a key part of your treatment and safety. Be sure to make and go to all appointments, and call your doctor if you are having problems. It's also a good idea to know your test results and keep a list of the medicines you take. Where can you learn more? Go to http://kendrick-emerald.info/. Enter S161 in the search box to learn more about \"Low Back Arthritis: Exercises. \" Current as of: November 29, 2017 Content Version: 11.7 © 6730-5565 [employer]. Care instructions adapted under license by [employer] (which disclaims liability or warranty for this information). If you have questions about a medical condition or this instruction, always ask your healthcare professional. [address] any warranty or liability for your use of this information. Introducing [facility]! Dear Cherelle Peña: 
Thank you for requesting a [employer] account. Our records indicate that you already have an active [employer] account. You can access your account anytime at [email or web address] Did you know that you can access your hospital and ER discharge instructions at any time in [employer]? You can also review all of your test results from your hospital stay or ER visit. Additional Information If you have questions, please visit the Frequently Asked Questions section of the [employer] website at [email or web address] Remember, [employer] is NOT to be used for urgent needs. For medical emergencies, dial 911. Now available from your iPhone and Android! Please provide this summary of care documentation to your next provider. Your primary care clinician is listed as Karlee Flores.  If you have any questions after today's visit, please call [phone].

## 2018-09-06 NOTE — PATIENT INSTRUCTIONS

## 2018-09-07 ENCOUNTER — OFFICE VISIT (OUTPATIENT)
Dept: FAMILY MEDICINE CLINIC | Age: 63
End: 2018-09-07

## 2018-09-07 VITALS
TEMPERATURE: 96.2 F | RESPIRATION RATE: 18 BRPM | DIASTOLIC BLOOD PRESSURE: 82 MMHG | SYSTOLIC BLOOD PRESSURE: 137 MMHG | BODY MASS INDEX: 22.82 KG/M2 | WEIGHT: 142 LBS | HEART RATE: 67 BPM | OXYGEN SATURATION: 99 % | HEIGHT: 66 IN

## 2018-09-07 DIAGNOSIS — J30.9 ALLERGIC RHINITIS, UNSPECIFIED SEASONALITY, UNSPECIFIED TRIGGER: ICD-10-CM

## 2018-09-07 DIAGNOSIS — J06.9 UPPER RESPIRATORY TRACT INFECTION, UNSPECIFIED TYPE: Primary | ICD-10-CM

## 2018-09-07 LAB
QUICKVUE INFLUENZA TEST: NEGATIVE
S PYO AG THROAT QL: NEGATIVE
VALID INTERNAL CONTROL?: YES
VALID INTERNAL CONTROL?: YES

## 2018-09-07 RX ORDER — FLUTICASONE PROPIONATE 50 MCG
2 SPRAY, SUSPENSION (ML) NASAL DAILY
Qty: 1 BOTTLE | Refills: 0 | Status: SHIPPED | OUTPATIENT
Start: 2018-09-07 | End: 2018-11-06

## 2018-09-07 RX ORDER — CETIRIZINE HCL 10 MG
10 TABLET ORAL DAILY
Qty: 30 TAB | Refills: 0 | Status: SHIPPED | OUTPATIENT
Start: 2018-09-07 | End: 2018-11-06

## 2018-09-07 RX ORDER — AZITHROMYCIN 250 MG/1
TABLET, FILM COATED ORAL
Qty: 6 TAB | Refills: 0 | Status: SHIPPED | OUTPATIENT
Start: 2018-09-07 | End: 2018-09-12

## 2018-09-07 NOTE — MR AVS SNAPSHOT
[facility] [address] [facility] 
[phone] Patient: Svetlana Hsu MRN: [id number] ICO:[id number] Visit Information Date & Time Provider Department Dept. Phone Encounter #  
 9/7/2018  8:00 AM Shakira Godoy [phone] [id number] Follow-up Instructions Return if symptoms worsen or fail to improve. Your Appointments 11/5/2018  7:45 AM  
[facility] Appt Note: f/u htn; pt r/s  
 148 [facility] 107 [id number] [address]  
  
   
 [address] [facility]  
  
    
 3/7/2019  8:15 AM  
Follow Up with Maximo Liu MD  
[facility] - [facility] (Mary Kate Gee) Appt Note: 6 mo follow up  lower back 2012 [facility]TY [facility] [place] [address] [facility] [address]  
[address] [facility] Upcoming Health Maintenance Date Due DTaP/Tdap/Td series (1 - Tdap) 3/5/1976 Influenza Age 5 to Adult 8/1/2018 BREAST CANCER SCRN MAMMOGRAM 3/16/2019 COLONOSCOPY 10/14/2021 Allergies as of 9/7/2018  Review Complete On: 9/7/2018 By: Gregory Stacy NP Severity Noted Reaction Type Reactions Penicillin G  04/04/2017    Hives Current Immunizations  Never Reviewed Name Date Influenza Vaccine (Quad) PF 10/30/2017 Not reviewed this visit You Were Diagnosed With   
  
 Codes Comments Upper respiratory tract infection, unspecified type    -  Primary ICD-10-CM: J06.9 ICD-9-CM: 465.9 Allergic rhinitis, unspecified seasonality, unspecified trigger     ICD-10-CM: J30.9 ICD-9-CM: 477.9 Vitals BP Pulse Temp Resp Height(growth percentile) Weight(growth percentile)  137/82 (BP 1 Location: Left arm, BP Patient Position: Sitting) 67 96.2 °F (35.7 °C) (Oral) 18 5' 6\" (1.676 m) 142 lb (64.4 kg) SpO2 BMI OB Status Smoking Status 99% 22.92 kg/m2 Hysterectomy Never Smoker BMI and BSA Data Body Mass Index Body Surface Area  
 22.92 kg/m 2 1.73 m 2 Preferred Pharmacy Pharmacy Name Phone [address] Calderon [facility] [address] [phone] Your Updated Medication List  
  
   
This list is accurate as of 9/7/18  8:48 AM.  Always use your most recent med list.  
  
  
  
  
 ascorbic acid (vitamin C) 500 mg tablet Commonly known as:  VITAMIN C Take 1,000 mg by mouth two (2) times a day. aspirin 81 mg chewable tablet Take 81 mg by mouth daily. atenolol 50 mg tablet Commonly known as:  TENORMIN  
TAKE 1 TABLET DAILY  
  
 azithromycin 250 mg tablet Commonly known as:  Arthea Bruch Take 2 tablets today, then take 1 tablet daily  
  
 cetirizine 10 mg tablet Commonly known as:  ZYRTEC Take 1 Tab by mouth daily. cholecalciferol (VITAMIN D3) 5,000 unit Tab tablet Commonly known as:  VITAMIN D3 Take  by mouth daily. Citracal + D tablet Generic drug:  calcium citrate-vitamin D3 Take  by mouth two (2) times a day. cyanocobalamin 500 mcg tablet Commonly known as:  VITAMIN B12 Take 2,500 mcg by mouth daily. diclofenac EC 75 mg EC tablet Commonly known as:  VOLTAREN Take 1 Tab by mouth two (2) times a day. Take as needed for pain and take with people  Indications: OSTEOARTHRITIS  
  
 doxycycline 100 mg tablet Commonly known as:  ADOXA  
TAKE 1 TABLET EVERY TUESDAY, THURSDAY AND SATURDAY  
  
 ferrous sulfate 325 mg (65 mg iron) tablet Take  by mouth every seven (7) days. fluticasone 50 mcg/actuation nasal spray Commonly known as:  Arnaud Metro 2 Sprays by Both Nostrils route daily. gabapentin 300 mg capsule Commonly known as:  NEURONTIN  
1 in the am and 2 in the evening as directed  Indications: NEUROPATHIC PAIN  
  
 GLUCOSAMINE 1500 COMPLEX 500-400 mg capsule Generic drug:  glucosamine-chondroit-vit c-mn Take  by mouth daily. lisinopril 10 mg tablet Commonly known as:  PRINIVIL, ZESTRIL  
TAKE 1 TABLET DAILY  
  
 multivitamin tablet Commonly known as:  ONE A DAY Take 1 Tab by mouth daily. potassium 99 mg tablet Take 99 mg by mouth every seven (7) days. PROBIOTIC 4X 10-15 mg Tbec Generic drug:  B.infantis-B.ani-B.long-B.bifi Take  by mouth.  
  
 rizatriptan 10 mg disintegrating tablet Commonly known as:  MAXALT-MLT PLACE 1 TABLET ON TONGUE ONCE AS NEEDED FOR MIGRAINE  
  
 rosuvastatin 10 mg tablet Commonly known as:  CRESTOR  
TAKE 1 TABLET NIGHTLY  
  
 vitamin E 400 unit capsule Commonly known as:  [address] Take  by mouth daily. Prescriptions Printed Refills  
 azithromycin (ZITHROMAX) 250 mg tablet 0 Sig: Take 2 tablets today, then take 1 tablet daily Class: Print Prescriptions Sent to Pharmacy Refills  
 fluticasone (FLONASE) 50 mcg/actuation nasal spray 0 Si Sprays by Both Nostrils route daily. Class: Normal  
 Pharmacy: [address] [place] [address] #: [phone] Route: Both Nostrils  
 cetirizine (ZYRTEC) 10 mg tablet 0 Sig: Take 1 Tab by mouth daily. Class: Normal  
 Pharmacy: [address] [place] [address] #: [phone] Route: Oral  
  
Follow-up Instructions Return if symptoms worsen or fail to improve. Patient Instructions Helpful recommendations to help manage symptoms Headache/Muscle aches *Tylenol 500-1000 mg every 6 hours as needed  
(Acetaminophen) And/or *Ibuprofen 400-800 mg every 8 hours as needed (Aleve/Motrin/Naproxen/Naprosyn/Mobic/Meloxicam) Itchy/watery eyes *Allergy/Antihistamine eye drops OTC 
*Refresh saline eye drops OTC Sinus congestion/pressure/ear fullness *Steroid nasal spray (Flonase/Nasonex/Nasacort) *Decongestant nasal spray (Afrin) *Saline nasal spray *Decongestant (Sudafed/Anything with pseudoephedrine) *Antihistamine (Claritin/Zyrtec/Benadryl/Allegra) *Humidifier in bedroom and Vapor rub to chest or through machine Sore throat *Tylenol or Ibuprofen as above *Chloraseptic throat spray *Lozenges *Frozen items (ice/popsicles) *Salt water gargles *Soft foods as below Chest congestion/Cough *OTC Delsym for during daytime *OTC Mucinex to help with thick chest congestion *Prescription cough medication for at night or at home if prescribed *Humidifier in bedroom and Vapor rub to chest or through machine Wheezing/shortness of breath/Cough *Albuterol inhaler/nebulizer if prescribed *Steroids if prescribed Diarrhea/nausea *Soft, bland foods (mashed potatoes, rice, applesauce, oatmeal, pudding, eggs, soup, cooked vegetables, canned fruits, etc.) *Anti-nausea medication if prescribed *Try to avoid anti-diarrheals unless instructed to take by provider General 
*Rest 
*Increase up water intake *Call or return to office for fevers >101 degrees or more localized symptoms to one area. Report new or worsening symptoms. Symptoms usually last about 4-7 days, however may last longer. If symptoms do not improve in 10 days, you may need further care. *Report to ER for increasing shortness of breath or wheezing that does not improve with management directed above. Report to ER for chest pains. There may be other symptoms that worry you, if you feel it is an emergency, please seek care in ER. Introducing hospitals & HEALTH SERVICES! Dear Blanco Whitley: 
Thank you for requesting a [email or web address] Ã¨â€¹Â±Ã¨â€¦Â¾Ã§Â½â€˜ account. Our records indicate that you already have an active [email or web address] Ã¨â€¹Â±Ã¨â€¦Â¾Ã§Â½â€˜ account. You can access your account anytime at [email or web address] Did you know that you can access your hospital and ER discharge instructions at any time in [employer]? You can also review all of your test results from your hospital stay or ER visit. Additional Information If you have questions, please visit the Frequently Asked Questions section of the [employer] website at [email or web address] Remember, [employer] is NOT to be used for urgent needs. For medical emergencies, dial 911. Now available from your iPhone and Android! Please provide this summary of care documentation to your next provider. Your primary care clinician is listed as Karlee Flores. If you have any questions after today's visit, please call [phone].

## 2018-09-07 NOTE — PROGRESS NOTES
OFFICE NOTE    Joan Carrillo is a 61 y.o. female presenting today for office visit. 9/7/2018  8:05 PM      Chief Complaint   Patient presents with    Nasal Congestion    Sinus Pain         HPI: Here today for complaints of cold symptoms. PCP Karlee Mendez MD. She reports that for the last few days, she has been having sinus pressure, congestion alternating with runny nose, loose stools, and sore throat. She has been taking Sudafed for symptoms. She has not been running fevers. Has been around sick contacts including someone with flu like symptoms. Review of Systems   Constitutional: Positive for fatigue. Negative for chills and fever. HENT: Positive for congestion, rhinorrhea, sinus pain, sinus pressure and sore throat. Negative for ear pain, facial swelling and sneezing. Eyes: Negative for pain, discharge, itching and visual disturbance. Respiratory: Positive for cough. Negative for shortness of breath and wheezing. Cardiovascular: Negative for chest pain. Gastrointestinal: Positive for diarrhea. Negative for abdominal pain, blood in stool, nausea and vomiting. Musculoskeletal: Negative for arthralgias and myalgias. Skin: Negative for rash. Allergic/Immunologic: Negative for environmental allergies. Neurological: Negative for headaches.          PHQ Screening   PHQ over the last two weeks 9/7/2018   Little interest or pleasure in doing things Not at all   Feeling down, depressed, irritable, or hopeless Not at all   Total Score PHQ 2 0         History  Past Medical History:   Diagnosis Date    Arthritis     Hx of migraines        Past Surgical History:   Procedure Laterality Date    FOOT/TOES SURGERY PROC UNLISTED      x6    HX HYSTERECTOMY      due to heavy bleeding    HX KNEE ARTHROSCOPY      HX LUMBAR LAMINECTOMY  01/2011    L5/S1 [facility]       Social History     Social History    Marital status:      Spouse name: N/A    Number of children: N/A    Years of education: N/A     Occupational History    Not on file. Social History Main Topics    Smoking status: Never Smoker    Smokeless tobacco: Never Used    Alcohol use No    Drug use: No    Sexual activity: Yes     Partners: Male     Other Topics Concern     Service No    Blood Transfusions Yes    Caffeine Concern No    Occupational Exposure No    Hobby Hazards No    Sleep Concern No    Stress Concern No    Weight Concern No    Special Diet No    Back Care No    Exercise Yes    Bike Helmet No    Seat Belt Yes    Self-Exams Yes     Social History Narrative       Allergies   Allergen Reactions    Penicillin G Hives       Current Outpatient Prescriptions   Medication Sig Dispense Refill    fluticasone (FLONASE) 50 mcg/actuation nasal spray 2 Sprays by Both Nostrils route daily. 1 Bottle 0    cetirizine (ZYRTEC) 10 mg tablet Take 1 Tab by mouth daily. 30 Tab 0    azithromycin (ZITHROMAX) 250 mg tablet Take 2 tablets today, then take 1 tablet daily 6 Tab 0    lisinopril (PRINIVIL, ZESTRIL) 10 mg tablet TAKE 1 TABLET DAILY 90 Tab 0    gabapentin (NEURONTIN) 300 mg capsule 1 in the am and 2 in the evening as directed  Indications: NEUROPATHIC PAIN 270 Cap 1    diclofenac EC (VOLTAREN) 75 mg EC tablet Take 1 Tab by mouth two (2) times a day. Take as needed for pain and take with people  Indications: OSTEOARTHRITIS 180 Tab 1    ferrous sulfate 325 mg (65 mg iron) tablet Take  by mouth every seven (7) days.  potassium 99 mg tablet Take 99 mg by mouth every seven (7) days.  vitamin E (AQUA GEMS) 400 unit capsule Take  by mouth daily.  doxycycline (ADOXA) 100 mg tablet TAKE 1 TABLET EVERY TUESDAY, THURSDAY AND SATURDAY 40 Tab 2    rosuvastatin (CRESTOR) 10 mg tablet TAKE 1 TABLET NIGHTLY 90 Tab 1    atenolol (TENORMIN) 50 mg tablet TAKE 1 TABLET DAILY 90 Tab 3    ascorbic acid, vitamin C, (VITAMIN C) 500 mg tablet Take 1,000 mg by mouth two (2) times a day.       calcium citrate-vitamin D3 (CITRACAL + D) tablet Take  by mouth two (2) times a day.  multivitamin (ONE A DAY) tablet Take 1 Tab by mouth daily.  cholecalciferol, VITAMIN D3, (VITAMIN D3) 5,000 unit tab tablet Take  by mouth daily.  cyanocobalamin (VITAMIN B12) 500 mcg tablet Take 2,500 mcg by mouth daily.  aspirin 81 mg chewable tablet Take 81 mg by mouth daily.  glucosamine-chondroit-vit c-mn (GLUCOSAMINE 1500 COMPLEX) 500-400 mg capsule Take  by mouth daily.  B.infantis-B.ani-B.long-B.bifi (PROBIOTIC 4X) 10-15 mg TbEC Take  by mouth.  rizatriptan (MAXALT-MLT) 10 mg disintegrating tablet PLACE 1 TABLET ON TONGUE ONCE AS NEEDED FOR MIGRAINE 30 Tab 0         Patient Care Team:  Patient Care Team:  Chelsea Marks MD as PCP - General (Family Practice)  Yemi Bales MD (Orthopedic Surgery)        LABS:    Results for orders placed or performed in visit on 09/07/18   AMB POC RAPID STREP A   Result Value Ref Range    VALID INTERNAL CONTROL POC Yes     Group A Strep Ag Negative Negative   AMB POC RAPID INFLUENZA TEST   Result Value Ref Range    VALID INTERNAL CONTROL POC Yes     QuickVue Influenza test Negative Negative       RADIOLOGY:  None new to review      Physical Exam   Constitutional: She is oriented to person, place, and time. She appears well-developed and well-nourished. No distress. HENT:   Right Ear: Tympanic membrane, external ear and ear canal normal.   Left Ear: Tympanic membrane, external ear and ear canal normal.   Nose: Mucosal edema and rhinorrhea present. Mouth/Throat: Uvula is midline and mucous membranes are normal. Posterior oropharyngeal erythema present. No oropharyngeal exudate. Eyes: EOM are normal. Pupils are equal, round, and reactive to light. Right eye exhibits no discharge. Left eye exhibits no discharge. Neck: Normal range of motion. Neck supple. Cardiovascular: Normal rate, regular rhythm and normal heart sounds.     No murmur heard.  Pulmonary/Chest: Effort normal and breath sounds normal. No respiratory distress. Abdominal: Soft. Bowel sounds are normal. There is no tenderness. Lymphadenopathy:     She has cervical adenopathy. Neurological: She is alert and oriented to person, place, and time. Skin: Skin is warm and dry. No rash noted. Vitals:    09/07/18 0802   BP: 137/82   Pulse: 67   Resp: 18   Temp: 96.2 °F (35.7 °C)   TempSrc: Oral   SpO2: 99%   Weight: 142 lb (64.4 kg)   Height: 5' 6\" (1.676 m)   PainSc:   8         Assessment and Plan    Upper respiratory tract infection, unspecified type vs. Allergic rhinitis, unspecified seasonality, unspecified trigger  *Discussed with patient- advised that this appears to be viral in nature and could also have an allergic component. Recommend symptom management to continue and monitoring- with this recommendation patient states \"so I came the whole way here today and you are not going to give me anything to make me comfortable. \" Explained to patient that symptom management does provide comfort while the virus is running its course. Patient seems hesitant about this information. Provided with Flonase and Zyrtec, as well as to continue with decongestant. Advised on other helpful agents such as humidifier in bedroom. *Gave AB Rx to start if not improved by next week, but highly recommended symptom management at this time due to lack of findings to support bacterial infection. Advised that taking AB when not necessary can cause adverse effects and antibiotic resistance. - fluticasone (FLONASE) 50 mcg/actuation nasal spray; 2 Sprays by Both Nostrils route daily. Dispense: 1 Bottle; Refill: 0  - cetirizine (ZYRTEC) 10 mg tablet; Take 1 Tab by mouth daily. Dispense: 30 Tab; Refill: 0  - AMB POC RAPID STREP A  - AMB POC RAPID INFLUENZA TEST  - azithromycin (ZITHROMAX) 250 mg tablet; Take 2 tablets today, then take 1 tablet daily  Dispense: 6 Tab;  Refill: 0        *Plan of care reviewed with patient. Patient in agreement with plan and expresses understanding. All questions answered and patient encouraged to call or RTO if further questions or concerns. Follow-up Disposition:  Return if symptoms worsen or fail to improve.

## 2018-09-07 NOTE — PROGRESS NOTES
Chief Complaint   Patient presents with    Nasal Congestion    Sinus Pain     1. Have you been to the ER, urgent care clinic since your last visit? Hospitalized since your last visit? No    2. Have you seen or consulted any other health care providers outside of the [address] [place] [address] since your last visit? Include any pap smears or colon screening.  No

## 2018-10-15 RX ORDER — ROSUVASTATIN CALCIUM 10 MG/1
TABLET, COATED ORAL
Qty: 90 TAB | Refills: 1 | Status: SHIPPED | OUTPATIENT
Start: 2018-10-15 | End: 2019-04-12 | Stop reason: SDUPTHER

## 2018-11-06 ENCOUNTER — OFFICE VISIT (OUTPATIENT)
Dept: FAMILY MEDICINE CLINIC | Age: 63
End: 2018-11-06

## 2018-11-06 ENCOUNTER — HOSPITAL ENCOUNTER (OUTPATIENT)
Dept: LAB | Age: 63
Discharge: HOME | End: 2018-11-06
Payer: OTHER GOVERNMENT

## 2018-11-06 VITALS
OXYGEN SATURATION: 99 % | RESPIRATION RATE: 18 BRPM | HEIGHT: 66 IN | WEIGHT: 145 LBS | TEMPERATURE: 97.4 F | HEART RATE: 76 BPM | BODY MASS INDEX: 23.3 KG/M2 | SYSTOLIC BLOOD PRESSURE: 133 MMHG | DIASTOLIC BLOOD PRESSURE: 76 MMHG

## 2018-11-06 DIAGNOSIS — E78.5 DYSLIPIDEMIA: ICD-10-CM

## 2018-11-06 DIAGNOSIS — I10 ESSENTIAL HYPERTENSION: Primary | ICD-10-CM

## 2018-11-06 DIAGNOSIS — I10 ESSENTIAL HYPERTENSION: ICD-10-CM

## 2018-11-06 DIAGNOSIS — Z23 ENCOUNTER FOR IMMUNIZATION: ICD-10-CM

## 2018-11-06 LAB
ALBUMIN SERPL-MCNC: 4 G/DL (ref 3.4–5)
ALBUMIN/GLOB SERPL: 1.2 {RATIO} (ref 0.8–1.7)
ALP SERPL-CCNC: 101 U/L (ref 45–117)
ALT SERPL-CCNC: 49 U/L (ref 13–56)
ANION GAP SERPL CALC-SCNC: 6 MMOL/L (ref 3–18)
AST SERPL-CCNC: 31 U/L (ref 15–37)
BASOPHILS # BLD: 0 K/UL (ref 0–0.1)
BASOPHILS NFR BLD: 1 % (ref 0–2)
BILIRUB SERPL-MCNC: 0.3 MG/DL (ref 0.2–1)
BUN SERPL-MCNC: 16 MG/DL (ref 7–18)
BUN/CREAT SERPL: 29 (ref 12–20)
CALCIUM SERPL-MCNC: 9.2 MG/DL (ref 8.5–10.1)
CHLORIDE SERPL-SCNC: 107 MMOL/L (ref 100–108)
CHOLEST SERPL-MCNC: 169 MG/DL
CO2 SERPL-SCNC: 29 MMOL/L (ref 21–32)
CREAT SERPL-MCNC: 0.56 MG/DL (ref 0.6–1.3)
DIFFERENTIAL METHOD BLD: ABNORMAL
EOSINOPHIL # BLD: 0.1 K/UL (ref 0–0.4)
EOSINOPHIL NFR BLD: 3 % (ref 0–5)
ERYTHROCYTE [DISTWIDTH] IN BLOOD BY AUTOMATED COUNT: 14.4 % (ref 11.6–14.5)
GLOBULIN SER CALC-MCNC: 3.3 G/DL (ref 2–4)
GLUCOSE SERPL-MCNC: 93 MG/DL (ref 74–99)
HCT VFR BLD AUTO: 43.3 % (ref 35–45)
HDLC SERPL-MCNC: 80 MG/DL (ref 40–60)
HDLC SERPL: 2.1 {RATIO} (ref 0–5)
HGB BLD-MCNC: 13.5 G/DL (ref 12–16)
LDLC SERPL CALC-MCNC: 79.8 MG/DL (ref 0–100)
LIPID PROFILE,FLP: ABNORMAL
LYMPHOCYTES # BLD: 1.3 K/UL (ref 0.9–3.6)
LYMPHOCYTES NFR BLD: 27 % (ref 21–52)
MCH RBC QN AUTO: 27.7 PG (ref 24–34)
MCHC RBC AUTO-ENTMCNC: 31.2 G/DL (ref 31–37)
MCV RBC AUTO: 88.7 FL (ref 74–97)
MONOCYTES # BLD: 0.7 K/UL (ref 0.05–1.2)
MONOCYTES NFR BLD: 14 % (ref 3–10)
NEUTS SEG # BLD: 2.7 K/UL (ref 1.8–8)
NEUTS SEG NFR BLD: 55 % (ref 40–73)
PLATELET # BLD AUTO: 279 K/UL (ref 135–420)
PMV BLD AUTO: 10.3 FL (ref 9.2–11.8)
POTASSIUM SERPL-SCNC: 4.8 MMOL/L (ref 3.5–5.5)
PROT SERPL-MCNC: 7.3 G/DL (ref 6.4–8.2)
RBC # BLD AUTO: 4.88 M/UL (ref 4.2–5.3)
SODIUM SERPL-SCNC: 142 MMOL/L (ref 136–145)
TRIGL SERPL-MCNC: 46 MG/DL (ref ?–150)
VLDLC SERPL CALC-MCNC: 9.2 MG/DL
WBC # BLD AUTO: 4.8 K/UL (ref 4.6–13.2)

## 2018-11-06 PROCEDURE — 80061 LIPID PANEL: CPT | Performed by: FAMILY MEDICINE

## 2018-11-06 PROCEDURE — 80053 COMPREHEN METABOLIC PANEL: CPT | Performed by: FAMILY MEDICINE

## 2018-11-06 PROCEDURE — 85025 COMPLETE CBC W/AUTO DIFF WBC: CPT | Performed by: FAMILY MEDICINE

## 2018-11-06 RX ORDER — CETIRIZINE HCL 10 MG
10 TABLET ORAL
Qty: 90 TAB | Refills: 1 | Status: SHIPPED | OUTPATIENT
Start: 2018-11-06 | End: 2018-11-29 | Stop reason: SDUPTHER

## 2018-11-06 RX ORDER — FLUTICASONE PROPIONATE 50 MCG
2 SPRAY, SUSPENSION (ML) NASAL DAILY
Qty: 1 BOTTLE | Refills: 0 | Status: SHIPPED | OUTPATIENT
Start: 2018-11-06 | End: 2018-11-29 | Stop reason: SDUPTHER

## 2018-11-06 NOTE — PROGRESS NOTES
Chief Complaint Patient presents with  Follow-up Routine Care for HTN and Cholesterol 1. Have you been to the ER, urgent care clinic since your last visit? Hospitalized since your last visit? No 
 
2. Have you seen or consulted any other health care providers outside of the [address] [place] [address] since your last visit? Include any pap smears or colon screening. No 
 
HPI Minna Neas comes in for follow-up care. Patient has hypertension. Blood pressure is stable. She is on Tenormin and lisinopril. We will continue with the current treatment plan. She does need to have her labs checked and we will do this today. She also has a history of dyslipidemia. She takes Crestor. She has been doing lifestyle and dietary modification in an effort to bring down her weight and also cut back on her cholesterol. She did initially have muscle aches but currently taking 5 mg of the Crestor and this does not cause the muscle aches and cramps. Will recheck her lipid panel today. Patient has nasal congestion, postnasal drainage and sinus congestion. She takes Zyrtec and Flonase. Would like a refill of medication sent to the pharmacy. She does have neuropathy. She takes gabapentin for this. We will continue with the current treatment plan. Also takes diclofenac. Past Medical History Past Medical History:  
Diagnosis Date  Arthritis  Hx of migraines Surgical History Past Surgical History:  
Procedure Laterality Date  FOOT/TOES SURGERY PROC UNLISTED    
 x6  
 HX HYSTERECTOMY    
 due to heavy bleeding  HX KNEE ARTHROSCOPY    
 HX LUMBAR LAMINECTOMY  01/2011 L5/S1 [facility] Medications Current Outpatient Medications Medication Sig Dispense Refill  cetirizine (ZYRTEC) 10 mg tablet Take 1 Tab by mouth daily as needed for Allergies.  90 Tab 1  
 rosuvastatin (CRESTOR) 10 mg tablet TAKE 1 TABLET NIGHTLY 90 Tab 1  
  rizatriptan (MAXALT-MLT) 10 mg disintegrating tablet PLACE 1 TABLET ON TONGUE ONCE AS NEEDED FOR MIGRAINE 30 Tab 0  
 lisinopril (PRINIVIL, ZESTRIL) 10 mg tablet TAKE 1 TABLET DAILY 90 Tab 0  
 gabapentin (NEURONTIN) 300 mg capsule 1 in the am and 2 in the evening as directed  Indications: NEUROPATHIC PAIN 270 Cap 1  diclofenac EC (VOLTAREN) 75 mg EC tablet Take 1 Tab by mouth two (2) times a day. Take as needed for pain and take with people  Indications: OSTEOARTHRITIS 180 Tab 1  
 ferrous sulfate 325 mg (65 mg iron) tablet Take  by mouth every seven (7) days.  potassium 99 mg tablet Take 99 mg by mouth every seven (7) days.  vitamin E (AQUA GEMS) 400 unit capsule Take  by mouth daily.  doxycycline (ADOXA) 100 mg tablet TAKE 1 TABLET EVERY TUESDAY, THURSDAY AND SATURDAY 40 Tab 2  
 atenolol (TENORMIN) 50 mg tablet TAKE 1 TABLET DAILY 90 Tab 3  
 ascorbic acid, vitamin C, (VITAMIN C) 500 mg tablet Take 1,000 mg by mouth two (2) times a day.  calcium citrate-vitamin D3 (CITRACAL + D) tablet Take  by mouth two (2) times a day.  multivitamin (ONE A DAY) tablet Take 1 Tab by mouth daily.  cholecalciferol, VITAMIN D3, (VITAMIN D3) 5,000 unit tab tablet Take  by mouth daily.  aspirin 81 mg chewable tablet Take 81 mg by mouth daily.  B.infantis-B.ani-B.long-B.bifi (PROBIOTIC 4X) 10-15 mg TbEC Take  by mouth. Allergies Allergies Allergen Reactions  Penicillin G Hives Family History Family History Problem Relation Age of Onset  Heart Attack Mother  Stroke Mother [facility] Other Mother CHF  Alcohol abuse Father  Other Father CHF  Liver Disease Father Social History Social History Socioeconomic History  Marital status:  Spouse name: Not on file  Number of children: Not on file  Years of education: Not on file  Highest education level: Not on file Social Needs  Financial resource strain: Not on file  Food insecurity - worry: Not on file  Food insecurity - inability: Not on file  Transportation needs - medical: Not on file  Transportation needs - non-medical: Not on file Occupational History  Not on file Tobacco Use  Smoking status: Never Smoker  Smokeless tobacco: Never Used Substance and Sexual Activity  Alcohol use: No  
 Drug use: No  
 Sexual activity: Yes  
  Partners: Male Other Topics Concern   Service No  
 Blood Transfusions Yes  Caffeine Concern No  
 Occupational Exposure No  
 Hobby Hazards No  
 Sleep Concern No  
 Stress Concern No  
 Weight Concern No  
 Special Diet No  
 Back Care No  
 Exercise Yes  Bike Helmet No  
 Seat Belt Yes  Self-Exams Yes Social History Narrative  Not on file Review of Systems Review of Systems -review of all systems negative except as noted above in the HPI. Vital Signs Visit Vitals /76 (BP 1 Location: Left arm, BP Patient Position: Sitting) Pulse 76 Temp 97.4 °F (36.3 °C) (Oral) Resp 18 Ht 5' 6\" (1.676 m) Wt 145 lb (65.8 kg) SpO2 99% BMI 23.40 kg/m² Physical Exam 
Physical Examination: General appearance - alert, well appearing, and in no distress, oriented to person, place, and time, normal appearing weight, acyanotic, in no respiratory distress and well hydrated Mental status - alert, oriented to person, place, and time, normal mood, behavior, speech, dress, motor activity, and thought processes Neck - supple, no significant adenopathy Lymphatics - no palpable lymphadenopathy, no hepatosplenomegaly Chest - clear to auscultation, no wheezes, rales or rhonchi, symmetric air entry Heart - normal rate and regular rhythm, S1 and S2 normal 
Abdomen - soft, nontender, nondistended, no masses or organomegaly Neurological - motor and sensory grossly normal bilaterally Musculoskeletal - osteoarthritic changes noted in both hands Extremities - no pedal edema noted, intact peripheral pulses Results Results for orders placed or performed in visit on 09/07/18 AMB POC RAPID STREP A Result Value Ref Range VALID INTERNAL CONTROL POC Yes Group A Strep Ag Negative Negative AMB POC RAPID INFLUENZA TEST Result Value Ref Range VALID INTERNAL CONTROL POC Yes QuickVue Influenza test Negative Negative ASSESSMENT and PLAN 
  ICD-10-CM ICD-9-CM 1. Essential hypertension I10 401.9 CBC WITH AUTOMATED DIFF  
   METABOLIC PANEL, COMPREHENSIVE COLLECTION VENOUS BLOOD,VENIPUNCTURE 2. Dyslipidemia E78.5 272.4 LIPID PANEL  
   COLLECTION VENOUS BLOOD,VENIPUNCTURE 3. Encounter for immunization Z23 V03.89 INFLUENZA VIRUS VAC QUAD,SPLIT,PRESV FREE SYRINGE IM  
   FL IMMUNIZ ADMIN,1 SINGLE/COMB VAC/TOXOID  
 
lab results and schedule of future lab studies reviewed with patient 
reviewed diet, exercise and weight control 
reviewed medications and side effects in detail I have discussed the diagnosis with the patient and the intended plan of care as seen in the above orders. The patient has received an after-visit summary and questions were answered concerning future plans. I have discussed medication, side effects, and warnings with the patient in detail. The patient verbalized understanding and is in agreement with the plan of care. The patient will contact the office with any additional concerns.  
 
Kyle Lora MD

## 2018-11-06 NOTE — PROGRESS NOTES
Patient was administered seasonal influenza vaccine without difficulty via left deltoid. Patient was educated on side effect and verbalized understanding. Patient waited 15 minutes and no side effects observed. No distress noted or voiced.

## 2018-11-08 NOTE — PROGRESS NOTES
Please let patient know her lab results are stable. We will continue with the current treatment plan. Lipid numbers are stable.   Victoriano Her MD

## 2018-11-08 NOTE — PROGRESS NOTES
Patient was called and verified by Name and . Patient was informed of lab results and verbalized understanding. Patient also stated she had already been on line and seen results.

## 2018-11-15 DIAGNOSIS — I10 ESSENTIAL HYPERTENSION: ICD-10-CM

## 2018-11-18 RX ORDER — LISINOPRIL 10 MG/1
TABLET ORAL
Qty: 90 TAB | Refills: 0 | Status: SHIPPED | OUTPATIENT
Start: 2018-11-18 | End: 2019-01-25 | Stop reason: SDUPTHER

## 2018-11-30 RX ORDER — CETIRIZINE HCL 10 MG
10 TABLET ORAL
Qty: 90 TAB | Refills: 1 | Status: SHIPPED | OUTPATIENT
Start: 2018-11-30 | End: 2018-12-13 | Stop reason: SDUPTHER

## 2018-11-30 RX ORDER — FLUTICASONE PROPIONATE 50 MCG
2 SPRAY, SUSPENSION (ML) NASAL DAILY
Qty: 2 BOTTLE | Refills: 0 | Status: SHIPPED | OUTPATIENT
Start: 2018-11-30 | End: 2018-12-12 | Stop reason: SDUPTHER

## 2018-12-13 RX ORDER — FLUTICASONE PROPIONATE 50 MCG
2 SPRAY, SUSPENSION (ML) NASAL DAILY
Qty: 2 BOTTLE | Refills: 0 | Status: SHIPPED | OUTPATIENT
Start: 2018-12-13 | End: 2019-02-20 | Stop reason: SDUPTHER

## 2018-12-13 RX ORDER — CETIRIZINE HCL 10 MG
10 TABLET ORAL
Qty: 90 TAB | Refills: 1 | Status: SHIPPED | OUTPATIENT
Start: 2018-12-13 | End: 2019-06-23 | Stop reason: SDUPTHER

## 2018-12-14 DIAGNOSIS — M48.061 LUMBAR FORAMINAL STENOSIS: ICD-10-CM

## 2018-12-14 DIAGNOSIS — M79.2 NEURITIS: ICD-10-CM

## 2018-12-14 RX ORDER — GABAPENTIN 300 MG/1
CAPSULE ORAL
Qty: 270 CAP | Refills: 0 | Status: SHIPPED | OUTPATIENT
Start: 2018-12-14 | End: 2019-03-26 | Stop reason: SDUPTHER

## 2018-12-14 NOTE — TELEPHONE ENCOUNTER
Last Visit: 09/06/2018 with MD Jean-Paul Pelayo  Next Appointment: 03/07/2019 with MD Jean-Paul Pelayo  Previous Refill Encounter(s): 07/30/2018 per MD Jean-Paul Pelayo #[id number] with 1 refill    Requested Prescriptions     Pending Prescriptions Disp Refills    gabapentin (NEURONTIN) 300 mg capsule 270 Cap 0     Sig: Take 1 capsule in the am and 2 in the evening as directed

## 2018-12-17 DIAGNOSIS — M47.816 LUMBAR FACET ARTHROPATHY: ICD-10-CM

## 2018-12-17 RX ORDER — DOXYCYCLINE 100 MG/1
TABLET ORAL
Qty: 40 TAB | Refills: 2 | Status: SHIPPED | OUTPATIENT
Start: 2018-12-17 | End: 2019-12-11 | Stop reason: SDUPTHER

## 2018-12-17 RX ORDER — DICLOFENAC SODIUM 75 MG/1
TABLET, DELAYED RELEASE ORAL
Qty: 180 TAB | Refills: 1 | Status: SHIPPED | OUTPATIENT
Start: 2018-12-17 | End: 2020-03-18

## 2019-01-25 DIAGNOSIS — I10 ESSENTIAL HYPERTENSION: ICD-10-CM

## 2019-01-27 RX ORDER — LISINOPRIL 10 MG/1
TABLET ORAL
Qty: 90 TAB | Refills: 0 | Status: SHIPPED | OUTPATIENT
Start: 2019-01-27 | End: 2019-04-09 | Stop reason: SDUPTHER

## 2019-02-20 RX ORDER — FLUTICASONE PROPIONATE 50 MCG
SPRAY, SUSPENSION (ML) NASAL
Qty: 32 G | Refills: 0 | Status: SHIPPED | OUTPATIENT
Start: 2019-02-20 | End: 2019-04-30 | Stop reason: SDUPTHER

## 2019-03-07 ENCOUNTER — OFFICE VISIT (OUTPATIENT)
Dept: ORTHOPEDIC SURGERY | Age: 64
End: 2019-03-07

## 2019-03-07 VITALS
DIASTOLIC BLOOD PRESSURE: 88 MMHG | HEART RATE: 70 BPM | HEIGHT: 66 IN | BODY MASS INDEX: 24.46 KG/M2 | WEIGHT: 152.2 LBS | RESPIRATION RATE: 16 BRPM | TEMPERATURE: 97.5 F | OXYGEN SATURATION: 100 % | SYSTOLIC BLOOD PRESSURE: 150 MMHG

## 2019-03-07 DIAGNOSIS — M71.22 BAKER'S CYST, LEFT: ICD-10-CM

## 2019-03-07 DIAGNOSIS — M71.38 SYNOVIAL CYST OF LUMBAR FACET JOINT: ICD-10-CM

## 2019-03-07 DIAGNOSIS — M25.562 CHRONIC PAIN OF LEFT KNEE: Primary | ICD-10-CM

## 2019-03-07 DIAGNOSIS — M47.816 LUMBAR FACET ARTHROPATHY: ICD-10-CM

## 2019-03-07 DIAGNOSIS — M54.16 LUMBAR RADICULOPATHY: ICD-10-CM

## 2019-03-07 DIAGNOSIS — G89.29 CHRONIC PAIN OF LEFT KNEE: Primary | ICD-10-CM

## 2019-03-07 NOTE — PATIENT INSTRUCTIONS
Low Back Arthritis: Exercises  Your Care Instructions  Here are some examples of typical rehabilitation exercises for your condition. Start each exercise slowly. Ease off the exercise if you start to have pain. Your doctor or physical therapist will tell you when you can start these exercises and which ones will work best for you. When you are not being active, find a comfortable position for rest. Some people are comfortable on the floor or a medium-firm bed with a small pillow under their head and another under their knees. Some people prefer to lie on their side with a pillow between their knees. Don't stay in one position for too long. Take short walks (10 to 20 minutes) every 2 to 3 hours. Avoid slopes, hills, and stairs until you feel better. Walk only distances you can manage without pain, especially leg pain. How to do the exercises  Pelvic tilt    1. Lie on your back with your knees bent. 2. \"Brace\" your stomach--tighten your muscles by pulling in and imagining your belly button moving toward your spine. 3. Press your lower back into the floor. You should feel your hips and pelvis rock back. 4. Hold for 6 seconds while breathing smoothly. 5. Relax and allow your pelvis and hips to rock forward. 6. Repeat 8 to 12 times. Back stretches    1. Get down on your hands and knees on the floor. 2. Relax your head and allow it to droop. Round your back up toward the ceiling until you feel a nice stretch in your upper, middle, and lower back. Hold this stretch for as long as it feels comfortable, or about 15 to 30 seconds. 3. Return to the starting position with a flat back while you are on your hands and knees. 4. Let your back sway by pressing your stomach toward the floor. Lift your buttocks toward the ceiling. 5. Hold this position for 15 to 30 seconds. 6. Repeat 2 to 4 times. Follow-up care is a key part of your treatment and safety.  Be sure to make and go to all appointments, and call your doctor if you are having problems. It's also a good idea to know your test results and keep a list of the medicines you take. Where can you learn more? Go to http://kendrick-emerald.info/. Enter J801 in the search box to learn more about \"Low Back Arthritis: Exercises. \"  Current as of: September 20, 2018  Content Version: 11.9  © 0248-2779 [employer]. Care instructions adapted under license by [employer] (which disclaims liability or warranty for this information). If you have questions about a medical condition or this instruction, always ask your healthcare professional. [place] [address] any warranty or liability for your use of this information. Knee Arthritis: Exercises  Your Care Instructions  Here are some examples of exercises for knee arthritis. Start each exercise slowly. Ease off the exercise if you start to have pain. Your doctor or physical therapist will tell you when you can start these exercises and which ones will work best for you. How to do the exercises  Knee flexion with heel slide    1. Lie on your back with your knees bent. 2. Slide your heel back by bending your affected knee as far as you can. Then hook your other foot around your ankle to help pull your heel even farther back. 3. Hold for about 6 seconds, then rest for up to 10 seconds. 4. Repeat 8 to 12 times. 5. Switch legs and repeat steps 1 through 4, even if only one knee is sore. Quad sets    1. Sit with your affected leg straight and supported on the floor or a firm bed. Place a small, rolled-up towel under your knee. Your other leg should be bent, with that foot flat on the floor. 2. Tighten the thigh muscles of your affected leg by pressing the back of your knee down into the towel. 3. Hold for about 6 seconds, then rest for up to 10 seconds. 4. Repeat 8 to 12 times.   5. Switch legs and repeat steps 1 through 4, even if only one knee is sore.    Straight-leg raises to the front    1. Lie on your back with your good knee bent so that your foot rests flat on the floor. Your affected leg should be straight. Make sure that your low back has a normal curve. You should be able to slip your hand in between the floor and the small of your back, with your palm touching the floor and your back touching the back of your hand. 2. Tighten the thigh muscles in your affected leg by pressing the back of your knee flat down to the floor. Hold your knee straight. 3. Keeping the thigh muscles tight and your leg straight, lift your affected leg up so that your heel is about 12 inches off the floor. Hold for about 6 seconds, then lower slowly. 4. Relax for up to 10 seconds between repetitions. 5. Repeat 8 to 12 times. 6. Switch legs and repeat steps 1 through 5, even if only one knee is sore. Active knee flexion    1. Lie on your stomach with your knees straight. If your kneecap is uncomfortable, roll up a washcloth and put it under your leg just above your kneecap. 2. Lift the foot of your affected leg by bending the knee so that you bring the foot up toward your buttock. If this motion hurts, try it without bending your knee quite as far. This may help you avoid any painful motion. 3. Slowly move your leg up and down. 4. Repeat 8 to 12 times. 5. Switch legs and repeat steps 1 through 4, even if only one knee is sore. Quadriceps stretch (facedown)    1. Lie flat on your stomach, and rest your face on the floor. 2. Wrap a towel or belt strap around the lower part of your affected leg. Then use the towel or belt strap to slowly pull your heel toward your buttock until you feel a stretch. 3. Hold for about 15 to 30 seconds, then relax your leg against the towel or belt strap. 4. Repeat 2 to 4 times. 5. Switch legs and repeat steps 1 through 4, even if only one knee is sore. Stationary exercise bike    1.  If you do not have a stationary exercise bike at home, you can find one to ride at your local health club or community center. 2. Adjust the height of the bike seat so that your knee is slightly bent when your leg is extended downward. If your knee hurts when the pedal reaches the top, you can raise the seat so that your knee does not bend as much. 3. Start slowly. At first, try to do 5 to 10 minutes of cycling with little to no resistance. Then increase your time and the resistance bit by bit until you can do 20 to 30 minutes without pain. 4. If you start to have pain, rest your knee until your pain gets back to the level that is normal for you. Or cycle for less time or with less effort. Follow-up care is a key part of your treatment and safety. Be sure to make and go to all appointments, and call your doctor if you are having problems. It's also a good idea to know your test results and keep a list of the medicines you take. Where can you learn more? Go to http://kendrick-emerald.info/. Enter C159 in the search box to learn more about \"Knee Arthritis: Exercises. \"  Current as of: September 20, 2018  Content Version: 11.9  © 2259-8700 [employer]. Care instructions adapted under license by [employer] (which disclaims liability or warranty for this information). If you have questions about a medical condition or this instruction, always ask your healthcare professional. [address] any warranty or liability for your use of this information.

## 2019-03-07 NOTE — PROGRESS NOTES
[facility]  [address] [place], [address]  Phone: [phone]  Fax: [phone]    Pt's YOB: 1955    ASSESSMENT   Diagnoses and all orders for this visit:    1. Chronic pain of left knee  -     REFERRAL TO ORTHOPEDICS    2. Baker's cyst, left  -     REFERRAL TO ORTHOPEDICS    3. Lumbar facet arthropathy    4. Lumbar radiculopathy    5. Synovial cyst of lumbar facet joint         IMPRESSION AND PLAN:  Jagjit Green is a 59 y.o. female with history of lumbar pain. She notes improvement in her lower back pain since her last office visit. Pt complains of pain and swelling in the left knee and difficulty with full extension. She decreased her Neurontin 300 mg to 1 tab daily and continues to take Voltaren 75 mg 1 tab QHS with dinner. 1) Pt was given information on lumbar and knee arthritis exercises. 2) She will follow up with Dr. Jered Benoit for evaluation of the left knee. 3) I recommended the Pt try water exercise, christi chi, and yoga. 4) Ms. Maynor Vaz has a reminder for a \"due or due soon\" health maintenance. I have asked that she contact her primary care provider, Martita Fishman MD, for follow-up on this health maintenance. 5)  demonstrated consistency with prescribing. 6) Pt will follow-up in 6 months or sooner if needed. HISTORY OF PRESENT ILLNESS:  Jagjit Green is a 59 y.o. female with history of lumbar pain and presents to the office today for follow up. She notes improvement in her lower back pain since her last office visit. Pt complains of pain and swelling in the left knee. Pt reports severe pain in the right knee with increased activity, flexion, and full extension. She notes that she had previous left knee surgery. Pt admits that she has not been able to perform her stretches and exercises as often due to her knee pain. She notes that she decreased the Neurontin 300 mg from 1 tab BID to 1 tab daily.  Pt continues to take Voltaren 75 mg 1 tab QHS with dinner. Pt at this time desires to proceed with referral to Dr. Lucretia Galvin.     Pain Scale: 2/10    PCP: Gala Castillo MD     Past Medical History:   Diagnosis Date    Arthritis     Hx of migraines         Social History     Socioeconomic History    Marital status:      Spouse name: Not on file    Number of children: Not on file    Years of education: Not on file    Highest education level: Not on file   Social Needs    Financial resource strain: Not on file    Food insecurity - worry: Not on file    Food insecurity - inability: Not on file    Transportation needs - medical: Not on file   [employer] needs - non-medical: Not on file   Occupational History    Not on file   Tobacco Use    Smoking status: Never Smoker    Smokeless tobacco: Never Used   Substance and Sexual Activity    Alcohol use: No    Drug use: No    Sexual activity: Yes     Partners: Male   Other Topics Concern     Service No    Blood Transfusions Yes    Caffeine Concern No    Occupational Exposure No    Hobby Hazards No    Sleep Concern No    Stress Concern No    Weight Concern No    Special Diet No    Back Care No    Exercise Yes    Bike Helmet No    Seat Belt Yes    Self-Exams Yes   Social History Narrative    Not on file       Current Outpatient Medications   Medication Sig Dispense Refill    fluticasone (FLONASE) 50 mcg/actuation nasal spray USE 2 SPRAYS IN EACH NOSTRIL DAILY 32 g 0    lisinopril (PRINIVIL, ZESTRIL) 10 mg tablet TAKE 1 TABLET DAILY 90 Tab 0    diclofenac EC (VOLTAREN) 75 mg EC tablet TAKE 1 TABLET TWICE A DAY AS NEEDED FOR PAIN WITH FOOD FOR OSTEOARTHRITIS 180 Tab 1    doxycycline (ADOXA) 100 mg tablet TAKE 1 TABLET EVERY TUESDAY, THURSDAY AND SATURDAY 40 Tab 2    gabapentin (NEURONTIN) 300 mg capsule Take 1 capsule in the am and 2 in the evening as directed 270 Cap 0    cetirizine (ZYRTEC) 10 mg tablet Take 1 Tab by mouth daily as needed for Allergies. 90 Tab 1    rosuvastatin (CRESTOR) 10 mg tablet TAKE 1 TABLET NIGHTLY 90 Tab 1    rizatriptan (MAXALT-MLT) 10 mg disintegrating tablet PLACE 1 TABLET ON TONGUE ONCE AS NEEDED FOR MIGRAINE 30 Tab 0    ferrous sulfate 325 mg (65 mg iron) tablet Take  by mouth every seven (7) days.  potassium 99 mg tablet Take 99 mg by mouth every seven (7) days.  vitamin E (AQUA GEMS) 400 unit capsule Take  by mouth daily.  atenolol (TENORMIN) 50 mg tablet TAKE 1 TABLET DAILY 90 Tab 3    ascorbic acid, vitamin C, (VITAMIN C) 500 mg tablet Take 1,000 mg by mouth two (2) times a day.  calcium citrate-vitamin D3 (CITRACAL + D) tablet Take  by mouth two (2) times a day.  multivitamin (ONE A DAY) tablet Take 1 Tab by mouth daily.  cholecalciferol, VITAMIN D3, (VITAMIN D3) 5,000 unit tab tablet Take  by mouth daily.  aspirin 81 mg chewable tablet Take 81 mg by mouth daily.  B.infantis-B.ani-B.long-B.bifi (PROBIOTIC 4X) 10-15 mg TbEC Take  by mouth. Allergies   Allergen Reactions    Penicillin G Hives         REVIEW OF SYSTEMS    Constitutional: Negative for fever, chills, or weight change. Respiratory: Negative for cough or shortness of breath. Cardiovascular: Negative for chest pain or palpitations. Gastrointestinal: Negative or acid reflux, change in bowel habits, or constipation. Genitourinary: Negative for dysuria and flank pain. Musculoskeletal: Positive for lumbar and knee pain. Skin: Negative for rash. Neurological: Negative for headaches, dizziness, or numbness. Endo/Heme/Allergies: Negative for increased bruising. Psychiatric/Behavioral: Negative for difficulty with sleep.       PHYSICAL EXAMINATION  Visit Vitals  /88 (BP 1 Location: Left arm, BP Patient Position: Sitting)   Pulse 70   Temp 97.5 °F (36.4 °C)   Resp 16   Ht 5' 6\" (1.676 m)   Wt 152 lb 3.2 oz (69 kg)   SpO2 100%   BMI 24.57 kg/m²       Constitutional: Awake, alert, and in no acute distress. Neurological: 1+ symmetrical DTRs in the lower extremities. Sensation to light touch is intact. Skin: warm, dry, and intact. Musculoskeletal: Tenderness to palpation in the lower lumbar region. Tenderness to palpation and swelling in the posterior aspect of the left knee. Pain with left knee extension. No pain and good range of motion of the right knee. Moderate pain with extension and axial loading. No pain with internal or external rotation of her hips. Negative straight leg raise bilaterally. Hip Flex  Quads Hamstrings Ankle DF EHL Ankle PF   Right +4/5 +4/5 +4/5 +4/5 +4/5 +4/5   Left +4/5 +4/5 +4/5 +4/5 +4/5 +4/5     IMAGING:    Lumbar spine MRI from 06/08/2018 was personally reviewed with the patient and demonstrated:   Results from [facility] on 06/08/18   MRI LUMB SPINE W WO CONT    Narrative EXAM:  MRI LUMB SPINE W WO CONT    INDICATION:   Lumbar disc disease with radiculopathy, bilateral sciatica    COMPARISON: None    TECHNIQUE:   MR imaging of the lumbar spine was performed with sagittal T1, T2, STIR;  axial  T1, T2.  Patient received 13 mL Dotarem intravenously. Postcontrast images were  obtained. FINDINGS:  Trace retrolisthesis L5 on S1. Trace anterolisthesis L4 on L5. Vertebral body  heights are maintained. Multilevel disc desiccation and mild disc height loss. No suspicious bone marrow lesions. 6 mm enhancing intrinsic high T1 lesion  posterior L3 vertebral body, most likely osseous hemangioma. The conus medullaris is normal in signal intensity terminating at L1 level. Correlation of sagittal and axial images at the level of the discs demonstrates  the following:    T12/L1: Sagittal images only. Degenerative endplate changes and endplate  Schmorl's nodes. Minimal posterior disc bulge. Patent central canal and neural  foramina. L1/2:  Small superiorly 2 Schmorl's node. Minimal disc bulge. Patent central  canal and neural foramina.  Subcentimeter left right foraminal perineural cyst.    L2/3:  Endplate Schmorl's nodes. No disc herniation. Patent central canal and  neural foramina. L3/4:  Mild disc bulge with mild facet and ligamentum hypertrophy. Mild mass  effect on the thecal sac but no significant central stenosis. No foraminal  stenosis. L4/5:  Trace grade 1 anterolisthesis. Mild disc bulge and right  foraminal/extraforaminal mild disc protrusion. Moderate facet and ligamentum  hypertrophy. 6 mm intraspinal fluid signal structure associated with the right  facet compatible with synovial cyst. There is associated impingement of the  right crossing L5 nerve root in the lateral recess (series 5 image 17). Otherwise no central canal stenosis. Mild right foraminal stenosis. L5/S1: Prior left hemilaminectomy. Left subarticular and foraminal disc  protrusion/extrusion. There is posterior displacement of the left crossing S1  nerve root (axial T2 series 5 image 10). No central canal stenosis. No foraminal  stenosis. No incidental abnormality in the partially imaged retroperitoneal structures. 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           Impression IMPRESSION:      1. Multilevel mild degenerative disc and facet joint disease without high-grade  central canal or foraminal stenosis. 2. Right L4/5 facet intraspinal 6 mm synovial cyst impinging the right crossing  L5 nerve root in the lateral recess. 3. Prior L5/S1 left hemilaminectomy. Left L5/S1 subarticular  protrusion/extrusion displacing the left crossing S1 nerve root. Written by Bernabe Melendez MD, [facility], as dictated by Rafy Cordero MD.  I, Dr. Rafy Cordero confirm that all documentation is accurate.

## 2019-03-08 ENCOUNTER — OFFICE VISIT (OUTPATIENT)
Dept: ORTHOPEDIC SURGERY | Age: 64
End: 2019-03-08

## 2019-03-08 VITALS
HEIGHT: 66 IN | RESPIRATION RATE: 16 BRPM | TEMPERATURE: 97.8 F | WEIGHT: 151 LBS | HEART RATE: 73 BPM | OXYGEN SATURATION: 100 % | BODY MASS INDEX: 24.27 KG/M2 | SYSTOLIC BLOOD PRESSURE: 156 MMHG | DIASTOLIC BLOOD PRESSURE: 95 MMHG

## 2019-03-08 DIAGNOSIS — M17.12 PRIMARY OSTEOARTHRITIS OF LEFT KNEE: Primary | ICD-10-CM

## 2019-03-08 DIAGNOSIS — M25.562 LEFT KNEE PAIN, UNSPECIFIED CHRONICITY: ICD-10-CM

## 2019-03-08 RX ORDER — TRIAMCINOLONE ACETONIDE 40 MG/ML
40 INJECTION, SUSPENSION INTRA-ARTICULAR; INTRAMUSCULAR ONCE
Qty: 1 ML | Refills: 0
Start: 2019-03-08 | End: 2019-03-08

## 2019-03-08 NOTE — PROGRESS NOTES
Patient: Neda Bryan                MRN: [id number]       SSN: [id number]  YOB: 1955        AGE: 59 y.o. SEX: female  Body mass index is 24.37 kg/m². PCP: Ori Simental MD  03/08/19    Chief Complaint: Left knee pain    HISTORY OF PRESENT ILLNESS:   Valentin Medicine is a 59year-old female who presents today with left knee pain. The pain is posteriorly in her knee, as well as anteriorly. It has been going on for a few months. She also has some issues with her back that she sees Dr. Caleb Wiley for. She has had a knee scope in the past where she had a partial meniscectomy. She started doing some exercising and may have overdone it and now she has pain in the back of her knee over her hamstring. There was concern for a possible cyst, a Baker's cyst, which is why she is here today. She does take Voltaren, which does not seem to make a huge difference, as well as Gabapentin for her pain. Past Medical History:   Diagnosis Date    Arthritis     Hx of migraines        Family History   Problem Relation Age of Onset    Heart Attack Mother     Stroke Mother     Other Mother         CHF    Alcohol abuse Father     Other Father         CHF    Liver Disease Father        Current Outpatient Medications   Medication Sig Dispense Refill    triamcinolone acetonide (KENALOG) 40 mg/mL injection 1 mL by Intra artICUlar route once for 1 dose.  1 mL 0    fluticasone (FLONASE) 50 mcg/actuation nasal spray USE 2 SPRAYS IN EACH NOSTRIL DAILY 32 g 0    lisinopril (PRINIVIL, ZESTRIL) 10 mg tablet TAKE 1 TABLET DAILY 90 Tab 0    diclofenac EC (VOLTAREN) 75 mg EC tablet TAKE 1 TABLET TWICE A DAY AS NEEDED FOR PAIN WITH FOOD FOR OSTEOARTHRITIS 180 Tab 1    doxycycline (ADOXA) 100 mg tablet TAKE 1 TABLET EVERY TUESDAY, THURSDAY AND SATURDAY 40 Tab 2    gabapentin (NEURONTIN) 300 mg capsule Take 1 capsule in the am and 2 in the evening as directed 270 Cap 0    cetirizine (ZYRTEC) 10 mg tablet Take 1 Tab by mouth daily as needed for Allergies. 90 Tab 1    rosuvastatin (CRESTOR) 10 mg tablet TAKE 1 TABLET NIGHTLY 90 Tab 1    rizatriptan (MAXALT-MLT) 10 mg disintegrating tablet PLACE 1 TABLET ON TONGUE ONCE AS NEEDED FOR MIGRAINE 30 Tab 0    ferrous sulfate 325 mg (65 mg iron) tablet Take  by mouth every seven (7) days.  potassium 99 mg tablet Take 99 mg by mouth every seven (7) days.  vitamin E (AQUA GEMS) 400 unit capsule Take  by mouth daily.  atenolol (TENORMIN) 50 mg tablet TAKE 1 TABLET DAILY 90 Tab 3    ascorbic acid, vitamin C, (VITAMIN C) 500 mg tablet Take 1,000 mg by mouth two (2) times a day.  calcium citrate-vitamin D3 (CITRACAL + D) tablet Take  by mouth two (2) times a day.  multivitamin (ONE A DAY) tablet Take 1 Tab by mouth daily.  cholecalciferol, VITAMIN D3, (VITAMIN D3) 5,000 unit tab tablet Take  by mouth daily.  aspirin 81 mg chewable tablet Take 81 mg by mouth daily.  B.infantis-B.ani-B.long-B.bifi (PROBIOTIC 4X) 10-15 mg TbEC Take  by mouth.          Allergies   Allergen Reactions    Penicillin G Hives       Past Surgical History:   Procedure Laterality Date    FOOT/TOES SURGERY PROC UNLISTED      x6    HX HYSTERECTOMY      due to heavy bleeding    HX KNEE ARTHROSCOPY      HX LUMBAR LAMINECTOMY  01/2011    L5/S1 [place]       Social History     Socioeconomic History    Marital status:      Spouse name: Not on file    Number of children: Not on file    Years of education: Not on file    Highest education level: Not on file   Social Needs    Financial resource strain: Not on file    Food insecurity - worry: Not on file    Food insecurity - inability: Not on file   [place] needs - medical: Not on file   [place] needs - non-medical: Not on file   Occupational History    Not on file   Tobacco Use    Smoking status: Never Smoker    Smokeless tobacco: Never Used   Substance and Sexual Activity    Alcohol use: No    Drug use: No    Sexual activity: Yes     Partners: Male   Other Topics Concern     Service No    Blood Transfusions Yes    Caffeine Concern No    Occupational Exposure No    Hobby Hazards No    Sleep Concern No    Stress Concern No    Weight Concern No    Special Diet No    Back Care No    Exercise Yes    Bike Helmet No    Seat Belt Yes    Self-Exams Yes   Social History Narrative    Not on file       REVIEW OF SYSTEMS:      CON: negative for recent weight loss/gain, fever, or chills  EYE: negative for double or blurry vision  ENT: negative for hoarseness  RS:   negative for cough, URI, SOB  CV:  negative for chest pain, palpitations  GI:    negative for blood in stool, nausea/vomiting  :  negative for blood in urine  MS: As per HPI  Other systems reviewed and noted below. PHYSICAL EXAMINATION:  Visit Vitals  BP (!) 156/95 (BP 1 Location: Left arm, BP Patient Position: Sitting)   Pulse 73   Temp 97.8 °F (36.6 °C) (Oral)   Resp 16   Ht 5' 6\" (1.676 m)   Wt 151 lb (68.5 kg)   SpO2 100%   BMI 24.37 kg/m²     Body mass index is 24.37 kg/m². GENERAL: Alert and oriented x3, in no acute distress, well-developed, well-nourished. HEENT: Normocephalic, atraumatic. RESP: Non labored breathing with equal chest rise on inspiration. CV: Well perfused extremities. No cyanosis or clubbing noted. ABDOMEN: Soft, non-tender, non-distended. PHYSICAL EXAMINATION:   Physical exam of the left knee with full knee range of motion. She is tender to palpation along the medial hamstring. She also has some tenderness to palpation over the pes insertion. Minimal tenderness to palpation over the medial and lateral joint line. No instability. Minimal crepitus. Neurovascularly intact distally. She has mild pain with stretch of the gastroc and hamstring. IMAGING:   X-rays of the left knee were reviewed in the office today. These do show some arthritic changes in the left knee. ASSESSMENT/PLAN:   Tree Rahman is a 59year-old female with left knee pain. She does have some underlying arthritis. I do not appreciate a Baker's cyst today. She is tender over the hamstrings today. We will try a cortisone shot in her pes and see how she does.           [facility] - [facility]  OFFICE PROCEDURE PROGRESS NOTE        Chart reviewed for the following:   Oliver Anguiano MD, have reviewed the History, Physical and updated the Allergic reactions for Nanette Luevano     TIME OUT performed immediately prior to start of procedure:   Oliver Anguiano MD, have performed the following reviews on Lc Perry prior to the start of the procedure:            * Patient was identified by name and date of birth   * Agreement on procedure being performed was verified  * Risks and Benefits explained to the patient  * Procedure site verified and marked as necessary  * Patient was positioned for comfort  * Consent was signed and verified    Time: 1100      Date of procedure: 3/8/2019    Procedure performed by:  Cely Pettit MD    Provider assisted by: Ehsan Nagel LPN    Patient assisted by: self    How tolerated by patient: tolerated the procedure well with no complications    Post Procedural Pain Scale: 0 - No Hurt    Comments: none                  Electronically signed by: Cely Pettit MD

## 2019-03-22 DIAGNOSIS — R03.0 ELEVATED BP WITHOUT DIAGNOSIS OF HYPERTENSION: ICD-10-CM

## 2019-03-22 DIAGNOSIS — G43.909 MIGRAINE WITHOUT STATUS MIGRAINOSUS, NOT INTRACTABLE, UNSPECIFIED MIGRAINE TYPE: ICD-10-CM

## 2019-03-22 RX ORDER — ATENOLOL 50 MG/1
TABLET ORAL
Qty: 90 TAB | Refills: 3 | Status: SHIPPED | OUTPATIENT
Start: 2019-03-22 | End: 2020-03-16

## 2019-03-26 DIAGNOSIS — M48.061 LUMBAR FORAMINAL STENOSIS: ICD-10-CM

## 2019-03-26 DIAGNOSIS — M79.2 NEURITIS: ICD-10-CM

## 2019-03-26 RX ORDER — GABAPENTIN 300 MG/1
CAPSULE ORAL
Qty: 270 CAP | Refills: 0 | Status: SHIPPED | OUTPATIENT
Start: 2019-03-26 | End: 2020-03-18

## 2019-04-09 DIAGNOSIS — I10 ESSENTIAL HYPERTENSION: ICD-10-CM

## 2019-04-09 RX ORDER — LISINOPRIL 10 MG/1
TABLET ORAL
Qty: 90 TAB | Refills: 0 | Status: SHIPPED | OUTPATIENT
Start: 2019-04-09 | End: 2019-07-08 | Stop reason: SDUPTHER

## 2019-04-14 RX ORDER — ROSUVASTATIN CALCIUM 10 MG/1
TABLET, COATED ORAL
Qty: 90 TAB | Refills: 1 | Status: SHIPPED | OUTPATIENT
Start: 2019-04-14 | End: 2020-06-08

## 2019-04-30 RX ORDER — FLUTICASONE PROPIONATE 50 MCG
SPRAY, SUSPENSION (ML) NASAL
Qty: 32 G | Refills: 0 | Status: SHIPPED | OUTPATIENT
Start: 2019-04-30 | End: 2019-07-08 | Stop reason: SDUPTHER

## 2019-05-09 ENCOUNTER — TELEPHONE (OUTPATIENT)
Dept: ORTHOPEDIC SURGERY | Age: 64
End: 2019-05-09

## 2019-05-09 NOTE — TELEPHONE ENCOUNTER
Patient called in states that on Tuesday 04/07/19 she was @ the [facility] minor emergency dept. B/c her leg hurt so bad. She states that they did an ultrasound and gave her the written print out but not a CD. She wanted to let Dr. Lidia Cabello know incase he wanted to contact them at [phone] to talk about it prior to her appt on 05/10/19.

## 2019-05-10 ENCOUNTER — OFFICE VISIT (OUTPATIENT)
Dept: ORTHOPEDIC SURGERY | Age: 64
End: 2019-05-10

## 2019-05-10 VITALS
WEIGHT: 149.6 LBS | DIASTOLIC BLOOD PRESSURE: 92 MMHG | TEMPERATURE: 95.7 F | OXYGEN SATURATION: 100 % | SYSTOLIC BLOOD PRESSURE: 140 MMHG | HEART RATE: 77 BPM | HEIGHT: 66 IN | RESPIRATION RATE: 16 BRPM | BODY MASS INDEX: 24.04 KG/M2

## 2019-05-10 DIAGNOSIS — M17.12 PRIMARY OSTEOARTHRITIS OF LEFT KNEE: Primary | ICD-10-CM

## 2019-05-10 DIAGNOSIS — M71.22 BAKER'S CYST, LEFT: ICD-10-CM

## 2019-05-10 NOTE — PROGRESS NOTES
Patient: Geovanni Stevenson                MRN: [id number]       SSN: [id number] YOB: 1955        AGE: 59 y.o. SEX: female Body mass index is 24.15 kg/m². PCP: Gabby Foote MD 
05/10/19 Chief Complaint: Left knee follow up HISTORY OF PRESENT ILLNESS:  Blanco Whitley returns to the office today for her left knee. She continues to have symptoms and pain. She was seen in the ER in [place] where she had an ultrasound done, where she was told she had a ruptured Baker's cyst.  The pain has gotten worse. The injection did not help. Past Medical History:  
Diagnosis Date  Arthritis  Hx of migraines Family History Problem Relation Age of Onset  Heart Attack Mother  Stroke Mother [facility] Other Mother CHF  Alcohol abuse Father  Other Father CHF  Liver Disease Father Current Outpatient Medications Medication Sig Dispense Refill  fluticasone propionate (FLONASE) 50 mcg/actuation nasal spray USE 2 SPRAYS IN EACH NOSTRIL DAILY 32 g 0  
 rosuvastatin (CRESTOR) 10 mg tablet TAKE 1 TABLET NIGHTLY 90 Tab 1  
 lisinopril (PRINIVIL, ZESTRIL) 10 mg tablet TAKE 1 TABLET DAILY 90 Tab 0  
 gabapentin (NEURONTIN) 300 mg capsule TAKE 1 CAPSULE IN THE MORNING AND 2 CAPSULES IN THE EVENING AS DIRECTED 270 Cap 0  
 atenolol (TENORMIN) 50 mg tablet TAKE 1 TABLET DAILY 90 Tab 3  
 diclofenac EC (VOLTAREN) 75 mg EC tablet TAKE 1 TABLET TWICE A DAY AS NEEDED FOR PAIN WITH FOOD FOR OSTEOARTHRITIS 180 Tab 1  
 doxycycline (ADOXA) 100 mg tablet TAKE 1 TABLET EVERY TUESDAY, THURSDAY AND SATURDAY 40 Tab 2  cetirizine (ZYRTEC) 10 mg tablet Take 1 Tab by mouth daily as needed for Allergies. 90 Tab 1  rizatriptan (MAXALT-MLT) 10 mg disintegrating tablet PLACE 1 TABLET ON TONGUE ONCE AS NEEDED FOR MIGRAINE 30 Tab 0  
 ferrous sulfate 325 mg (65 mg iron) tablet Take  by mouth every seven (7) days.  potassium 99 mg tablet Take 99 mg by mouth every seven (7) days.  vitamin E (AQUA GEMS) 400 unit capsule Take  by mouth daily.  ascorbic acid, vitamin C, (VITAMIN C) 500 mg tablet Take 1,000 mg by mouth two (2) times a day.  calcium citrate-vitamin D3 (CITRACAL + D) tablet Take  by mouth two (2) times a day.  multivitamin (ONE A DAY) tablet Take 1 Tab by mouth daily.  cholecalciferol, VITAMIN D3, (VITAMIN D3) 5,000 unit tab tablet Take  by mouth daily.  aspirin 81 mg chewable tablet Take 81 mg by mouth daily.  B.infantis-B.ani-B.long-B.bifi (PROBIOTIC 4X) 10-15 mg TbEC Take  by mouth. Allergies Allergen Reactions  Penicillin G Hives Past Surgical History:  
Procedure Laterality Date  FOOT/TOES SURGERY PROC UNLISTED    
 x6  
 HX HYSTERECTOMY    
 due to heavy bleeding  HX KNEE ARTHROSCOPY    
 HX LUMBAR LAMINECTOMY  01/2011 L5/S1 [place]ami Social History Socioeconomic History  Marital status:  Spouse name: Not on file  Number of children: Not on file  Years of education: Not on file  Highest education level: Not on file Occupational History  Not on file Social Needs  Financial resource strain: Not on file  Food insecurity:  
  Worry: Not on file Inability: Not on file  Transportation needs:  
  Medical: Not on file Non-medical: Not on file Tobacco Use  Smoking status: Never Smoker  Smokeless tobacco: Never Used Substance and Sexual Activity  Alcohol use: No  
 Drug use: No  
 Sexual activity: Yes  
  Partners: Male Lifestyle  Physical activity:  
  Days per week: Not on file Minutes per session: Not on file  Stress: Not on file Relationships  Social connections:  
  Talks on phone: Not on file Gets together: Not on file Attends Spiritism service: Not on file Active member of club or organization: Not on file Attends meetings of clubs or organizations: Not on file Relationship status: Not on file  Intimate partner violence:  
  Fear of current or ex partner: Not on file Emotionally abused: Not on file Physically abused: Not on file Forced sexual activity: Not on file Other Topics Concern   Service No  
 Blood Transfusions Yes  Caffeine Concern No  
 Occupational Exposure No  
 Hobby Hazards No  
 Sleep Concern No  
 Stress Concern No  
 Weight Concern No  
 Special Diet No  
 Back Care No  
 Exercise Yes  Bike Helmet No  
 Seat Belt Yes  Self-Exams Yes Social History Narrative  Not on file REVIEW OF SYSTEMS:   
 
No changes from previous review of systems unless noted. PHYSICAL EXAMINATION: 
Visit Vitals BP (!) 140/92 Pulse 77 Temp 95.7 °F (35.4 °C) (Oral) Resp 16 Ht 5' 6\" (1.676 m) Wt 149 lb 9.6 oz (67.9 kg) SpO2 100% BMI 24.15 kg/m² Body mass index is 24.15 kg/m². GENERAL: Alert and oriented x3, in no acute distress. HEENT: Normocephalic, atraumatic. RESP: Non labored breathing. SKIN: No rashes or lesions noted. PHYSICAL EXAM:  Physical exam of the left knee with fullness in the posterior popliteal fossa. She is tender to palpation over this area. She is neurovascularly intact distally. She does have some pain with hamstring stretch and knee extension. Knee flexion to about 120 degrees. No effusion, warmth or erythema. ASSESSMENT AND PLAN:   Elizabeth Martines continues to have left knee pain. I have recommended an MRI and follow up after the MRI is completed to further evaluate the left knee.    
 
 
 
 
 
Electronically signed by: Sandra Nettles MD

## 2019-05-25 ENCOUNTER — HOSPITAL ENCOUNTER (OUTPATIENT)
Age: 64
Discharge: HOME | End: 2019-05-25
Attending: ORTHOPAEDIC SURGERY
Payer: OTHER GOVERNMENT

## 2019-05-25 DIAGNOSIS — M71.22 BAKER'S CYST, LEFT: ICD-10-CM

## 2019-05-25 PROCEDURE — 73721 MRI JNT OF LWR EXTRE W/O DYE: CPT

## 2019-05-29 ENCOUNTER — OFFICE VISIT (OUTPATIENT)
Dept: ORTHOPEDIC SURGERY | Age: 64
End: 2019-05-29

## 2019-05-29 VITALS
HEART RATE: 80 BPM | BODY MASS INDEX: 24.11 KG/M2 | SYSTOLIC BLOOD PRESSURE: 123 MMHG | DIASTOLIC BLOOD PRESSURE: 83 MMHG | HEIGHT: 66 IN | OXYGEN SATURATION: 100 % | WEIGHT: 150 LBS | RESPIRATION RATE: 24 BRPM | TEMPERATURE: 97.3 F

## 2019-05-29 DIAGNOSIS — M71.22 BAKER'S CYST, LEFT: Primary | ICD-10-CM

## 2019-05-29 RX ORDER — TRIAMCINOLONE ACETONIDE 40 MG/ML
40 INJECTION, SUSPENSION INTRA-ARTICULAR; INTRAMUSCULAR ONCE
Qty: 1 ML | Refills: 0
Start: 2019-05-29 | End: 2019-05-29

## 2019-05-29 RX ORDER — CYCLOBENZAPRINE HCL 10 MG
10 TABLET ORAL
Qty: 90 TAB | Refills: 2 | Status: SHIPPED | OUTPATIENT
Start: 2019-05-29 | End: 2020-03-18

## 2019-05-29 NOTE — PROGRESS NOTES
Patient: Sandra Marquis                MRN: [id number]       SSN: [id number]  YOB: 1955        AGE: 59 y.o. SEX: female  Body mass index is 24.21 kg/m². PCP: Shanta Solorio MD  05/29/19    Chief Complaint: Left knee MRI follow up    HISTORY OF PRESENT ILLNESS:  Mercedes Stephen returns to the office today for her left knee. She continues to have some pain in her left knee, as well as some pain anteriorly in the kneecap. She had an MRI done, which she is here to review. She mostly complains of pain in the back of her knee. Past Medical History:   Diagnosis Date    Arthritis     Hx of migraines        Family History   Problem Relation Age of Onset    Heart Attack Mother     Stroke Mother     Other Mother         CHF    Alcohol abuse Father     Other Father         CHF    Liver Disease Father        Current Outpatient Medications   Medication Sig Dispense Refill    triamcinolone acetonide (KENALOG) 40 mg/mL injection 1 mL by Intra artICUlar route once for 1 dose. 1 mL 0    fluticasone propionate (FLONASE) 50 mcg/actuation nasal spray USE 2 SPRAYS IN EACH NOSTRIL DAILY 32 g 0    rosuvastatin (CRESTOR) 10 mg tablet TAKE 1 TABLET NIGHTLY 90 Tab 1    lisinopril (PRINIVIL, ZESTRIL) 10 mg tablet TAKE 1 TABLET DAILY 90 Tab 0    gabapentin (NEURONTIN) 300 mg capsule TAKE 1 CAPSULE IN THE MORNING AND 2 CAPSULES IN THE EVENING AS DIRECTED 270 Cap 0    atenolol (TENORMIN) 50 mg tablet TAKE 1 TABLET DAILY 90 Tab 3    diclofenac EC (VOLTAREN) 75 mg EC tablet TAKE 1 TABLET TWICE A DAY AS NEEDED FOR PAIN WITH FOOD FOR OSTEOARTHRITIS 180 Tab 1    doxycycline (ADOXA) 100 mg tablet TAKE 1 TABLET EVERY TUESDAY, THURSDAY AND SATURDAY 40 Tab 2    cetirizine (ZYRTEC) 10 mg tablet Take 1 Tab by mouth daily as needed for Allergies.  90 Tab 1    rizatriptan (MAXALT-MLT) 10 mg disintegrating tablet PLACE 1 TABLET ON TONGUE ONCE AS NEEDED FOR MIGRAINE 30 Tab 0    ferrous sulfate 325 mg (65 mg iron) tablet Take  by mouth every seven (7) days.  potassium 99 mg tablet Take 99 mg by mouth every seven (7) days.  vitamin E (AQUA GEMS) 400 unit capsule Take  by mouth daily.  ascorbic acid, vitamin C, (VITAMIN C) 500 mg tablet Take 1,000 mg by mouth two (2) times a day.  calcium citrate-vitamin D3 (CITRACAL + D) tablet Take  by mouth two (2) times a day.  multivitamin (ONE A DAY) tablet Take 1 Tab by mouth daily.  cholecalciferol, VITAMIN D3, (VITAMIN D3) 5,000 unit tab tablet Take  by mouth daily.  aspirin 81 mg chewable tablet Take 81 mg by mouth daily.  B.infantis-B.ani-B.long-B.bifi (PROBIOTIC 4X) 10-15 mg TbEC Take  by mouth.          Allergies   Allergen Reactions    Penicillin G Hives       Past Surgical History:   Procedure Laterality Date    FOOT/TOES SURGERY PROC UNLISTED      x6    HX HYSTERECTOMY      due to heavy bleeding    HX KNEE ARTHROSCOPY      HX LUMBAR LAMINECTOMY  01/2011    L5/S1 Lami       Social History     Socioeconomic History    Marital status:      Spouse name: Not on file    Number of children: Not on file    Years of education: Not on file    Highest education level: Not on file   Occupational History    Not on file   Social Needs    Financial resource strain: Not on file    Food insecurity:     Worry: Not on file     Inability: Not on file    Transportation needs:     Medical: Not on file     Non-medical: Not on file   Tobacco Use    Smoking status: Never Smoker    Smokeless tobacco: Never Used   Substance and Sexual Activity    Alcohol use: No    Drug use: No    Sexual activity: Yes     Partners: Male   Lifestyle    Physical activity:     Days per week: Not on file     Minutes per session: Not on file    Stress: Not on file   Relationships    Social connections:     Talks on phone: Not on file     Gets together: Not on file     Attends Judaism service: Not on file     Active member of club or organization: Not on file     Attends meetings of clubs or organizations: Not on file     Relationship status: Not on file    Intimate partner violence:     Fear of current or ex partner: Not on file     Emotionally abused: Not on file     Physically abused: Not on file     Forced sexual activity: Not on file   Other Topics Concern     Service No    Blood Transfusions Yes    Caffeine Concern No    Occupational Exposure No    Hobby Hazards No    Sleep Concern No    Stress Concern No    Weight Concern No    Special Diet No    Back Care No    Exercise Yes    Bike Helmet No    Seat Belt Yes    Self-Exams Yes   Social History Narrative    Not on file       REVIEW OF SYSTEMS:      No changes from previous review of systems unless noted. PHYSICAL EXAMINATION:  Visit Vitals  /83   Pulse 80   Temp 97.3 °F (36.3 °C)   Resp 24   Ht 5' 6\" (1.676 m)   Wt 150 lb (68 kg)   SpO2 100%   BMI 24.21 kg/m²     Body mass index is 24.21 kg/m². GENERAL: Alert and oriented x3, in no acute distress. HEENT: Normocephalic, atraumatic. RESP: Non labored breathing. SKIN: No rashes or lesions noted. PHYSICAL EXAM:  Physical exam of the left knee with mild crepitus with patellofemoral range of motion. Nontender over the medial and lateral joint line. There is a fullness in the posterior knee that is tender to palpation. There is no warmth or erythema. No effusion is appreciated in the knee. She is otherwise neurovascularly intact distally. IMAGING:  An MRI was reviewed. This does show a meniscus tear, but more predominately a very large cystic mass in the posterior aspect of her knee that is superficial.      ASSESSMENT AND PLAN:   Gonzalo Marks is a 59year-old female with likely a large Baker's type cyst in her left knee posteriorly. We discussed treatment options at length. She would like to try an aspiration of it. After injecting it with local anesthetic, approximately 25 cc of serosanguineous fluid was drained.   She tolerated this well with improvement in her symptoms. If it recurs, the next step may be an MRI with contrast to further evaluate the mass, but I think this is likely just a Baker's cyst.  I will see her back as needed.        [facility] - [facility]  OFFICE PROCEDURE PROGRESS NOTE        Chart reviewed for the following:   Alysia Auguste MD, have reviewed the History, Physical and updated the Allergic reactions for Nanette ELISA Luevano     TIME OUT performed immediately prior to start of procedure:   Alysia Auguste MD, have performed the following reviews on Leanne Tripathi prior to the start of the procedure:            * Patient was identified by name and date of birth   * Agreement on procedure being performed was verified  * Risks and Benefits explained to the patient  * Procedure site verified and marked as necessary  * Patient was positioned for comfort  * Consent was signed and verified    Time: 1000      Date of procedure: 5/29/2019    Procedure performed by:  Siva Garcia MD    Provider assisted by: Home Hickey LPN    Patient assisted by: self    How tolerated by patient: tolerated the procedure well with no complications    Post Procedural Pain Scale: 0 - No Hurt    Comments: none                  Electronically signed by: Siva Garcia MD

## 2019-06-23 RX ORDER — CETIRIZINE HCL 10 MG
TABLET ORAL
Qty: 90 TAB | Refills: 1 | Status: SHIPPED | OUTPATIENT
Start: 2019-06-23 | End: 2019-12-22

## 2019-07-02 ENCOUNTER — OFFICE VISIT (OUTPATIENT)
Dept: FAMILY MEDICINE CLINIC | Age: 64
End: 2019-07-02

## 2019-07-02 ENCOUNTER — HOSPITAL ENCOUNTER (OUTPATIENT)
Dept: LAB | Age: 64
Discharge: HOME | End: 2019-07-02
Payer: OTHER GOVERNMENT

## 2019-07-02 VITALS
SYSTOLIC BLOOD PRESSURE: 146 MMHG | WEIGHT: 149 LBS | BODY MASS INDEX: 23.95 KG/M2 | DIASTOLIC BLOOD PRESSURE: 88 MMHG | RESPIRATION RATE: 16 BRPM | TEMPERATURE: 97.6 F | HEIGHT: 66 IN | OXYGEN SATURATION: 99 % | HEART RATE: 80 BPM

## 2019-07-02 DIAGNOSIS — M25.562 CHRONIC PAIN OF LEFT KNEE: ICD-10-CM

## 2019-07-02 DIAGNOSIS — I10 ESSENTIAL HYPERTENSION: ICD-10-CM

## 2019-07-02 DIAGNOSIS — G89.29 CHRONIC PAIN OF LEFT KNEE: ICD-10-CM

## 2019-07-02 DIAGNOSIS — E78.5 DYSLIPIDEMIA: ICD-10-CM

## 2019-07-02 DIAGNOSIS — M54.40 CHRONIC MIDLINE LOW BACK PAIN WITH SCIATICA, SCIATICA LATERALITY UNSPECIFIED: ICD-10-CM

## 2019-07-02 DIAGNOSIS — G89.29 CHRONIC MIDLINE LOW BACK PAIN WITH SCIATICA, SCIATICA LATERALITY UNSPECIFIED: ICD-10-CM

## 2019-07-02 DIAGNOSIS — G62.9 NEUROPATHY: ICD-10-CM

## 2019-07-02 DIAGNOSIS — Z12.39 SCREENING FOR BREAST CANCER: ICD-10-CM

## 2019-07-02 DIAGNOSIS — M71.22 POPLITEAL CYST, LEFT: Primary | ICD-10-CM

## 2019-07-02 LAB
ALBUMIN SERPL-MCNC: 3.9 G/DL (ref 3.4–5)
ALBUMIN/GLOB SERPL: 1.2 {RATIO} (ref 0.8–1.7)
ALP SERPL-CCNC: 96 U/L (ref 45–117)
ALT SERPL-CCNC: 28 U/L (ref 13–56)
ANION GAP SERPL CALC-SCNC: 7 MMOL/L (ref 3–18)
AST SERPL-CCNC: 27 U/L (ref 15–37)
BASOPHILS # BLD: 0 K/UL (ref 0–0.1)
BASOPHILS NFR BLD: 1 % (ref 0–2)
BILIRUB SERPL-MCNC: 0.4 MG/DL (ref 0.2–1)
BUN SERPL-MCNC: 18 MG/DL (ref 7–18)
BUN/CREAT SERPL: 28 (ref 12–20)
CALCIUM SERPL-MCNC: 8.8 MG/DL (ref 8.5–10.1)
CHLORIDE SERPL-SCNC: 106 MMOL/L (ref 100–108)
CHOLEST SERPL-MCNC: 261 MG/DL
CO2 SERPL-SCNC: 27 MMOL/L (ref 21–32)
CREAT SERPL-MCNC: 0.65 MG/DL (ref 0.6–1.3)
DIFFERENTIAL METHOD BLD: ABNORMAL
EOSINOPHIL # BLD: 0.3 K/UL (ref 0–0.4)
EOSINOPHIL NFR BLD: 4 % (ref 0–5)
ERYTHROCYTE [DISTWIDTH] IN BLOOD BY AUTOMATED COUNT: 14.5 % (ref 11.6–14.5)
GLOBULIN SER CALC-MCNC: 3.3 G/DL (ref 2–4)
GLUCOSE SERPL-MCNC: 83 MG/DL (ref 74–99)
HCT VFR BLD AUTO: 41.2 % (ref 35–45)
HDLC SERPL-MCNC: 95 MG/DL (ref 40–60)
HDLC SERPL: 2.7 {RATIO} (ref 0–5)
HGB BLD-MCNC: 12.6 G/DL (ref 12–16)
LDLC SERPL CALC-MCNC: 153.6 MG/DL (ref 0–100)
LIPID PROFILE,FLP: ABNORMAL
LYMPHOCYTES # BLD: 1.9 K/UL (ref 0.9–3.6)
LYMPHOCYTES NFR BLD: 33 % (ref 21–52)
MCH RBC QN AUTO: 26.9 PG (ref 24–34)
MCHC RBC AUTO-ENTMCNC: 30.6 G/DL (ref 31–37)
MCV RBC AUTO: 88 FL (ref 74–97)
MONOCYTES # BLD: 0.4 K/UL (ref 0.05–1.2)
MONOCYTES NFR BLD: 8 % (ref 3–10)
NEUTS SEG # BLD: 3.2 K/UL (ref 1.8–8)
NEUTS SEG NFR BLD: 54 % (ref 40–73)
PLATELET # BLD AUTO: 301 K/UL (ref 135–420)
PMV BLD AUTO: 9.8 FL (ref 9.2–11.8)
POTASSIUM SERPL-SCNC: 4.6 MMOL/L (ref 3.5–5.5)
PROT SERPL-MCNC: 7.2 G/DL (ref 6.4–8.2)
RBC # BLD AUTO: 4.68 M/UL (ref 4.2–5.3)
SODIUM SERPL-SCNC: 140 MMOL/L (ref 136–145)
TRIGL SERPL-MCNC: 62 MG/DL (ref ?–150)
VLDLC SERPL CALC-MCNC: 12.4 MG/DL
WBC # BLD AUTO: 5.8 K/UL (ref 4.6–13.2)

## 2019-07-02 PROCEDURE — 80061 LIPID PANEL: CPT

## 2019-07-02 PROCEDURE — 80053 COMPREHEN METABOLIC PANEL: CPT

## 2019-07-02 PROCEDURE — 85025 COMPLETE CBC W/AUTO DIFF WBC: CPT

## 2019-07-02 RX ORDER — VITS A,C,E/LUTEIN/MINERALS 300MCG-200
TABLET ORAL
COMMUNITY
End: 2019-10-28

## 2019-07-02 NOTE — PROGRESS NOTES
Chief Complaint   Patient presents with    Hypertension     follow up     Knee Pain     1. Have you been to the ER, urgent care clinic since your last visit? Hospitalized since your last visit? No    2. Have you seen or consulted any other health care providers outside of the [address] [place] [address] since your last visit? Include any pap smears or colon screening. No     HPI  Royann Levels comes in for f/u care. Knee pain: Patient has left popliteal cyst.  Has been seen by the orthopedic physician. The popliteal cyst did burst open and the cystic fluid did infiltrate her left leg resulting in swelling and pain. Later on she did have drainage of some of the fluid in the cyst.  Swelling is coming back. She continues to have pain and discomfort in the left leg. She is on Voltaren as an anti-inflammatory. She wonders about different medications. She has been given Flexeril as a muscle relaxant. She does have some stiffness especially in flexion and extension of the left leg at the knee. She will call the orthopedist to inquire on what other medications to take or if she needs to get further drainage of the cyst given it is swelling up again. In the meantime she should continue with the anti-inflammatory up to twice a day as needed. HTN:BP is elevated, she is on lisinopril 10 mg daily. States that at home her blood pressure has been stable. She denies headache, changes in vision or focal weakness. She will keep a blood pressure log and follow-up at next visit in 6 weeks. If still elevated then we will go up on the lisinopril to 20 mg daily. She also takes atenolol 50 mg daily. We will check labs today. Back pain: Patient has history of chronic low back pain and is followed up in the spine clinic. She is on voltaren, flexeril and gabapentin. The gabapentin at times makes her feel uncomfortable. She thus is taking it twice a day as opposed to 3 times a day.   Dyslipidemia: Patient has a history of dyslipidemia. We will check labs today. She is unable to tolerate statin medications. Health maintenance: Patient would like to have mammogram done. She specifically requests for a 3D mammogram.  Request will be made. Neuropathy: Patient has neuropathy from her back pain. She is on gabapentin. Encouraged to continue with this as prescribed. It does help with her symptoms. Past Medical History  Past Medical History:   Diagnosis Date    Arthritis     Hx of migraines        Surgical History  Past Surgical History:   Procedure Laterality Date    FOOT/TOES SURGERY PROC UNLISTED      x6    HX HYSTERECTOMY      due to heavy bleeding    HX KNEE ARTHROSCOPY      HX LUMBAR LAMINECTOMY  01/2011    L5/S1 [place]        Medications  Current Outpatient Medications   Medication Sig Dispense Refill    magnesium oxide 200 mg magnesium tab Take  by mouth.  cetirizine (ZYRTEC) 10 mg tablet TAKE 1 TABLET DAILY AS NEEDED FOR ALLERGIES 90 Tab 1    cyclobenzaprine (FLEXERIL) 10 mg tablet Take 1 Tab by mouth three (3) times daily as needed for Muscle Spasm(s). 90 Tab 2    lisinopril (PRINIVIL, ZESTRIL) 10 mg tablet TAKE 1 TABLET DAILY 90 Tab 0    gabapentin (NEURONTIN) 300 mg capsule TAKE 1 CAPSULE IN THE MORNING AND 2 CAPSULES IN THE EVENING AS DIRECTED 270 Cap 0    atenolol (TENORMIN) 50 mg tablet TAKE 1 TABLET DAILY 90 Tab 3    diclofenac EC (VOLTAREN) 75 mg EC tablet TAKE 1 TABLET TWICE A DAY AS NEEDED FOR PAIN WITH FOOD FOR OSTEOARTHRITIS 180 Tab 1    doxycycline (ADOXA) 100 mg tablet TAKE 1 TABLET EVERY TUESDAY, THURSDAY AND SATURDAY 40 Tab 2    ferrous sulfate 325 mg (65 mg iron) tablet Take  by mouth every seven (7) days.  potassium 99 mg tablet Take 99 mg by mouth every seven (7) days.  vitamin E (AQUA GEMS) 400 unit capsule Take  by mouth daily.  ascorbic acid, vitamin C, (VITAMIN C) 500 mg tablet Take 1,000 mg by mouth two (2) times a day.       calcium citrate-vitamin D3 (CITRACAL + D) tablet Take  by mouth two (2) times a day.  multivitamin (ONE A DAY) tablet Take 1 Tab by mouth daily.  cholecalciferol, VITAMIN D3, (VITAMIN D3) 5,000 unit tab tablet Take  by mouth daily.  aspirin 81 mg chewable tablet Take 81 mg by mouth daily.  B.infantis-B.ani-B.long-B.bifi (PROBIOTIC 4X) 10-15 mg TbEC Take  by mouth.       fluticasone propionate (FLONASE) 50 mcg/actuation nasal spray USE 2 SPRAYS IN EACH NOSTRIL DAILY 32 g 0    rosuvastatin (CRESTOR) 10 mg tablet TAKE 1 TABLET NIGHTLY 90 Tab 1    rizatriptan (MAXALT-MLT) 10 mg disintegrating tablet PLACE 1 TABLET ON TONGUE ONCE AS NEEDED FOR MIGRAINE 30 Tab 0       Allergies  Allergies   Allergen Reactions    Penicillin G Hives       Family History  Family History   Problem Relation Age of Onset    Heart Attack Mother     Stroke Mother     Other Mother         CHF    Alcohol abuse Father     Other Father         CHF    Liver Disease Father        Social History  Social History     Socioeconomic History    Marital status:      Spouse name: Not on file    Number of children: Not on file    Years of education: Not on file    Highest education level: Not on file   Occupational History    Not on file   Social Needs    Financial resource strain: Not on file    Food insecurity:     Worry: Not on file     Inability: Not on file    Transportation needs:     Medical: Not on file     Non-medical: Not on file   Tobacco Use    Smoking status: Never Smoker    Smokeless tobacco: Never Used   Substance and Sexual Activity    Alcohol use: No    Drug use: No    Sexual activity: Yes     Partners: Male   Lifestyle    Physical activity:     Days per week: Not on file     Minutes per session: Not on file    Stress: Not on file   Relationships    Social connections:     Talks on phone: Not on file     Gets together: Not on file     Attends Religion service: Not on file     Active member of club or organization: Not on file Attends meetings of clubs or organizations: Not on file     Relationship status: Not on file    Intimate partner violence:     Fear of current or ex partner: Not on file     Emotionally abused: Not on file     Physically abused: Not on file     Forced sexual activity: Not on file   Other Topics Concern     Service No    Blood Transfusions Yes    Caffeine Concern No    Occupational Exposure No    Hobby Hazards No    Sleep Concern No    Stress Concern No    Weight Concern No    Special Diet No    Back Care No    Exercise Yes    Bike Helmet No    Seat Belt Yes    Self-Exams Yes   Social History Narrative    Not on file       Review of Systems  Review of Systems - Review of all systems is negative except as noted above in the HPI.     Vital Signs  Visit Vitals  /88 (BP 1 Location: Left arm, BP Patient Position: Sitting)   Pulse 80   Temp 97.6 °F (36.4 °C) (Oral)   Resp 16   Ht 5' 6\" (1.676 m)   Wt 149 lb (67.6 kg)   SpO2 99%   BMI 24.05 kg/m²         Physical Exam  Physical Examination: General appearance - alert, well appearing, and in no distress, oriented to person, place, and time, normal appearing weight, acyanotic, in no respiratory distress and well hydrated  Mental status - alert, oriented to person, place, and time, affect appropriate to mood  Mouth - mucous membranes moist, pharynx normal without lesions  Neck - supple, no significant adenopathy  Lymphatics - no palpable lymphadenopathy, no hepatosplenomegaly  Chest - clear to auscultation, no wheezes, rales or rhonchi, symmetric air entry  Heart - S1 and S2 normal  Abdomen - no rebound tenderness noted  Back exam - limited range of motion, pain with motion noted during exam, tenderness noted midline lumbar spine and paralumbar muscles  Neurological - normal muscle tone, no tremors, strength 5/5  Musculoskeletal -left leg slightly swollen as compared to the right, left knee swelling with mild discomfort to palpation along the knee margins. Popliteal cyst palpated posterior aspect left knee  Extremities - intact peripheral pulses    Results  Results for orders placed or performed during the hospital encounter of 11/06/18   CBC WITH AUTOMATED DIFF   Result Value Ref Range    WBC 4.8 4.6 - 13.2 K/uL    RBC 4.88 4.20 - 5.30 M/uL    HGB 13.5 12.0 - 16.0 g/dL    HCT 43.3 35.0 - 45.0 %    MCV 88.7 74.0 - 97.0 FL    MCH 27.7 24.0 - 34.0 PG    MCHC 31.2 31.0 - 37.0 g/dL    RDW 14.4 11.6 - 14.5 %    PLATELET 229 165 - 500 K/uL    MPV 10.3 9.2 - 11.8 FL    NEUTROPHILS 55 40 - 73 %    LYMPHOCYTES 27 21 - 52 %    MONOCYTES 14 (H) 3 - 10 %    EOSINOPHILS 3 0 - 5 %    BASOPHILS 1 0 - 2 %    ABS. NEUTROPHILS 2.7 1.8 - 8.0 K/UL    ABS. LYMPHOCYTES 1.3 0.9 - 3.6 K/UL    ABS. MONOCYTES 0.7 0.05 - 1.2 K/UL    ABS. EOSINOPHILS 0.1 0.0 - 0.4 K/UL    ABS. BASOPHILS 0.0 0.0 - 0.1 K/UL    DF AUTOMATED     LIPID PANEL   Result Value Ref Range    LIPID PROFILE          Cholesterol, total 169 <200 MG/DL    Triglyceride 46 <150 MG/DL    HDL Cholesterol 80 (H) 40 - 60 MG/DL    LDL, calculated 79.8 0 - 100 MG/DL    VLDL, calculated 9.2 MG/DL    CHOL/HDL Ratio 2.1 0 - 5.0     METABOLIC PANEL, COMPREHENSIVE   Result Value Ref Range    Sodium 142 136 - 145 mmol/L    Potassium 4.8 3.5 - 5.5 mmol/L    Chloride 107 100 - 108 mmol/L    CO2 29 21 - 32 mmol/L    Anion gap 6 3.0 - 18 mmol/L    Glucose 93 74 - 99 mg/dL    BUN 16 7.0 - 18 MG/DL    Creatinine 0.56 (L) 0.6 - 1.3 MG/DL    BUN/Creatinine ratio 29 (H) 12 - 20      GFR est AA >60 >60 ml/min/1.73m2    GFR est non-AA >60 >60 ml/min/1.73m2    Calcium 9.2 8.5 - 10.1 MG/DL    Bilirubin, total 0.3 0.2 - 1.0 MG/DL    ALT (SGPT) 49 13 - 56 U/L    AST (SGOT) 31 15 - 37 U/L    Alk. phosphatase 101 45 - 117 U/L    Protein, total 7.3 6.4 - 8.2 g/dL    Albumin 4.0 3.4 - 5.0 g/dL    Globulin 3.3 2.0 - 4.0 g/dL    A-G Ratio 1.2 0.8 - 1.7         ASSESSMENT and PLAN    ICD-10-CM ICD-9-CM    1. Popliteal cyst, left M71.22 727.51    2. Chronic pain of left knee M25.562 719.46     G89.29 338.29    3. Essential hypertension I10 401.9 CBC WITH AUTOMATED DIFF      METABOLIC PANEL, COMPREHENSIVE      COLLECTION VENOUS BLOOD,VENIPUNCTURE   4. Dyslipidemia E78.5 272.4 LIPID PANEL      COLLECTION VENOUS BLOOD,VENIPUNCTURE   5. Screening for breast cancer Z12.31 V76.10 [facility] 3D MY W MAMMO BI SCREENING INCL CAD   6. Neuropathy G62.9 355.9    7. Chronic midline low back pain with sciatica, sciatica laterality unspecified M54.40 724.2     G89.29 724.3      338.29      lab results and schedule of future lab studies reviewed with patient  reviewed diet, exercise and weight control  cardiovascular risk and specific lipid/LDL goals reviewed  reviewed medications and side effects in detail  radiology results and schedule of future radiology studies reviewed with patient    I have discussed the diagnosis with the patient and the intended plan of care as seen in the above orders. The patient has received an after-visit summary and questions were answered concerning future plans. I have discussed medication, side effects, and warnings with the patient in detail. The patient verbalized understanding and is in agreement with the plan of care. The patient will contact the office with any additional concerns. Cindy Bonilla MD    PLEASE NOTE:   This document has been produced using voice recognition software.  Unrecognized errors in transcription may be present

## 2019-07-02 NOTE — PROGRESS NOTES
Venipuncture to left forearm at this time. Patient tolerated well at this time. Labs ordered by PCP.  No other concerns noted

## 2019-07-03 NOTE — PROGRESS NOTES
Please let patient know her LDL cholesterol is elevated at 153. She is on Crestor 10 mg daily. She should intensify lifestyle and dietary modification. I would recommend going up to 20 mg of the Crestor. She should exercise and take a diet low and polysaturated fats. If she is willing I will send in a prescription of the higher dose of the Crestor. Rest of her labs are stable. Recheck lipid panel in 3 months.   Flora Vergara MD

## 2019-07-08 DIAGNOSIS — I10 ESSENTIAL HYPERTENSION: ICD-10-CM

## 2019-07-08 RX ORDER — FLUTICASONE PROPIONATE 50 MCG
SPRAY, SUSPENSION (ML) NASAL
Qty: 32 G | Refills: 0 | Status: SHIPPED | OUTPATIENT
Start: 2019-07-08 | End: 2022-06-22

## 2019-07-08 RX ORDER — LISINOPRIL 10 MG/1
TABLET ORAL
Qty: 90 TAB | Refills: 0 | Status: SHIPPED | OUTPATIENT
Start: 2019-07-08 | End: 2019-10-07 | Stop reason: ALTCHOICE

## 2019-07-17 NOTE — PROGRESS NOTES
Spoke with patient at this time regarding results. Patient states she do not agree to going up on medication cause she has not been taking medication at this time. Patient states she will start on medication

## 2019-07-26 ENCOUNTER — HOSPITAL ENCOUNTER (OUTPATIENT)
Dept: MAMMOGRAPHY | Age: 64
Discharge: HOME | End: 2019-07-26
Attending: FAMILY MEDICINE
Payer: OTHER GOVERNMENT

## 2019-07-26 DIAGNOSIS — Z12.39 SCREENING FOR BREAST CANCER: ICD-10-CM

## 2019-07-26 PROCEDURE — 77063 BREAST TOMOSYNTHESIS BI: CPT

## 2019-08-29 ENCOUNTER — OFFICE VISIT (OUTPATIENT)
Dept: ORTHOPEDIC SURGERY | Age: 64
End: 2019-08-29

## 2019-08-29 VITALS
HEIGHT: 66 IN | OXYGEN SATURATION: 98 % | RESPIRATION RATE: 18 BRPM | SYSTOLIC BLOOD PRESSURE: 132 MMHG | DIASTOLIC BLOOD PRESSURE: 86 MMHG | HEART RATE: 71 BPM | WEIGHT: 155 LBS | BODY MASS INDEX: 24.91 KG/M2

## 2019-08-29 DIAGNOSIS — M25.562 CHRONIC PAIN OF LEFT KNEE: ICD-10-CM

## 2019-08-29 DIAGNOSIS — M54.16 LUMBAR RADICULOPATHY: ICD-10-CM

## 2019-08-29 DIAGNOSIS — M71.38 SYNOVIAL CYST OF LUMBAR FACET JOINT: ICD-10-CM

## 2019-08-29 DIAGNOSIS — M47.816 LUMBAR FACET ARTHROPATHY: Primary | ICD-10-CM

## 2019-08-29 DIAGNOSIS — G89.29 CHRONIC PAIN OF LEFT KNEE: ICD-10-CM

## 2019-08-29 NOTE — PATIENT INSTRUCTIONS
Low Back Arthritis: Exercises  Introduction  Here are some examples of typical rehabilitation exercises for your condition. Start each exercise slowly. Ease off the exercise if you start to have pain. Your doctor or physical therapist will tell you when you can start these exercises and which ones will work best for you. When you are not being active, find a comfortable position for rest. Some people are comfortable on the floor or a medium-firm bed with a small pillow under their head and another under their knees. Some people prefer to lie on their side with a pillow between their knees. Don't stay in one position for too long. Take short walks (10 to 20 minutes) every 2 to 3 hours. Avoid slopes, hills, and stairs until you feel better. Walk only distances you can manage without pain, especially leg pain. How to do the exercises  Pelvic tilt    1. Lie on your back with your knees bent. 2. \"Brace\" your stomach--tighten your muscles by pulling in and imagining your belly button moving toward your spine. 3. Press your lower back into the floor. You should feel your hips and pelvis rock back. 4. Hold for 6 seconds while breathing smoothly. 5. Relax and allow your pelvis and hips to rock forward. 6. Repeat 8 to 12 times. Back stretches    1. Get down on your hands and knees on the floor. 2. Relax your head and allow it to droop. Round your back up toward the ceiling until you feel a nice stretch in your upper, middle, and lower back. Hold this stretch for as long as it feels comfortable, or about 15 to 30 seconds. 3. Return to the starting position with a flat back while you are on your hands and knees. 4. Let your back sway by pressing your stomach toward the floor. Lift your buttocks toward the ceiling. 5. Hold this position for 15 to 30 seconds. 6. Repeat 2 to 4 times. Follow-up care is a key part of your treatment and safety.  Be sure to make and go to all appointments, and call your doctor if you are having problems. It's also a good idea to know your test results and keep a list of the medicines you take. Where can you learn more? Go to http://kendrick-emerald.info/. Enter B291 in the search box to learn more about \"Low Back Arthritis: Exercises. \"  Current as of: September 20, 2018  Content Version: 12.1  © 9788-1874 [employer]. Care instructions adapted under license by [employer] (which disclaims liability or warranty for this information). If you have questions about a medical condition or this instruction, always ask your healthcare professional. [place] [address] any warranty or liability for your use of this information.

## 2019-08-29 NOTE — PROGRESS NOTES
[facility]  [address] [place], [facility] [address]  Phone: [phone]  Fax: [phone]    Pt's YOB: 1955    ASSESSMENT   Diagnoses and all orders for this visit:    1. Lumbar facet arthropathy  -     MRI LUMB SPINE W WO CONT; Future    2. Lumbar radiculopathy  -     MRI LUMB SPINE W WO CONT; Future    3. Synovial cyst of lumbar facet joint  -     MRI LUMB SPINE W WO CONT; Future    4. Chronic pain of left knee         IMPRESSION AND PLAN:  Erick Vo is a 59 y.o. female with history of lumbar pain. She complains of progressive pain in the lower back that radiates down the lateral aspect of the right leg to the ankle. Pt takes Neurontin 300 mg 1 tab QAM and 2 tabs QHS, Voltaren 75 mg 1 tab BID, and Flexeril 10 mg 1 tab TID. She notes excruciating pain when she misses a dose of Voltaren. 1) Pt was given information on lumbar arthritis exercises. 2) A lumbar MRI was ordered. She has progressive lumbar pain with right radicular symptoms, had previous physical therapy, has difficulty standing/walking, and is on NSAID's.   3) Ms. Leilani Sicard has a reminder for a \"due or due soon\" health maintenance. I have asked that she contact her primary care provider, Travis Kelly MD, for follow-up on this health maintenance. 4)  demonstrated consistency with prescribing. 5) Pt will return sooner if needed -- her  will be getting a defibrillator next month  Follow-up and Dispositions    · Return in about 2 months (around 10/29/2019) for Diagnostic Test follow up. HISTORY OF PRESENT ILLNESS:  Erick Vo is a 59 y.o. female with history of lumbar pain and presents to the office today for follow up. Pt complains of progressive pain in the lower back that radiates down the lateral aspect of the right leg to the ankle. She notes pain when bending forward and difficulty standing/walking. Pt denies any groin pain at this time.  She followed up with Dr. Yordy Enriquez and notes that he drained a Baker's cyst on the left. Pt has been prescribed Neurontin 300 mg and takes 1 tab QAM and 2 tabs QHS. She continues to take Voltaren 75 mg 1 tab BID and takes Flexeril 10 mg 1 tab TID. Pt notes excruciating pain when she misses a dose of Voltaren. Pt at this time desires to proceed with a lumbar MRI. Pt notes that her  had a major myocardial infarction since her last office visit and will have a defibrillator implanted on 09/10/2019. She reports that her was also recently diagnosed with Non-hodgkin's lymphoma.      Pain Scale: /10    PCP: Fidelina Lopez MD     Past Medical History:   Diagnosis Date    Arthritis     Hx of migraines         Social History     Socioeconomic History    Marital status:      Spouse name: Not on file    Number of children: Not on file    Years of education: Not on file    Highest education level: Not on file   Occupational History    Not on file   Social Needs    Financial resource strain: Not on file    Food insecurity:     Worry: Not on file     Inability: Not on file    Transportation needs:     Medical: Not on file     Non-medical: Not on file   Tobacco Use    Smoking status: Never Smoker    Smokeless tobacco: Never Used   Substance and Sexual Activity    Alcohol use: No    Drug use: No    Sexual activity: Yes     Partners: Male   Lifestyle    Physical activity:     Days per week: Not on file     Minutes per session: Not on file    Stress: Not on file   Relationships    Social connections:     Talks on phone: Not on file     Gets together: Not on file     Attends Religion service: Not on file     Active member of club or organization: Not on file     Attends meetings of clubs or organizations: Not on file     Relationship status: Not on file    Intimate partner violence:     Fear of current or ex partner: Not on file     Emotionally abused: Not on file     Physically abused: Not on file Forced sexual activity: Not on file   Other Topics Concern     Service No    Blood Transfusions Yes    Caffeine Concern No    Occupational Exposure No    Hobby Hazards No    Sleep Concern No    Stress Concern No    Weight Concern No    Special Diet No    Back Care No    Exercise Yes    Bike Helmet No    Seat Belt Yes    Self-Exams Yes   Social History Narrative    Not on file       Current Outpatient Medications   Medication Sig Dispense Refill    fluticasone propionate (FLONASE) 50 mcg/actuation nasal spray USE 2 SPRAYS IN EACH NOSTRIL DAILY 32 g 0    lisinopril (PRINIVIL, ZESTRIL) 10 mg tablet TAKE 1 TABLET DAILY 90 Tab 0    magnesium oxide 200 mg magnesium tab Take  by mouth.  cetirizine (ZYRTEC) 10 mg tablet TAKE 1 TABLET DAILY AS NEEDED FOR ALLERGIES 90 Tab 1    cyclobenzaprine (FLEXERIL) 10 mg tablet Take 1 Tab by mouth three (3) times daily as needed for Muscle Spasm(s). 90 Tab 2    rosuvastatin (CRESTOR) 10 mg tablet TAKE 1 TABLET NIGHTLY 90 Tab 1    gabapentin (NEURONTIN) 300 mg capsule TAKE 1 CAPSULE IN THE MORNING AND 2 CAPSULES IN THE EVENING AS DIRECTED 270 Cap 0    atenolol (TENORMIN) 50 mg tablet TAKE 1 TABLET DAILY 90 Tab 3    diclofenac EC (VOLTAREN) 75 mg EC tablet TAKE 1 TABLET TWICE A DAY AS NEEDED FOR PAIN WITH FOOD FOR OSTEOARTHRITIS 180 Tab 1    doxycycline (ADOXA) 100 mg tablet TAKE 1 TABLET EVERY TUESDAY, THURSDAY AND SATURDAY 40 Tab 2    rizatriptan (MAXALT-MLT) 10 mg disintegrating tablet PLACE 1 TABLET ON TONGUE ONCE AS NEEDED FOR MIGRAINE 30 Tab 0    ferrous sulfate 325 mg (65 mg iron) tablet Take  by mouth every seven (7) days.  potassium 99 mg tablet Take 99 mg by mouth every seven (7) days.  vitamin E (AQUA GEMS) 400 unit capsule Take  by mouth daily.  ascorbic acid, vitamin C, (VITAMIN C) 500 mg tablet Take 1,000 mg by mouth two (2) times a day.       calcium citrate-vitamin D3 (CITRACAL + D) tablet Take  by mouth two (2) times a day.      multivitamin (ONE A DAY) tablet Take 1 Tab by mouth daily.  cholecalciferol, VITAMIN D3, (VITAMIN D3) 5,000 unit tab tablet Take  by mouth daily.  aspirin 81 mg chewable tablet Take 81 mg by mouth daily.  B.infantis-B.ani-B.long-B.bifi (PROBIOTIC 4X) 10-15 mg TbEC Take  by mouth. Allergies   Allergen Reactions    Penicillin G Hives         REVIEW OF SYSTEMS    Constitutional: Negative for fever, chills, or weight change. Respiratory: Negative for cough or shortness of breath. Cardiovascular: Negative for chest pain or palpitations. Gastrointestinal: Negative for acid reflux, change in bowel habits, or constipation. Genitourinary: Negative for dysuria and flank pain. Musculoskeletal: Positive for lumbar and leg pain. Skin: Negative for rash. Neurological: Negative for headaches, dizziness, or numbness. Endo/Heme/Allergies: Negative for increased bruising. Psychiatric/Behavioral: Negative for difficulty with sleep. PHYSICAL EXAMINATION  Visit Vitals  /86   Pulse 71   Resp 18   Ht 5' 6\" (1.676 m)   Wt 155 lb (70.3 kg)   SpO2 98%   BMI 25.02 kg/m²       Constitutional: Awake, alert, and in no acute distress. Neurological: 1+ symmetrical DTRs in the upper extremities. 1+ symmetrical DTRs in the lower extremities. Sensation to light touch is intact. Negative Bryan's sign bilaterally. Skin: warm, dry, and intact. Musculoskeletal: Tenderness to palpation in the lower lumbar region. Pain with palpation over the greater trochanters, L>R. Moderate pain with extension, axial loading, and forward flexion. Pain with external rotation of her right hip. Negative straight leg raise bilaterally.       Biceps  Triceps Deltoids Wrist Ext Wrist Flex Hand Intrin   Right +4/5 +4/5 +4/5 +4/5 +4/5 +4/5   Left +4/5 +4/5 +4/5 +4/5 +4/5 +4/5      Hip Flex  Quads Hamstrings Ankle DF EHL Ankle PF   Right +4/5 +4/5 +4/5 +4/5 +4/5 +4/5   Left +4/5 +4/5 +4/5 +4/5 +4/5 +4/5 IMAGING:    Lumbar spine MRI from 06/08/2018 was personally reviewed with the patient and demonstrated:   Results from [facility] on 06/08/18   MRI LUMB SPINE W WO CONT    Narrative EXAM:  MRI LUMB SPINE W WO CONT    INDICATION:   Lumbar disc disease with radiculopathy, bilateral sciatica    COMPARISON: None    TECHNIQUE:   MR imaging of the lumbar spine was performed with sagittal T1, T2, STIR;  axial  T1, T2.  Patient received 13 mL Dotarem intravenously. Postcontrast images were  obtained. FINDINGS:  Trace retrolisthesis L5 on S1. Trace anterolisthesis L4 on L5. Vertebral body  heights are maintained. Multilevel disc desiccation and mild disc height loss. No suspicious bone marrow lesions. 6 mm enhancing intrinsic high T1 lesion  posterior L3 vertebral body, most likely osseous hemangioma. The conus medullaris is normal in signal intensity terminating at L1 level. Correlation of sagittal and axial images at the level of the discs demonstrates  the following:    T12/L1: Sagittal images only. Degenerative endplate changes and endplate  Schmorl's nodes. Minimal posterior disc bulge. Patent central canal and neural  foramina. L1/2:  Small superiorly 2 Schmorl's node. Minimal disc bulge. Patent central  canal and neural foramina. Subcentimeter left right foraminal perineural cyst.    L2/3:  Endplate Schmorl's nodes. No disc herniation. Patent central canal and  neural foramina. L3/4:  Mild disc bulge with mild facet and ligamentum hypertrophy. Mild mass  effect on the thecal sac but no significant central stenosis. No foraminal  stenosis. L4/5:  Trace grade 1 anterolisthesis. Mild disc bulge and right  foraminal/extraforaminal mild disc protrusion. Moderate facet and ligamentum  hypertrophy.  6 mm intraspinal fluid signal structure associated with the right  facet compatible with synovial cyst. There is associated impingement of the  right crossing L5 nerve root in the lateral recess (series 5 image 17). Otherwise no central canal stenosis. Mild right foraminal stenosis. L5/S1: Prior left hemilaminectomy. Left subarticular and foraminal disc  protrusion/extrusion. There is posterior displacement of the left crossing S1  nerve root (axial T2 series 5 image 10). No central canal stenosis. No foraminal  stenosis. No incidental abnormality in the partially imaged retroperitoneal structures. 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           Impression IMPRESSION:      1. Multilevel mild degenerative disc and facet joint disease without high-grade  central canal or foraminal stenosis. 2. Right L4/5 facet intraspinal 6 mm synovial cyst impinging the right crossing  L5 nerve root in the lateral recess. 3. Prior L5/S1 left hemilaminectomy. Left L5/S1 subarticular  protrusion/extrusion displacing the left crossing S1 nerve root.        Written by Harshil Tan, as dictated by Brandin Vitale MD.  I, Dr. Brandin Vitale confirm that all documentation is accurate.

## 2019-08-29 NOTE — LETTER
8/29/19 Patient: Sammie Bryan YOB: 1955 Date of Visit: 8/29/2019 Eva Alan MD 
[place] [address]. [place] [address] VIA In Basket Dear Eva Alan MD, Thank you for referring Ms. Nanette Luevano to [facility] [facility] for evaluation. My notes for this consultation are attached. If you have questions, please do not hesitate to call me. I look forward to following your patient along with you. Sincerely, Jesse Tavera MD

## 2019-09-18 ENCOUNTER — TELEPHONE (OUTPATIENT)
Dept: ORTHOPEDIC SURGERY | Age: 64
End: 2019-09-18

## 2019-09-18 NOTE — TELEPHONE ENCOUNTER
Patient called and advised she is in extreme pain. Patient asked if it would be ok to take a 3rd voltaren today. I advised would have nurses review and call her back.        Patient can be reached at: [phone]

## 2019-09-19 NOTE — TELEPHONE ENCOUNTER
Patient called back wishing to speak with Dr. Mary Alice Baca or clinical staff regarding her pain.  Please advise patient at [phone]

## 2019-09-19 NOTE — TELEPHONE ENCOUNTER
Returned call to patient, verified Name/, informed patient of below message per GIACOMO Best. Patient verbalized agreement/understanding. Patient requested sooner appt with Dr. Arnaud Edwards to review MRI. Patient rescheduled appt to 19 at 0750. Patient provided [employer] address and directions. No further action required at this time.

## 2019-09-23 ENCOUNTER — HOSPITAL ENCOUNTER (OUTPATIENT)
Age: 64
Discharge: HOME | End: 2019-09-23
Attending: PHYSICAL MEDICINE & REHABILITATION
Payer: OTHER GOVERNMENT

## 2019-09-23 DIAGNOSIS — M71.38 SYNOVIAL CYST OF LUMBAR FACET JOINT: ICD-10-CM

## 2019-09-23 DIAGNOSIS — M54.16 LUMBAR RADICULOPATHY: ICD-10-CM

## 2019-09-23 DIAGNOSIS — M47.816 LUMBAR FACET ARTHROPATHY: ICD-10-CM

## 2019-09-23 LAB — CREAT UR-MCNC: 0.5 MG/DL (ref 0.6–1.3)

## 2019-09-23 PROCEDURE — 82565 ASSAY OF CREATININE: CPT

## 2019-09-23 PROCEDURE — 74011636320 HC RX REV CODE- 636/320: Performed by: PHYSICAL MEDICINE & REHABILITATION

## 2019-09-23 PROCEDURE — 72158 MRI LUMBAR SPINE W/O & W/DYE: CPT

## 2019-09-23 PROCEDURE — A9575 INJ GADOTERATE MEGLUMI 0.1ML: HCPCS | Performed by: PHYSICAL MEDICINE & REHABILITATION

## 2019-09-23 RX ADMIN — GADOTERATE MEGLUMINE 14 ML: 376.9 INJECTION INTRAVENOUS at 09:00

## 2019-09-25 ENCOUNTER — OFFICE VISIT (OUTPATIENT)
Dept: ORTHOPEDIC SURGERY | Age: 64
End: 2019-09-25

## 2019-09-25 VITALS
WEIGHT: 156 LBS | HEART RATE: 74 BPM | SYSTOLIC BLOOD PRESSURE: 150 MMHG | BODY MASS INDEX: 25.18 KG/M2 | RESPIRATION RATE: 15 BRPM | TEMPERATURE: 97.7 F | DIASTOLIC BLOOD PRESSURE: 96 MMHG

## 2019-09-25 DIAGNOSIS — M47.816 LUMBAR FACET ARTHROPATHY: ICD-10-CM

## 2019-09-25 DIAGNOSIS — M71.22 BAKER'S CYST OF KNEE, LEFT: ICD-10-CM

## 2019-09-25 DIAGNOSIS — M71.38 SYNOVIAL CYST OF LUMBAR FACET JOINT: Primary | ICD-10-CM

## 2019-09-25 DIAGNOSIS — M17.12 PRIMARY OSTEOARTHRITIS OF LEFT KNEE: ICD-10-CM

## 2019-09-25 DIAGNOSIS — M48.061 LUMBAR FORAMINAL STENOSIS: ICD-10-CM

## 2019-09-25 DIAGNOSIS — M54.16 LUMBAR RADICULOPATHY: ICD-10-CM

## 2019-09-25 DIAGNOSIS — M22.42 PATELLOFEMORAL CHONDROSIS OF LEFT KNEE: ICD-10-CM

## 2019-09-25 NOTE — LETTER
9/27/19 Patient: Karmen House YOB: 1955 Date of Visit: 9/25/2019 Kourtney Wills MD 
[place] [address]. [address] [id number] [place] [address] VIA In Basket Dear Kourtney Wills MD, Thank you for referring Ms. Nanette Luevano to [facility] [facility] for evaluation. My notes for this consultation are attached. If you have questions, please do not hesitate to call me. I look forward to following your patient along with you. Sincerely, Marisol Geiger MD

## 2019-09-25 NOTE — PATIENT INSTRUCTIONS
Low Back Arthritis: Exercises  Introduction  Here are some examples of typical rehabilitation exercises for your condition. Start each exercise slowly. Ease off the exercise if you start to have pain. Your doctor or physical therapist will tell you when you can start these exercises and which ones will work best for you. When you are not being active, find a comfortable position for rest. Some people are comfortable on the floor or a medium-firm bed with a small pillow under their head and another under their knees. Some people prefer to lie on their side with a pillow between their knees. Don't stay in one position for too long. Take short walks (10 to 20 minutes) every 2 to 3 hours. Avoid slopes, hills, and stairs until you feel better. Walk only distances you can manage without pain, especially leg pain. How to do the exercises  Pelvic tilt    1. Lie on your back with your knees bent. 2. \"Brace\" your stomach--tighten your muscles by pulling in and imagining your belly button moving toward your spine. 3. Press your lower back into the floor. You should feel your hips and pelvis rock back. 4. Hold for 6 seconds while breathing smoothly. 5. Relax and allow your pelvis and hips to rock forward. 6. Repeat 8 to 12 times. Back stretches    1. Get down on your hands and knees on the floor. 2. Relax your head and allow it to droop. Round your back up toward the ceiling until you feel a nice stretch in your upper, middle, and lower back. Hold this stretch for as long as it feels comfortable, or about 15 to 30 seconds. 3. Return to the starting position with a flat back while you are on your hands and knees. 4. Let your back sway by pressing your stomach toward the floor. Lift your buttocks toward the ceiling. 5. Hold this position for 15 to 30 seconds. 6. Repeat 2 to 4 times. Follow-up care is a key part of your treatment and safety.  Be sure to make and go to all appointments, and call your doctor if you are having problems. It's also a good idea to know your test results and keep a list of the medicines you take. Where can you learn more? Go to http://kendrick-emerald.info/. Enter C617 in the search box to learn more about \"Low Back Arthritis: Exercises. \"  Current as of: June 26, 2019  Content Version: 12.2  © 7975-3103 [employer]. Care instructions adapted under license by [employer] (which disclaims liability or warranty for this information). If you have questions about a medical condition or this instruction, always ask your healthcare professional. [address] any warranty or liability for your use of this information.

## 2019-09-25 NOTE — PROGRESS NOTES
[facility]  [address] [place], [facility] [address]  Phone: [phone]  Fax: [phone]    Pt's YOB: 1955    ASSESSMENT   Diagnoses and all orders for this visit:    1. Synovial cyst of lumbar facet joint  -     REFERRAL TO SPINE SURGERY    2. Patellofemoral chondrosis of left knee  -     REFERRAL TO ORTHOPEDICS    3. Primary osteoarthritis of left knee  -     REFERRAL TO ORTHOPEDICS    4. Baker's cyst of knee, left  -     REFERRAL TO ORTHOPEDICS    5. Lumbar facet arthropathy  -     REFERRAL TO SPINE SURGERY    6. Lumbar radiculopathy  -     REFERRAL TO SPINE SURGERY    7. Lumbar foraminal stenosis  -     REFERRAL TO SPINE SURGERY         IMPRESSION AND PLAN:  Shayy Kaur is a 59 y.o. female with history of lumbar pain. Pt complains of progressive pain in the lower back that radiates down the lateral aspect of the right leg to the ankle. She takes Neurontin 300 mg 1 tab QAM, 1 tab in the afternoon, and 2 tabs QHS, Voltaren 75 mg 1 tab daily, and Flexeril 10 mg as needed. 1) Pt was given information on lumbar arthritis exercises. 2) She was referred to Dr. Liza Honeycutt for surgical evaluation. 3) Pt was referred to Dr. Avis Santana for evaluation of the left knee. 4) She will continue taking Neurontin 300 mg, Voltaren 75 mg, and Flexeril 10 mg as prescribed and does not need refills at this time. 5) Ms. Yola Arvizu has a reminder for a \"due or due soon\" health maintenance. I have asked that she contact her primary care provider, Noemi Bell MD, for follow-up on this health maintenance. 6)  demonstrated consistency with prescribing. Follow-up and Dispositions    · Return in about 4 months (around 1/25/2020) for Medication follow up. HISTORY OF PRESENT ILLNESS:  Lisa Aparicio is a 59 y.o. female with history of lumbar pain and presents to the office today for MRI follow up.  Pt complains of progressive pain in the lower back that radiates down the lateral aspect of the right leg to the ankle. She reports occasional burning pain in the right ankle. Of note, she had previous lumbar surgery in [place], [place]. She has had injections in the past and as her pain has worsened, she would like to pursue a more direct treatment for her pain. She also complains of pain in the left knee and reports occasional excruciating pain in the knee when she tries to extend it upon waking. Pt previously had been seen by Dr. Sean Lowe and treated conservatively. She reports a history of a Baker's cyst which ruptured but her knee pain has worsened and is affecting her on a daily basis. She would like to be evaluated for possible surgical intervention She takes Neurontin 300 mg 1 tab QAM, 1 tab in the afternoon, and 2 tabs QHS, Voltaren 75 mg 1 tab daily, and Flexeril 10 mg as needed. Pt at this time desires to proceed with referral to Dr. Tino Arcos for surgical evaluation of her lumbar and to ortho for evaluation of her knee. .    Pain Scale: 7/10    PCP: Lois Ramirez MD     Past Medical History:   Diagnosis Date    Arthritis     Hx of migraines         Social History     Socioeconomic History    Marital status:      Spouse name: Not on file    Number of children: Not on file    Years of education: Not on file    Highest education level: Not on file   Occupational History    Not on file   Social Needs    Financial resource strain: Not on file    Food insecurity:     Worry: Not on file     Inability: Not on file    Transportation needs:     Medical: Not on file     Non-medical: Not on file   Tobacco Use    Smoking status: Never Smoker    Smokeless tobacco: Never Used   Substance and Sexual Activity    Alcohol use: No    Drug use: No    Sexual activity: Yes     Partners: Male   Lifestyle    Physical activity:     Days per week: Not on file     Minutes per session: Not on file    Stress: Not on file   Relationships    Social connections: Talks on phone: Not on file     Gets together: Not on file     Attends Bahai service: Not on file     Active member of club or organization: Not on file     Attends meetings of clubs or organizations: Not on file     Relationship status: Not on file    Intimate partner violence:     Fear of current or ex partner: Not on file     Emotionally abused: Not on file     Physically abused: Not on file     Forced sexual activity: Not on file   Other Topics Concern     Service No    Blood Transfusions Yes    Caffeine Concern No    Occupational Exposure No    Hobby Hazards No    Sleep Concern No    Stress Concern No    Weight Concern No    Special Diet No    Back Care No    Exercise Yes    Bike Helmet No    Seat Belt Yes    Self-Exams Yes   Social History Narrative    Not on file       Current Outpatient Medications   Medication Sig Dispense Refill    fluticasone propionate (FLONASE) 50 mcg/actuation nasal spray USE 2 SPRAYS IN EACH NOSTRIL DAILY 32 g 0    lisinopril (PRINIVIL, ZESTRIL) 10 mg tablet TAKE 1 TABLET DAILY 90 Tab 0    magnesium oxide 200 mg magnesium tab Take  by mouth.  cetirizine (ZYRTEC) 10 mg tablet TAKE 1 TABLET DAILY AS NEEDED FOR ALLERGIES 90 Tab 1    cyclobenzaprine (FLEXERIL) 10 mg tablet Take 1 Tab by mouth three (3) times daily as needed for Muscle Spasm(s).  90 Tab 2    rosuvastatin (CRESTOR) 10 mg tablet TAKE 1 TABLET NIGHTLY 90 Tab 1    gabapentin (NEURONTIN) 300 mg capsule TAKE 1 CAPSULE IN THE MORNING AND 2 CAPSULES IN THE EVENING AS DIRECTED 270 Cap 0    atenolol (TENORMIN) 50 mg tablet TAKE 1 TABLET DAILY 90 Tab 3    diclofenac EC (VOLTAREN) 75 mg EC tablet TAKE 1 TABLET TWICE A DAY AS NEEDED FOR PAIN WITH FOOD FOR OSTEOARTHRITIS 180 Tab 1    doxycycline (ADOXA) 100 mg tablet TAKE 1 TABLET EVERY TUESDAY, THURSDAY AND SATURDAY 40 Tab 2    rizatriptan (MAXALT-MLT) 10 mg disintegrating tablet PLACE 1 TABLET ON TONGUE ONCE AS NEEDED FOR MIGRAINE 30 Tab 0  ferrous sulfate 325 mg (65 mg iron) tablet Take  by mouth every seven (7) days.  potassium 99 mg tablet Take 99 mg by mouth every seven (7) days.  vitamin E (AQUA GEMS) 400 unit capsule Take  by mouth daily.  ascorbic acid, vitamin C, (VITAMIN C) 500 mg tablet Take 1,000 mg by mouth two (2) times a day.  calcium citrate-vitamin D3 (CITRACAL + D) tablet Take  by mouth two (2) times a day.  multivitamin (ONE A DAY) tablet Take 1 Tab by mouth daily.  cholecalciferol, VITAMIN D3, (VITAMIN D3) 5,000 unit tab tablet Take  by mouth daily.  aspirin 81 mg chewable tablet Take 81 mg by mouth daily.  B.infantis-B.ani-B.long-B.bifi (PROBIOTIC 4X) 10-15 mg TbEC Take  by mouth. Allergies   Allergen Reactions    Penicillin G Hives         REVIEW OF SYSTEMS    Constitutional: Negative for fever, chills, or weight change. Respiratory: Negative for cough or shortness of breath. Cardiovascular: Negative for chest pain or palpitations. Gastrointestinal: Negative for acid reflux, change in bowel habits, or constipation. Genitourinary: Negative for dysuria and flank pain. Musculoskeletal: Positive for lumbar pain. Skin: Negative for rash. Neurological: Negative for headaches, dizziness, or numbness. Endo/Heme/Allergies: Negative for increased bruising. Psychiatric/Behavioral: Negative for difficulty with sleep. PHYSICAL EXAMINATION  Visit Vitals  BP (!) 150/96 (BP 1 Location: Left arm, BP Patient Position: Sitting)   Pulse 74   Temp 97.7 °F (36.5 °C) (Oral)   Resp 15   Wt 156 lb (70.8 kg)   BMI 25.18 kg/m²       Constitutional: Awake, alert, and in no acute distress. Neurological: 1+ symmetrical DTRs in the lower extremities. Sensation to light touch is intact. Skin: warm, dry, and intact. Musculoskeletal: Tenderness to palpation in the lower lumbar region. Moderate pain with extension and axial loading. No pain with internal or external rotation of her hips. Negative straight leg raise bilaterally. Hip Flex  Quads Hamstrings Ankle DF EHL Ankle PF   Right +4/5 +4/5 +4/5 +4/5 +4/5 +4/5   Left +4/5 +4/5 +4/5 +4/5 +4/5 +4/5     IMAGING:    Lumbar spine MRI from 06/08/2018 was personally reviewed with the patient and demonstrated:   Results from [facility] on 06/08/18   MRI LUMB SPINE W WO CONT    Narrative EXAM:  MRI LUMB SPINE W WO CONT    INDICATION:   Lumbar disc disease with radiculopathy, bilateral sciatica    COMPARISON: None    TECHNIQUE:   MR imaging of the lumbar spine was performed with sagittal T1, T2, STIR;  axial  T1, T2.  Patient received 13 mL Dotarem intravenously. Postcontrast images were  obtained. FINDINGS:  Trace retrolisthesis L5 on S1. Trace anterolisthesis L4 on L5. Vertebral body  heights are maintained. Multilevel disc desiccation and mild disc height loss. No suspicious bone marrow lesions. 6 mm enhancing intrinsic high T1 lesion  posterior L3 vertebral body, most likely osseous hemangioma. The conus medullaris is normal in signal intensity terminating at L1 level. Correlation of sagittal and axial images at the level of the discs demonstrates  the following:    T12/L1: Sagittal images only. Degenerative endplate changes and endplate  Schmorl's nodes. Minimal posterior disc bulge. Patent central canal and neural  foramina. L1/2:  Small superiorly 2 Schmorl's node. Minimal disc bulge. Patent central  canal and neural foramina. Subcentimeter left right foraminal perineural cyst.    L2/3:  Endplate Schmorl's nodes. No disc herniation. Patent central canal and  neural foramina. L3/4:  Mild disc bulge with mild facet and ligamentum hypertrophy. Mild mass  effect on the thecal sac but no significant central stenosis. No foraminal  stenosis. L4/5:  Trace grade 1 anterolisthesis. Mild disc bulge and right  foraminal/extraforaminal mild disc protrusion. Moderate facet and ligamentum  hypertrophy.  6 mm intraspinal fluid signal structure associated with the right  facet compatible with synovial cyst. There is associated impingement of the  right crossing L5 nerve root in the lateral recess (series 5 image 17). Otherwise no central canal stenosis. Mild right foraminal stenosis. L5/S1: Prior left hemilaminectomy. Left subarticular and foraminal disc  protrusion/extrusion. There is posterior displacement of the left crossing S1  nerve root (axial T2 series 5 image 10). No central canal stenosis. No foraminal  stenosis. No incidental abnormality in the partially imaged retroperitoneal structures. 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           Impression IMPRESSION:      1. Multilevel mild degenerative disc and facet joint disease without high-grade  central canal or foraminal stenosis. 2. Right L4/5 facet intraspinal 6 mm synovial cyst impinging the right crossing  L5 nerve root in the lateral recess. 3. Prior L5/S1 left hemilaminectomy. Left L5/S1 subarticular  protrusion/extrusion displacing the left crossing S1 nerve root. Left knee MRI from 05/25/2019 was personally reviewed with the patient and demonstrated:  FINDINGS:      MENISCI  Medial meniscus: There is intrasubstance signal within the medial meniscus which  does not definitely extend to an articular surface and does not meet MRI  criteria for a tear. This likely reflects intrasubstance degeneration. No  definite medial meniscal tear. Lateral meniscus: There is maceration of the anterior body-horn junction through  the posterior body root-horn junction of the lateral meniscus, with extrusion of  disc contents into the coronary recesses and adjacent synovitis.     LIGAMENTS  ACL: There is thickening and loss of striations within the ACL, likely  reflecting scarring due to prior partial thickness injury. There are majority of  the ACL fibers appear intact. PCL: Intact  MCL: Intact  LCL complex: There is advanced tendinosis of the popliteus insertion. Otherwise  unremarkable.     EXTENSOR MECHANISM/TENDONS: Mild distal quadriceps and proximal patellar  tendinosis. TT-TG is within normal limits. Moderate degree of edema within the  central superior aspect of Hoffa's fat pad, nonspecific and may be related to  prior surgery. Mild scarring is noted in the inferior aspect of Hoffa's fat pad,  compatible sequela of prior arthroscopic surgery The quadriceps and prefemoral  fat pads are normal in signal and morphology. Mild synovitis at the posterior  margin of Hoffa's fat pad. Medial and lateral patellofemoral retinacula are  intact.     JOINT: There is a moderate size joint effusion with synovitis. There is a 13 mm  body in the popliteus sheath (series 8 image 13). There is an 8 mm  intra-articular body at the posterior joint line, located superior to the PCL  (series 7 image 1:15). Scarring is noted in the Hoffa's     CARTILAGE:  Patellofemoral: There is a focal 3 mm transverse grade 2 fissure in the median  ridge of the patella. The patellofemoral cartilage is otherwise preserved. Medial Compartment: No deep chondral defect. Lateral Compartment: Grade 4 chondrosis within the peripheral third of the  central weightbearing lateral femoral condyle and subjacent lateral tibial  plateau.     OSSEOUS STRUCTURES: Tricompartmental osteophytes, most pronounced in the lateral  tibiofemoral compartment. No evidence of acute fracture or diffuse marrow  infiltrative process. No osteonecrosis.     MUSCULATURE: Unremarkable     SOFT TISSUES/OTHER: There is a large Baker's cyst. There are 2 heterogeneous T2  hyperintense collections at the posterior medial aspect of the knee.  One  collection is clearly located between the medial head gastrocnemius and  semimembranosus tendons, measuring 9 x 1.6 cm CC X TV and compatible with a  Baker's cyst. There is a additional heterogeneous T2 hyperintense collection  measuring 9 x 4 cm CC X TV which appears to demonstrate a tail communicating  with the first collection (series 7 image 173), likely a portion of the Baker's  cyst. There is a moderate degree of edema along the superficial margins of the  gastrocnemius muscle likely reflecting leakage of the cyst contents. This signal  abnormality extends inferior to the field of view and is incompletely evaluated. Multiple subcutaneous tissues varicose veins are present. Neurovascular  structures are otherwise unremarkable in appearance.     IMPRESSION  1.  Large ruptured Baker's cyst. A heterogeneous T2 hyperintense structure at  the posterior aspect of the knee, located posterior to the medial head of the  gastrocnemius and with a tail that likely communicates with the Baker's cyst,  favored to reflect extension of the Baker's cyst. Given the unlikely possibility  that this reflects a neoplastic process, given the noncontrast enhanced nature  of the study, continued clinical follow-up is recommended. 2.  Maceration of the lateral meniscus. Intrasubstance signal within the medial  meniscus does not meet MRI criteria for tear and is compatible with mucoid  degeneration. 3.  Prior partial thickness ACL injury. Advanced popliteus insertional  tendinosis. Otherwise intact PCL, MCL, and LCL. 4.  Tricompartmental chondrosis, including grade 4 chondrosis in the lateral  tibiofemoral compartment, focal grade 2 fissure in the median ridge of the  patella, and intra-articular bodies in the popliteus sheath and posterior knee  joint, as above. Written by Tamra Chan, as dictated by Corrine Campo MD.  I, Dr. Corrine Campo confirm that all documentation is accurate.

## 2019-10-01 ENCOUNTER — OFFICE VISIT (OUTPATIENT)
Dept: ORTHOPEDIC SURGERY | Age: 64
End: 2019-10-01

## 2019-10-01 VITALS
WEIGHT: 158.8 LBS | DIASTOLIC BLOOD PRESSURE: 96 MMHG | OXYGEN SATURATION: 99 % | HEIGHT: 66 IN | HEART RATE: 72 BPM | BODY MASS INDEX: 25.52 KG/M2 | TEMPERATURE: 95.4 F | RESPIRATION RATE: 16 BRPM | SYSTOLIC BLOOD PRESSURE: 167 MMHG

## 2019-10-01 DIAGNOSIS — M17.12 PRIMARY OSTEOARTHRITIS OF LEFT KNEE: Primary | ICD-10-CM

## 2019-10-01 RX ORDER — TRIAMCINOLONE ACETONIDE 40 MG/ML
40 INJECTION, SUSPENSION INTRA-ARTICULAR; INTRAMUSCULAR ONCE
Qty: 1 ML | Refills: 0
Start: 2019-10-01 | End: 2019-10-01

## 2019-10-01 NOTE — PROGRESS NOTES
1. Have you been to the ER, urgent care clinic since your last visit? Hospitalized since your last visit? No    2. Have you seen or consulted any other health care providers outside of the [address] [place] [address] since your last visit? Include any pap smears or colon screening.  No

## 2019-10-07 ENCOUNTER — HOSPITAL ENCOUNTER (OUTPATIENT)
Dept: LAB | Age: 64
Discharge: HOME | End: 2019-10-07
Payer: OTHER GOVERNMENT

## 2019-10-07 ENCOUNTER — OFFICE VISIT (OUTPATIENT)
Dept: FAMILY MEDICINE CLINIC | Age: 64
End: 2019-10-07

## 2019-10-07 VITALS
HEART RATE: 74 BPM | TEMPERATURE: 96.7 F | BODY MASS INDEX: 24.75 KG/M2 | DIASTOLIC BLOOD PRESSURE: 83 MMHG | WEIGHT: 154 LBS | OXYGEN SATURATION: 100 % | RESPIRATION RATE: 16 BRPM | SYSTOLIC BLOOD PRESSURE: 138 MMHG | HEIGHT: 66 IN

## 2019-10-07 DIAGNOSIS — G89.29 CHRONIC MIDLINE LOW BACK PAIN WITH SCIATICA, SCIATICA LATERALITY UNSPECIFIED: ICD-10-CM

## 2019-10-07 DIAGNOSIS — M25.562 CHRONIC PAIN OF LEFT KNEE: ICD-10-CM

## 2019-10-07 DIAGNOSIS — M54.40 CHRONIC MIDLINE LOW BACK PAIN WITH SCIATICA, SCIATICA LATERALITY UNSPECIFIED: ICD-10-CM

## 2019-10-07 DIAGNOSIS — E78.5 DYSLIPIDEMIA: ICD-10-CM

## 2019-10-07 DIAGNOSIS — G89.29 CHRONIC PAIN OF LEFT KNEE: ICD-10-CM

## 2019-10-07 DIAGNOSIS — I10 ESSENTIAL HYPERTENSION: Primary | ICD-10-CM

## 2019-10-07 DIAGNOSIS — M71.22 POPLITEAL CYST, LEFT: ICD-10-CM

## 2019-10-07 DIAGNOSIS — Z23 ENCOUNTER FOR IMMUNIZATION: ICD-10-CM

## 2019-10-07 PROCEDURE — 80061 LIPID PANEL: CPT

## 2019-10-07 RX ORDER — LISINOPRIL 20 MG/1
20 TABLET ORAL DAILY
Qty: 90 TAB | Refills: 1 | Status: SHIPPED | OUTPATIENT
Start: 2019-10-07 | End: 2020-02-11 | Stop reason: DRUGHIGH

## 2019-10-07 NOTE — PROGRESS NOTES
Venipuncture to left forearm at this time. Patient tolerated well at this time. No concerns voiced. DTAP vaccines also ordered at this time by PCP. Injection given to left deltoid at this time.  Patient tolerated well

## 2019-10-07 NOTE — PROGRESS NOTES
Chief Complaint   Patient presents with    Hypertension    Knee Pain     1. Have you been to the ER, urgent care clinic since your last visit? Hospitalized since your last visit? No    2. Have you seen or consulted any other health care providers outside of the [address] [place] [address] since your last visit? Include any pap smears or colon screening. No     HPI  Nanette Maier comes in for f/u care. HTN: Patient has hypertension. Blood pressure has been stable. She is on atenolol and lisinopril. States that she feels it is starting to go up and would prefer to keep her blood pressure down. She is on lisinopril 10 mg. Requests to go up to 20 mg daily. I will send in a prescription for this new dose. She denies headache, changes in vision. Back pain: recalcitrant on gabapentin and voltaren. She has been referred to see a spine specialist.  Noted to have a cyst in her lumbar vertebrae. She does have occasional neuropathic symptoms that radiate down her lower extremities. She is on gabapentin and Voltaren and these have helped though she has had to take increasing doses of the medication. She denies bladder or bowel dysfunction. Left knee pain: Patient has pain in her left knee that has been chronic. She is followed up by the orthopedist.  Recently had steroid injection given in her left knee. This has helped somewhat with the pain. Continue current treatment plan. Dyslipidemia: Patient has dyslipidemia. We will recheck her lipid panel today. She is on Crestor daily.     Past Medical History  Past Medical History:   Diagnosis Date    Arthritis     Hx of migraines        Surgical History  Past Surgical History:   Procedure Laterality Date    FOOT/TOES SURGERY PROC UNLISTED      x6    HX HYSTERECTOMY      due to heavy bleeding    HX KNEE ARTHROSCOPY      HX LUMBAR LAMINECTOMY  01/2011    L5/S1 Lami        Medications  Current Outpatient Medications   Medication Sig Dispense Refill    fluticasone propionate (FLONASE) 50 mcg/actuation nasal spray USE 2 SPRAYS IN EACH NOSTRIL DAILY 32 g 0    lisinopril (PRINIVIL, ZESTRIL) 10 mg tablet TAKE 1 TABLET DAILY 90 Tab 0    magnesium oxide 200 mg magnesium tab Take  by mouth.  cetirizine (ZYRTEC) 10 mg tablet TAKE 1 TABLET DAILY AS NEEDED FOR ALLERGIES 90 Tab 1    rosuvastatin (CRESTOR) 10 mg tablet TAKE 1 TABLET NIGHTLY 90 Tab 1    gabapentin (NEURONTIN) 300 mg capsule TAKE 1 CAPSULE IN THE MORNING AND 2 CAPSULES IN THE EVENING AS DIRECTED 270 Cap 0    atenolol (TENORMIN) 50 mg tablet TAKE 1 TABLET DAILY 90 Tab 3    diclofenac EC (VOLTAREN) 75 mg EC tablet TAKE 1 TABLET TWICE A DAY AS NEEDED FOR PAIN WITH FOOD FOR OSTEOARTHRITIS 180 Tab 1    doxycycline (ADOXA) 100 mg tablet TAKE 1 TABLET EVERY TUESDAY, THURSDAY AND SATURDAY 40 Tab 2    rizatriptan (MAXALT-MLT) 10 mg disintegrating tablet PLACE 1 TABLET ON TONGUE ONCE AS NEEDED FOR MIGRAINE 30 Tab 0    ferrous sulfate 325 mg (65 mg iron) tablet Take  by mouth every seven (7) days.  potassium 99 mg tablet Take 99 mg by mouth every seven (7) days.  vitamin E (AQUA GEMS) 400 unit capsule Take  by mouth daily.  ascorbic acid, vitamin C, (VITAMIN C) 500 mg tablet Take 1,000 mg by mouth two (2) times a day.  calcium citrate-vitamin D3 (CITRACAL + D) tablet Take  by mouth two (2) times a day.  multivitamin (ONE A DAY) tablet Take 1 Tab by mouth daily.  cholecalciferol, VITAMIN D3, (VITAMIN D3) 5,000 unit tab tablet Take  by mouth daily.  aspirin 81 mg chewable tablet Take 81 mg by mouth daily.  B.infantis-B.ani-B.long-B.bifi (PROBIOTIC 4X) 10-15 mg TbEC Take  by mouth.  cyclobenzaprine (FLEXERIL) 10 mg tablet Take 1 Tab by mouth three (3) times daily as needed for Muscle Spasm(s).  90 Tab 2       Allergies  Allergies   Allergen Reactions    Penicillin G Hives       Family History  Family History   Problem Relation Age of Onset    Heart Attack Mother  Stroke Mother     Other Mother         CHF    Alcohol abuse Father     Other Father         CHF   [place] Liver Disease Father        Social History  Social History     Socioeconomic History    Marital status:      Spouse name: Not on file    Number of children: Not on file    Years of education: Not on file    Highest education level: Not on file   Occupational History    Not on file   Social Needs    Financial resource strain: Not on file    Food insecurity:     Worry: Not on file     Inability: Not on file    Transportation needs:     Medical: Not on file     Non-medical: Not on file   Tobacco Use    Smoking status: Never Smoker    Smokeless tobacco: Never Used   Substance and Sexual Activity    Alcohol use: No    Drug use: No    Sexual activity: Yes     Partners: Male   Lifestyle    Physical activity:     Days per week: Not on file     Minutes per session: Not on file    Stress: Not on file   Relationships    Social connections:     Talks on phone: Not on file     Gets together: Not on file     Attends Yarsani service: Not on file     Active member of club or organization: Not on file     Attends meetings of clubs or organizations: Not on file     Relationship status: Not on file    Intimate partner violence:     Fear of current or ex partner: Not on file     Emotionally abused: Not on file     Physically abused: Not on file     Forced sexual activity: Not on file   Other Topics Concern     Service No    Blood Transfusions Yes    Caffeine Concern No    Occupational Exposure No    Hobby Hazards No    Sleep Concern No    Stress Concern No    Weight Concern No    Special Diet No    Back Care No    Exercise Yes    Bike Helmet No    Seat Belt Yes    Self-Exams Yes   Social History Narrative    Not on file       Review of Systems  Review of Systems - Review of all systems is negative except as noted above in the HPI.     Vital Signs  Visit Vitals  /83 (BP 1 Location: Left arm, BP Patient Position: Sitting)   Pulse 74   Temp 96.7 °F (35.9 °C) (Oral)   Resp 16   Ht 5' 6\" (1.676 m)   Wt 154 lb (69.9 kg)   SpO2 100%   BMI 24.86 kg/m²         Physical Exam  Physical Examination: General appearance - alert, well appearing, and in no distress, oriented to person, place, and time, normal appearing weight and acyanotic, in no respiratory distress  Mental status - alert, oriented to person, place, and time, affect appropriate to mood  Lymphatics - no palpable lymphadenopathy, no hepatosplenomegaly  Chest - clear to auscultation, no wheezes, rales or rhonchi, symmetric air entry  Heart - normal rate and regular rhythm, S1 and S2 normal, systolic murmur 2/6 at lower left sternal border  Back exam - limited range of motion, pain with motion noted during exam, tenderness noted midline lumbar spine and paralumbar muscles  Neurological - abnormal neurological exam unchanged from prior examinations  Musculoskeletal - osteoarthritic changes noted in both hands    Results  Results for orders placed or performed during the hospital encounter of 09/23/19   POC CREATININE   Result Value Ref Range    Creatinine, POC 0.5 (L) 0.6 - 1.3 MG/DL    GFRAA, POC >60 >60 ml/min/1.73m2    GFRNA, POC >60 >60 ml/min/1.73m2       ASSESSMENT and PLAN    ICD-10-CM ICD-9-CM    1. Essential hypertension I10 401.9    2. 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 Chronic pain of left knee M25.562 719.46     G89.29 338.29    4. Encounter for immunization Z23 V03.89 TETANUS, DIPHTHERIA TOXOIDS AND ACELLULAR PERTUSSIS VACCINE (TDAP), IN INDIVIDS. >=7, IM      SD IMMUNIZ ADMIN,1 SINGLE/COMB VAC/TOXOID   5. Dyslipidemia E78.5 272.4 LIPID PANEL   6.  Chronic midline low back pain with sciatica, sciatica laterality unspecified M54.40 724.2     G89.29 724.3      338.29      lab results and schedule of future lab studies reviewed with patient  reviewed diet, exercise and weight control  cardiovascular risk and specific lipid/LDL goals reviewed  reviewed medications and side effects in detail      I have discussed the diagnosis with the patient and the intended plan of care as seen in the above orders. The patient has received an after-visit summary and questions were answered concerning future plans. I have discussed medication, side effects, and warnings with the patient in detail. The patient verbalized understanding and is in agreement with the plan of care. The patient will contact the office with any additional concerns. Johnnie Goel MD    PLEASE NOTE:   This document has been produced using voice recognition software.  Unrecognized errors in transcription may be present

## 2019-10-08 LAB
CHOLEST SERPL-MCNC: 182 MG/DL
HDLC SERPL-MCNC: 85 MG/DL (ref 40–60)
HDLC SERPL: 2.1 {RATIO} (ref 0–5)
LDLC SERPL CALC-MCNC: 82.8 MG/DL (ref 0–100)
LIPID PROFILE,FLP: ABNORMAL
TRIGL SERPL-MCNC: 71 MG/DL (ref ?–150)
VLDLC SERPL CALC-MCNC: 14.2 MG/DL

## 2019-10-22 ENCOUNTER — HOSPITAL ENCOUNTER (OUTPATIENT)
Dept: LAB | Age: 64
Discharge: HOME | End: 2019-10-22
Payer: OTHER GOVERNMENT

## 2019-10-22 ENCOUNTER — OFFICE VISIT (OUTPATIENT)
Dept: ORTHOPEDIC SURGERY | Age: 64
End: 2019-10-22

## 2019-10-22 VITALS
HEART RATE: 80 BPM | DIASTOLIC BLOOD PRESSURE: 92 MMHG | HEIGHT: 66 IN | SYSTOLIC BLOOD PRESSURE: 158 MMHG | BODY MASS INDEX: 25.39 KG/M2 | WEIGHT: 158 LBS

## 2019-10-22 DIAGNOSIS — M54.50 LUMBAR PAIN: Primary | ICD-10-CM

## 2019-10-22 DIAGNOSIS — M51.26 HNP (HERNIATED NUCLEUS PULPOSUS), LUMBAR: ICD-10-CM

## 2019-10-22 DIAGNOSIS — Z01.818 PRE-OP EVALUATION: ICD-10-CM

## 2019-10-22 LAB
ANION GAP SERPL CALC-SCNC: 4 MMOL/L (ref 3–18)
ATRIAL RATE: 74 BPM
BUN SERPL-MCNC: 14 MG/DL (ref 7–18)
BUN/CREAT SERPL: 28 (ref 12–20)
CALCIUM SERPL-MCNC: 9.1 MG/DL (ref 8.5–10.1)
CALCULATED P AXIS, ECG09: 27 DEGREES
CALCULATED R AXIS, ECG10: -9 DEGREES
CALCULATED T AXIS, ECG11: 33 DEGREES
CHLORIDE SERPL-SCNC: 105 MMOL/L (ref 100–111)
CO2 SERPL-SCNC: 30 MMOL/L (ref 21–32)
CREAT SERPL-MCNC: 0.5 MG/DL (ref 0.6–1.3)
DIAGNOSIS, 93000: NORMAL
ERYTHROCYTE [DISTWIDTH] IN BLOOD BY AUTOMATED COUNT: 14.8 % (ref 11.6–14.5)
GLUCOSE SERPL-MCNC: 85 MG/DL (ref 74–99)
HCT VFR BLD AUTO: 40.8 % (ref 35–45)
HGB BLD-MCNC: 13 G/DL (ref 12–16)
MCH RBC QN AUTO: 26.9 PG (ref 24–34)
MCHC RBC AUTO-ENTMCNC: 31.9 G/DL (ref 31–37)
MCV RBC AUTO: 84.3 FL (ref 74–97)
P-R INTERVAL, ECG05: 154 MS
PLATELET # BLD AUTO: 278 K/UL (ref 135–420)
PMV BLD AUTO: 10.8 FL (ref 9.2–11.8)
POTASSIUM SERPL-SCNC: 4 MMOL/L (ref 3.5–5.5)
Q-T INTERVAL, ECG07: 378 MS
QRS DURATION, ECG06: 72 MS
QTC CALCULATION (BEZET), ECG08: 419 MS
RBC # BLD AUTO: 4.84 M/UL (ref 4.2–5.3)
SODIUM SERPL-SCNC: 139 MMOL/L (ref 136–145)
VENTRICULAR RATE, ECG03: 74 BPM
WBC # BLD AUTO: 6.5 K/UL (ref 4.6–13.2)

## 2019-10-22 PROCEDURE — 93005 ELECTROCARDIOGRAM TRACING: CPT

## 2019-10-22 PROCEDURE — 80048 BASIC METABOLIC PNL TOTAL CA: CPT

## 2019-10-22 PROCEDURE — 85027 COMPLETE CBC AUTOMATED: CPT

## 2019-10-22 PROCEDURE — 36415 COLL VENOUS BLD VENIPUNCTURE: CPT

## 2019-10-22 NOTE — PROGRESS NOTES
[address]  [address], [address]  [address]  Phone: [phone]  Fax: [phone]  INITIAL CONSULTATION  Patient: Michael Campos                MRN: [id number]       SSN: [id number]  YOB: 1955        AGE: 59 y.o. SEX: female  Body mass index is 25.5 kg/m². PCP: Key Andres MD  10/22/19    Chief Complaint   Patient presents with    Back Pain    Leg Pain     RLE         HISTORY OF PRESENT ILLNESS, RADIOGRAPHS, and PLAN:         HISTORY OF PRESENT ILLNESS:  Ms. Macario Goss is seen today at the request of Dr. Shailesh Huynh. Ms. Macario Goss is a pleasant, 60-year-old female, generally healthy who has had a year of progressive back and radiating right leg pain. It is primarily in the buttock and right leg. She denies bowel or bladder dysfunction, fever, chills, or night sweats, weight loss or weight gain. She has had a previous lumbar decompression on the left decades ago from which she did well. Her pain is terrible. It has been unresponsive to conservative treatments. She has been through medications, injections, and the like. 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 X-rays I obtained today demonstrate no gross instability. There is some synovitis on the left but is asymptomatic. ASSESSMENT/PLAN: I discussed the matter at length with her. We discussed unilateral versus bilateral decompression versus fusion and the risks, benefits, complications, and alternatives to it, and the possibility of fusion now versus fusion in the future. All of this was discussed at length and in detail, and the patient, at this time, wishes to proceed with a unilateral decompression at L4-5 on the right. We will proceed with surgery once the appropriate approvals and clearances take place. cc: Benito Nolan M.D.         Past Medical History:   Diagnosis Date    Arthritis     Hx of migraines        Family History   Problem Relation Age of Onset    Heart Attack Mother     Stroke Mother     Other Mother         CHF    Alcohol abuse Father     Other Father         CHF    Liver Disease Father        Current Outpatient Medications   Medication Sig Dispense Refill    lisinopril (PRINIVIL, ZESTRIL) 20 mg tablet Take 1 Tab by mouth daily. 90 Tab 1    fluticasone propionate (FLONASE) 50 mcg/actuation nasal spray USE 2 SPRAYS IN EACH NOSTRIL DAILY 32 g 0    magnesium oxide 200 mg magnesium tab Take  by mouth.  cetirizine (ZYRTEC) 10 mg tablet TAKE 1 TABLET DAILY AS NEEDED FOR ALLERGIES 90 Tab 1    rosuvastatin (CRESTOR) 10 mg tablet TAKE 1 TABLET NIGHTLY 90 Tab 1    gabapentin (NEURONTIN) 300 mg capsule TAKE 1 CAPSULE IN THE MORNING AND 2 CAPSULES IN THE EVENING AS DIRECTED 270 Cap 0    atenolol (TENORMIN) 50 mg tablet TAKE 1 TABLET DAILY 90 Tab 3    diclofenac EC (VOLTAREN) 75 mg EC tablet TAKE 1 TABLET TWICE A DAY AS NEEDED FOR PAIN WITH FOOD FOR OSTEOARTHRITIS 180 Tab 1    doxycycline (ADOXA) 100 mg tablet TAKE 1 TABLET EVERY TUESDAY, THURSDAY AND SATURDAY 40 Tab 2    rizatriptan (MAXALT-MLT) 10 mg disintegrating tablet PLACE 1 TABLET ON TONGUE ONCE AS NEEDED FOR MIGRAINE 30 Tab 0    potassium 99 mg tablet Take 99 mg by mouth every seven (7) days.  vitamin E (AQUA GEMS) 400 unit capsule Take  by mouth daily.  ascorbic acid, vitamin C, (VITAMIN C) 500 mg tablet Take 1,000 mg by mouth two (2) times a day.  calcium citrate-vitamin D3 (CITRACAL + D) tablet Take  by mouth two (2) times a day.  multivitamin (ONE A DAY) tablet Take 1 Tab by mouth daily.  cholecalciferol, VITAMIN D3, (VITAMIN D3) 5,000 unit tab tablet Take  by mouth daily.  aspirin 81 mg chewable tablet Take 81 mg by mouth daily.  B.infantis-B.ani-B.long-B.bifi (PROBIOTIC 4X) 10-15 mg TbEC Take  by mouth.       cyclobenzaprine (FLEXERIL) 10 mg tablet Take 1 Tab by mouth three (3) times daily as needed for Muscle Spasm(s). 90 Tab 2    ferrous sulfate 325 mg (65 mg iron) tablet Take  by mouth every seven (7) days.          Allergies   Allergen Reactions    Penicillin G Hives       Past Surgical History:   Procedure Laterality Date    FOOT/TOES SURGERY PROC UNLISTED      x6    HX HYSTERECTOMY      due to heavy bleeding    HX KNEE ARTHROSCOPY      HX LUMBAR LAMINECTOMY  01/2011    L5/S1 Lami       Past Medical History:   Diagnosis Date    Arthritis     Hx of migraines        Social History     Socioeconomic History    Marital status:      Spouse name: Not on file    Number of children: Not on file    Years of education: Not on file    Highest education level: Not on file   Occupational History    Not on file   Social Needs    Financial resource strain: Not on file    Food insecurity:     Worry: Not on file     Inability: Not on file    Transportation needs:     Medical: Not on file     Non-medical: Not on file   Tobacco Use    Smoking status: Never Smoker    Smokeless tobacco: Never Used   Substance and Sexual Activity    Alcohol use: No    Drug use: No    Sexual activity: Yes     Partners: Male   Lifestyle    Physical activity:     Days per week: Not on file     Minutes per session: Not on file    Stress: Not on file   Relationships    Social connections:     Talks on phone: Not on file     Gets together: Not on file     Attends Quaker service: Not on file     Active member of club or organization: Not on file     Attends meetings of clubs or organizations: Not on file     Relationship status: Not on file    Intimate partner violence:     Fear of current or ex partner: Not on file     Emotionally abused: Not on file     Physically abused: Not on file     Forced sexual activity: Not on file   Other Topics Concern     Service No    Blood Transfusions Yes    Caffeine Concern No    Occupational Exposure No    Hobby Hazards No    Sleep Concern No    Stress Concern No    Weight Concern No    Special Diet No    Back Care No    Exercise Yes    Bike Helmet No    Seat Belt Yes    Self-Exams Yes   Social History Narrative    Not on file           REVIEW OF SYSTEMS:   CONSTITUTIONAL SYMPTOMS:  Negative. EYES:  Negative. EARS, NOSE, THROAT AND MOUTH:  Negative. CARDIOVASCULAR:  Negative. RESPIRATORY:  Negative. GENITOURINARY: Per HPI. GASTROINTESTINAL:  Per HPI. INTEGUMENTARY (SKIN AND/OR BREAST):  Negative. MUSCULOSKELETAL: Per HPI.   ENDOCRINE/RHEUMATOLOGIC:  Negative. NEUROLOGICAL:  Per HPI. HEMATOLOGIC/LYMPHATIC:  Negative. ALLERGIC/IMMUNOLOGIC:  Negative. PSYCHIATRIC:  Negative. PHYSICAL EXAMINATION:   Visit Vitals  BP (!) 158/92   Pulse 80   Ht 5' 6\" (1.676 m)   Wt 158 lb (71.7 kg)   BMI 25.50 kg/m²    PAIN SCALE: 8/10    CONSTITUTIONAL: The patient is in no apparent distress and is alert and oriented x 3. HEENT: Normocephalic. Hearing grossly intact. NECK: Supple and symmetric. no tenderness, or masses were felt. RESPIRATORY: No labored breathing. CARDIOVASCULAR: The carotid pulses were normal. Peripheral pulses were 2+. CHEST: Normal AP diameter and normal contour without any kyphoscoliosis. LYMPHATIC: No lymphadenopathy was appreciated in the neck, axillae or groin. SKIN:  Negative for scars, rashes, lesions, or ulcers on the right upper, right lower, left upper, left lower and trunk. NEUROLOGICAL: Alert and oriented x 3. Ambulation without assistive device. FWB. EXTREMITIES:  See musculoskeletal.  MUSCULOSKELETAL:   Head and Neck:  Negative for misalignment, asymmetry, crepitation, defects, tenderness masses or effusions.  Left Upper Extremity: Inspection, percussion and palpation performed. Bryans sign is negative.  Right Upper Extremity: Inspection, percussion and palpation performed. Bryans sign is negative.  Spine, Ribs and Pelvis: Back pain radiating into RLE.  Inspection, percussion and palpation performed. Negative for misalignment, asymmetry, crepitation, defects, tenderness masses or effusions.  Left Lower Extremity: Inspection, percussion and palpation performed. Negative straight leg raise.  Right Lower Extremity: Pain. Inspection, percussion and palpation performed. Negative straight leg raise. SPINE EXAM:     Cervical spine: Neck is midline. Normal muscle tone. No focal atrophy is noted. Lumbar spine: No rash, ecchymosis, or gross obliquity. No focal atrophy is noted. ASSESSMENT    ICD-10-CM ICD-9-CM    1. Lumbar pain M54.5 724.2 AMB POC XRAY, SPINE, LUMBOSACRAL; 4+      AMB POC XRAY, SPINE, LUMBOSACRAL; 2 O   2. HNP (herniated nucleus pulposus), lumbar M51.26 722.10        Written by Kulwinder Metcalf, as dictated by Hanna Mancilla MD.    I, Dr. Hanna Mancilla MD, confirm that all documentation is accurate.

## 2019-10-22 NOTE — H&P
Pre-Admission History and Physical    Patient: Rahul Long   MRN: [id number]   SSN: [id number]   YOB: 1955   Age: 59 y.o. Sex: female     Patient scheduled for: right L4/5 lami. Date of surgery: 10/31/19. [facility] Surgeon: Bong Valdez MD    HPI:  Rahul Long is a 59 y.o. female with a year of progressive back and radiating right leg pain. It is primarily in the buttock and right leg. She denies bowel or bladder dysfunction, fever, chills, or night sweats, weight loss or weight gain. She has had a previous lumbar decompression on the left decades ago from which she did well. Her pain is terrible. It has been unresponsive to conservative treatments. She has been through medications, injections, and the like. 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 X-rays I obtained today demonstrate no gross instability. There is some synovitis on the left but is asymptomatic. She reports a pain level of 8/10. Pain has impacted the patient's functional ability to perform daily activity. She is being admitted for surgical intervention.          Past Medical History:   Diagnosis Date    Arthritis     Hx of migraines      Social History     Socioeconomic History    Marital status:      Spouse name: Not on file    Number of children: Not on file    Years of education: Not on file    Highest education level: Not on file   Tobacco Use    Smoking status: Never Smoker    Smokeless tobacco: Never Used   Substance and Sexual Activity    Alcohol use: No    Drug use: No    Sexual activity: Yes     Partners: Male   Other Topics Concern     Service No    Blood Transfusions Yes    Caffeine Concern No    Occupational Exposure No    Hobby Hazards No    Sleep Concern No    Stress Concern No    Weight Concern No    Special Diet No    Back Care No    Exercise Yes    Bike Helmet No    Seat Belt Yes    Self-Exams Yes     Past Surgical History:   Procedure Laterality Date    FOOT/TOES SURGERY PROC UNLISTED      x6    HX HYSTERECTOMY      due to heavy bleeding    HX KNEE ARTHROSCOPY      HX LUMBAR LAMINECTOMY  01/2011    L5/S1 Lami     Family History   Problem Relation Age of Onset    Heart Attack Mother     Stroke Mother     Other Mother         CHF    Alcohol abuse Father     Other Father         CHF    Liver Disease Father      Allergies   Allergen Reactions    Penicillin G Hives     Current Outpatient Medications   Medication Sig Dispense Refill    lisinopril (PRINIVIL, ZESTRIL) 20 mg tablet Take 1 Tab by mouth daily. 90 Tab 1    fluticasone propionate (FLONASE) 50 mcg/actuation nasal spray USE 2 SPRAYS IN EACH NOSTRIL DAILY 32 g 0    magnesium oxide 200 mg magnesium tab Take  by mouth.  cetirizine (ZYRTEC) 10 mg tablet TAKE 1 TABLET DAILY AS NEEDED FOR ALLERGIES 90 Tab 1    cyclobenzaprine (FLEXERIL) 10 mg tablet Take 1 Tab by mouth three (3) times daily as needed for Muscle Spasm(s). 90 Tab 2    rosuvastatin (CRESTOR) 10 mg tablet TAKE 1 TABLET NIGHTLY 90 Tab 1    gabapentin (NEURONTIN) 300 mg capsule TAKE 1 CAPSULE IN THE MORNING AND 2 CAPSULES IN THE EVENING AS DIRECTED 270 Cap 0    atenolol (TENORMIN) 50 mg tablet TAKE 1 TABLET DAILY 90 Tab 3    diclofenac EC (VOLTAREN) 75 mg EC tablet TAKE 1 TABLET TWICE A DAY AS NEEDED FOR PAIN WITH FOOD FOR OSTEOARTHRITIS 180 Tab 1    doxycycline (ADOXA) 100 mg tablet TAKE 1 TABLET EVERY TUESDAY, THURSDAY AND SATURDAY 40 Tab 2    rizatriptan (MAXALT-MLT) 10 mg disintegrating tablet PLACE 1 TABLET ON TONGUE ONCE AS NEEDED FOR MIGRAINE 30 Tab 0    ferrous sulfate 325 mg (65 mg iron) tablet Take  by mouth every seven (7) days.  potassium 99 mg tablet Take 99 mg by mouth every seven (7) days.  vitamin E (AQUA GEMS) 400 unit capsule Take  by mouth daily.       ascorbic acid, vitamin C, (VITAMIN C) 500 mg tablet Take 1,000 mg by mouth two (2) times a day.  calcium citrate-vitamin D3 (CITRACAL + D) tablet Take  by mouth two (2) times a day.  multivitamin (ONE A DAY) tablet Take 1 Tab by mouth daily.  cholecalciferol, VITAMIN D3, (VITAMIN D3) 5,000 unit tab tablet Take  by mouth daily.  aspirin 81 mg chewable tablet Take 81 mg by mouth daily.  B.infantis-B.ani-B.long-B.bifi (PROBIOTIC 4X) 10-15 mg TbEC Take  by mouth. ROS:  Denies chills, fever,night sweats,  bowel or bladder dysfunction, unexplained weight loss/weight gain, chest pain, sob or anxiety. Physical Examination    Gen: Well developed, well nourished 59 y.o. female Visit Vitals  BP (!) 158/92   Pulse 80   Ht 5' 6\" (1.676 m)   Wt 158 lb (71.7 kg)   BMI 25.50 kg/m²    PAIN SCALE: 8/10     CONSTITUTIONAL: The patient is in no apparent distress and is alert and oriented x 3. HEENT: Normocephalic. Hearing grossly intact. NECK: Supple and symmetric. no tenderness, or masses were felt. RESPIRATORY: No labored breathing. CARDIOVASCULAR: The carotid pulses were normal. Peripheral pulses were 2+. CHEST: Normal AP diameter and normal contour without any kyphoscoliosis. LYMPHATIC: No lymphadenopathy was appreciated in the neck, axillae or groin. SKIN:  Negative for scars, rashes, lesions, or ulcers on the right upper, right lower, left upper, left lower and trunk. NEUROLOGICAL: Alert and oriented x 3. Ambulation without assistive device. FWB. EXTREMITIES:  See musculoskeletal.  MUSCULOSKELETAL:  · Head and Neck:  Negative for misalignment, asymmetry, crepitation, defects, tenderness masses or effusions. · Left Upper Extremity: Inspection, percussion and palpation performed. Bryans sign is negative. · Right Upper Extremity: Inspection, percussion and palpation performed. Bryans sign is negative. · Spine, Ribs and Pelvis: Back pain radiating into RLE. Inspection, percussion and palpation performed.  Negative for misalignment, asymmetry, crepitation, defects, tenderness masses or effusions. · Left Lower Extremity: Inspection, percussion and palpation performed. Negative straight leg raise. · Right Lower Extremity: Pain. Inspection, percussion and palpation performed. Negative straight leg raise.        SPINE EXAM:      Cervical spine: Neck is midline. Normal muscle tone. No focal atrophy is noted.     Lumbar spine: No rash, ecchymosis, or gross obliquity. No focal atrophy is noted. Assessment and Plan    Due to the pt's persistent symptoms unrelieved by conservative measure Nanette Carmen is being admitted [facility] to undergo surgical intervention. The post-operative plan of care consists of physical therapy, home health and a 2 week f/u office visit. We are pending medical clearance by Dr. Yanelis Borja. The risks, benefits, complications and alternatives to surgery have been discussed in detail with the patient. The patient understands and agrees to proceed.      Carlos A Green NP-C for Mathias Bamberger, MD

## 2019-10-23 ENCOUNTER — TELEPHONE (OUTPATIENT)
Dept: ORTHOPEDIC SURGERY | Age: 64
End: 2019-10-23

## 2019-10-23 ENCOUNTER — OFFICE VISIT (OUTPATIENT)
Dept: FAMILY MEDICINE CLINIC | Age: 64
End: 2019-10-23

## 2019-10-23 VITALS
DIASTOLIC BLOOD PRESSURE: 81 MMHG | SYSTOLIC BLOOD PRESSURE: 127 MMHG | TEMPERATURE: 97.4 F | RESPIRATION RATE: 20 BRPM | WEIGHT: 160 LBS | OXYGEN SATURATION: 98 % | HEART RATE: 74 BPM | BODY MASS INDEX: 25.71 KG/M2 | HEIGHT: 66 IN

## 2019-10-23 DIAGNOSIS — G89.29 CHRONIC MIDLINE LOW BACK PAIN WITH SCIATICA, SCIATICA LATERALITY UNSPECIFIED: ICD-10-CM

## 2019-10-23 DIAGNOSIS — E78.5 DYSLIPIDEMIA: ICD-10-CM

## 2019-10-23 DIAGNOSIS — M54.40 CHRONIC MIDLINE LOW BACK PAIN WITH SCIATICA, SCIATICA LATERALITY UNSPECIFIED: ICD-10-CM

## 2019-10-23 DIAGNOSIS — I10 ESSENTIAL HYPERTENSION: ICD-10-CM

## 2019-10-23 DIAGNOSIS — Z01.818 PREOP EXAMINATION: Primary | ICD-10-CM

## 2019-10-23 NOTE — PROGRESS NOTES
Chief Complaint   Patient presents with    Pre-op Exam     1. Have you been to the ER, urgent care clinic since your last visit? Hospitalized since your last visit? No    2. Have you seen or consulted any other health care providers outside of the [address] [place] [address] since your last visit? Include any pap smears or colon screening. No     HPI  Nanette Alexandre comes in for preop evaluation. Planned procedure: Right L4/5 laminectomy  Referring physician: Dr. Brad Manzano  Date of procedure: 10/31/2019  Indication of procedure: Chronic low back pain with radiculopathy    Patient has chronic low back pain. Pain is mainly in the lower back with radicular symptoms radiating down right lower extremity. Has had conservative measures done but pain has been progressivly getting worse. She denies any bladder or bowel dysfunction. Had lumbar decompression procedure done on the left side in the past.  She did have an MRI done that shows a large synovial cyst causing severe lateral recess stenosis at L4-5. She has been seen by the neurosurgeon recommended to have unilateral decompression at L4-5 on the right. She comes in for preop evaluation. Currently on gabapentin and flexeril for the pain. Patient has had an EKG done that is normal.  Had CBC and BMP done that are normal.  She is medically stabilized and cleared to proceed with planned procedure. HTN: she is on atenolol and lisinopril. Blood pressure is stable. Will continue with current treatment plan. She can take her blood pressure medications on the day of surgery. Dyslipidemia: Patient has dyslipidemia. She is on Crestor. Lipid panel is stable.     Past Medical History  Past Medical History:   Diagnosis Date    Arthritis     Hx of migraines        Surgical History  Past Surgical History:   Procedure Laterality Date    FOOT/TOES SURGERY PROC UNLISTED      x6    HX HYSTERECTOMY      due to heavy bleeding    HX KNEE ARTHROSCOPY      HX LUMBAR LAMINECTOMY  01/2011    L5/S1 Lami        Medications  Current Outpatient Medications   Medication Sig Dispense Refill    lisinopril (PRINIVIL, ZESTRIL) 20 mg tablet Take 1 Tab by mouth daily. 90 Tab 1    cetirizine (ZYRTEC) 10 mg tablet TAKE 1 TABLET DAILY AS NEEDED FOR ALLERGIES 90 Tab 1    cyclobenzaprine (FLEXERIL) 10 mg tablet Take 1 Tab by mouth three (3) times daily as needed for Muscle Spasm(s). 90 Tab 2    rosuvastatin (CRESTOR) 10 mg tablet TAKE 1 TABLET NIGHTLY 90 Tab 1    gabapentin (NEURONTIN) 300 mg capsule TAKE 1 CAPSULE IN THE MORNING AND 2 CAPSULES IN THE EVENING AS DIRECTED 270 Cap 0    atenolol (TENORMIN) 50 mg tablet TAKE 1 TABLET DAILY 90 Tab 3    doxycycline (ADOXA) 100 mg tablet TAKE 1 TABLET EVERY TUESDAY, THURSDAY AND SATURDAY 40 Tab 2    ferrous sulfate 325 mg (65 mg iron) tablet Take  by mouth every seven (7) days.  potassium 99 mg tablet Take 99 mg by mouth every seven (7) days.  vitamin E (AQUA GEMS) 400 unit capsule Take  by mouth daily.  ascorbic acid, vitamin C, (VITAMIN C) 500 mg tablet Take 1,000 mg by mouth two (2) times a day.  calcium citrate-vitamin D3 (CITRACAL + D) tablet Take  by mouth two (2) times a day.  multivitamin (ONE A DAY) tablet Take 1 Tab by mouth daily.  cholecalciferol, VITAMIN D3, (VITAMIN D3) 5,000 unit tab tablet Take  by mouth daily.  B.infantis-B.ani-B.long-B.bifi (PROBIOTIC 4X) 10-15 mg TbEC Take  by mouth.  fluticasone propionate (FLONASE) 50 mcg/actuation nasal spray USE 2 SPRAYS IN EACH NOSTRIL DAILY 32 g 0    magnesium oxide 200 mg magnesium tab Take  by mouth.  diclofenac EC (VOLTAREN) 75 mg EC tablet TAKE 1 TABLET TWICE A DAY AS NEEDED FOR PAIN WITH FOOD FOR OSTEOARTHRITIS 180 Tab 1    rizatriptan (MAXALT-MLT) 10 mg disintegrating tablet PLACE 1 TABLET ON TONGUE ONCE AS NEEDED FOR MIGRAINE 30 Tab 0    aspirin 81 mg chewable tablet Take 81 mg by mouth daily.          Allergies  Allergies   Allergen Reactions    Penicillin G Hives       Family History  Family History   Problem Relation Age of Onset    Heart Attack Mother     Stroke Mother     Other Mother         CHF    Alcohol abuse Father     Other Father         CHF    Liver Disease Father        Social History  Social History     Socioeconomic History    Marital status:      Spouse name: Not on file    Number of children: Not on file    Years of education: Not on file    Highest education level: Not on file   Occupational History    Not on file   Social Needs    Financial resource strain: Not on file    Food insecurity:     Worry: Not on file     Inability: Not on file    Transportation needs:     Medical: Not on file     Non-medical: Not on file   Tobacco Use    Smoking status: Never Smoker    Smokeless tobacco: Never Used   Substance and Sexual Activity    Alcohol use: No    Drug use: No    Sexual activity: Yes     Partners: Male   Lifestyle    Physical activity:     Days per week: Not on file     Minutes per session: Not on file    Stress: Not on file   Relationships    Social connections:     Talks on phone: Not on file     Gets together: Not on file     Attends Latter day service: Not on file     Active member of club or organization: Not on file     Attends meetings of clubs or organizations: Not on file     Relationship status: Not on file    Intimate partner violence:     Fear of current or ex partner: Not on file     Emotionally abused: Not on file     Physically abused: Not on file     Forced sexual activity: Not on file   Other Topics Concern     Service No    Blood Transfusions Yes    Caffeine Concern No    Occupational Exposure No    Hobby Hazards No    Sleep Concern No    Stress Concern No    Weight Concern No    Special Diet No    Back Care No    Exercise Yes    Bike Helmet No    Seat Belt Yes    Self-Exams Yes   Social History Narrative    Not on file       Review of Systems  Review of Systems - Review of all systems is negative except as noted above in the HPI. Vital Signs  Visit Vitals  /81 (BP 1 Location: Left arm, BP Patient Position: Sitting)   Pulse 74   Temp 97.4 °F (36.3 °C) (Oral)   Resp 20   Ht 5' 6\" (1.676 m)   Wt 160 lb (72.6 kg)   SpO2 98%   BMI 25.82 kg/m²         Physical Exam  Physical Examination: General appearance - oriented to person, place, and time, acyanotic, in no respiratory distress and well hydrated  Mental status - alert, oriented to person, place, and time, affect appropriate to mood  Mouth - mucous membranes moist, pharynx normal without lesions  Neck - supple, no significant adenopathy  Lymphatics - no palpable lymphadenopathy  Chest - clear to auscultation, no wheezes, rales or rhonchi, symmetric air entry  Heart - S1 and S2 normal  Abdomen - no rebound tenderness noted  Back exam - limited range of motion, pain with motion noted during exam, tenderness noted right paralumbar and midline lumbar spine  Neurological - numbness and tingling right lower extremity  Musculoskeletal - osteoarthritic changes noted in both hands  Extremities - intact peripheral pulses    Results  Results for orders placed or performed during the hospital encounter of 10/22/19   EKG, 12 LEAD, INITIAL   Result Value Ref Range    Ventricular Rate 74 BPM    Atrial Rate 74 BPM    P-R Interval 154 ms    QRS Duration 72 ms    Q-T Interval 378 ms    QTC Calculation (Bezet) 419 ms    Calculated P Axis 27 degrees    Calculated R Axis -9 degrees    Calculated T Axis 33 degrees    Diagnosis       Normal sinus rhythm  Normal ECG  No previous ECGs available  Confirmed by Christie Hernandez MD, [facility] ([id number]) on 10/22/2019 2:35:19 PM         ASSESSMENT and PLAN    ICD-10-CM ICD-9-CM    1. Preop examination Z01.818 V72.84    2. Chronic midline low back pain with sciatica, sciatica laterality unspecified M54.40 724.2     G89.29 724.3      338.29    3. Essential hypertension I10 401.9    4.  Dyslipidemia E78.5 272.4      lab results and schedule of future lab studies reviewed with patient  reviewed diet, exercise and weight control  cardiovascular risk and specific lipid/LDL goals reviewed  reviewed medications and side effects in detail      I have discussed the diagnosis with the patient and the intended plan of care as seen in the above orders. The patient has received an after-visit summary and questions were answered concerning future plans. I have discussed medication, side effects, and warnings with the patient in detail. The patient verbalized understanding and is in agreement with the plan of care. The patient will contact the office with any additional concerns. Rico Perez MD    PLEASE NOTE:   This document has been produced using voice recognition software.  Unrecognized errors in transcription may be present

## 2019-10-23 NOTE — TELEPHONE ENCOUNTER
Mrs. Gerber Hawkins called and said that she 'stop taking her Voltaren\" and her pain has increased. I explained to her that she only had to stop it 5 days before. Her sx is 10-24 and she can start taking it again and stop it on 19th. She is concerned for the 5 days before because her muscle relaxer doesn't work. She want to know if she could get something for pain for the five days before.   Please call

## 2019-10-23 NOTE — PATIENT INSTRUCTIONS
Body Mass Index: Care Instructions Your Care Instructions Body mass index (BMI) can help you see if your weight is raising your risk for health problems. It uses a formula to compare how much you weigh with how tall you are. · A BMI lower than 18.5 is considered underweight. · A BMI between 18.5 and 24.9 is considered healthy. · A BMI between 25 and 29.9 is considered overweight. A BMI of 30 or higher is considered obese. If your BMI is in the normal range, it means that you have a lower risk for weight-related health problems. If your BMI is in the overweight or obese range, you may be at increased risk for weight-related health problems, such as high blood pressure, heart disease, stroke, arthritis or joint pain, and diabetes. If your BMI is in the underweight range, you may be at increased risk for health problems such as fatigue, lower protection (immunity) against illness, muscle loss, bone loss, hair loss, and hormone problems. BMI is just one measure of your risk for weight-related health problems. You may be at higher risk for health problems if you are not active, you eat an unhealthy diet, or you drink too much alcohol or use tobacco products. Follow-up care is a key part of your treatment and safety. Be sure to make and go to all appointments, and call your doctor if you are having problems. It's also a good idea to know your test results and keep a list of the medicines you take. How can you care for yourself at home? · Practice healthy eating habits. This includes eating plenty of fruits, vegetables, whole grains, lean protein, and low-fat dairy. · If your doctor recommends it, get more exercise. Walking is a good choice. Bit by bit, increase the amount you walk every day. Try for at least 30 minutes on most days of the week. · Do not smoke. Smoking can increase your risk for health problems.  If you need help quitting, talk to your doctor about stop-smoking programs and medicines. These can increase your chances of quitting for good. · Limit alcohol to 2 drinks a day for men and 1 drink a day for women. Too much alcohol can cause health problems. If you have a BMI higher than 25 · Your doctor may do other tests to check your risk for weight-related health problems. This may include measuring the distance around your waist. A waist measurement of more than 40 inches in men or 35 inches in women can increase the risk of weight-related health problems. · Talk with your doctor about steps you can take to stay healthy or improve your health. You may need to make lifestyle changes to lose weight and stay healthy, such as changing your diet and getting regular exercise. If you have a BMI lower than 18.5 · Your doctor may do other tests to check your risk for health problems. · Talk with your doctor about steps you can take to stay healthy or improve your health. You may need to make lifestyle changes to gain or maintain weight and stay healthy, such as getting more healthy foods in your diet and doing exercises to build muscle. Where can you learn more? Go to http://kendrick-emerald.info/. Enter S176 in the search box to learn more about \"Body Mass Index: Care Instructions. \" Current as of: October 13, 2016 Content Version: 11.4 © 0233-4228 [employer]. Care instructions adapted under license by [employer] (which disclaims liability or warranty for this information). If you have questions about a medical condition or this instruction, always ask your healthcare professional. [address] any warranty or liability for your use of this information.

## 2019-10-25 NOTE — TELEPHONE ENCOUNTER
What is the reason behind not being able to take Tylenol? Has she ever taken Tramadol? Also, can you check behind me on the ? I am not able to locate the patient for some reason.  Also tried NC.

## 2019-10-25 NOTE — TELEPHONE ENCOUNTER
Pt called me back and said that she is not supposed to take tylenol products. What can she take.  Please call

## 2019-10-28 NOTE — TELEPHONE ENCOUNTER
Spoke with patient, she stated she has bad migraines and the Tylenol makes her have rebound headaches. She also stated that it had been years since she has taken Tylenol, and she had not heard back from the office she took some yesterday. She stated that the headache that came with it wasn't horrible enough for her not to take it. She also informed me that she had some Oxycodone she had from previously, so if the pain gets too bad she take one of those. She thanked the office for calling back, no further actions needed at this time.

## 2019-10-30 ENCOUNTER — ANESTHESIA EVENT (OUTPATIENT)
Dept: SURGERY | Age: 64
DRG: 517 | End: 2019-10-30
Payer: OTHER GOVERNMENT

## 2019-10-31 ENCOUNTER — HOSPITAL ENCOUNTER (INPATIENT)
Age: 64
LOS: 1 days | Discharge: HOME | DRG: 517 | End: 2019-11-01
Attending: ORTHOPAEDIC SURGERY | Admitting: ORTHOPAEDIC SURGERY
Payer: OTHER GOVERNMENT

## 2019-10-31 ENCOUNTER — APPOINTMENT (OUTPATIENT)
Dept: GENERAL RADIOLOGY | Age: 64
DRG: 517 | End: 2019-10-31
Attending: ORTHOPAEDIC SURGERY
Payer: OTHER GOVERNMENT

## 2019-10-31 ENCOUNTER — ANESTHESIA (OUTPATIENT)
Dept: SURGERY | Age: 64
DRG: 517 | End: 2019-10-31
Payer: OTHER GOVERNMENT

## 2019-10-31 DIAGNOSIS — Z98.890 S/P LUMBAR LAMINECTOMY: Primary | ICD-10-CM

## 2019-10-31 PROBLEM — M48.00 SPINAL STENOSIS: Status: ACTIVE | Noted: 2019-10-31

## 2019-10-31 PROBLEM — M71.30 SYNOVIAL CYST: Status: ACTIVE | Noted: 2019-10-31

## 2019-10-31 PROCEDURE — 76010000149 HC OR TIME 1 TO 1.5 HR: Performed by: ORTHOPAEDIC SURGERY

## 2019-10-31 PROCEDURE — 74011250636 HC RX REV CODE- 250/636: Performed by: ORTHOPAEDIC SURGERY

## 2019-10-31 PROCEDURE — 77030030163 HC BN WAX J&J -A: Performed by: ORTHOPAEDIC SURGERY

## 2019-10-31 PROCEDURE — 74011250636 HC RX REV CODE- 250/636: Performed by: NURSE ANESTHETIST, CERTIFIED REGISTERED

## 2019-10-31 PROCEDURE — 97116 GAIT TRAINING THERAPY: CPT

## 2019-10-31 PROCEDURE — 74011250637 HC RX REV CODE- 250/637: Performed by: NURSE ANESTHETIST, CERTIFIED REGISTERED

## 2019-10-31 PROCEDURE — 74011000250 HC RX REV CODE- 250: Performed by: ORTHOPAEDIC SURGERY

## 2019-10-31 PROCEDURE — 74011250637 HC RX REV CODE- 250/637: Performed by: ANESTHESIOLOGY

## 2019-10-31 PROCEDURE — 74011000250 HC RX REV CODE- 250: Performed by: NURSE ANESTHETIST, CERTIFIED REGISTERED

## 2019-10-31 PROCEDURE — 77030037134 HC WRAP COMPR BACK THER SOLM -B: Performed by: ORTHOPAEDIC SURGERY

## 2019-10-31 PROCEDURE — 77030003029 HC SUT VCRL J&J -B: Performed by: ORTHOPAEDIC SURGERY

## 2019-10-31 PROCEDURE — 77030002933 HC SUT MCRYL J&J -A: Performed by: ORTHOPAEDIC SURGERY

## 2019-10-31 PROCEDURE — 74011250636 HC RX REV CODE- 250/636: Performed by: NURSE PRACTITIONER

## 2019-10-31 PROCEDURE — 77030035236 HC SUT PDS STRATFX BARB J&J -B: Performed by: ORTHOPAEDIC SURGERY

## 2019-10-31 PROCEDURE — 77030004402 HC BUR NEUR STRY -C: Performed by: ORTHOPAEDIC SURGERY

## 2019-10-31 PROCEDURE — 77030012893: Performed by: ORTHOPAEDIC SURGERY

## 2019-10-31 PROCEDURE — 74011250637 HC RX REV CODE- 250/637: Performed by: ORTHOPAEDIC SURGERY

## 2019-10-31 PROCEDURE — 01NB0ZZ RELEASE LUMBAR NERVE, OPEN APPROACH: ICD-10-PCS | Performed by: ORTHOPAEDIC SURGERY

## 2019-10-31 PROCEDURE — 76210000006 HC OR PH I REC 0.5 TO 1 HR: Performed by: ORTHOPAEDIC SURGERY

## 2019-10-31 PROCEDURE — 77030040361 HC SLV COMPR DVT MDII -B: Performed by: ORTHOPAEDIC SURGERY

## 2019-10-31 PROCEDURE — 88304 TISSUE EXAM BY PATHOLOGIST: CPT

## 2019-10-31 PROCEDURE — 97161 PT EVAL LOW COMPLEX 20 MIN: CPT

## 2019-10-31 PROCEDURE — 76060000033 HC ANESTHESIA 1 TO 1.5 HR: Performed by: ORTHOPAEDIC SURGERY

## 2019-10-31 PROCEDURE — 77030027138 HC INCENT SPIROMETER -A: Performed by: ORTHOPAEDIC SURGERY

## 2019-10-31 PROCEDURE — 77030018836 HC SOL IRR NACL ICUM -A: Performed by: ORTHOPAEDIC SURGERY

## 2019-10-31 PROCEDURE — 74011000272 HC RX REV CODE- 272: Performed by: ORTHOPAEDIC SURGERY

## 2019-10-31 PROCEDURE — 77030013079 HC BLNKT BAIR HGGR 3M -A: Performed by: ORTHOPAEDIC SURGERY

## 2019-10-31 PROCEDURE — 65270000029 HC RM PRIVATE

## 2019-10-31 RX ORDER — LIDOCAINE HYDROCHLORIDE 10 MG/ML
0.1 INJECTION, SOLUTION EPIDURAL; INFILTRATION; INTRACAUDAL; PERINEURAL AS NEEDED
Status: DISCONTINUED | OUTPATIENT
Start: 2019-10-31 | End: 2019-10-31 | Stop reason: HOSPADM

## 2019-10-31 RX ORDER — DIPHENHYDRAMINE HYDROCHLORIDE 50 MG/ML
12.5 INJECTION, SOLUTION INTRAMUSCULAR; INTRAVENOUS
Status: DISCONTINUED | OUTPATIENT
Start: 2019-10-31 | End: 2019-11-01 | Stop reason: HOSPADM

## 2019-10-31 RX ORDER — FENTANYL CITRATE 50 UG/ML
25 INJECTION, SOLUTION INTRAMUSCULAR; INTRAVENOUS AS NEEDED
Status: DISCONTINUED | OUTPATIENT
Start: 2019-10-31 | End: 2019-10-31 | Stop reason: HOSPADM

## 2019-10-31 RX ORDER — ROCURONIUM BROMIDE 10 MG/ML
INJECTION, SOLUTION INTRAVENOUS AS NEEDED
Status: DISCONTINUED | OUTPATIENT
Start: 2019-10-31 | End: 2019-10-31 | Stop reason: HOSPADM

## 2019-10-31 RX ORDER — LORAZEPAM 2 MG/ML
1 INJECTION INTRAMUSCULAR
Status: DISCONTINUED | OUTPATIENT
Start: 2019-10-31 | End: 2019-11-01 | Stop reason: HOSPADM

## 2019-10-31 RX ORDER — SODIUM CHLORIDE 0.9 % (FLUSH) 0.9 %
5-40 SYRINGE (ML) INJECTION EVERY 8 HOURS
Status: DISCONTINUED | OUTPATIENT
Start: 2019-10-31 | End: 2019-11-01 | Stop reason: HOSPADM

## 2019-10-31 RX ORDER — EPHEDRINE SULFATE/0.9% NACL/PF 50 MG/5 ML
SYRINGE (ML) INTRAVENOUS AS NEEDED
Status: DISCONTINUED | OUTPATIENT
Start: 2019-10-31 | End: 2019-10-31 | Stop reason: HOSPADM

## 2019-10-31 RX ORDER — ONDANSETRON 2 MG/ML
4 INJECTION INTRAMUSCULAR; INTRAVENOUS
Status: DISCONTINUED | OUTPATIENT
Start: 2019-10-31 | End: 2019-11-01 | Stop reason: HOSPADM

## 2019-10-31 RX ORDER — VANCOMYCIN/0.9 % SOD CHLORIDE 1 G/100 ML
1 PLASTIC BAG, INJECTION (ML) INTRAVENOUS ONCE
Status: COMPLETED | OUTPATIENT
Start: 2019-10-31 | End: 2019-10-31

## 2019-10-31 RX ORDER — DIAZEPAM 5 MG/1
5 TABLET ORAL
Status: DISCONTINUED | OUTPATIENT
Start: 2019-10-31 | End: 2019-11-01 | Stop reason: HOSPADM

## 2019-10-31 RX ORDER — PREGABALIN 75 MG/1
75 CAPSULE ORAL ONCE
Status: COMPLETED | OUTPATIENT
Start: 2019-10-31 | End: 2019-10-31

## 2019-10-31 RX ORDER — NEOSTIGMINE METHYLSULFATE 1 MG/ML
INJECTION, SOLUTION INTRAVENOUS AS NEEDED
Status: DISCONTINUED | OUTPATIENT
Start: 2019-10-31 | End: 2019-10-31 | Stop reason: HOSPADM

## 2019-10-31 RX ORDER — SODIUM CHLORIDE 0.9 % (FLUSH) 0.9 %
5-40 SYRINGE (ML) INJECTION AS NEEDED
Status: DISCONTINUED | OUTPATIENT
Start: 2019-10-31 | End: 2019-10-31 | Stop reason: HOSPADM

## 2019-10-31 RX ORDER — FENTANYL CITRATE 50 UG/ML
INJECTION, SOLUTION INTRAMUSCULAR; INTRAVENOUS AS NEEDED
Status: DISCONTINUED | OUTPATIENT
Start: 2019-10-31 | End: 2019-10-31 | Stop reason: HOSPADM

## 2019-10-31 RX ORDER — ATENOLOL 50 MG/1
50 TABLET ORAL DAILY
Status: DISCONTINUED | OUTPATIENT
Start: 2019-11-01 | End: 2019-11-01 | Stop reason: HOSPADM

## 2019-10-31 RX ORDER — GABAPENTIN 300 MG/1
300 CAPSULE ORAL 3 TIMES DAILY
Status: DISCONTINUED | OUTPATIENT
Start: 2019-10-31 | End: 2019-11-01 | Stop reason: HOSPADM

## 2019-10-31 RX ORDER — DIPHENHYDRAMINE HCL 25 MG
25 CAPSULE ORAL
Status: DISCONTINUED | OUTPATIENT
Start: 2019-10-31 | End: 2019-11-01 | Stop reason: HOSPADM

## 2019-10-31 RX ORDER — SODIUM CHLORIDE 0.9 % (FLUSH) 0.9 %
5-40 SYRINGE (ML) INJECTION EVERY 8 HOURS
Status: DISCONTINUED | OUTPATIENT
Start: 2019-10-31 | End: 2019-10-31 | Stop reason: HOSPADM

## 2019-10-31 RX ORDER — MIDAZOLAM HYDROCHLORIDE 1 MG/ML
INJECTION, SOLUTION INTRAMUSCULAR; INTRAVENOUS AS NEEDED
Status: DISCONTINUED | OUTPATIENT
Start: 2019-10-31 | End: 2019-10-31 | Stop reason: HOSPADM

## 2019-10-31 RX ORDER — KETOROLAC TROMETHAMINE 15 MG/ML
INJECTION, SOLUTION INTRAMUSCULAR; INTRAVENOUS AS NEEDED
Status: DISCONTINUED | OUTPATIENT
Start: 2019-10-31 | End: 2019-10-31 | Stop reason: HOSPADM

## 2019-10-31 RX ORDER — PROPOFOL 10 MG/ML
INJECTION, EMULSION INTRAVENOUS AS NEEDED
Status: DISCONTINUED | OUTPATIENT
Start: 2019-10-31 | End: 2019-10-31 | Stop reason: HOSPADM

## 2019-10-31 RX ORDER — HYDROMORPHONE HYDROCHLORIDE 1 MG/ML
1 INJECTION, SOLUTION INTRAMUSCULAR; INTRAVENOUS; SUBCUTANEOUS
Status: DISCONTINUED | OUTPATIENT
Start: 2019-10-31 | End: 2019-11-01 | Stop reason: HOSPADM

## 2019-10-31 RX ORDER — CELECOXIB 400 MG/1
400 CAPSULE ORAL ONCE
Status: COMPLETED | OUTPATIENT
Start: 2019-10-31 | End: 2019-10-31

## 2019-10-31 RX ORDER — LIDOCAINE HYDROCHLORIDE 20 MG/ML
INJECTION, SOLUTION EPIDURAL; INFILTRATION; INTRACAUDAL; PERINEURAL AS NEEDED
Status: DISCONTINUED | OUTPATIENT
Start: 2019-10-31 | End: 2019-10-31 | Stop reason: HOSPADM

## 2019-10-31 RX ORDER — ONDANSETRON 2 MG/ML
INJECTION INTRAMUSCULAR; INTRAVENOUS AS NEEDED
Status: DISCONTINUED | OUTPATIENT
Start: 2019-10-31 | End: 2019-10-31 | Stop reason: HOSPADM

## 2019-10-31 RX ORDER — SCOLOPAMINE TRANSDERMAL SYSTEM 1 MG/1
1 PATCH, EXTENDED RELEASE TRANSDERMAL
Status: DISCONTINUED | OUTPATIENT
Start: 2019-10-31 | End: 2019-10-31

## 2019-10-31 RX ORDER — FAMOTIDINE 20 MG/1
20 TABLET, FILM COATED ORAL ONCE
Status: COMPLETED | OUTPATIENT
Start: 2019-10-31 | End: 2019-10-31

## 2019-10-31 RX ORDER — MORPHINE SULFATE 4 MG/ML
4 INJECTION INTRAVENOUS
Status: DISCONTINUED | OUTPATIENT
Start: 2019-10-31 | End: 2019-10-31 | Stop reason: HOSPADM

## 2019-10-31 RX ORDER — ACETAMINOPHEN 500 MG
1000 TABLET ORAL EVERY 6 HOURS
Status: DISCONTINUED | OUTPATIENT
Start: 2019-10-31 | End: 2019-11-01 | Stop reason: HOSPADM

## 2019-10-31 RX ORDER — SODIUM CHLORIDE, SODIUM LACTATE, POTASSIUM CHLORIDE, CALCIUM CHLORIDE 600; 310; 30; 20 MG/100ML; MG/100ML; MG/100ML; MG/100ML
50 INJECTION, SOLUTION INTRAVENOUS CONTINUOUS
Status: DISCONTINUED | OUTPATIENT
Start: 2019-10-31 | End: 2019-10-31 | Stop reason: HOSPADM

## 2019-10-31 RX ORDER — ONDANSETRON 2 MG/ML
4 INJECTION INTRAMUSCULAR; INTRAVENOUS ONCE
Status: DISCONTINUED | OUTPATIENT
Start: 2019-10-31 | End: 2019-10-31 | Stop reason: HOSPADM

## 2019-10-31 RX ORDER — BUPIVACAINE HYDROCHLORIDE 5 MG/ML
INJECTION, SOLUTION EPIDURAL; INTRACAUDAL AS NEEDED
Status: DISCONTINUED | OUTPATIENT
Start: 2019-10-31 | End: 2019-10-31 | Stop reason: HOSPADM

## 2019-10-31 RX ORDER — MAGNESIUM SULFATE 100 %
4 CRYSTALS MISCELLANEOUS AS NEEDED
Status: DISCONTINUED | OUTPATIENT
Start: 2019-10-31 | End: 2019-10-31 | Stop reason: HOSPADM

## 2019-10-31 RX ORDER — OXYCODONE HYDROCHLORIDE 5 MG/1
5-10 TABLET ORAL
Status: DISCONTINUED | OUTPATIENT
Start: 2019-10-31 | End: 2019-11-01 | Stop reason: HOSPADM

## 2019-10-31 RX ORDER — VANCOMYCIN HYDROCHLORIDE 1 G/20ML
INJECTION, POWDER, LYOPHILIZED, FOR SOLUTION INTRAVENOUS AS NEEDED
Status: DISCONTINUED | OUTPATIENT
Start: 2019-10-31 | End: 2019-10-31 | Stop reason: HOSPADM

## 2019-10-31 RX ORDER — NALOXONE HYDROCHLORIDE 0.4 MG/ML
0.4 INJECTION, SOLUTION INTRAMUSCULAR; INTRAVENOUS; SUBCUTANEOUS AS NEEDED
Status: DISCONTINUED | OUTPATIENT
Start: 2019-10-31 | End: 2019-11-01 | Stop reason: HOSPADM

## 2019-10-31 RX ORDER — VANCOMYCIN/0.9 % SOD CHLORIDE 1 G/100 ML
1 PLASTIC BAG, INJECTION (ML) INTRAVENOUS EVERY 12 HOURS
Status: COMPLETED | OUTPATIENT
Start: 2019-10-31 | End: 2019-11-01

## 2019-10-31 RX ORDER — DEXTROSE 50 % IN WATER (D50W) INTRAVENOUS SYRINGE
25-50 AS NEEDED
Status: DISCONTINUED | OUTPATIENT
Start: 2019-10-31 | End: 2019-10-31 | Stop reason: HOSPADM

## 2019-10-31 RX ORDER — GLYCOPYRROLATE 0.2 MG/ML
INJECTION INTRAMUSCULAR; INTRAVENOUS AS NEEDED
Status: DISCONTINUED | OUTPATIENT
Start: 2019-10-31 | End: 2019-10-31 | Stop reason: HOSPADM

## 2019-10-31 RX ORDER — SODIUM CHLORIDE 0.9 % (FLUSH) 0.9 %
5-40 SYRINGE (ML) INJECTION AS NEEDED
Status: DISCONTINUED | OUTPATIENT
Start: 2019-10-31 | End: 2019-11-01 | Stop reason: HOSPADM

## 2019-10-31 RX ORDER — LISINOPRIL 20 MG/1
20 TABLET ORAL DAILY
Status: DISCONTINUED | OUTPATIENT
Start: 2019-11-01 | End: 2019-11-01 | Stop reason: HOSPADM

## 2019-10-31 RX ORDER — ROSUVASTATIN CALCIUM 10 MG/1
10 TABLET, COATED ORAL DAILY
Status: DISCONTINUED | OUTPATIENT
Start: 2019-11-01 | End: 2019-11-01 | Stop reason: HOSPADM

## 2019-10-31 RX ADMIN — GABAPENTIN 300 MG: 300 CAPSULE ORAL at 22:51

## 2019-10-31 RX ADMIN — FAMOTIDINE 20 MG: 20 TABLET, FILM COATED ORAL at 10:28

## 2019-10-31 RX ADMIN — SODIUM CHLORIDE, SODIUM LACTATE, POTASSIUM CHLORIDE, AND CALCIUM CHLORIDE 50 ML/HR: 600; 310; 30; 20 INJECTION, SOLUTION INTRAVENOUS at 10:27

## 2019-10-31 RX ADMIN — KETOROLAC TROMETHAMINE 15 MG: 15 INJECTION, SOLUTION INTRAMUSCULAR; INTRAVENOUS at 12:22

## 2019-10-31 RX ADMIN — VANCOMYCIN HYDROCHLORIDE 1000 MG: 10 INJECTION, POWDER, LYOPHILIZED, FOR SOLUTION INTRAVENOUS at 10:54

## 2019-10-31 RX ADMIN — ROCURONIUM BROMIDE 50 MG: 10 INJECTION, SOLUTION INTRAVENOUS at 11:16

## 2019-10-31 RX ADMIN — LIDOCAINE HYDROCHLORIDE 100 MG: 20 INJECTION, SOLUTION EPIDURAL; INFILTRATION; INTRACAUDAL; PERINEURAL at 11:16

## 2019-10-31 RX ADMIN — CELECOXIB 400 MG: 400 CAPSULE ORAL at 10:28

## 2019-10-31 RX ADMIN — Medication 10 MG: at 11:48

## 2019-10-31 RX ADMIN — VANCOMYCIN HYDROCHLORIDE 1 G: 10 INJECTION, POWDER, LYOPHILIZED, FOR SOLUTION INTRAVENOUS at 11:29

## 2019-10-31 RX ADMIN — FENTANYL CITRATE 100 MCG: 50 INJECTION INTRAMUSCULAR; INTRAVENOUS at 11:09

## 2019-10-31 RX ADMIN — VANCOMYCIN HYDROCHLORIDE 1000 MG: 10 INJECTION, POWDER, LYOPHILIZED, FOR SOLUTION INTRAVENOUS at 22:51

## 2019-10-31 RX ADMIN — Medication 10 ML: at 22:52

## 2019-10-31 RX ADMIN — MIDAZOLAM HYDROCHLORIDE 2 MG: 2 INJECTION, SOLUTION INTRAMUSCULAR; INTRAVENOUS at 11:09

## 2019-10-31 RX ADMIN — ONDANSETRON 4 MG: 2 INJECTION INTRAMUSCULAR; INTRAVENOUS at 11:36

## 2019-10-31 RX ADMIN — PROPOFOL 130 MG: 10 INJECTION, EMULSION INTRAVENOUS at 11:16

## 2019-10-31 RX ADMIN — PREGABALIN 75 MG: 75 CAPSULE ORAL at 10:28

## 2019-10-31 RX ADMIN — GLYCOPYRROLATE 0.4 MG: 0.2 INJECTION INTRAMUSCULAR; INTRAVENOUS at 11:52

## 2019-10-31 RX ADMIN — Medication 3 MG: at 11:52

## 2019-10-31 RX ADMIN — GABAPENTIN 300 MG: 300 CAPSULE ORAL at 16:33

## 2019-10-31 NOTE — INTERVAL H&P NOTE
H&P Update:  Ty Velásquez was seen and examined. History and physical has been reviewed. The patient has been examined.  There have been no significant clinical changes since the completion of the originally dated History and Physical.

## 2019-10-31 NOTE — PROGRESS NOTES
Patients arrives via bed from PACU alert awake and oriented, dressings is dry and intact with ice pack CMS+ family members at bed side.

## 2019-10-31 NOTE — PROGRESS NOTES
Refused home health, NP Reid Jackson aware. 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 Likelihood of Readmission:   LOW                         Transition of Care Plan:              Initial assessment completed with patient. Cognitive status of patient: oriented to time, place, person and situation. Face sheet information confirmed:  yes. The patient designates  to participate in her discharge plan and to receive any needed information. This patient lives in a single family home with . Patient is able to navigate steps as needed. Prior to hospitalization, patient was considered to be independent with ADLs/IADLS : yes . Patient has a current ACP document on file: no  The  will be available to transport patient home upon discharge. The patient already has , and  medical equipment available in the home. Patient is not currently active with home health. Patient has not stayed in a skilled nursing facility or rehab. .      This patient is on dialysis :no    Currently, the discharge plan is Home. The patient states that she can obtain her medications from the pharmacy, and take her medications as directed. Patient's current insurance is BRAIN       Care Management Interventions  PCP Verified by CM:  Yes  Palliative Care Criteria Met (RRAT>21 & CHF Dx)?: No  Mode of Transport at Discharge: Self  Transition of Care Consult (CM Consult): Discharge Planning  Physical Therapy Consult: Yes  Occupational Therapy Consult: Yes  Current Support Network: Lives with Spouse  Confirm Follow Up Transport: Family  Plan discussed with Pt/Family/Caregiver: Yes  Freedom of Choice Offered: Yes  Discharge Location  Discharge Placement: [place] [id number], MSW  Case Management  [phone]

## 2019-10-31 NOTE — ADDENDUM NOTE
Addendum  created 10/31/19 1237 by Michael Sparrow CRNA    Intraprocedure Event edited, Intraprocedure Staff edited

## 2019-10-31 NOTE — PROGRESS NOTES
Problem: Mobility Impaired (Adult and Pediatric)  Goal: *Acute Goals and Plan of Care (Insert Text)  Description  Physical Therapy Goals  Initiated 10/31/2019 and to be accomplished within 3 day(s)  1. Patient will move from supine to sit and sit to supine , scoot up and down and roll side to side in bed with modified independence. 2.  Patient will transfer from bed to chair and chair to bed with modified independence using the least restrictive device. 3.  Patient will perform sit to stand with modified independence. 4.  Patient will ambulate with modified independence for >150 feet with the least restrictive device. 5.  Patient will ascend/descend 4 stairs with 1-2 handrail(s) with supervision/set-up. PLOF: Patient lives with  in 1 story home with 4STE with HR and was independent with mobility and I/ADL's PTA. Patient has handicap accessible bathroom and has RW, SPC, and knee scooter at home. Outcome: Progressing Towards Goal   PHYSICAL THERAPY EVALUATION    Patient: Maximus Hollis (1 y.o. 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 Patient was educated on spinal precautions and performed log roll OOB with supervision. Patient was given demo with instruction on sit <> stand transfer and gait training and transferred to standing with CG and ambulated with RW into bathroom, transferred on/off commode, stood at sink to wash hands with good balance.  Patient then continued for total 11ft ambulation with RW at [facility] and demonstrated good compliance with precautions throughout session. At conclusion of session patient transferred to sitting in recliner and was left resting with call bell by the side and SCDs donned. Patient instructed to call for assistance if they needed to get up for any reason and denied need for further assistance. Patient demonstrates decreased strength, mobility, and endurance and will benefit from skilled intervention to address the above impairments. Patient's rehabilitation potential is considered to be Good  Factors which may influence rehabilitation potential include:   ? None noted  ? Mental ability/status  ? Medical condition  ? Home/family situation and support systems  ? Safety awareness  ? Pain tolerance/management  ? Other:      PLAN :  Recommendations and Planned Interventions:   ?           Bed Mobility Training             ? Neuromuscular Re-Education  ? Transfer Training                   ? Orthotic/Prosthetic Training  ? Gait Training                          ? Modalities  ? Therapeutic Exercises           ? Edema Management/Control  ? Therapeutic Activities            ? Family Training/Education  ? Patient Education  ? Other (comment):    Frequency/Duration: Patient will be followed by physical therapy 1-2 times per day/4-7 days per week to address goals. Discharge Recommendations: Home Health  Further Equipment Recommendations for Discharge: rolling walker     SUBJECTIVE:   Patient stated I think I might need to use the bathroom.     OBJECTIVE DATA SUMMARY:     Past Medical History:   Diagnosis Date    Arthritis     Dyslipidemia     Heart murmur     Hx of migraines     Hypertension     Nausea & vomiting     Rosacea      Past Surgical History:   Procedure Laterality Date    FOOT/TOES SURGERY PROC UNLISTED      x6    HX HYSTERECTOMY      due to heavy bleeding    HX KNEE ARTHROSCOPY      HX LUMBAR LAMINECTOMY  01/2011    L5/S1 Lami     Barriers to Learning/Limitations: None  Compensate with: N/A  Home Situation:  Home Situation  Home Environment: Private residence  # Steps to Enter: 4  One/Two Story Residence: One story  Living Alone: No  Support Systems: Family member(s)  Patient Expects to be Discharged to[de-identified] Private residence  Current DME Used/Available at Home: Cane, straight, Walker, rolling, Raised toilet seat  Critical Behavior:    A&OX4  Strength:    Strength: Within functional limits(BLE)   Tone & Sensation:   Tone: Normal(BLE)   Sensation: Intact(BLE)   Range Of Motion:  AROM: Within functional limits(BLE)   Functional Mobility:  Bed Mobility:   Supine to Sit: Supervision   Scooting: Supervision  Transfers:  Sit to Stand: Contact guard assistance  Stand to Sit: Supervision    Balance:   Sitting: Intact  Standing: Impaired; With support  Standing - Static: Good  Standing - Dynamic : Fair  Ambulation/Gait Training:  Distance (ft): 110 Feet (ft)  Assistive Device: Walker, rolling  Ambulation - Level of Assistance: Stand-by assistance    Speed/Porsha: Slow   Interventions: Verbal cues; Visual/Demos  Pain:  Pain level pre-treatment: 4/10   Pain level post-treatment: 4/10   Pain Intervention(s) : Medication (see MAR); Rest, Repositioning  Response to intervention: Nurse notified, See doc flow  Activity Tolerance:   Patient tolerated activity well and demos good carryover of learning. Please refer to the flowsheet for vital signs taken during this treatment. After treatment:   ?         Patient left in no apparent distress sitting up in chair  ? Patient left in no apparent distress in bed  ? Call bell left within reach  ? Nursing notified  ? Caregiver present  ? Bed alarm activated  ? SCDs applied    COMMUNICATION/EDUCATION:   ?         Role of Physical Therapy in the acute care setting.   ?         Fall prevention education was provided and the patient/caregiver indicated understanding. ? Patient/family have participated as able in goal setting and plan of care. ?         Patient/family agree to work toward stated goals and plan of care. ?         Patient understands intent and goals of therapy, but is neutral about his/her participation. ? Patient is unable to participate in goal setting/plan of care: ongoing with therapy staff. ?         Other:     Thank you for this referral.  Karen Coon, PT   Time Calculation: 25 mins      Eval Complexity: History: MEDIUM  Complexity : 1-2 comorbidities / personal factors will impact the outcome/ POC Exam:LOW Complexity : 1-2 Standardized tests and measures addressing body structure, function, activity limitation and / or participation in recreation  Presentation: LOW Complexity : Stable, uncomplicated  Clinical Decision Making:Low Complexity    Overall Complexity:LOW

## 2019-10-31 NOTE — ANESTHESIA POSTPROCEDURE EVALUATION
Procedure(s):  RIGHT L4/5 LAMINECTOMY/C-ARM. general    Anesthesia Post Evaluation      Multimodal analgesia: multimodal analgesia not used between 6 hours prior to anesthesia start to PACU discharge  Patient location during evaluation: PACU  Patient participation: complete - patient participated  Level of consciousness: awake  Pain score: 1  Pain management: adequate  Airway patency: patent  Anesthetic complications: no  Cardiovascular status: acceptable and hemodynamically stable  Respiratory status: acceptable, spontaneous ventilation, nasal cannula and nonlabored ventilation  Hydration status: acceptable  Post anesthesia nausea and vomiting:  none      Vitals Value Taken Time   /91 10/31/2019 12:34 PM   Temp 37.1 °C (98.7 °F) 10/31/2019 12:32 PM   Pulse 86 10/31/2019 12:35 PM   Resp 10 10/31/2019 12:35 PM   SpO2 100 % 10/31/2019 12:35 PM   Vitals shown include unvalidated device data.

## 2019-10-31 NOTE — PERIOP NOTES
TRANSFER - OUT REPORT:    Verbal report given to Arcelia CARRENO(name) on Philip Laird  being transferred to [address] [place] [address](unit) for routine post - op       Report consisted of patients Situation, Background, Assessment and   Recommendations(SBAR). Information from the following report(s) SBAR, Kardex, OR Summary, Procedure Summary, Intake/Output, MAR and Recent Results was reviewed with the receiving nurse. Lines:   Peripheral IV 10/31/19 Right Hand (Active)   Site Assessment Clean, dry, & intact 10/31/2019 12:34 PM   Phlebitis Assessment 0 10/31/2019 12:34 PM   Infiltration Assessment 0 10/31/2019 12:34 PM   Dressing Status Clean, dry, & intact 10/31/2019 12:34 PM   Dressing Type Tape;Transparent 10/31/2019 12:34 PM   Hub Color/Line Status Pink; Infusing 10/31/2019 12:34 PM   Action Taken Open ports on tubing capped 10/31/2019 11:48 AM   Alcohol Cap Used Yes 10/31/2019 11:48 AM        Opportunity for questions and clarification was provided.       Patient transported with:   [employer]

## 2019-10-31 NOTE — ANESTHESIA PREPROCEDURE EVALUATION
Relevant Problems   No relevant active problems       Anesthetic History     PONV          Review of Systems / Medical History  Patient summary reviewed and pertinent labs reviewed    Pulmonary  Within defined limits                 Neuro/Psych         TIA     Cardiovascular    Hypertension          Hyperlipidemia    Exercise tolerance: >4 METS     GI/Hepatic/Renal  Within defined limits              Endo/Other        Arthritis     Other Findings            Physical Exam    Airway  Mallampati: II  TM Distance: 4 - 6 cm  Neck ROM: normal range of motion   Mouth opening: Normal    Comments: micrognathic Cardiovascular  Regular rate and rhythm,  S1 and S2 normal,  no murmur, click, rub, or gallop  Rhythm: regular  Rate: normal         Dental    Dentition: Lower dentition intact and Upper dentition intact     Pulmonary  Breath sounds clear to auscultation               Abdominal  GI exam deferred       Other Findings            Anesthetic Plan    ASA: 2  Anesthesia type: general          Induction: Intravenous  Anesthetic plan and risks discussed with: Patient

## 2019-10-31 NOTE — PROGRESS NOTES
Report given by Norma Hanley RN from PACU regarding patients current medical situations for continuation of care.

## 2019-10-31 NOTE — BRIEF OP NOTE
BRIEF OPERATIVE NOTE    Date of Procedure: 10/31/2019   Preoperative Diagnosis: Synovial cyst of lumbar spine [M71.38]  Spinal stenosis, lumbar region with neurogenic claudication [M48.062]  Postoperative Diagnosis: Synovial cyst of lumbar spine [M71.38]  Spinal stenosis, lumbar region with neurogenic claudication [M48.062]    Procedure(s):  RIGHT L4/5 LAMINECTOMY/C-ARM  Surgeon(s) and Role:     Nancy Starr MD - Primary         Surgical Assistant: 0    Surgical Staff:  Circ-1: Elvia Best  Circ-2: Lavell Nageotte, RN  Circ-Relief: Naida Baird  Radiology Technician: Angelita Yee; Nehal Hartmann, RT  Scrub Tech-1: Wilda Zimmer  Surg Asst-1: Kathleen Basurto  Event Time In Time Out   Incision Start 1133    Incision Close       Anesthesia: General   Estimated Blood Loss: 10  Specimens:   ID Type Source Tests Collected by Time Destination   1 : Synovial Cyst Preservative Spine  Rosalind Mcconnell MD 10/31/2019 1156 Pathology      Findings: large synovial cyst   Complications: 0  Implants: * No implants in log *

## 2019-10-31 NOTE — PROGRESS NOTES
OR today  Right L4/5 laminectomy  VSS  Dressing dry  Due to void  States right leg pain resolved  Only c/o incisional pain  4/5 BLE    Toñito Zamora, NP

## 2019-11-01 VITALS
HEIGHT: 66 IN | BODY MASS INDEX: 25.25 KG/M2 | RESPIRATION RATE: 18 BRPM | DIASTOLIC BLOOD PRESSURE: 70 MMHG | TEMPERATURE: 98 F | WEIGHT: 157.13 LBS | SYSTOLIC BLOOD PRESSURE: 120 MMHG | OXYGEN SATURATION: 96 % | HEART RATE: 82 BPM

## 2019-11-01 PROCEDURE — 97116 GAIT TRAINING THERAPY: CPT

## 2019-11-01 PROCEDURE — 97530 THERAPEUTIC ACTIVITIES: CPT

## 2019-11-01 PROCEDURE — 74011250636 HC RX REV CODE- 250/636: Performed by: ORTHOPAEDIC SURGERY

## 2019-11-01 PROCEDURE — 97165 OT EVAL LOW COMPLEX 30 MIN: CPT

## 2019-11-01 RX ORDER — OXYCODONE AND ACETAMINOPHEN 5; 325 MG/1; MG/1
1 TABLET ORAL
Qty: 20 TAB | Refills: 0 | Status: SHIPPED | OUTPATIENT
Start: 2019-11-01 | End: 2019-11-08

## 2019-11-01 RX ORDER — OXYCODONE AND ACETAMINOPHEN 5; 325 MG/1; MG/1
1 TABLET ORAL
Qty: 20 TAB | Refills: 0 | Status: SHIPPED | OUTPATIENT
Start: 2019-11-01 | End: 2019-11-04

## 2019-11-01 RX ADMIN — VANCOMYCIN HYDROCHLORIDE 1000 MG: 10 INJECTION, POWDER, LYOPHILIZED, FOR SOLUTION INTRAVENOUS at 09:45

## 2019-11-01 RX ADMIN — Medication 10 ML: at 06:00

## 2019-11-01 NOTE — PROGRESS NOTES
Problem: Self Care Deficits Care Plan (Adult)  Goal: *Acute Goals and Plan of Care (Insert Text)  Outcome: Resolved/Met   OCCUPATIONAL THERAPY EVALUATION/DISCHARGE    Patient: Zeinab Shearer (40 y.o. female)  Date: 11/1/2019  Primary Diagnosis: Synovial cyst of lumbar spine [M71.38]  Spinal stenosis, lumbar region with neurogenic claudication [M48.062]  Spinal stenosis [M48.00]  Synovial cyst [M71.30]  Procedure(s) (LRB):  RIGHT L4/5 LAMINECTOMY/C-ARM (Right) 1 Day Post-Op   Precautions:   Fall, Spinal  PLOF: Pt reports she was (I) with basic self-care/ADLs, IADLs, and functional mobility PTA. She lives with her  in a [facility] with 4STE. Pt has a handicap accessible bathroom, equipped with a walk-in tub with seat, grab bars, and raised toilet seat. Pt also has a Rw, cane, an knee scooter. ASSESSMENT AND RECOMMENDATIONS:  Pt cleared to participate in OT evaluation by Rn. Based on the objective data described below, the patient presents she is (I) with basic self-care/ADLs, requiring SBA for LB dressing. Pt reports difficulty performing cross legs method to doff/don socks during this session d/t arthritis. Educated pt on use of AE (reacher to don pants/doff socks, and sock aid to don socks) to complete LB dressing tasks, however pt pleasantly declined AE. Pt reports she will be able to perform task with  to assist with ADLs PRN. Pt ambulated from bed<>bathroom for toilet transfers and grooming at sink level Independently. Will defer to PT for functional balance and mobility. Educated pt on the role of Ot, evaluation process, AE, fall prevention, and back/spinal precautions with pt demo good understanding. Skilled occupational therapy is not indicated at this time.     Discharge Recommendations: None  Further Equipment Recommendations for Discharge: N/A     SUBJECTIVE:   Patient stated my  can help    OBJECTIVE DATA SUMMARY:     Past Medical History:   Diagnosis Date    Arthritis     Dyslipidemia     Heart murmur     Hx of migraines     Hypertension     Nausea & vomiting     Rosacea      Past Surgical History:   Procedure Laterality Date    FOOT/TOES SURGERY PROC UNLISTED      x6    HX HYSTERECTOMY      due to heavy bleeding    HX KNEE ARTHROSCOPY      HX LUMBAR LAMINECTOMY  01/2011    L5/S1 Lami     Barriers to Learning/Limitations: None  Compensate with: visual, verbal, tactile, kinesthetic cues/model    Home Situation:   Home Situation  Home Environment: Private residence  # Steps to Enter: 4  One/Two Story Residence: One story  Living Alone: No  Support Systems: Family member(s)  Patient Expects to be Discharged to[de-identified] Private residence  Current DME Used/Available at Home: Cane, straight, Walker, rolling, Raised toilet seat  Tub or Shower Type: (Handicap accessible bathroom. )  ? Right hand dominant   ? Left hand dominant    Cognitive/Behavioral Status:  Neurologic State: Alert  Orientation Level: Oriented X4  Cognition: Appropriate decision making; Follows commands  Safety/Judgement: Fall prevention    Skin: Visible skin appeared intact  Edema: None noted    Coordination: BUE  Coordination: Within functional limits  Fine Motor Skills-Upper: Left Intact; Right Intact    Gross Motor Skills-Upper: Left Intact; Right Intact      Balance:  Sitting: Intact  Standing: Intact; Without support  Standing - Static: Good  Standing - Dynamic : Good    Strength: BUE  Strength:  Within functional limits    Tone & Sensation: BUE  Tone: Normal  Sensation: Intact    Range of Motion: BUE  AROM: Within functional limits         Functional Mobility and Transfers for ADLs:  Bed Mobility:  Supine to Sit: Independent  Sit to Supine: Independent  Scooting: Independent  Transfers:  Sit to Stand: Independent  Stand to Sit: Independent   Toilet Transfer : Independent   Bathroom Mobility: Independent    ADL Assessment:  Feeding: Independent    Oral Facial Hygiene/Grooming: Independent    Bathing: Independent    Upper Body Dressing: Independent    Lower Body Dressing: Stand-by assistance    Toileting: Independent      ADL Intervention:  Toileting: Independently; no LOB noted    Pt unable to perform cross legs method to don/doff socks d/t arthritis. Educated pt on use of AE (reacher and sock aid) to assist with this task, but pt pleasantly declined Ae. Pt reports she will be able to complete task with  to provide assist PRN. Cognitive Retraining  Safety/Judgement: Fall prevention    Pain:  Pain level pre-treatment: 1/10   Pain level post-treatment: 1/10   Pain Intervention(s): Medication (see MAR); Rest, Ice, Repositioning  Response to intervention: Nurse notified, See doc flow    Activity Tolerance:   Good    Please refer to the flowsheet for vital signs taken during this treatment. After treatment:   ?  Patient left in no apparent distress sitting up in chair  ? Patient left in no apparent distress sitting Edge of bed  ? Call bell left within reach  ? Nursing notified  ? Caregiver present  ? Bed alarm activated    COMMUNICATION/EDUCATION:   ?      Role of Occupational Therapy in the acute care setting  ? Home safety education was provided and the patient/caregiver indicated understanding. ? Patient/family have participated as able and agree with findings and recommendations. ?      Patient is unable to participate in plan of care at this time. Thank you for this referral.  Renee Goodwin MS, OTR/L  Time Calculation: 12 mins      Eval Complexity: History: LOW Complexity : Brief history review ; Examination: LOW Complexity : 1-3 performance deficits relating to physical, cognitive , or psychosocial skils that result in activity limitations and / or participation restrictions ;    Decision Making:LOW Complexity : No comorbidities that affect functional and no verbal or physical assistance needed to complete eval tasks

## 2019-11-01 NOTE — ROUTINE PROCESS
Bedside and Verbal shift change report given to Summer Zavala RN (oncoming nurse) by Nery Blount RN (offgoing nurse). Report included the following information Kardex and MAR.     0700    End of Shift Note     Bedside and verbal shift change report given to Ge Teixeira RN (On coming nurse) by Summer Zavala RN (Off going nurse).   Report included the following information:Procedure Summary, Intake/Output, MAR, Recent Results, Med Rec Status, and SBAR  (Situation, Background, Assessment and Recommendations)    SBAR Recommendations: none    Issues for Provider to address none        Activity This Shift      [] Resting in bed   [] TDWB   [] Dangled     [] Chair Ambulated to     [x] Bathroom     [] BSC     [] Refused Ambulated In    [] Room    [] Hallway    [] Bedrest ordered   Bladder Scan  Voiding Status [] Yes  [x] Void [x] No  [] Bello [] N/A  [] I&O Cath   Blood Sugars Managed [] Yes [x] No [] N/A   Bowel Movement  Passing Gas [] Yes  [x] Yes [x] No  [] No    CHG Bath Done     Before Surgery     After Surgery   [x] Yes  [x] Yes   [] No  [] No   [] N/A  [] N/A   Drain Removed [] Yes [x] No [] N/A   Dressing Checked  Dressing Changed [x] Yes  [] Yes [] No  [x] No [] N/A  [] N/A   Nausea/Vomiting [] Yes [x] No    Ice Packs Changed [x] Yes [] No [] N/A   Incentive Spirometer  [x] Yes [] No Volume 1000   SCD Pumps On Bilaterally  Ankle Pumping  [x] Yes  [] Yes [] Yes  [x] No [] N/A  [] N/A    Telemetry Monitoring [] Yes [x] No Rhythm N/A   Up to Chair for Meals [x] Yes [] No [] N/A

## 2019-11-01 NOTE — ROUTINE PROCESS
Prescription canceled at express script. I have reviewed discharge instructions with the patient. The patient verbalized understanding.     Discharge medications reviewed with patient and appropriate educational materials and side effects teaching were provided.     Armband was removed and shredded. Pt vitals was stable and pt is cleared for discharge.

## 2019-11-01 NOTE — DISCHARGE INSTRUCTIONS
Post-Operative Discharge Instructions after [facility]  [phone]      You will return to the office 2 weeks after surgery. (Appointment was made at the time you scheduled your surgery)    Call office or physician on-call for any of the following:  · Fever greater than 100.5  · Uncontrollable chills  · Drainage from incision  · Pain not controlled by pain medication  · New pain  · New weakness or progressive weakness  · Food sticking in throat or excessive coughing when swallowing    Stop all anti-inflammatories (arthritis medication) such as Ibuprofen, Motrin, Aleve, Voltaren, Relafen, Naproxen, Arthrotec, etc. for 12 weeks ONLY if you had a neck or back Fusion. You may take Tylenol or acetaminophen over the counter. Take your pain medication as prescribed. Medication Name Percocet          May remove AURELIO stockings when discharged from the hospital.    May shower on the third day after surgery. Leave dressing on while you shower; the dressing is waterproof. No need for a dressing on neck patients after the third day. Abstain from driving until your first post-operative visit. If you must drive, do not take pain medications that may alter your abilities. Neck patients feel better sleeping in a recliner or \"propped up\" position for a few days after surgery. This helps reduce the swelling. It is normal for neck patients to have some difficulty swallowing the first few days. Use a straw for all liquids. Cut up solid foods smaller than normal until you are swallowing without difficulty. Walking is the best therapy after back surgery. Avoid extremes of bending, twisting, or lifting. All post-operative surgical patients should function within the range of the pain. \"If it hurts - do not do it. \"    Thank you for choosing [facility] for your neck surgery.

## 2019-11-01 NOTE — PROGRESS NOTES
Problem: Mobility Impaired (Adult and Pediatric)  Goal: *Acute Goals and Plan of Care (Insert Text)  Description  Physical Therapy Goals  Initiated 10/31/2019 and to be accomplished within 3 day(s)  1. Patient will move from supine to sit and sit to supine , scoot up and down and roll side to side in bed with modified independence. 2.  Patient will transfer from bed to chair and chair to bed with modified independence using the least restrictive device. 3.  Patient will perform sit to stand with modified independence. 4.  Patient will ambulate with modified independence for >150 feet with the least restrictive device. 5.  Patient will ascend/descend 4 stairs with 1-2 handrail(s) with supervision/set-up. PLOF: Patient lives with  in 1 story home with 4STE with HR and was independent with mobility and I/ADL's PTA. Patient has handicap accessible bathroom and has RW, SPC, and knee scooter at home. Outcome: Resolved/Met   PHYSICAL THERAPY TREATMENT AND DISCHARGE    Patient: Nani Pedro (69 y.o. female)  Date: 11/1/2019  Diagnosis: Synovial cyst of lumbar spine [M71.38]  Spinal stenosis, lumbar region with neurogenic claudication [M48.062]  Spinal stenosis [M48.00]  Synovial cyst [M71.30] <principal problem not specified>  Procedure(s) (LRB):  RIGHT L4/5 LAMINECTOMY/C-ARM (Right) 1 Day Post-Op  Precautions: Fall, Spinal    ASSESSMENT:  Patient is cleared by nursing for PT, and patient consents to therapy. Pt educated on safety, mobility, therex, spine precautions (pt recalls 3/3), log rolling technique, and OOB 3-5 times. Pt performing supine to sit with log roll independently. Transfers independent. Gait 250 feet initially RW then no AD with no LOB. Pt practiced stairs 4 steps B HRA no LOB. Pt is safe for d/c home with nursing informed. Pt sitting edge of bed with all needs met.         PLAN:  Maximum therapeutic gains met at current level of care and patient will be discharged from physical therapy at this time. Rationale for discharge:    ?     Goals Achieved  ? Plateau Reached  ? Patient not participating in therapy  ? Other:  Discharge Recommendations:  Home Health as needed (PT discussed slowing increasing gait back to 45 minutes walks)  Further Equipment Recommendations for Discharge:  N/A     SUBJECTIVE:   Patient stated Hayder Chavira will I be able to walk for 45 minutes again?     OBJECTIVE DATA SUMMARY:   Critical Behavior:  Neurologic State: Alert  Orientation Level: Oriented X4, Appropriate for age  Cognition: Follows commands, Appropriate decision making  Safety/Judgement: Fall prevention, Awareness of environment  Functional Mobility Training:  Bed Mobility:     Supine to Sit: Independent  Sit to Supine: Independent  Scooting: Independent         Transfers:  Sit to Stand: Independent  Stand to Sit: Independent             Balance:  Sitting: Intact  Standing: Intact; Without support  Standing - Static: Good  Standing - Dynamic : Good        Ambulation/Gait Training:  Distance (ft): 250 Feet (ft)  Assistive Device: (RW for 20 feet then no AD)  Ambulation - Level of Assistance: Independent  Speed/Porsha: Pace decreased (<100 feet/min)          Stairs:  Number of Stairs Trained: 4  Stairs - Level of Assistance: Modified independent  Rail Use: Both    Therapeutic Exercises:   Reviewed and performed ankle pumps to increase blood flow and circulation. Pain:  Pain level pre-treatment: 1/10  back  Pain level post-treatment: 1/10  back  Pain Intervention(s): Medication (see MAR); Rest, Ice, Repositioning   Response to intervention: Nurse notified, See doc flow    Activity Tolerance:   good  Please refer to the flowsheet for vital signs taken during this treatment. After treatment:   ? Patient left in no apparent distress sitting up in chair  ? Patient left in no apparent distress in bed  ? Call bell left within reach  ? Personal items in reach  ? Nursing notified Stan Brooks  ?  Caregiver present  ? Bed/chair alarm activated  ? SCDs applied      COMMUNICATION/EDUCATION:   ?         Role of Physical Therapy in the acute care setting. ?         Fall prevention education was provided and the patient/caregiver indicated understanding. ? Patient/family have participated as able and agree with findings and recommendations. ?         Patient is unable to participate in plan of care at this time. ?         Out of bed at least 3-5 times a day. ?          Other:        [place], PT, DPT   Time Calculation: 23 mins

## 2019-11-01 NOTE — PROGRESS NOTES
Pt has discharge orders. Pt declined home health. She stated she has a ride home. No further discharge needs at this time.       MATTHEW BrownN RN  Care Management  Pager: [phone]

## 2019-11-01 NOTE — OP NOTES
[address] [place] [address]   OPERATIVE REPORT    Name:  Eliseo Webb  MR#:   [id number]  :  1955  ACCOUNT #:  [de-identified]  DATE OF SERVICE:  10/31/2019    PREOPERATIVE DIAGNOSIS:  Spinal stenosis, synovial cyst L4-5 on right. POSTOPERATIVE DIAGNOSIS:  Spinal stenosis, synovial cyst L4-5 on right. PROCEDURE PERFORMED:  Right L4-5 hemilaminotomy, medial facetectomy, resection of synovial cyst.    SURGEON:  Jimmy Nur MD    ASSISTANT:  No assistant. ANESTHESIA:  General endotracheal.    COMPLICATIONS:  No complications. SPECIMENS REMOVED:  One synovial cyst to Pathology for permanent section. IMPLANTS:  No implants. ESTIMATED BLOOD LOSS:  5-10 mL. FINDINGS:  The patient had lateral recess stenosis and a large synovial cyst causing severe compression adherence to the traversing neural elements. OPERATION:  Follow induction of general endotracheal anesthesia, the patient turned to prone position on spinal frame. The patient prepped and draped in usual fashion. Midline incision was made. A paramedian incision was made in the lumbodorsal fascia and subperiosteal dissection on L4-5 on the right. C-arm image verified the surgical level. Hemilaminotomy was done with medial facetectomy. The synovial mass was delineated, the proximal and distal margins defined, it was freed from overlying facet, the medial aspect of which was resected and then with slow meticulous dissection, I freed it from the underlying dura. It was a complicated cyst with liquid and solid portion. I sent it for pathologic examination. With slow meticulous dissection, I freed almost all of it from the underlying dura certainly decompressing the traversing and exiting root. Some of the capsule was so tightly adherent to the dura, I did not dare remove it as it would just create a large dural defect. At conclusion, I placed a pledget of Gelfoam and thrombin over this dura. Segments appeared to maintain stability. Vancomycin powder was instilled for infection prophylaxis. There was no apparent bleeding. Lumbodorsal fascia was closed with #1 Vicryl, subcutaneous tissues closed with 2-0 Vicryl, skin closed with a 4-0 Monocryl subcuticular suture and Dermabond. A sterile occlusive dressing was placed upon the wound. All counts were correct.       Loney Cushing, MD      [id number]  D:  10/31/2019 12:28  T:  10/31/2019 23:12  JOB #:  [id number]

## 2019-11-01 NOTE — ROUTINE PROCESS
Bedside and Verbal shift change report given to Jeanette Moser RN (oncoming nurse) by US Lema RN (offgoing nurse). Report included the following information SBAR, Kardex, Intake/Output and MAR.

## 2019-11-01 NOTE — PROGRESS NOTES
OT order received and chart reviewed. Patient seen for skilled OT evaluation and is safe for discharge home when medically stable/cleared by PT. Full note to follow.       Thank you for the referral.    Sofía Graves MS, OTR/L

## 2019-11-01 NOTE — DISCHARGE SUMMARY
Discharge  Summary     Patient: Maximus Hollis MRN: [id number]  SSN: [id number]    YOB: 1955  Age: 59 y.o. Sex: female       Admit Date: 10/31/2019    Discharge Date: 11/1/2019      Admission Diagnoses: Synovial cyst of lumbar spine [M71.38]  Spinal stenosis, lumbar region with neurogenic claudication [M48.062]  Spinal stenosis [M48.00]  Synovial cyst [M71.30]    Discharge Diagnoses:   Problem List as of 11/1/2019 Date Reviewed: 10/31/2019          Codes Class Noted - Resolved    Spinal stenosis ICD-10-CM: M48.00  ICD-9-CM: 724.00  10/31/2019 - Present        Synovial cyst ICD-10-CM: M71.30  ICD-9-CM: 727.40  10/31/2019 - Present        Dyslipidemia ICD-10-CM: E78.5  ICD-9-CM: 272.4  10/30/2017 - Present        Essential hypertension ICD-10-CM: I10  ICD-9-CM: 401.9  7/28/2017 - Present        Rosacea ICD-10-CM: L71.9  ICD-9-CM: 695.3  7/28/2017 - Present        Elevated BP without diagnosis of hypertension ICD-10-CM: R03.0  ICD-9-CM: 796.2  4/28/2017 - Present        Migraine without status migrainosus, not intractable ICD-10-CM: G98.218  ICD-9-CM: 346.90  4/10/2017 - Present               Discharge Condition:  Good    Procedure: Right L4-5 hemilaminotomy, medial facetectomy, resection of synovial cyst.    Hospital Course: Normal hospital course for this procedure. Tolerated surgical intervention well. Incision dry and intact. Ambulatory. Disposition: home      Face to Face Encounter    Patients Name: Maximus Hollis  YOB: 1955    Primary Diagnosis:  Spinal stenosis, synovial cyst L4-5 on right*    Date of Face to Face:   11/1/2019                                   Face to Face Encounter findings are related to primary reason for home care:   yes    1. I certify that the patient needs intermittent skilled nursing care, physical therapy and/or speech therapy.  I will be following this patient in the Community and will be responsible for reviewing and signing the Home Health Plan of Care. 2. Initial Orders for Care: Must be completed only if Face to Face MD will not be signing the [address] Skilled Nursing and Physical Therapy    3. I certify that this patient is homebound for the following reason(s): Requires considerable and taxing effort to leave the home     4. I certify that this patient is under my care and that I, or a nurse practitioner or [address] [place] [address] working with me, had a Face-to-Face Encounter that meets the physician Face-to-Face Encounter requirements. Document the physical findings from the Face-to-Face Encounter that support the need for skilled services: Has wound that requires skilled nursing assessment and treatment     Roland Thurman NP  11/1/2019          Discharge Medications:   Current Discharge Medication List      START taking these medications    Details   oxyCODONE-acetaminophen (PERCOCET) 5-325 mg per tablet Take 1 Tab by mouth every six (6) hours as needed for Pain for up to 3 days. Max Daily Amount: 4 Tabs. Qty: 20 Tab, Refills: 0    Associated Diagnoses: S/P lumbar laminectomy         CONTINUE these medications which have NOT CHANGED    Details   lisinopril (PRINIVIL, ZESTRIL) 20 mg tablet Take 1 Tab by mouth daily. Qty: 90 Tab, Refills: 1      fluticasone propionate (FLONASE) 50 mcg/actuation nasal spray USE 2 SPRAYS IN EACH NOSTRIL DAILY  Qty: 32 g, Refills: 0      cetirizine (ZYRTEC) 10 mg tablet TAKE 1 TABLET DAILY AS NEEDED FOR ALLERGIES  Qty: 90 Tab, Refills: 1      cyclobenzaprine (FLEXERIL) 10 mg tablet Take 1 Tab by mouth three (3) times daily as needed for Muscle Spasm(s). Qty: 90 Tab, Refills: 2      rosuvastatin (CRESTOR) 10 mg tablet TAKE 1 TABLET NIGHTLY  Qty: 90 Tab, Refills: 1      gabapentin (NEURONTIN) 300 mg capsule TAKE 1 CAPSULE IN THE MORNING AND 2 CAPSULES IN THE EVENING AS DIRECTED  Qty: 270 Cap, Refills: 0    Associated Diagnoses: Lumbar foraminal stenosis;  Neuritis      atenolol (TENORMIN) 50 mg tablet TAKE 1 TABLET DAILY  Qty: 90 Tab, Refills: 3    Associated Diagnoses: Migraine without status migrainosus, not intractable, unspecified migraine type; Elevated BP without diagnosis of hypertension      doxycycline (ADOXA) 100 mg tablet TAKE 1 TABLET EVERY TUESDAY, THURSDAY AND SATURDAY  Qty: 40 Tab, Refills: 2      rizatriptan (MAXALT-MLT) 10 mg disintegrating tablet PLACE 1 TABLET ON TONGUE ONCE AS NEEDED FOR MIGRAINE  Qty: 30 Tab, Refills: 0      potassium 99 mg tablet Take 99 mg by mouth every seven (7) days. vitamin E (AQUA GEMS) 400 unit capsule Take  by mouth daily. Associated Diagnoses: Lumbar disc disease with radiculopathy; Bilateral sciatica      ascorbic acid, vitamin C, (VITAMIN C) 500 mg tablet Take 1,000 mg by mouth daily. calcium citrate-vitamin D3 (CITRACAL + D) tablet Take  by mouth daily. multivitamin (ONE A DAY) tablet Take 1 Tab by mouth daily. cholecalciferol, VITAMIN D3, (VITAMIN D3) 5,000 unit tab tablet Take  by mouth daily. B.infantis-B.ani-B.long-B.bifi (PROBIOTIC 4X) 10-15 mg TbEC Take  by mouth daily. diclofenac EC (VOLTAREN) 75 mg EC tablet TAKE 1 TABLET TWICE A DAY AS NEEDED FOR PAIN WITH FOOD FOR OSTEOARTHRITIS  Qty: 180 Tab, Refills: 1    Associated Diagnoses: Lumbar facet arthropathy      aspirin 81 mg chewable tablet Take 81 mg by mouth daily. Follow-up Appointments   Procedures    FOLLOW UP VISIT Appointment in: Two Weeks     Standing Status:   Standing     Number of Occurrences:   1     Order Specific Question:   Appointment in     Answer:    Two Weeks       Signed By: Roland Thurman NP     November 1, 2019

## 2019-11-01 NOTE — PROGRESS NOTES
Nursing reports that [facility] The rx needs to go to [facility] in [place]. [facility] to [facility] that went there.   I have escribed a new perocet rx to the [facility] in [place]

## 2019-11-04 ENCOUNTER — OFFICE VISIT (OUTPATIENT)
Dept: FAMILY MEDICINE CLINIC | Age: 64
End: 2019-11-04

## 2019-11-04 VITALS
BODY MASS INDEX: 25.88 KG/M2 | SYSTOLIC BLOOD PRESSURE: 146 MMHG | WEIGHT: 161 LBS | HEART RATE: 63 BPM | HEIGHT: 66 IN | TEMPERATURE: 96.6 F | DIASTOLIC BLOOD PRESSURE: 89 MMHG | OXYGEN SATURATION: 95 % | RESPIRATION RATE: 16 BRPM

## 2019-11-04 DIAGNOSIS — Z51.89 VISIT FOR WOUND CHECK: Primary | ICD-10-CM

## 2019-11-04 DIAGNOSIS — I10 ESSENTIAL HYPERTENSION: ICD-10-CM

## 2019-11-04 DIAGNOSIS — Z23 ENCOUNTER FOR IMMUNIZATION: ICD-10-CM

## 2019-11-04 NOTE — PROGRESS NOTES
Flu vaccine ordered by PCP at this time. Injection given to left deltoid at this time.  Patient tolerated well

## 2019-11-04 NOTE — PROGRESS NOTES
Chief Complaint   Patient presents with   Fortunato Wick for scheduled L4-5 cyst removal and arthiritis debridement DOS: 10/31/19    Wound Check     wants dressing change to incision     1. Have you been to the ER, urgent care clinic since your last visit? Hospitalized since your last visit? Yes, 10/31/19 for Lumbar spine cyst removal L4-5 by Dr. Elen Wick    2. Have you seen or consulted any other health care providers outside of the [address] [place] [address] since your last visit? Include any pap smears or colon screening. No     HPI  Mitzi Murrell Hamman comes in for follow-up care. Patient comes in for wound check. She had Right L4-5 hemilaminotomy, medial facetectomy, resection of synovial cyst.  Area at her below her back was dressed but she did develop some redness around the site of the dressing material.  It felt as if her skin is being pulled. This area is slightly red and erythematous. Has formed some bumps. Question of allergy to the dressing material.  This was removed. Wound itself is healing well. It is clean, dry and intact. I did apply a telfa dressing to the area. She will follow-up with the specialist.  Patient states that that she feels improved. Numbness and tingling to the lower extremities has diminished. She denies bladder or bowel dysfunction. Patient has a blood pressure 146/89. Currently she is on lisinopril 20 mg daily and atenolol 50 mg daily. Blood pressure at home has been stable. We will continue with the current treatment plan. She denies headache or changes in vision. Patient will get a flu vaccine today.     Past Medical History  Past Medical History:   Diagnosis Date    Arthritis     Dyslipidemia     Heart murmur     Hx of migraines     Hypertension     Nausea & vomiting     Rosacea        Surgical History  Past Surgical History:   Procedure Laterality Date    FOOT/TOES SURGERY PROC UNLISTED      x6    HX HYSTERECTOMY      due to heavy bleeding    HX KNEE ARTHROSCOPY      HX LUMBAR LAMINECTOMY  01/2011    L5/S1 [place]    HX ORTHOPAEDIC      Lumbar Laminectomy in 2011 [place]    HX ORTHOPAEDIC      Lumbar cyst removal L4-5        Medications  Current Outpatient Medications   Medication Sig Dispense Refill    lisinopril (PRINIVIL, ZESTRIL) 20 mg tablet Take 1 Tab by mouth daily. 90 Tab 1    cetirizine (ZYRTEC) 10 mg tablet TAKE 1 TABLET DAILY AS NEEDED FOR ALLERGIES 90 Tab 1    rosuvastatin (CRESTOR) 10 mg tablet TAKE 1 TABLET NIGHTLY 90 Tab 1    gabapentin (NEURONTIN) 300 mg capsule TAKE 1 CAPSULE IN THE MORNING AND 2 CAPSULES IN THE EVENING AS DIRECTED (Patient taking differently: TAKE 1 CAPSULE IN THE MORNING, I capsule in the afternoon AND 1 CAPSULE IN THE EVENING AS DIRECTED) 270 Cap 0    atenolol (TENORMIN) 50 mg tablet TAKE 1 TABLET DAILY 90 Tab 3    diclofenac EC (VOLTAREN) 75 mg EC tablet TAKE 1 TABLET TWICE A DAY AS NEEDED FOR PAIN WITH FOOD FOR OSTEOARTHRITIS 180 Tab 1    doxycycline (ADOXA) 100 mg tablet TAKE 1 TABLET EVERY TUESDAY, THURSDAY AND SATURDAY 40 Tab 2    rizatriptan (MAXALT-MLT) 10 mg disintegrating tablet PLACE 1 TABLET ON TONGUE ONCE AS NEEDED FOR MIGRAINE 30 Tab 0    potassium 99 mg tablet Take 99 mg by mouth every seven (7) days.  vitamin E (AQUA GEMS) 400 unit capsule Take  by mouth daily.  ascorbic acid, vitamin C, (VITAMIN C) 500 mg tablet Take 1,000 mg by mouth daily.  calcium citrate-vitamin D3 (CITRACAL + D) tablet Take  by mouth daily.  multivitamin (ONE A DAY) tablet Take 1 Tab by mouth daily.  cholecalciferol, VITAMIN D3, (VITAMIN D3) 5,000 unit tab tablet Take  by mouth daily.  aspirin 81 mg chewable tablet Take 81 mg by mouth daily.  B.infantis-B.ani-B.long-B.bifi (PROBIOTIC 4X) 10-15 mg TbEC Take  by mouth daily.  oxyCODONE-acetaminophen (PERCOCET) 5-325 mg per tablet Take 1 Tab by mouth every six (6) hours as needed for Pain for up to 3 days.  Max Daily Amount: 4 Tabs. 20 Tab 0    oxyCODONE-acetaminophen (PERCOCET) 5-325 mg per tablet Take 1 Tab by mouth every six (6) hours as needed for Pain for up to 7 days. Max Daily Amount: 4 Tabs. Indications: pain 20 Tab 0    fluticasone propionate (FLONASE) 50 mcg/actuation nasal spray USE 2 SPRAYS IN EACH NOSTRIL DAILY (Patient taking differently: daily as needed.) 32 g 0    cyclobenzaprine (FLEXERIL) 10 mg tablet Take 1 Tab by mouth three (3) times daily as needed for Muscle Spasm(s).  90 Tab 2       Allergies  Allergies   Allergen Reactions    Penicillin G Hives    Tylenol [Acetaminophen] Other (comments)     Causes headache       Family History  Family History   Problem Relation Age of Onset    Heart Attack Mother     Stroke Mother     Other Mother         CHF    Alcohol abuse Father     Other Father         CHF    Liver Disease Father        Social History  Social History     Socioeconomic History    Marital status:      Spouse name: Not on file    Number of children: Not on file    Years of education: Not on file    Highest education level: Not on file   Occupational History    Not on file   Social Needs    Financial resource strain: Not on file    Food insecurity:     Worry: Not on file     Inability: Not on file    Transportation needs:     Medical: Not on file     Non-medical: Not on file   Tobacco Use    Smoking status: Never Smoker    Smokeless tobacco: Never Used   Substance and Sexual Activity    Alcohol use: No    Drug use: No    Sexual activity: Yes     Partners: Male   Lifestyle    Physical activity:     Days per week: Not on file     Minutes per session: Not on file    Stress: Not on file   Relationships    Social connections:     Talks on phone: Not on file     Gets together: Not on file     Attends [employer] service: Not on file     Active member of club or organization: Not on file     Attends meetings of clubs or organizations: Not on file     Relationship status: Not on file   Renita Rizzo Intimate partner violence:     Fear of current or ex partner: Not on file     Emotionally abused: Not on file     Physically abused: Not on file     Forced sexual activity: Not on file   Other Topics Concern     Service No    Blood Transfusions Yes    Caffeine Concern No    Occupational Exposure No    Hobby Hazards No    Sleep Concern No    Stress Concern No    Weight Concern No    Special Diet No    Back Care No    Exercise Yes    Bike Helmet No    Seat Belt Yes    Self-Exams Yes   Social History Narrative    Not on file       Review of Systems  Review of Systems - Review of all systems is negative except as noted above in the HPI. Vital Signs  Visit Vitals  /89 (BP 1 Location: Left arm, BP Patient Position: Sitting)   Pulse 63   Temp 96.6 °F (35.9 °C) (Oral)   Resp 16   Ht 5' 6\" (1.676 m)   Wt 161 lb (73 kg)   SpO2 95%   BMI 25.99 kg/m²         Physical Exam  Physical Examination: General appearance - oriented to person, place, and time and acyanotic, in no respiratory distress  Mental status - alert, oriented to person, place, and time, affect appropriate to mood  Chest - no tachypnea, retractions or cyanosis  Heart - S1 and S2 normal  Neurological - neck supple without rigidity  Extremities - intact peripheral pulses  Skin -surgical site healing well, clean dry and intact. Does have mesh with the covering wound glue that is stable. No wound dehiscence. Patient has some slight erythema around the area where previous dressing had been placed.     Results  Results for orders placed or performed during the hospital encounter of 10/22/19   EKG, 12 LEAD, INITIAL   Result Value Ref Range    Ventricular Rate 74 BPM    Atrial Rate 74 BPM    P-R Interval 154 ms    QRS Duration 72 ms    Q-T Interval 378 ms    QTC Calculation (Bezet) 419 ms    Calculated P Axis 27 degrees    Calculated R Axis -9 degrees    Calculated T Axis 33 degrees    Diagnosis       Normal sinus rhythm  Normal ECG  No previous ECGs available  Confirmed by Althea Trujillo MD, James Quitaque ([id number]) on 10/22/2019 2:35:19 PM         ASSESSMENT and PLAN    ICD-10-CM ICD-9-CM    1. Visit for wound check Z51.89 V58.89    2. Encounter for immunization Z23 V03.89 INFLUENZA VIRUS VAC QUAD,SPLIT,PRESV FREE SYRINGE IM      WA IMMUNIZ ADMIN,1 SINGLE/COMB VAC/TOXOID   3. Essential hypertension I10 401.9      reviewed diet, exercise and weight control  reviewed medications and side effects in detail      I have discussed the diagnosis with the patient and the intended plan of care as seen in the above orders. The patient has received an after-visit summary and questions were answered concerning future plans. I have discussed medication, side effects, and warnings with the patient in detail. The patient verbalized understanding and is in agreement with the plan of care. The patient will contact the office with any additional concerns. Estefani Malone MD    PLEASE NOTE:   This document has been produced using voice recognition software.  Unrecognized errors in transcription may be present

## 2019-11-13 ENCOUNTER — OFFICE VISIT (OUTPATIENT)
Dept: ORTHOPEDIC SURGERY | Age: 64
End: 2019-11-13

## 2019-11-13 VITALS
OXYGEN SATURATION: 100 % | HEART RATE: 80 BPM | HEIGHT: 66 IN | RESPIRATION RATE: 14 BRPM | TEMPERATURE: 97.9 F | DIASTOLIC BLOOD PRESSURE: 80 MMHG | SYSTOLIC BLOOD PRESSURE: 138 MMHG | BODY MASS INDEX: 25.43 KG/M2 | WEIGHT: 158.2 LBS

## 2019-11-13 DIAGNOSIS — M71.38 SYNOVIAL CYST OF LUMBAR FACET JOINT: Primary | ICD-10-CM

## 2019-11-13 DIAGNOSIS — Z98.890 S/P LUMBAR LAMINECTOMY: ICD-10-CM

## 2019-11-13 NOTE — PATIENT INSTRUCTIONS
Lumbar Laminectomy: What to Expect at [address] can expect your back to feel stiff or sore after surgery. This should improve in the weeks after surgery. You may have trouble sitting or standing in one position for very long and may need pain medicine in the weeks after your surgery. Your doctor may advise you to work with a physical therapist to strengthen the muscles around your spine and trunk. You will need to learn how to lift, twist, and bend so that you do not put too much strain on your back. This care sheet gives you a general idea about how long it will take for you to recover. But each person recovers at a different pace. Follow the steps below to get better as quickly as possible. How can you care for yourself at home? Activity    · Rest when you feel tired. Getting enough sleep will help you recover.     · Try to walk each day. Start by walking a little more than you did the day before. Bit by bit, increase the amount you walk. Walking boosts blood flow and helps prevent pneumonia and constipation. Walking may also decrease your muscle soreness after surgery.     · If advised by your doctor, you may need to avoid lifting anything that would cause excessive strain on your back. This may include a child, heavy grocery bags and milk containers, a heavy briefcase or backpack, cat litter or dog food bags, or a vacuum .     · Avoid strenuous activities, such as bicycle riding, jogging, weight lifting, or aerobic exercise, until your doctor says it is okay.     · Do not drive for 2 to 4 weeks after your surgery or until your doctor says it is okay.     · Avoid riding in a car for more than 30 minutes at a time for 2 to 4 weeks after surgery. If you must ride in a car for a longer distance, stop often to walk and stretch your legs.     · Try to change your position about every 30 minutes while sitting or standing.  This will help decrease your back pain while you are healing.     · You will probably need to take 4 to 6 weeks off from work. It depends on the type of work you do and how you feel.     · You may have sex as soon as you feel able, but avoid positions that put stress on your back or cause pain. Diet    · You can eat your normal diet. If your stomach is upset, try bland, low-fat foods like plain rice, broiled chicken, toast, and yogurt.     · Drink plenty of fluids (unless your doctor tells you not to).     · You may notice that your bowel movements are not regular right after your surgery. This is common. Try to avoid constipation and straining with bowel movements. You may want to take a fiber supplement every day. If you have not had a bowel movement after a couple of days, ask your doctor about taking a mild laxative. Medicines    · Your doctor will tell you if and when you can restart your medicines. He or she will also give you instructions about taking any new medicines.     · If you take blood thinners, such as warfarin (Coumadin), clopidogrel (Plavix), or aspirin, be sure to talk to your doctor. He or she will tell you if and when to start taking those medicines again. Make sure that you understand exactly what your doctor wants you to do.     · Take pain medicines exactly as directed. ? If the doctor gave you a prescription medicine for pain, take it as prescribed. ? If you are not taking a prescription pain medicine, ask your doctor if you can take an over-the-counter medicine.     · If your doctor prescribed antibiotics, take them as directed. Do not stop taking them just because you feel better. You need to take the full course of antibiotics.     · If you think your pain medicine is making you sick to your stomach:  ? Take your medicine after meals (unless your doctor has told you not to). ? Ask your doctor for a different pain medicine.    Incision care    · If you have strips of tape on the cut (incision) the doctor made, leave the tape on for a week or until it falls off.     · Wash the area daily with warm, soapy water and pat it dry.     · Keep the area clean and dry. You may cover it with a gauze bandage if it weeps or rubs against clothing. Change the bandage every day. Exercise    · Do back exercises as instructed by your doctor.     · Your doctor may advise you to work with a physical therapist to improve the strength and flexibility of your back. Other instructions    · To reduce stiffness and help sore muscles, use a warm water bottle, a heating pad set on low, or a warm cloth on your back. Do not put heat right over the incision. Do not go to sleep with a heating pad on your skin. Follow-up care is a key part of your treatment and safety. Be sure to make and go to all appointments, and call your doctor if you are having problems. It's also a good idea to know your test results and keep a list of the medicines you take. When should you call for help? Call 911 anytime you think you may need emergency care. For example, call if:    · You passed out (lost consciousness).     · You have sudden chest pain and shortness of breath, or you cough up blood.     · You are unable to move a leg at all.   [facility] your doctor now or seek immediate medical care if:    · You have new or worse symptoms in your legs or buttocks. Symptoms may include:  ? Numbness or tingling. ? Weakness. ? Pain.     · You lose bladder or bowel control.     · You have loose stitches, or your incision comes open.     · You have blood or fluid draining from the incision.     · You have signs of infection, such as:  ? Increased pain, swelling, warmth, or redness. ? Pus draining from the incision. ? A fever. ? Red streaks leading from the incision.    Watch closely for changes in your health, and be sure to contact your doctor if:    · You do not have a bowel movement after taking a laxative.     · You are not getting better as expected. Where can you learn more?   Go to http://kendrick-emerald.info/. Enter T276 in the search box to learn more about \"Lumbar Laminectomy: What to Expect at Home. \"  Current as of: June 26, 2019  Content Version: 12.2  © 1667-6738 [employer]. Care instructions adapted under license by [employer] (which disclaims liability or warranty for this information). If you have questions about a medical condition or this instruction, always ask your healthcare professional. [address] any warranty or liability for your use of this information.

## 2019-11-13 NOTE — PROGRESS NOTES
[address]  [address], [address]  [place], [address] [place] [address] Street  Phone: [phone]  Fax: [phone]  PROGRESS NOTE-Post Op  Patient: Aashish Ellis                MRN: [id number]       SSN: [id number]  YOB: 1955        AGE: 59 y.o. SEX: female  Body mass index is 25.53 kg/m². PCP: Robina Hare MD  11/13/19    Chief Complaint   Patient presents with    LOW BACK PAIN    Surgical Follow-up     RIGHT L4/5 LAMINECTOMY on 10/31/19       HISTORY OF PRESENT ILLNESS:  Aashish Ellis is a 59 y.o. female with history of back and RLE pain for 6 months who had Right L4-5 hemilaminotomy, medial facetectomy, resection of synovial cyst surgery 2 weeks ago for synovial cyst and stenosis. Pain prior to surgery was in the l4-5 distribution all the way down the leg and described as aching, burning and numbing. Today, pain in RLE has significantly improved since surgery. Her pain is now some incisional pain and some residual nerve pain. Patient denies any fevers, wound drainage, bladder/bowel dysfunction, new onset weakness, new neuropathic pain, or other neurological deficits. Pt desires to continue with evaluation of back pain and postoperative care. Current Medications: Flexeril PRN     ASSESSMENT   Diagnoses and all orders for this visit:    1. Synovial cyst of lumbar facet joint    2. S/P lumbar laminectomy         IMPRESSION AND PLAN:  This is a pt who had a lumbar decompression surgery 2 weeks ago. She is doing well with improved leg pain, normal strength. Her incision is healing nicely, she had a reaction to one of the dressings and has some redness from that.      > Pt was given information on s/p lum lami post-operative and wound care.  > Reviewed activity   > Flexeril PRN  > Ms. Claudia Rogers has a reminder for a \"due or due soon\" health maintenance.  I have asked that she contact her primary care provider, Robina Hare MD, for follow-up on this health maintenance.  > We have informed the patient to notify us for immediate appointment if he has any worsening neurogical symptoms or if an emergency situation presents, then call 911  >  demonstrated consistency with prescribing.   > Pt will follow-up in 4 wks. SUBJECTIVE    Smoking Status non smoker  Work: retired    Pain Scale: 2/10    Pain Assessment  11/13/2019   Location of Pain Back;Leg   Location Modifiers Right   Severity of Pain 2   Quality of Pain Throbbing;Burning   Quality of Pain Comment -   Duration of Pain Persistent   Duration of Pain Comment -   Frequency of Pain Constant   Aggravating Factors -   Aggravating Factors Comment -   Limiting Behavior Yes   Relieving Factors -   Relieving Factors Comment -   Result of Injury No           REVIEW OF SYSTEMS  Constitutional: Negative for fever, chills, or weight change. Respiratory: Negative for cough or shortness of breath. Cardiovascular: Negative for chest pain or palpitations. Gastrointestinal: Negative for incontinence, acid reflux, change in bowel habits, or constipation. Genitourinary: Negative for incontinence, dysuria and flank pain. Musculoskeletal: Positive for back, INTERMITTENT rle pain. See HPI. Skin: Negative for rash. Neurological:intermittent RLE L4-5 radiculopathy. See HPI. Endo/Heme/Allergies: Negative. Psychiatric/Behavioral: Negative. PHYSICAL EXAMINATION  Visit Vitals  /80   Pulse 80   Temp 97.9 °F (36.6 °C) (Oral)   Resp 14   Ht 5' 6\" (1.676 m)   Wt 158 lb 3.2 oz (71.8 kg)   SpO2 100%   BMI 25.53 kg/m²         Accompanied by Spouse. Constitutional:  Well developed, well nourished, in no acute distress. Psychiatric: Affect and mood are appropriate. Integumentary: No rashes or abrasions noted on exposed areas. Wound: mid low back Incision healing well, no drainage, no erythema, no hernia, no seroma, no swelling, no dehiscence, incision well approximated.   Cardiovascular/Peripheral Vascular: +2 radial & pedal pulses. No peripheral edema is noted. Lymphatic:  No evidence of lymphedema. No cervical lymphadenopathy. SPINE/MUSCULOSKELETAL EXAM    Lumbar spine:  No rash, ecchymosis, or gross obliquity. No fasciculations. No focal atrophy is noted. Range of motion is intact with flexion, extension . Tenderness to palpation none. No tenderness to palpation at the sciatic notch. Sensation grossly intact to light touch. Straight leg raise neg      MOTOR:     Hip Flex Quads Hamstrings Ankle DF EHL Ankle PF   Right 5/5 5/5 5/5 5/5 5/5 5/5   Left 5/5 5/5 5/5 5/5 5/5 5/5       normal gait and station    Ambulation without assistive device. FWB. PAST MEDICAL HISTORY   Past Medical History:   Diagnosis Date    Arthritis     Dyslipidemia     Heart murmur     Hx of migraines     Hypertension     Nausea & vomiting     Rosacea        Past Surgical History:   Procedure Laterality Date    FOOT/TOES SURGERY PROC UNLISTED      x6    HX HYSTERECTOMY      due to heavy bleeding    HX KNEE ARTHROSCOPY      HX LUMBAR LAMINECTOMY  01/2011    L5/S1 Lami    HX ORTHOPAEDIC      Lumbar Laminectomy in 2011 [place]    HX ORTHOPAEDIC      Lumbar cyst removal L4-5   . MEDICATIONS      Current Outpatient Medications   Medication Sig Dispense Refill    lisinopril (PRINIVIL, ZESTRIL) 20 mg tablet Take 1 Tab by mouth daily. 90 Tab 1    fluticasone propionate (FLONASE) 50 mcg/actuation nasal spray USE 2 SPRAYS IN EACH NOSTRIL DAILY (Patient taking differently: daily as needed.) 32 g 0    cetirizine (ZYRTEC) 10 mg tablet TAKE 1 TABLET DAILY AS NEEDED FOR ALLERGIES 90 Tab 1    cyclobenzaprine (FLEXERIL) 10 mg tablet Take 1 Tab by mouth three (3) times daily as needed for Muscle Spasm(s).  90 Tab 2    rosuvastatin (CRESTOR) 10 mg tablet TAKE 1 TABLET NIGHTLY 90 Tab 1    gabapentin (NEURONTIN) 300 mg capsule TAKE 1 CAPSULE IN THE MORNING AND 2 CAPSULES IN THE EVENING AS DIRECTED (Patient taking differently: TAKE 1 CAPSULE IN THE MORNING, I capsule in the afternoon AND 1 CAPSULE IN THE EVENING AS DIRECTED) 270 Cap 0    atenolol (TENORMIN) 50 mg tablet TAKE 1 TABLET DAILY 90 Tab 3    doxycycline (ADOXA) 100 mg tablet TAKE 1 TABLET EVERY TUESDAY, THURSDAY AND SATURDAY 40 Tab 2    rizatriptan (MAXALT-MLT) 10 mg disintegrating tablet PLACE 1 TABLET ON TONGUE ONCE AS NEEDED FOR MIGRAINE 30 Tab 0    potassium 99 mg tablet Take 99 mg by mouth every seven (7) days.  vitamin E (AQUA GEMS) 400 unit capsule Take 400 Units by mouth daily.  ascorbic acid, vitamin C, (VITAMIN C) 500 mg tablet Take 1,000 mg by mouth daily.  calcium citrate-vitamin D3 (CITRACAL + D) tablet Take  by mouth daily.  multivitamin (ONE A DAY) tablet Take 1 Tab by mouth daily.  cholecalciferol, VITAMIN D3, (VITAMIN D3) 5,000 unit tab tablet Take  by mouth daily.  aspirin 81 mg chewable tablet Take 81 mg by mouth daily.  B.infantis-B.ani-B.long-B.bifi (PROBIOTIC 4X) 10-15 mg TbEC Take  by mouth daily.       diclofenac EC (VOLTAREN) 75 mg EC tablet TAKE 1 TABLET TWICE A DAY AS NEEDED FOR PAIN WITH FOOD FOR OSTEOARTHRITIS 180 Tab 1        ALLERGIES    Allergies   Allergen Reactions    Penicillin G Hives    Tylenol [Acetaminophen] Other (comments)     Causes headache          SOCIAL HISTORY    Social History     Socioeconomic History    Marital status:      Spouse name: Not on file    Number of children: Not on file    Years of education: Not on file    Highest education level: Not on file   Occupational History    Not on file   Social Needs    Financial resource strain: Not on file    Food insecurity:     Worry: Not on file     Inability: Not on file    Transportation needs:     Medical: Not on file     Non-medical: Not on file   Tobacco Use    Smoking status: Never Smoker    Smokeless tobacco: Never Used   Substance and Sexual Activity    Alcohol use: No    Drug use: No    Sexual activity: Yes     Partners: Male   Lifestyle    Physical activity:     Days per week: Not on file     Minutes per session: Not on file    Stress: Not on file   Relationships    Social connections:     Talks on phone: Not on file     Gets together: Not on file     Attends [employer] service: Not on file     Active member of club or organization: Not on file     Attends meetings of clubs or organizations: Not on file     Relationship status: Not on file    Intimate partner violence:     Fear of current or ex partner: Not on file     Emotionally abused: Not on file     Physically abused: Not on file     Forced sexual activity: Not on file   Other Topics Concern     Service No    Blood Transfusions Yes    Caffeine Concern No    Occupational Exposure No    Hobby Hazards No    Sleep Concern No    Stress Concern No    Weight Concern No    Special Diet No    Back Care No    Exercise Yes    Bike Helmet No    Seat Belt Yes    Self-Exams Yes   Social History Narrative    Not on file     Socioeconomic History    Marital status:      Spouse name: Not on file    Number of children: Not on file    Years of education: Not on file    Highest education level: Not on file   Occupational History    Not on file   Social Needs    Financial resource strain: Not on file    Food insecurity:     Worry: Not on file     Inability: Not on file    Transportation needs:     Medical: Not on file     Non-medical: Not on file   Tobacco Use    Smoking status: Never Smoker    Smokeless tobacco: Never Used   Substance and Sexual Activity    Alcohol use: No    Drug use: No    Sexual activity: Yes     Partners: Male   Lifestyle    Physical activity:     Days per week: Not on file     Minutes per session: Not on file    Stress: Not on file   Relationships    Social connections:     Talks on phone: Not on file     Gets together: Not on file     Attends [employer] service: Not on file     Active member of club or organization: Not on file     Attends meetings of clubs or organizations: Not on file     Relationship status: Not on file    Intimate partner violence:     Fear of current or ex partner: Not on file     Emotionally abused: Not on file     Physically abused: Not on file     Forced sexual activity: Not on file   Other Topics Concern     Service No    Blood Transfusions Yes    Caffeine Concern No    Occupational Exposure No    Hobby Hazards No    Sleep Concern No    Stress Concern No    Weight Concern No    Special Diet No    Back Care No    Exercise Yes    Bike Helmet No    Seat Belt Yes    Self-Exams Yes   Social History Narrative    Not on file      Problem Relation Age of Onset    Heart Attack Mother     Stroke Mother     Other Mother         CHF    Alcohol abuse Father     Other Father         CHF    Liver Disease Father          Matheus Cardoza NP

## 2019-12-03 ENCOUNTER — OFFICE VISIT (OUTPATIENT)
Dept: ORTHOPEDIC SURGERY | Age: 64
End: 2019-12-03

## 2019-12-03 VITALS
DIASTOLIC BLOOD PRESSURE: 94 MMHG | HEIGHT: 66 IN | HEART RATE: 78 BPM | RESPIRATION RATE: 16 BRPM | TEMPERATURE: 97.1 F | OXYGEN SATURATION: 98 % | BODY MASS INDEX: 25.71 KG/M2 | WEIGHT: 160 LBS | SYSTOLIC BLOOD PRESSURE: 151 MMHG

## 2019-12-03 DIAGNOSIS — M17.12 PRIMARY OSTEOARTHRITIS OF LEFT KNEE: Primary | ICD-10-CM

## 2019-12-03 RX ORDER — VITAMIN E (DL,TOCOPHERYL ACET) 180 MG
CAPSULE ORAL
COMMUNITY
End: 2020-03-18

## 2019-12-03 NOTE — PROGRESS NOTES
Travis Prader  1955   Chief Complaint   Patient presents with    Knee Pain     left knee f/u         HISTORY OF PRESENT ILLNESS  Nanette Lux is a 59 y.o. female who presents today for reevaluation of left knee pain. Patient rates pain as 3/10 today. Pain has been present for 8 months. Pt had her knee aspirated on 5/29/19 by Dr. Nissa Yin. Pt notes she cannot \"stoop down\". Pt is fairly active and does a lot of walking. She has been taking Gabapentin and Voltaren for her back and nerve pain. She has seen Dr. Mode Cartagena for this. At last OV on 10/01/19, patient had a cortisone injection which provided good relief. She states there is still intermittent swelling but the pain has improved. She had back surgery for spinal stenosis on 10/31/19. She complains of left leg pain that has worsened within the last 3 weeks, states this is likely related to nerve pain. Patient denies any fever, chills, chest pain, shortness of breath or calf pain. The remainder of the review of systems is negative. There are no new illness or injuries to report since last seen in the office. There are no changes to medications, allergies, family or social history. Pain Assessment  12/3/2019   Location of Pain Knee   Location Modifiers Left   Severity of Pain 3   Quality of Pain Dull;Aching   Quality of Pain Comment -   Duration of Pain A few hours   Duration of Pain Comment -   Frequency of Pain Intermittent   Aggravating Factors Standing;Walking   Aggravating Factors Comment -   Limiting Behavior -   Relieving Factors Rest;Elevation   Relieving Factors Comment -   Result of Injury No     PHYSICAL EXAM:   Visit Vitals  BP (!) 151/94   Pulse 78   Temp 97.1 °F (36.2 °C) (Oral)   Resp 16   Ht 5' 6\" (1.676 m)   Wt 160 lb (72.6 kg)   SpO2 98%   BMI 25.82 kg/m²     The patient is a well-developed, well-nourished female   in no acute distress. The patient is alert and oriented times three.   The patient is alert and oriented times three. Mood and affect are normal.  LYMPHATIC: lymph nodes are not enlarged and are within normal limits  SKIN: normal in color and non tender to palpation. There are no bruises or abrasions noted. NEUROLOGICAL: Motor sensory exam is within normal limits. Reflexes are equal bilaterally. There is normal sensation to pinprick and light touch  MUSCULOSKELETAL:  Examination Left knee   Skin Intact   Range of motion    Effusion +   Medial joint line tenderness +   Lateral joint line tenderness +   Tenderness Pes Bursa -   Tenderness insertion MCL -   Tenderness insertion LCL -   Tricias -   Patella crepitus +   Patella grind +   Lachman -   Pivot shift -   Anterior drawer -   Posterior drawer -   Varus stress -   Valgus stress -   Neurovascular Intact   Calf Swelling and Tenderness to Palpation -   Fanta's Test -   Hamstring Cord Tightness -        IMAGING: MRI of left knee dated 5/25/19 was reviewed and read:   IMPRESSION:  1.  Large ruptured Baker's cyst. A heterogeneous T2 hyperintense structure at the posterior aspect of the knee, located posterior to the medial head of the gastrocnemius and with a tail that likely communicates with the Baker's cyst, favored to reflect extension of the Baker's cyst. Given the unlikely possibility that this reflects a neoplastic process, given the noncontrast enhanced nature of the study, continued clinical follow-up is recommended. 2.  Maceration of the lateral meniscus. Intrasubstance signal within the medial meniscus does not meet MRI criteria for tear and is compatible with mucoid degeneration. 3.  Prior partial thickness ACL injury. Advanced popliteus insertional tendinosis. Otherwise intact PCL, MCL, and LCL.   4.  Tricompartmental chondrosis, including grade 4 chondrosis in the lateral tibiofemoral compartment, focal grade 2 fissure in the median ridge of the patella, and intra-articular bodies in the popliteus sheath and posterior knee joint, as above.    IMPRESSION:      ICD-10-CM ICD-9-CM    1. Primary osteoarthritis of left knee M17.12 715.16         PLAN:   1. Pt presents today with left knee pain due to primary OA that was helped by the cortisone injection given at last OV. She can return as needed. Risk factors include: n/a  2. No ultrasound exam indicated today  3. No cortisone injection indicated today   4. No Physical/Occupational Therapy indicated today  5. No diagnostic test indicated today:   6. No durable medical equipment indicated today  7. No referral indicated today   8. No medications indicated today:   9. No Narcotic indicated today       RTC prn      Scribed by [address]) as dictated by Rios Rojas MD    I, Dr. Rios Rojas, confirm that all documentation is accurate.     Rios Rojas M.D.   [facility]

## 2019-12-11 NOTE — TELEPHONE ENCOUNTER
Requested Prescriptions     Pending Prescriptions Disp Refills    doxycycline (ADOXA) 100 mg tablet 40 Tab 2     [facility]

## 2019-12-12 RX ORDER — DOXYCYCLINE 100 MG/1
TABLET ORAL
Qty: 40 TAB | Refills: 2 | Status: SHIPPED | OUTPATIENT
Start: 2019-12-12 | End: 2020-10-13

## 2019-12-13 ENCOUNTER — OFFICE VISIT (OUTPATIENT)
Dept: ORTHOPEDIC SURGERY | Age: 64
End: 2019-12-13

## 2019-12-13 VITALS
HEIGHT: 66 IN | RESPIRATION RATE: 16 BRPM | WEIGHT: 162 LBS | BODY MASS INDEX: 26.03 KG/M2 | DIASTOLIC BLOOD PRESSURE: 95 MMHG | HEART RATE: 72 BPM | OXYGEN SATURATION: 98 % | SYSTOLIC BLOOD PRESSURE: 151 MMHG | TEMPERATURE: 97.8 F

## 2019-12-13 DIAGNOSIS — M54.16 LEFT LUMBAR RADICULITIS: Primary | ICD-10-CM

## 2019-12-13 DIAGNOSIS — M71.38 SYNOVIAL CYST OF LUMBAR FACET JOINT: ICD-10-CM

## 2019-12-13 DIAGNOSIS — Z98.890 S/P LUMBAR LAMINECTOMY: ICD-10-CM

## 2019-12-13 RX ORDER — METHYLPREDNISOLONE 4 MG/1
TABLET ORAL
Qty: 1 DOSE PACK | Refills: 0 | Status: SHIPPED | OUTPATIENT
Start: 2019-12-13 | End: 2020-02-11

## 2019-12-13 RX ORDER — GABAPENTIN 300 MG/1
300 CAPSULE ORAL 3 TIMES DAILY
Qty: 270 CAP | Refills: 1 | Status: SHIPPED | OUTPATIENT
Start: 2019-12-13 | End: 2020-02-11 | Stop reason: SDUPTHER

## 2019-12-13 NOTE — PATIENT INSTRUCTIONS
Neuropathic Pain: Care Instructions Your Care Instructions Neuropathic pain is caused by pressure on or damage to your nerves. It's often simply called nerve pain. Some people feel this type of pain all the time. For others, it comes and goes. Diabetes, shingles, or an injury can cause nerve pain. Many people say the pain feels sharp, burning, or stabbing. But some people feel it as a dull ache. In some cases, it makes your skin very sensitive. So touch, pressure, and other sensations that did not hurt before may now cause pain. It's important to know that this kind of pain is real and can affect your quality of life. It's also important to know that treatment can help. Treatment includes pain medicines, exercise, and physical therapy. Medicines can help reduce the number of pain signals that travel over the nerves. This can make the painful areas less sensitive. It can also help you sleep better and improve your mood. But medicines are only one part of successful treatment. Most people do best with more than one kind of treatment. Your doctor may recommend that you try cognitive-behavioral therapy and stress management. Or, if needed, you may decide to try to quit smoking, lower your blood pressure, or better control blood sugar. These kinds of healthy changes can also make a difference. If you feel that your treatment is not working, talk to your doctor. And be sure to tell your doctor if you think you might be depressed or anxious. These are common problems that can also be treated. Follow-up care is a key part of your treatment and safety. Be sure to make and go to all appointments, and call your doctor if you are having problems. It's also a good idea to know your test results and keep a list of the medicines you take. How can you care for yourself at home? · Be safe with medicines. Read and follow all instructions on the label. ? If the doctor gave you a prescription medicine for pain, take it as prescribed. ? If you are not taking a prescription pain medicine, ask your doctor if you can take an over-the-counter medicine. · Save hard tasks for days when you have less pain. Follow a hard task with an easy task. And remember to take breaks. · Relax, and reduce stress. You may want to try deep breathing or meditation. These can help. · Keep moving. Gentle, daily exercise can help reduce pain. Your doctor or physical therapist can tell you what type of exercise is best for you. This may include walking, swimming, and stationary biking. It may also include stretches and range-of-motion exercises. · Try heat, cold packs, and massage. · Get enough sleep. Constant pain can make you more tired. If the pain makes it hard to sleep, talk with your doctor. · Think positively. Your thoughts can affect your pain. Do fun things to distract yourself from the pain. See a movie, read a book, listen to music, or spend time with a friend. · Keep a pain diary. Try to write down how strong your pain is and what it feels like. Also try to notice and write down how your moods, thoughts, sleep, activities, and medicine affect your pain. These notes can help you and your doctor find the best ways to treat your pain. Reducing constipation caused by pain medicine Pain medicines often cause constipation. To reduce constipation: 
· Include fruits, vegetables, beans, and whole grains in your diet each day. These foods are high in fiber. · Drink plenty of fluids, enough so that your urine is light yellow or clear like water. If you have kidney, heart, or liver disease and have to limit fluids, talk with your doctor before you increase the amount of fluids you drink. · Get some exercise every day. Build up slowly to 30 to 60 minutes a day on 5 or more days of the week.  
· Take a fiber supplement, such as Citrucel or Metamucil, every day if needed. Read and follow all instructions on the label. · Schedule time each day for a bowel movement. Having a daily routine may help. Take your time and do not strain when having a bowel movement. · Ask your doctor about a laxative. The goal is to have one easy bowel movement every 1 to 2 days. Do not let constipation go untreated for more than 3 days. When should you call for help? Call your doctor now or seek immediate medical care if: 
  · You feel sad, anxious, or hopeless for more than a few days. This could mean you are depressed. Depression is common in people who have a lot of pain. But it can be treated.  
  · You have trouble with bowel movements, such as: 
? No bowel movement in 3 days. ? Blood in the anal area, in your stool, or on the toilet paper. ? Diarrhea for more than 24 hours.  
 Watch closely for changes in your health, and be sure to contact your doctor if: 
  · Your pain is getting worse.  
  · You can't sleep because of pain.  
  · You are very worried or anxious about your pain.  
  · You have trouble taking your pain medicine.  
  · You have any concerns about your pain medicine or its side effects.  
  · You have vomiting or cramps for more than 2 hours. Where can you learn more? Go to http://kendrick-emerald.info/. Enter Z540 in the search box to learn more about \"Neuropathic Pain: Care Instructions. \" Current as of: March 28, 2019 Content Version: 12.2 © 0197-9069 [employer]. Care instructions adapted under license by [employer] (which disclaims liability or warranty for this information). If you have questions about a medical condition or this instruction, always ask your healthcare professional. [address] any warranty or liability for your use of this information.

## 2019-12-13 NOTE — PROGRESS NOTES
[address]  [address], [address]  [place], [facility] [address]  Phone: [phone]  Fax: [phone]  PROGRESS NOTE  Patient: Jeaneth Akers                MRN: [id number]       SSN: [id number]  YOB: 1955        AGE: 59 y.o. SEX: female  Body mass index is 26.15 kg/m². PCP: Lisa Up MD  12/13/19    Chief Complaint   Patient presents with    Back Pain     lower back pain, f/u appt. HISTORY OF PRESENT ILLNESS, RADIOGRAPHS, and PLAN:     HISTORY OF PRESENT ILLNESS:  Ms. Wilberto Real returns today. She is six weeks out from her L4-5 laminotomy and medial facetectomy for a large synovial cyst on the right. Her right leg pain is resolved entirely. She is thrilled. However, she had known synovitis on the left that was asymptomatic. She feels that now that the right leg pain is gone, she is sensitive to it, and that is causing her to use Gabapentin and anti-inflammatories, again, for left-sided symptoms. She is neurologically intact without nerve tension signs. ASSESSMENT/PLAN: She wants to try to treat this as conservatively as possible for as long as possible. She is already on anti-inflammatories. We will get her an LS corset to see if that improves her symptomatology on this other side, as well as a Medrol Dosepak to use in case she has a severe flare. We will see her back in six weeks for routine followup. cc: Dayron Vang M.D.          Past Medical History:   Diagnosis Date    Arthritis     Dyslipidemia     Heart murmur     Hx of migraines     Hypertension     Nausea & vomiting     Rosacea        Family History   Problem Relation Age of Onset    Heart Attack Mother     Stroke Mother     Other Mother         CHF    Alcohol abuse Father     Other Father         CHF    Liver Disease Father        Current Outpatient Medications   Medication Sig Dispense Refill    doxycycline (ADOXA) 100 mg tablet TAKE 1 TABLET EVERY TUESDAY, THURSDAY AND SATURDAY 40 Tab 2    turmeric/turmeric ext/pepr ext (TURMERIC-TURMERIC EXT-PEPPER) 500-3 mg cap Take  by mouth.  lisinopril (PRINIVIL, ZESTRIL) 20 mg tablet Take 1 Tab by mouth daily. 90 Tab 1    cetirizine (ZYRTEC) 10 mg tablet TAKE 1 TABLET DAILY AS NEEDED FOR ALLERGIES 90 Tab 1    rosuvastatin (CRESTOR) 10 mg tablet TAKE 1 TABLET NIGHTLY 90 Tab 1    gabapentin (NEURONTIN) 300 mg capsule TAKE 1 CAPSULE IN THE MORNING AND 2 CAPSULES IN THE EVENING AS DIRECTED (Patient taking differently: TAKE 1 CAPSULE IN THE MORNING, I capsule in the afternoon AND 1 CAPSULE IN THE EVENING AS DIRECTED) 270 Cap 0    atenolol (TENORMIN) 50 mg tablet TAKE 1 TABLET DAILY 90 Tab 3    diclofenac EC (VOLTAREN) 75 mg EC tablet TAKE 1 TABLET TWICE A DAY AS NEEDED FOR PAIN WITH FOOD FOR OSTEOARTHRITIS 180 Tab 1    rizatriptan (MAXALT-MLT) 10 mg disintegrating tablet PLACE 1 TABLET ON TONGUE ONCE AS NEEDED FOR MIGRAINE 30 Tab 0    potassium 99 mg tablet Take 99 mg by mouth every seven (7) days.  vitamin E (AQUA GEMS) 400 unit capsule Take 400 Units by mouth daily.  ascorbic acid, vitamin C, (VITAMIN C) 500 mg tablet Take 1,000 mg by mouth daily.  calcium citrate-vitamin D3 (CITRACAL + D) tablet Take  by mouth daily.  multivitamin (ONE A DAY) tablet Take 1 Tab by mouth daily.  cholecalciferol, VITAMIN D3, (VITAMIN D3) 5,000 unit tab tablet Take  by mouth daily.  aspirin 81 mg chewable tablet Take 81 mg by mouth daily.  B.infantis-B.ani-B.long-B.bifi (PROBIOTIC 4X) 10-15 mg TbEC Take  by mouth daily.  fluticasone propionate (FLONASE) 50 mcg/actuation nasal spray USE 2 SPRAYS IN EACH NOSTRIL DAILY (Patient taking differently: daily as needed.) 32 g 0    cyclobenzaprine (FLEXERIL) 10 mg tablet Take 1 Tab by mouth three (3) times daily as needed for Muscle Spasm(s).  90 Tab 2       Allergies   Allergen Reactions    Penicillin G Hives    Tylenol [Acetaminophen] Other (comments)     Causes headache       Past Surgical History:   Procedure Laterality Date    FOOT/TOES SURGERY PROC UNLISTED      x6    HX BACK SURGERY      HX HYSTERECTOMY      due to heavy bleeding    HX KNEE ARTHROSCOPY      HX LUMBAR LAMINECTOMY  01/2011    L5/S1 [place]    HX ORTHOPAEDIC      [place] in [place]    HX ORTHOPAEDIC      Lumbar cyst removal L4-5       Past Medical History:   Diagnosis Date    Arthritis     Dyslipidemia     Heart murmur     Hx of migraines     Hypertension     Nausea & vomiting     Rosacea        Social History     Socioeconomic History    Marital status:      Spouse name: Not on file    Number of children: Not on file    Years of education: Not on file    Highest education level: Not on file   Occupational History    Not on file   Social Needs    Financial resource strain: Not on file    Food insecurity:     Worry: Not on file     Inability: Not on file    Transportation needs:     Medical: Not on file     Non-medical: Not on file   Tobacco Use    Smoking status: Never Smoker    Smokeless tobacco: Never Used   Substance and Sexual Activity    Alcohol use: No    Drug use: No    Sexual activity: Yes     Partners: Male   Lifestyle    Physical activity:     Days per week: Not on file     Minutes per session: Not on file    Stress: Not on file   Relationships    Social connections:     Talks on phone: Not on file     Gets together: Not on file     Attends Holiness service: Not on file     Active member of club or organization: Not on file     Attends meetings of clubs or organizations: Not on file     Relationship status: Not on file    Intimate partner violence:     Fear of current or ex partner: Not on file     Emotionally abused: Not on file     Physically abused: Not on file     Forced sexual activity: Not on file   Other Topics Concern     Service No    Blood Transfusions Yes    Caffeine Concern No    Occupational Exposure No  Hobby Hazards No    Sleep Concern No    Stress Concern No    Weight Concern No    Special Diet No    Back Care No    Exercise Yes    Bike Helmet No    Seat Belt Yes    Self-Exams Yes   Social History Narrative    Not on file         REVIEW OF SYSTEMS:   CONSTITUTIONAL SYMPTOMS:  Negative. EYES:  Negative. EARS, NOSE, THROAT AND MOUTH:  Negative. CARDIOVASCULAR:  Negative. RESPIRATORY:  Negative. GENITOURINARY: Per HPI. GASTROINTESTINAL:  Per HPI. INTEGUMENTARY (SKIN AND/OR BREAST):  Negative. MUSCULOSKELETAL: Per HPI.   ENDOCRINE/RHEUMATOLOGIC:  Negative. NEUROLOGICAL:  Per HPI. HEMATOLOGIC/LYMPHATIC:  Negative. ALLERGIC/IMMUNOLOGIC:  Negative. PSYCHIATRIC:  Negative. PHYSICAL EXAMINATION:   Visit Vitals  BP (!) 151/95 (BP 1 Location: Left arm, BP Patient Position: Sitting)   Pulse 72   Temp 97.8 °F (36.6 °C) (Oral)   Resp 16   Ht 5' 6\" (1.676 m)   Wt 162 lb (73.5 kg)   SpO2 98%   BMI 26.15 kg/m²    PAIN SCALE: 5/10    CONSTITUTIONAL: The patient is in no apparent distress and is alert and oriented x 3. HEENT: Normocephalic. Hearing grossly intact. NECK: Supple and symmetric. no tenderness, or masses were felt. RESPIRATORY: No labored breathing. CARDIOVASCULAR: The carotid pulses were normal. Peripheral pulses were 2+. CHEST: Normal AP diameter and normal contour without any kyphoscoliosis. LYMPHATIC: No lymphadenopathy was appreciated in the neck, axillae or groin. SKIN:  Incision healing well, no drainage, no erythema, no hernia, no seroma, no swelling, no dehiscence, incision well approximated. Negative for scars, rashes, lesions, or ulcers on the right upper, right lower, left upper, left lower and trunk. NEUROLOGICAL: Alert and oriented x 3. Ambulation without assistive device. FWB. EXTREMITIES: See musculoskeletal.  MUSCULOSKELETAL:   Head and Neck:  Negative for misalignment, asymmetry, crepitation, defects, tenderness masses or effusions.    Left Upper Extremity:  Inspection, percussion and palpation performed. Bryans sign is negative.  Right Upper Extremity: Inspection, percussion and palpation performed. Bryans sign is negative.  Spine, Ribs and Pelvis: Back pain. Inspection, percussion and palpation performed. Negative for misalignment, asymmetry, crepitation, defects, tenderness masses or effusions.  Left Lower Extremity: Pain and Numbness to foot. Inspection, percussion and palpation performed. Negative straight leg raise.  Right Lower Extremity: Inspection, percussion and palpation performed. Negative straight leg raise. SPINE EXAM:     Lumbar spine: No rash, ecchymosis, or gross obliquity. No focal atrophy is noted. ASSESSMENT    ICD-10-CM ICD-9-CM    1. Left lumbar radiculitis M54.16 724.4 methylPREDNISolone (MEDROL, MELONY,) 4 mg tablet      AMB SUPPLY ORDER      gabapentin (NEURONTIN) 300 mg capsule   2. S/P lumbar laminectomy Z98.890 V45.89 AMB SUPPLY ORDER      gabapentin (NEURONTIN) 300 mg capsule   3. Synovial cyst of lumbar facet joint M71.38 727.40        Written by Page Leahy, as dictated by Wale Mendoza MD.    I, Dr. Wale Mendoza MD, confirm that all documentation is accurate.

## 2020-02-11 ENCOUNTER — OFFICE VISIT (OUTPATIENT)
Dept: FAMILY MEDICINE CLINIC | Age: 65
End: 2020-02-11

## 2020-02-11 VITALS
HEIGHT: 66 IN | OXYGEN SATURATION: 98 % | SYSTOLIC BLOOD PRESSURE: 161 MMHG | BODY MASS INDEX: 26.36 KG/M2 | DIASTOLIC BLOOD PRESSURE: 104 MMHG | TEMPERATURE: 95.9 F | WEIGHT: 164 LBS | HEART RATE: 82 BPM | RESPIRATION RATE: 16 BRPM

## 2020-02-11 DIAGNOSIS — G89.29 CHRONIC MIDLINE LOW BACK PAIN WITH SCIATICA, SCIATICA LATERALITY UNSPECIFIED: ICD-10-CM

## 2020-02-11 DIAGNOSIS — I10 ESSENTIAL HYPERTENSION: ICD-10-CM

## 2020-02-11 DIAGNOSIS — L71.9 ROSACEA: Primary | ICD-10-CM

## 2020-02-11 DIAGNOSIS — M54.40 CHRONIC MIDLINE LOW BACK PAIN WITH SCIATICA, SCIATICA LATERALITY UNSPECIFIED: ICD-10-CM

## 2020-02-11 DIAGNOSIS — M25.562 CHRONIC PAIN OF LEFT KNEE: ICD-10-CM

## 2020-02-11 DIAGNOSIS — R01.1 CARDIAC MURMUR: ICD-10-CM

## 2020-02-11 DIAGNOSIS — G89.29 CHRONIC PAIN OF LEFT KNEE: ICD-10-CM

## 2020-02-11 RX ORDER — CLINDAMYCIN PHOSPHATE 10 UG/ML
LOTION TOPICAL DAILY
Qty: 1 BOTTLE | Refills: 3 | Status: SHIPPED | OUTPATIENT
Start: 2020-02-11 | End: 2020-04-28 | Stop reason: SDUPTHER

## 2020-02-11 RX ORDER — LISINOPRIL 40 MG/1
40 TABLET ORAL DAILY
Qty: 90 TAB | Refills: 1 | Status: SHIPPED | OUTPATIENT
Start: 2020-02-11 | End: 2020-10-07 | Stop reason: DRUGHIGH

## 2020-02-11 NOTE — PROGRESS NOTES
Chief Complaint   Patient presents with    Follow-up     request referral to cardiology due to heart murmur      1. Have you been to the ER, urgent care clinic since your last visit? Hospitalized since your last visit? No    2. Have you seen or consulted any other health care providers outside of the [address] [place] [address] since your last visit? Include any pap smears or colon screening. No     HPI  Nanette Dao comes in for f/u care. Cardiac: she has heart murmur. Gets palpitations on and off. Denies dizziness or syncope. States that she has been told he had a heart murmur in the past.  I did listen and did not appreciate much of the cardiac murmur. I will order an echocardiogram.  I will place a referral to see the cardiologist as per her request.  She denies lower extremity swelling or poor exercise tolerance. HTN: BP is elevated. She has been keeping a blood pressure log. Her numbers have been stable she says. I did look through her chart and her numbers have been elevated. She states that at home they are within normal.  After discussion she is willing to go up on the lisinopril and take 40 mg daily. I will send in a prescription for this. We will recheck labs at next visit. She denies changes in vision or focal weakness. Back pain: this is chronic and she has has surgery to the lower back. She is being followed up by the spine specialist.  States that after her most recent surgery back pain is improved. She is on Flexeril and gabapentin for the pain. She has also taken diclofenac. We will continue with the current treatment plan. Knee pain: seen by the orthopedist and had intraarticular injection. She will set up appointment for f/u care. She has arthritis in the left knee and has a h/o meniscal tear. She is on diclofenac. Can use this as needed. Rosacea: on doxycycline.  She was also on clindamycin topical and would like the medication sent in.  Prescription will be sent in.  Dyslipidemia: Patient is on Crestor. We will continue with current treatment plan. Plan to recheck lipid panel at next visit. Neuropathy: Patient has neuropathy due to back pain and spondylosis. She is on gabapentin. We will continue with the current treatment plan. Denies bladder or bowel dysfunction. Past Medical History  Past Medical History:   Diagnosis Date    Arthritis     Dyslipidemia     Heart murmur     Hx of migraines     Hypertension     Nausea & vomiting     Rosacea        Surgical History  Past Surgical History:   Procedure Laterality Date    FOOT/TOES SURGERY PROC UNLISTED      x6    HX BACK SURGERY      HX HYSTERECTOMY      due to heavy bleeding    HX KNEE ARTHROSCOPY      HX LUMBAR LAMINECTOMY  01/2011    L5/S1 [place]    HX ORTHOPAEDIC      [place] L[place] in [place]    HX ORTHOPAEDIC      Lumbar cyst removal L4-5        Medications  Current Outpatient Medications   Medication Sig Dispense Refill    clindamycin (CLEOCIN T) 1 % lotion Apply  to affected area daily. use thin film on affected area 1 Bottle 3    lisinopril (PRINIVIL, ZESTRIL) 40 mg tablet Take 1 Tab by mouth daily. 90 Tab 1    cetirizine (ZYRTEC) 10 mg tablet TAKE 1 TABLET DAILY AS NEEDED FOR ALLERGIES 90 Tab 1    doxycycline (ADOXA) 100 mg tablet TAKE 1 TABLET EVERY TUESDAY, THURSDAY AND SATURDAY 40 Tab 2    turmeric/turmeric ext/pepr ext (TURMERIC-TURMERIC EXT-PEPPER) 500-3 mg cap Take  by mouth.  fluticasone propionate (FLONASE) 50 mcg/actuation nasal spray USE 2 SPRAYS IN EACH NOSTRIL DAILY (Patient taking differently: daily as needed.) 32 g 0    cyclobenzaprine (FLEXERIL) 10 mg tablet Take 1 Tab by mouth three (3) times daily as needed for Muscle Spasm(s). (Patient taking differently: Take 10 mg by mouth three (3) times daily as needed for Muscle Spasm(s).  Not taking) 90 Tab 2    rosuvastatin (CRESTOR) 10 mg tablet TAKE 1 TABLET NIGHTLY 90 Tab 1    gabapentin (NEURONTIN) 300 mg capsule TAKE 1 CAPSULE IN THE MORNING AND 2 CAPSULES IN THE EVENING AS DIRECTED (Patient taking differently: TAKE 1 CAPSULE IN THE MORNING, I capsule in the afternoon AND 1 CAPSULE IN THE EVENING AS DIRECTED) 270 Cap 0    atenolol (TENORMIN) 50 mg tablet TAKE 1 TABLET DAILY 90 Tab 3    diclofenac EC (VOLTAREN) 75 mg EC tablet TAKE 1 TABLET TWICE A DAY AS NEEDED FOR PAIN WITH FOOD FOR OSTEOARTHRITIS 180 Tab 1    rizatriptan (MAXALT-MLT) 10 mg disintegrating tablet PLACE 1 TABLET ON TONGUE ONCE AS NEEDED FOR MIGRAINE 30 Tab 0    potassium 99 mg tablet Take 99 mg by mouth every seven (7) days.  vitamin E (AQUA GEMS) 400 unit capsule Take 400 Units by mouth daily.  ascorbic acid, vitamin C, (VITAMIN C) 500 mg tablet Take 1,000 mg by mouth daily.  calcium citrate-vitamin D3 (CITRACAL + D) tablet Take  by mouth daily.  multivitamin (ONE A DAY) tablet Take 1 Tab by mouth daily.  cholecalciferol, VITAMIN D3, (VITAMIN D3) 5,000 unit tab tablet Take  by mouth daily.  aspirin 81 mg chewable tablet Take 81 mg by mouth daily.  B.infantis-B.ani-B.long-B.bifi (PROBIOTIC 4X) 10-15 mg TbEC Take  by mouth daily.          Allergies  Allergies   Allergen Reactions    Penicillin G Hives    Tylenol [Acetaminophen] Other (comments)     Causes headache       Family History  Family History   Problem Relation Age of Onset    Heart Attack Mother     Stroke Mother     Other Mother         CHF    Alcohol abuse Father     Other Father         CHF    Liver Disease Father        Social History  Social History     Socioeconomic History    Marital status:      Spouse name: Not on file    Number of children: Not on file    Years of education: Not on file    Highest education level: Not on file   Occupational History    Not on file   Social Needs    Financial resource strain: Not on file    Food insecurity:     Worry: Not on file     Inability: Not on file    Transportation needs:     Medical: Not on file     Non-medical: Not on file   Tobacco Use    Smoking status: Never Smoker    Smokeless tobacco: Never Used   Substance and Sexual Activity    Alcohol use: No    Drug use: No    Sexual activity: Yes     Partners: Male   Lifestyle    Physical activity:     Days per week: Not on file     Minutes per session: Not on file    Stress: Not on file   Relationships    Social connections:     Talks on phone: Not on file     Gets together: Not on file     Attends Judaism service: Not on file     Active member of club or organization: Not on file     Attends meetings of clubs or organizations: Not on file     Relationship status: Not on file    Intimate partner violence:     Fear of current or ex partner: Not on file     Emotionally abused: Not on file     Physically abused: Not on file     Forced sexual activity: Not on file   Other Topics Concern     Service No    Blood Transfusions Yes    Caffeine Concern No    Occupational Exposure No    Hobby Hazards No    Sleep Concern No    Stress Concern No    Weight Concern No    Special Diet No    Back Care No    Exercise Yes    Bike Helmet No    Seat Belt Yes    Self-Exams Yes   Social History Narrative    Not on file       Review of Systems  Review of Systems - Review of all systems is negative except as noted above in the HPI.     Vital Signs  Visit Vitals  BP (!) 161/104 (BP 1 Location: Right arm, BP Patient Position: Sitting)   Pulse 82   Temp 95.9 °F (35.5 °C) (Oral)   Resp 16   Ht 5' 6\" (1.676 m)   Wt 164 lb (74.4 kg)   SpO2 98%   BMI 26.47 kg/m²         Physical Exam  Physical Examination: General appearance - alert, well appearing, and in no distress and acyanotic, in no respiratory distress  Mental status - affect appropriate to mood  Nose - normal and patent, no erythema, discharge or polyps  Mouth - mucous membranes moist, pharynx normal without lesions  Neck - supple, no significant adenopathy  Lymphatics - no palpable lymphadenopathy  Chest - decreased air entry noted bilateral lung bases  Heart - normal rate and regular rhythm, S1 and S2 normal, systolic murmur 1/6 left   Abdomen - soft, nontender, nondistended, no masses or organomegaly  Back exam - limited range of motion  Neurological - abnormal neurological exam unchanged from prior examinations  Musculoskeletal - osteoarthritic changes noted in both hands  Extremities - intact peripheral pulses  Skin -rosacea on the face      Results  Results for orders placed or performed during the hospital encounter of 10/22/19   EKG, 12 LEAD, INITIAL   Result Value Ref Range    Ventricular Rate 74 BPM    Atrial Rate 74 BPM    P-R Interval 154 ms    QRS Duration 72 ms    Q-T Interval 378 ms    QTC Calculation (Bezet) 419 ms    Calculated P Axis 27 degrees    Calculated R Axis -9 degrees    Calculated T Axis 33 degrees    Diagnosis       Normal sinus rhythm  Normal ECG  No previous ECGs available  Confirmed by Mikki Mckeon MD, Noemy Ruffin ([id number]) on 10/22/2019 2:35:19 PM         ASSESSMENT and PLAN    ICD-10-CM ICD-9-CM    1. Rosacea L71.9 695.3 clindamycin (CLEOCIN T) 1 % lotion   2. Cardiac murmur R01.1 785.2 ECHO ADULT COMPLETE      REFERRAL TO CARDIOLOGY   3. Essential hypertension I10 401.9    4. Chronic midline low back pain with sciatica, sciatica laterality unspecified M54.40 724.2     G89.29 724.3      338.29    5. Chronic pain of left knee M25.562 719.46     G89.29 338.29      lab results and schedule of future lab studies reviewed with patient  reviewed diet, exercise and weight control  cardiovascular risk and specific lipid/LDL goals reviewed  reviewed medications and side effects in detail      I have discussed the diagnosis with the patient and the intended plan of care as seen in the above orders. The patient has received an after-visit summary and questions were answered concerning future plans. I have discussed medication, side effects, and warnings with the patient in detail.  The patient verbalized understanding and is in agreement with the plan of care. The patient will contact the office with any additional concerns. Facundo Mak MD    PLEASE NOTE:   This document has been produced using voice recognition software.  Unrecognized errors in transcription may be present

## 2020-02-25 ENCOUNTER — OFFICE VISIT (OUTPATIENT)
Dept: ORTHOPEDIC SURGERY | Age: 65
End: 2020-02-25

## 2020-02-25 VITALS
HEIGHT: 66 IN | SYSTOLIC BLOOD PRESSURE: 140 MMHG | RESPIRATION RATE: 16 BRPM | DIASTOLIC BLOOD PRESSURE: 92 MMHG | WEIGHT: 164 LBS | HEART RATE: 80 BPM | OXYGEN SATURATION: 100 % | BODY MASS INDEX: 26.36 KG/M2 | TEMPERATURE: 96.5 F

## 2020-02-25 DIAGNOSIS — M17.12 PRIMARY OSTEOARTHRITIS OF LEFT KNEE: Primary | ICD-10-CM

## 2020-02-25 RX ORDER — TRIAMCINOLONE ACETONIDE 40 MG/ML
40 INJECTION, SUSPENSION INTRA-ARTICULAR; INTRAMUSCULAR ONCE
Qty: 1 ML | Refills: 0
Start: 2020-02-25 | End: 2020-02-25

## 2020-02-25 NOTE — PROGRESS NOTES
Nader Renee  1955   Chief Complaint   Patient presents with    Knee Pain     left knee pain        HISTORY OF PRESENT ILLNESS  Nanette Granger is a 59 y.o. female who presents today for reevaluation of left knee pain. Patient rates pain as 6/10 today. Pain has been present for over 10 months. The patient has had a cortisone injection, most recently on 10/01/19, which provided good relief. Pain with prolonged walking and standing. Pt reports swelling and giving way sensation. Associated symptom of swelling today. She does report pain at night with bending the knee. Pt is fairly active and likes to walk for exercise. Pt had her knee aspirated on 5/29/19 by Dr. Mary Buchanan. She has taken Gabapentin and Voltaren for her back and nerve pain, stopped taking it 2 weeks ago secondary to an upset stomach. She had back surgery for spinal stenosis on 10/31/19. She has another upcoming back surgery scheduled with Dr. Bernadette Dalal. Patient denies any fever, chills, chest pain, shortness of breath or calf pain. The remainder of the review of systems is negative. There are no new illness or injuries to report since last seen in the office. There are no changes to medications, allergies, family or social history.      Pain Assessment  2/25/2020   Location of Pain Knee   Location Modifiers Left   Severity of Pain 6   Quality of Pain Aching;Popping   Quality of Pain Comment feels like it is going to give out, swelling   Duration of Pain Persistent   Duration of Pain Comment -   Frequency of Pain Constant   Aggravating Factors Walking;Standing;Bending;Stretching;Straightening   Aggravating Factors Comment -   Limiting Behavior -   Relieving Factors Nothing   Relieving Factors Comment -   Result of Injury No     PHYSICAL EXAM:   Visit Vitals  BP (!) 140/92 (BP 1 Location: Right arm, BP Patient Position: Sitting)   Pulse 80   Temp 96.5 °F (35.8 °C) (Oral)   Resp 16   Ht 5' 6\" (1.676 m)   Wt 164 lb (74.4 kg)   SpO2 100%   BMI 26.47 kg/m²     The patient is a well-developed, well-nourished female   in no acute distress. The patient is alert and oriented times three. The patient is alert and oriented times three. Mood and affect are normal.  LYMPHATIC: lymph nodes are not enlarged and are within normal limits  SKIN: normal in color and non tender to palpation. There are no bruises or abrasions noted. NEUROLOGICAL: Motor sensory exam is within normal limits. Reflexes are equal bilaterally. There is normal sensation to pinprick and light touch  MUSCULOSKELETAL:  Examination Left knee   Skin Intact   Range of motion    Effusion +   Medial joint line tenderness +   Lateral joint line tenderness +   Tenderness Pes Bursa -   Tenderness insertion MCL -   Tenderness insertion LCL -   Tricias -   Patella crepitus +   Patella grind +   Lachman -   Pivot shift -   Anterior drawer -   Posterior drawer -   Varus stress -   Valgus stress -   Neurovascular Intact   Calf Swelling and Tenderness to Palpation -   Fanta's Test -   Hamstring Cord Tightness -      PROCEDURE: Left Knee Aspiration and Injection with Ultrasound Guidance     After sterile prep, left knee was aspirated. 40 cc of bloody-tinged fluid were obtained under ultrasound guidance. The fluid was discarded. After sterile prep, 4 cc of Xylocaine and 1 cc of Kenalog were injected into the left knee. Ultrasound images captured using [address] [place] [address] [employer] Ultrasound machine and scanned into patient's chart.        [facility] - [facility]  OFFICE PROCEDURE PROGRESS NOTE        Chart reviewed for the following:  Sana Tpaia M.D, have reviewed the History, Physical and updated the Allergic reactions for [address] [place] [address] performed immediately prior to start of procedure:  Sana Tapia M.D, have performed the following reviews on [address] [place] [address] prior to the start of the procedure:            * Patient was identified by name and date of birth   * Agreement on procedure being performed was verified  * Risks and Benefits explained to the patient  * Procedure site verified and marked as necessary  * Patient was positioned for comfort  * Consent was signed and verified     Time: 10:13 AM     Date of procedure: 2/25/2020    Procedure performed by:  Margarita Mccoy M.D    Provider assisted by: (see medication administration)    How tolerated by patient: tolerated the procedure well with no complications    Comments: none        IMAGING: MRI of left knee dated 5/25/19 was reviewed and read:   IMPRESSION:  1.  Large ruptured Baker's cyst. A heterogeneous T2 hyperintense structure at the posterior aspect of the knee, located posterior to the medial head of the gastrocnemius and with a tail that likely communicates with the Baker's cyst, favored to reflect extension of the Baker's cyst. Given the unlikely possibility that this reflects a neoplastic process, given the noncontrast enhanced nature of the study, continued clinical follow-up is recommended. 2.  Maceration of the lateral meniscus. Intrasubstance signal within the medial meniscus does not meet MRI criteria for tear and is compatible with mucoid degeneration. 3.  Prior partial thickness ACL injury. Advanced popliteus insertional tendinosis. Otherwise intact PCL, MCL, and LCL. 4.  Tricompartmental chondrosis, including grade 4 chondrosis in the lateral tibiofemoral compartment, focal grade 2 fissure in the median ridge of the patella, and intra-articular bodies in the popliteus sheath and posterior knee joint, as above. IMPRESSION:      ICD-10-CM ICD-9-CM    1. Primary osteoarthritis of left knee M17.12 715.16 TRIAMCINOLONE ACETONIDE INJ      triamcinolone acetonide (KENALOG) 40 mg/mL injection      US GUIDE INJ/ASP/ARTHRO LG JNT/BURSA        PLAN:   1. Pt presents today with left knee pain due to primary OA and I would like to inject and aspirate the knee today.   Risk factors include: n/a  2. No ultrasound exam indicated today  3. Yes cortisone injection and aspiration indicated today L KNEE US  4. No Physical/Occupational Therapy indicated today  5. No diagnostic test indicated today:   6. No durable medical equipment indicated today  7. No referral indicated today   8. No medications indicated today:   9. No Narcotic indicated today       RTC 3 weeks if pain continues      Scribed by Denita Brandon [address]) as dictated by Robert Marlow MD    I, Dr. Robert Marlow, confirm that all documentation is accurate.     Robert Marlow M.D.   [facility]

## 2020-03-03 ENCOUNTER — OFFICE VISIT (OUTPATIENT)
Dept: ORTHOPEDIC SURGERY | Age: 65
End: 2020-03-03

## 2020-03-03 VITALS
WEIGHT: 164 LBS | HEIGHT: 66 IN | BODY MASS INDEX: 26.36 KG/M2 | DIASTOLIC BLOOD PRESSURE: 97 MMHG | SYSTOLIC BLOOD PRESSURE: 131 MMHG | OXYGEN SATURATION: 98 % | RESPIRATION RATE: 16 BRPM | HEART RATE: 75 BPM | TEMPERATURE: 97.8 F

## 2020-03-03 DIAGNOSIS — Z98.890 S/P LUMBAR LAMINECTOMY: ICD-10-CM

## 2020-03-03 DIAGNOSIS — M71.30 SYNOVIAL CYST: ICD-10-CM

## 2020-03-03 DIAGNOSIS — M51.26 HNP (HERNIATED NUCLEUS PULPOSUS), LUMBAR: Primary | ICD-10-CM

## 2020-03-03 NOTE — PROGRESS NOTES
[address] [facility]   Pre-Surgical Worksheet    Patient: Memory Felling                         MRN: [id number]     Age:  59 y.o.,      Sex: female    YOB: 1955           ASHLYN: March 3, 2020  PCP: Tye Her MD    Allergies   Allergen Reactions    Penicillin G Hives    Tylenol [Acetaminophen] Other (comments)     Causes headache         ICD-10-CM ICD-9-CM    1. HNP (herniated nucleus pulposus), lumbar M51.26 722.10 AMB POC XRAY, SPINE, LUMBOSACRAL; 2 O   2. Synovial cyst M71.30 727.40 AMB POC XRAY, SPINE, LUMBOSACRAL; 2 O   3. S/P lumbar laminectomy Z98.890 V45.89 AMB POC XRAY, SPINE, LUMBOSACRAL; 2 O       Surgery: Left L4/5 Lami, Left redo L5/S1 Lami. Pain Assessment   Pain Assessment  3/3/2020   Location of Pain Back   Location Modifiers (No Data)   Severity of Pain 6   Quality of Pain Throbbing; Sharp;Dull;Aching;Burning; Other (Comment)   Quality of Pain Comment stabbing & weakness   Duration of Pain Persistent   Duration of Pain Comment -   Frequency of Pain Constant   Aggravating Factors Bending; Other (Comment)   Aggravating Factors Comment when laying down   Limiting Behavior Yes   Relieving Factors Exercises   Relieving Factors Comment walking   Result of Injury No       Visit Vitals  BP (!) 131/97 (BP 1 Location: Left arm, BP Patient Position: Sitting)   Pulse 75   Temp 97.8 °F (36.6 °C) (Oral)   Resp 16   Ht 5' 6\" (1.676 m)   Wt 164 lb (74.4 kg)   SpO2 98%   BMI 26.47 kg/m²       ADL Limits: Patient stated pain is always there and spouse helps quite a bit. Spine Surgery?: Yes When 2011, 11/2019. Where [facility]. Spinal Injections?: Yes  When years ago. Where? .    Physical Therapy?: No:   When ? [facility] Where? .    NSAID's?: No:     Pain Medications?: No:   Type: ? [facility] In Pain Management: NO, Where: ?    Current Outpatient Medications   Medication Sig    clindamycin (CLEOCIN T) 1 % lotion Apply  to affected area daily.  use thin film on affected area    lisinopril (PRINIVIL, ZESTRIL) 40 mg tablet Take 1 Tab by mouth daily.  doxycycline (ADOXA) 100 mg tablet TAKE 1 TABLET EVERY TUESDAY, THURSDAY AND SATURDAY    rosuvastatin (CRESTOR) 10 mg tablet TAKE 1 TABLET NIGHTLY    atenolol (TENORMIN) 50 mg tablet TAKE 1 TABLET DAILY    rizatriptan (MAXALT-MLT) 10 mg disintegrating tablet PLACE 1 TABLET ON TONGUE ONCE AS NEEDED FOR MIGRAINE    potassium 99 mg tablet Take 99 mg by mouth every seven (7) days.  vitamin E (AQUA GEMS) 400 unit capsule Take 400 Units by mouth daily.  ascorbic acid, vitamin C, (VITAMIN C) 500 mg tablet Take 1,000 mg by mouth daily.  calcium citrate-vitamin D3 (CITRACAL + D) tablet Take  by mouth daily.  multivitamin (ONE A DAY) tablet Take 1 Tab by mouth daily.  cholecalciferol, VITAMIN D3, (VITAMIN D3) 5,000 unit tab tablet Take  by mouth daily.  aspirin 81 mg chewable tablet Take 81 mg by mouth daily.  B.infantis-B.ani-B.long-B.bifi (PROBIOTIC 4X) 10-15 mg TbEC Take  by mouth daily.  cetirizine (ZYRTEC) 10 mg tablet TAKE 1 TABLET DAILY AS NEEDED FOR ALLERGIES    turmeric/turmeric ext/pepr ext (TURMERIC-TURMERIC EXT-PEPPER) 500-3 mg cap Take  by mouth.  fluticasone propionate (FLONASE) 50 mcg/actuation nasal spray USE 2 SPRAYS IN EACH NOSTRIL DAILY (Patient taking differently: daily as needed.)    cyclobenzaprine (FLEXERIL) 10 mg tablet Take 1 Tab by mouth three (3) times daily as needed for Muscle Spasm(s). (Patient taking differently: Take 10 mg by mouth three (3) times daily as needed for Muscle Spasm(s).  Not taking)    gabapentin (NEURONTIN) 300 mg capsule TAKE 1 CAPSULE IN THE MORNING AND 2 CAPSULES IN THE EVENING AS DIRECTED (Patient taking differently: TAKE 1 CAPSULE IN THE MORNING, I capsule in the afternoon AND 1 CAPSULE IN THE EVENING AS DIRECTED)    diclofenac EC (VOLTAREN) 75 mg EC tablet TAKE 1 TABLET TWICE A DAY AS NEEDED FOR PAIN WITH FOOD FOR OSTEOARTHRITIS     No current facility-administered medications for this visit.         Past Medical History:   Diagnosis Date    Arthritis     Dyslipidemia     Heart murmur     Hx of migraines     Hypertension     Nausea & vomiting     Rosacea        Past Surgical History:   Procedure Laterality Date    FOOT/TOES SURGERY PROC UNLISTED      x6    HX BACK SURGERY      HX HYSTERECTOMY      due to heavy bleeding    HX KNEE ARTHROSCOPY      HX LUMBAR LAMINECTOMY  01/2011    L5/S1 Lami    HX ORTHOPAEDIC      [place]bar Laminectomy in [place]    HX ORTHOPAEDIC      Lumbar cyst removal L4-5       Social History     Socioeconomic History    Marital status:      Spouse name: Not on file    Number of children: Not on file    Years of education: Not on file    Highest education level: Not on file   Tobacco Use    Smoking status: Never Smoker    Smokeless tobacco: Never Used   Substance and Sexual Activity    Alcohol use: No    Drug use: No    Sexual activity: Yes     Partners: Male   Other Topics Concern     Service No    Blood Transfusions Yes    Caffeine Concern No    Occupational Exposure No    Hobby Hazards No    Sleep Concern No    Stress Concern No    Weight Concern No    Special Diet No    Back Care No    Exercise Yes    Bike Helmet No    Seat Belt Yes    Self-Exams Yes

## 2020-03-03 NOTE — PROGRESS NOTES
[address]  [address], [address]  [place], [address] [place] [address] Street  Phone: [phone]  Fax: [phone]  PROGRESS NOTE  Patient: Parag Geller                MRN: [id number]       SSN: [id number]  YOB: 1955        AGE: 59 y.o. SEX: female  Body mass index is 26.47 kg/m². PCP: Mechelle Sims MD  03/03/20    Chief Complaint   Patient presents with    Back Pain     lumbar pain, Sx consult        HISTORY OF PRESENT ILLNESS, RADIOGRAPHS, and PLAN:     HISTORY OF PRESENT ILLNESS:  Ms. Praneeth Youngblood returns today. Her right leg pain remains resolved. She has had ongoing if not progressive left leg pain. It is primarily a low lumbar sciatica down the posterior aspect of her buttock to her leg. As was recalled from her initial MRI, she has had a laminectomy about nine years ago elsewhere and has a recurrent disc herniation on the left at L5-S1 that is causing S1 nerve root compression. This seems the most likely source of this current, very low lumbar radiculopathy. However, the patient does have a small synovial cyst at L4-5 also on the left-hand side, which one cannot say is without issue in causing this pain. RADIOGRAPHS:  I obtained flexion extension x-rays. The patient, with close observation, may have a millimeter or two of listhesis at L4-5 but no read dramatic spondylolisthesis. ASSESSMENT/PLAN: She strongly wants to avoid considerations of fusion. At this point, my recommendation given her progressive pain would be a laminotomy on the left at L4-5 to resolve the synovial cyst and a redo laminectomy on the left at L5-S1 to manage the recurrent disc herniation. The risks, benefits, complications, and alternatives were discussed. The patient consents. cc: Jono Martinez M.D.          Past Medical History:   Diagnosis Date    Arthritis     Dyslipidemia     Heart murmur     Hx of migraines     Hypertension     Nausea & vomiting     Rosacea        Family History   Problem Relation Age of Onset    Heart Attack Mother     Stroke Mother     Other Mother         CHF    Alcohol abuse Father     Other Father         CHF    Liver Disease Father        Current Outpatient Medications   Medication Sig Dispense Refill    clindamycin (CLEOCIN T) 1 % lotion Apply  to affected area daily. use thin film on affected area 1 Bottle 3    lisinopril (PRINIVIL, ZESTRIL) 40 mg tablet Take 1 Tab by mouth daily. 90 Tab 1    doxycycline (ADOXA) 100 mg tablet TAKE 1 TABLET EVERY TUESDAY, THURSDAY AND SATURDAY 40 Tab 2    rosuvastatin (CRESTOR) 10 mg tablet TAKE 1 TABLET NIGHTLY 90 Tab 1    atenolol (TENORMIN) 50 mg tablet TAKE 1 TABLET DAILY 90 Tab 3    rizatriptan (MAXALT-MLT) 10 mg disintegrating tablet PLACE 1 TABLET ON TONGUE ONCE AS NEEDED FOR MIGRAINE 30 Tab 0    potassium 99 mg tablet Take 99 mg by mouth every seven (7) days.  vitamin E (AQUA GEMS) 400 unit capsule Take 400 Units by mouth daily.  ascorbic acid, vitamin C, (VITAMIN C) 500 mg tablet Take 1,000 mg by mouth daily.  calcium citrate-vitamin D3 (CITRACAL + D) tablet Take  by mouth daily.  multivitamin (ONE A DAY) tablet Take 1 Tab by mouth daily.  cholecalciferol, VITAMIN D3, (VITAMIN D3) 5,000 unit tab tablet Take  by mouth daily.  aspirin 81 mg chewable tablet Take 81 mg by mouth daily.  B.infantis-B.ani-B.long-B.bifi (PROBIOTIC 4X) 10-15 mg TbEC Take  by mouth daily.  cetirizine (ZYRTEC) 10 mg tablet TAKE 1 TABLET DAILY AS NEEDED FOR ALLERGIES 90 Tab 1    turmeric/turmeric ext/pepr ext (TURMERIC-TURMERIC EXT-PEPPER) 500-3 mg cap Take  by mouth.  fluticasone propionate (FLONASE) 50 mcg/actuation nasal spray USE 2 SPRAYS IN EACH NOSTRIL DAILY (Patient taking differently: daily as needed.) 32 g 0    cyclobenzaprine (FLEXERIL) 10 mg tablet Take 1 Tab by mouth three (3) times daily as needed for Muscle Spasm(s).  (Patient taking differently: Take 10 mg by mouth three (3) times daily as needed for Muscle Spasm(s).  Not taking) 90 Tab 2    gabapentin (NEURONTIN) 300 mg capsule TAKE 1 CAPSULE IN THE MORNING AND 2 CAPSULES IN THE EVENING AS DIRECTED (Patient taking differently: TAKE 1 CAPSULE IN THE MORNING, I capsule in the afternoon AND 1 CAPSULE IN THE EVENING AS DIRECTED) 270 Cap 0    diclofenac EC (VOLTAREN) 75 mg EC tablet TAKE 1 TABLET TWICE A DAY AS NEEDED FOR PAIN WITH FOOD FOR OSTEOARTHRITIS 180 Tab 1       Allergies   Allergen Reactions    Penicillin G Hives    Tylenol [Acetaminophen] Other (comments)     Causes headache       Past Surgical History:   Procedure Laterality Date    FOOT/TOES SURGERY PROC UNLISTED      x6    HX BACK SURGERY      HX HYSTERECTOMY      due to heavy bleeding    HX KNEE ARTHROSCOPY      HX LUMBAR LAMINECTOMY  01/2011    L5/S1 Lami    HX ORTHOPAEDIC      [place]bar Laminectomy in [place]    HX ORTHOPAEDIC      Lumbar cyst removal L4-5       Past Medical History:   Diagnosis Date    Arthritis     Dyslipidemia     Heart murmur     Hx of migraines     Hypertension     Nausea & vomiting     Rosacea        Social History     Socioeconomic History    Marital status:      Spouse name: Not on file    Number of children: Not on file    Years of education: Not on file    Highest education level: Not on file   Occupational History    Not on file   Social Needs    Financial resource strain: Not on file    Food insecurity:     Worry: Not on file     Inability: Not on file    Transportation needs:     Medical: Not on file     Non-medical: Not on file   Tobacco Use    Smoking status: Never Smoker    Smokeless tobacco: Never Used   Substance and Sexual Activity    Alcohol use: No    Drug use: No    Sexual activity: Yes     Partners: Male   Lifestyle    Physical activity:     Days per week: Not on file     Minutes per session: Not on file    Stress: Not on file   Relationships    Social connections: Talks on phone: Not on file     Gets together: Not on file     Attends Oriental orthodox service: Not on file     Active member of club or organization: Not on file     Attends meetings of clubs or organizations: Not on file     Relationship status: Not on file    Intimate partner violence:     Fear of current or ex partner: Not on file     Emotionally abused: Not on file     Physically abused: Not on file     Forced sexual activity: Not on file   Other Topics Concern     Service No    Blood Transfusions Yes    Caffeine Concern No    Occupational Exposure No    Hobby Hazards No    Sleep Concern No    Stress Concern No    Weight Concern No    Special Diet No    Back Care No    Exercise Yes    Bike Helmet No    Seat Belt Yes    Self-Exams Yes   Social History Narrative    Not on file         REVIEW OF SYSTEMS:   CONSTITUTIONAL SYMPTOMS:  Negative. EYES:  Negative. EARS, NOSE, THROAT AND MOUTH:  Negative. CARDIOVASCULAR:  Negative. RESPIRATORY:  Negative. GENITOURINARY: Per HPI. GASTROINTESTINAL:  Per HPI. INTEGUMENTARY (SKIN AND/OR BREAST):  Negative. MUSCULOSKELETAL: Per HPI.   ENDOCRINE/RHEUMATOLOGIC:  Negative. NEUROLOGICAL:  Per HPI. HEMATOLOGIC/LYMPHATIC:  Negative. ALLERGIC/IMMUNOLOGIC:  Negative. PSYCHIATRIC:  Negative. PHYSICAL EXAMINATION:   Visit Vitals  BP (!) 131/97 (BP 1 Location: Left arm, BP Patient Position: Sitting)   Pulse 75   Temp 97.8 °F (36.6 °C) (Oral)   Resp 16   Ht 5' 6\" (1.676 m)   Wt 164 lb (74.4 kg)   SpO2 98%   BMI 26.47 kg/m²    PAIN SCALE: 6/10    CONSTITUTIONAL: The patient is in no apparent distress and is alert and oriented x 3. HEENT: Normocephalic. Hearing grossly intact. NECK: Supple and symmetric. no tenderness, or masses were felt. RESPIRATORY: No labored breathing. CARDIOVASCULAR: The carotid pulses were normal. Peripheral pulses were 2+. CHEST: Normal AP diameter and normal contour without any kyphoscoliosis.   LYMPHATIC: No lymphadenopathy was appreciated in the neck, axillae or groin. SKIN: Negative for scars, rashes, lesions, or ulcers on the right upper, right lower, left upper, left lower and trunk. NEUROLOGICAL: Alert and oriented x 3. Ambulation without assistive device. FWB. EXTREMITIES: See musculoskeletal.  MUSCULOSKELETAL:   Head and Neck:  Negative for misalignment, asymmetry, crepitation, defects, tenderness masses or effusions.  Left Upper Extremity: Inspection, percussion and palpation performed. Bryans sign is negative.  Right Upper Extremity: Inspection, percussion and palpation performed. Bryans sign is negative.  Spine, Ribs and Pelvis: Back pain. Inspection, percussion and palpation performed. Negative for misalignment, asymmetry, crepitation, defects, tenderness masses or effusions.  Left Lower Extremity: Inspection, percussion and palpation performed. Negative straight leg raise.  Right Lower Extremity: Pain. Inspection, percussion and palpation performed. Negative straight leg raise. SPINE EXAM:     Lumbar spine: No rash, ecchymosis, or gross obliquity. No focal atrophy is noted. ASSESSMENT    ICD-10-CM ICD-9-CM    1. HNP (herniated nucleus pulposus), lumbar M51.26 722.10 AMB POC XRAY, SPINE, LUMBOSACRAL; 2 O   2. Synovial cyst M71.30 727.40 AMB POC XRAY, SPINE, LUMBOSACRAL; 2 O   3. S/P lumbar laminectomy Z98.890 V45.89 AMB POC XRAY, SPINE, LUMBOSACRAL; 2 O       Written by Maddy Holder, as dictated by Rajan Ramirez MD.    I, Dr. Rajan Ramirez MD, confirm that all documentation is accurate.

## 2020-03-12 ENCOUNTER — HOSPITAL ENCOUNTER (OUTPATIENT)
Dept: PREADMISSION TESTING | Age: 65
Discharge: HOME | End: 2020-03-12
Payer: MEDICARE

## 2020-03-12 ENCOUNTER — OFFICE VISIT (OUTPATIENT)
Dept: CARDIOLOGY CLINIC | Age: 65
End: 2020-03-12

## 2020-03-12 VITALS
SYSTOLIC BLOOD PRESSURE: 140 MMHG | DIASTOLIC BLOOD PRESSURE: 98 MMHG | HEIGHT: 66 IN | OXYGEN SATURATION: 99 % | HEART RATE: 77 BPM | WEIGHT: 164 LBS | BODY MASS INDEX: 26.36 KG/M2

## 2020-03-12 DIAGNOSIS — I10 ESSENTIAL HYPERTENSION: ICD-10-CM

## 2020-03-12 DIAGNOSIS — M51.26 HNP (HERNIATED NUCLEUS PULPOSUS), LUMBAR: ICD-10-CM

## 2020-03-12 DIAGNOSIS — R00.2 PALPITATIONS: ICD-10-CM

## 2020-03-12 DIAGNOSIS — Z98.890 S/P LUMBAR LAMINECTOMY: ICD-10-CM

## 2020-03-12 DIAGNOSIS — Z01.810 PREOPERATIVE CARDIOVASCULAR EXAMINATION: Primary | ICD-10-CM

## 2020-03-12 DIAGNOSIS — M71.30 SYNOVIAL CYST: ICD-10-CM

## 2020-03-12 LAB
ALBUMIN SERPL-MCNC: 3.5 G/DL (ref 3.4–5)
ALBUMIN/GLOB SERPL: 1 {RATIO} (ref 0.8–1.7)
ALP SERPL-CCNC: 99 U/L (ref 45–117)
ALT SERPL-CCNC: 20 U/L (ref 13–56)
ANION GAP SERPL CALC-SCNC: 3 MMOL/L (ref 3–18)
AST SERPL-CCNC: 19 U/L (ref 10–38)
BILIRUB SERPL-MCNC: 0.4 MG/DL (ref 0.2–1)
BUN SERPL-MCNC: 10 MG/DL (ref 7–18)
BUN/CREAT SERPL: 22 (ref 12–20)
CALCIUM SERPL-MCNC: 8.9 MG/DL (ref 8.5–10.1)
CHLORIDE SERPL-SCNC: 105 MMOL/L (ref 100–111)
CO2 SERPL-SCNC: 30 MMOL/L (ref 21–32)
CREAT SERPL-MCNC: 0.45 MG/DL (ref 0.6–1.3)
ERYTHROCYTE [DISTWIDTH] IN BLOOD BY AUTOMATED COUNT: 14.5 % (ref 11.6–14.5)
GLOBULIN SER CALC-MCNC: 3.4 G/DL (ref 2–4)
GLUCOSE SERPL-MCNC: 82 MG/DL (ref 74–99)
HCT VFR BLD AUTO: 41.3 % (ref 35–45)
HGB BLD-MCNC: 13.6 G/DL (ref 12–16)
MCH RBC QN AUTO: 27.8 PG (ref 24–34)
MCHC RBC AUTO-ENTMCNC: 32.9 G/DL (ref 31–37)
MCV RBC AUTO: 84.3 FL (ref 74–97)
PLATELET # BLD AUTO: 252 K/UL (ref 135–420)
PMV BLD AUTO: 10.4 FL (ref 9.2–11.8)
POTASSIUM SERPL-SCNC: 4.3 MMOL/L (ref 3.5–5.5)
PROT SERPL-MCNC: 6.9 G/DL (ref 6.4–8.2)
RBC # BLD AUTO: 4.9 M/UL (ref 4.2–5.3)
SODIUM SERPL-SCNC: 138 MMOL/L (ref 136–145)
WBC # BLD AUTO: 7.1 K/UL (ref 4.6–13.2)

## 2020-03-12 PROCEDURE — 80053 COMPREHEN METABOLIC PANEL: CPT

## 2020-03-12 PROCEDURE — 85027 COMPLETE CBC AUTOMATED: CPT

## 2020-03-12 PROCEDURE — 36415 COLL VENOUS BLD VENIPUNCTURE: CPT

## 2020-03-12 NOTE — PROGRESS NOTES
[facility]    Chief Complaint   Patient presents with    Heart Murmur     REFERRED BY DR. Mariza Cm    Palpitations     fluttering    Surgical Clearance     Back surgery with Dr. Say Wallace on 3/23/2020 ay [facility]        HPI    [facility] [place] is a 72 y.o. with history of hypertension, dyslipidemia, migraines, history of palpitations here for perioperative or stratification prior to back surgery. As you know this patient is quite active and healthy and most of her complaints are surrounding her orthopedic issues. No she follows quite closely with Dr. Lilia Pope in regards to her knee and more recently Dr. La Thornton and is scheduled for a second back surgery in a couple weeks. She has no new cardiac complaints. Completely denies chest pain or exertional symptoms but again her orthopedic issues and most recently her back pain is what limits her activity. She does believe that 1 of her pain medications and it sounds like that was Voltaren she felt was increasing her palpitations. She has a long history of palpitations that feel more like flips flutters or skipping and they are typically very sporadic and rare and do not really bother her much. She does hypertension and is frustrated because it sounds like she has whitecoat hypertension. Just to kind of proved to me today she says her blood pressure was actually in the low 100s before coming her appointment today. She did not take her medications but when we took it here it was systolic 623F. She says she has been on atenolol for years and this was actually started because of migraines found to be very effective for her. She tried to stop it about 5 years ago and the migraines came back so was put back on. More recently when her blood pressure became noticeable issue atenolol was actually increased and then she was added lisinopril. She said her lisinopril was also then doubled. She has a history of a murmur, but details are unknown.     She comes in today with several questions besides, \"can I go to surgery\"- incl  1.) Should I take ASA  2.) Do I have to take 2 BP meds  3.) Do I need to have an echo (she didn't have the one ordered by her PCP)    CV RFs: HTN, HL    Past Medical History:   Diagnosis Date    Arthritis     Dyslipidemia     Heart murmur     Hx of migraines     Hypertension     Nausea & vomiting     Rosacea        Past Surgical History:   Procedure Laterality Date    FOOT/TOES SURGERY PROC UNLISTED      x6    HX BACK SURGERY      HX HYSTERECTOMY      due to heavy bleeding    HX KNEE ARTHROSCOPY      HX LUMBAR LAMINECTOMY  01/2011    L5/S1 [place]    HX ORTHOPAEDIC      [place] in [place]    HX ORTHOPAEDIC      Lumbar cyst removal L4-5       Current Outpatient Medications   Medication Sig Dispense Refill    clindamycin (CLEOCIN T) 1 % lotion Apply  to affected area daily. use thin film on affected area 1 Bottle 3    lisinopril (PRINIVIL, ZESTRIL) 40 mg tablet Take 1 Tab by mouth daily. 90 Tab 1    cetirizine (ZYRTEC) 10 mg tablet TAKE 1 TABLET DAILY AS NEEDED FOR ALLERGIES 90 Tab 1    doxycycline (ADOXA) 100 mg tablet TAKE 1 TABLET EVERY TUESDAY, THURSDAY AND SATURDAY 40 Tab 2    fluticasone propionate (FLONASE) 50 mcg/actuation nasal spray USE 2 SPRAYS IN EACH NOSTRIL DAILY (Patient taking differently: daily as needed.) 32 g 0    rosuvastatin (CRESTOR) 10 mg tablet TAKE 1 TABLET NIGHTLY 90 Tab 1    atenolol (TENORMIN) 50 mg tablet TAKE 1 TABLET DAILY 90 Tab 3    rizatriptan (MAXALT-MLT) 10 mg disintegrating tablet PLACE 1 TABLET ON TONGUE ONCE AS NEEDED FOR MIGRAINE 30 Tab 0    potassium 99 mg tablet Take 99 mg by mouth every seven (7) days.  vitamin E (AQUA GEMS) 400 unit capsule Take 400 Units by mouth daily.  ascorbic acid, vitamin C, (VITAMIN C) 500 mg tablet Take 1,000 mg by mouth daily.  calcium citrate-vitamin D3 (CITRACAL + D) tablet Take  by mouth daily.       multivitamin (ONE A DAY) tablet Take 1 Tab by mouth daily.  cholecalciferol, VITAMIN D3, (VITAMIN D3) 5,000 unit tab tablet Take 1,000 Units by mouth daily.  aspirin 81 mg chewable tablet Take 81 mg by mouth daily.  B.infantis-B.ani-B.long-B.bifi (PROBIOTIC 4X) 10-15 mg TbEC Take  by mouth daily.  turmeric/turmeric ext/pepr ext (TURMERIC-TURMERIC EXT-PEPPER) 500-3 mg cap Take  by mouth.  cyclobenzaprine (FLEXERIL) 10 mg tablet Take 1 Tab by mouth three (3) times daily as needed for Muscle Spasm(s). (Patient taking differently: Take 10 mg by mouth three (3) times daily as needed for Muscle Spasm(s).  Not taking) 90 Tab 2    gabapentin (NEURONTIN) 300 mg capsule TAKE 1 CAPSULE IN THE MORNING AND 2 CAPSULES IN THE EVENING AS DIRECTED (Patient taking differently: TAKE 1 CAPSULE IN THE MORNING, I capsule in the afternoon AND 1 CAPSULE IN THE EVENING AS DIRECTED) 270 Cap 0    diclofenac EC (VOLTAREN) 75 mg EC tablet TAKE 1 TABLET TWICE A DAY AS NEEDED FOR PAIN WITH FOOD FOR OSTEOARTHRITIS 180 Tab 1       Allergies   Allergen Reactions    Penicillin G Hives    Tylenol [Acetaminophen] Other (comments)     Causes headache       Social History     Socioeconomic History    Marital status:      Spouse name: Not on file    Number of children: Not on file    Years of education: Not on file    Highest education level: Not on file   Occupational History    Not on file   Social Needs    Financial resource strain: Not on file    Food insecurity     Worry: Not on file     Inability: Not on file    Transportation needs     Medical: Not on file     Non-medical: Not on file   Tobacco Use    Smoking status: Never Smoker    Smokeless tobacco: Never Used   Substance and Sexual Activity    Alcohol use: No    Drug use: No    Sexual activity: Yes     Partners: Male   Lifestyle    Physical activity     Days per week: Not on file     Minutes per session: Not on file    Stress: Not on file   Relationships    Social connections Talks on phone: Not on file     Gets together: Not on file     Attends Mosque service: Not on file     Active member of club or organization: Not on file     Attends meetings of clubs or organizations: Not on file     Relationship status: Not on file    Intimate partner violence     Fear of current or ex partner: Not on file     Emotionally abused: Not on file     Physically abused: Not on file     Forced sexual activity: Not on file   Other Topics Concern     Service No    Blood Transfusions Yes    Caffeine Concern No    Occupational Exposure No    Hobby Hazards No    Sleep Concern No    Stress Concern No    Weight Concern No    Special Diet No    Back Care No    Exercise Yes    Bike Helmet No    Seat Belt Yes    Self-Exams Yes   Social History Narrative    Not on file    lives home with  in [place], he just had a \" maker\" MI in 2019     FH: neg premature CAD, no SCD, Mother  in late [de-identified] CHF, Father  upper 76s CHF    Review of Systems    15 pt Review of Systems is negative unless otherwise mentioned in the HPI. Wt Readings from Last 3 Encounters:   20 74.4 kg (164 lb)   20 74.4 kg (164 lb)   20 74.4 kg (164 lb)     Temp Readings from Last 3 Encounters:   20 97.8 °F (36.6 °C) (Oral)   20 96.5 °F (35.8 °C) (Oral)   20 95.9 °F (35.5 °C) (Oral)     BP Readings from Last 3 Encounters:   20 (!) 140/98   20 (!) 131/97   20 (!) 140/92     Pulse Readings from Last 3 Encounters:   20 77   20 75   20 80       Physical Exam:    Visit Vitals  BP (!) 140/98   Pulse 77   Ht 5' 6\" (1.676 m)   Wt 74.4 kg (164 lb)   SpO2 99%   BMI 26.47 kg/m²      Physical Exam  HENT:      Head: Normocephalic and atraumatic. Eyes:      Pupils: Pupils are equal, round, and reactive to light. Cardiovascular:      Rate and Rhythm: Normal rate and regular rhythm. Heart sounds: Normal heart sounds. No murmur.  No friction rub. No gallop. Pulmonary:      Effort: Pulmonary effort is normal. No respiratory distress. Breath sounds: Normal breath sounds. No wheezing or rales. Chest:      Chest wall: No tenderness. Abdominal:      General: Bowel sounds are normal.      Palpations: Abdomen is soft. Musculoskeletal:         General: No tenderness. Skin:     General: Skin is warm and dry. Neurological:      Mental Status: She is alert and oriented to person, place, and time. EKG today shows: NSR, normal axis and intervals, no ST segment abnormalities    Impression and Plan:  Campos Copeland is a 72 y.o. with:    1.) Perioperative risk stratification prior to back surgery  2.) HTN with white coat HTN  3.) Dyslipidemia, on Crestor 10 (more gave her leg cramps)  4.) Migraines (that's why Atenolol started)  5.) Sporadic rare palps  6.) Back/ joint pain    1.) Pt is low risk and can proceed to OR without CV testing  2.) RTC in ~4-6 months recheck HTN, will try to challenge BB if she's willing to see if we can decrease it then stop    In meantime, I do think from preventative perspective she likely benefits from baby ASA (but will need her recent FLP to calc her ASCVD risk score)    It's okay she didn't have the echo ordered, and as she has no concerning symptoms, normal CV exam and ekg, I will reeval her and we can decide in future if this is needed    >45 mins, all her questions answered    Thank you for allowing me to participate in the care of your patient, please do not hesitate to call with questions or concerns.     [address],    Itzel Hays, DO

## 2020-03-12 NOTE — LETTER
3/12/2020 9:20 AM 
 
Ms. Les Rhoades [address] [place] [id number] Les Rhoades was seen in our office on 0/12/2020 for cardiac evaluation. From a cardiac standpoint she is low risk for back surgery. Please feel free to contact our office if you have any questions regarding this patient. Sincerely, Flory Nguyen, DO

## 2020-03-16 ENCOUNTER — OFFICE VISIT (OUTPATIENT)
Dept: FAMILY MEDICINE CLINIC | Age: 65
End: 2020-03-16

## 2020-03-16 VITALS
TEMPERATURE: 96.2 F | HEART RATE: 75 BPM | SYSTOLIC BLOOD PRESSURE: 134 MMHG | BODY MASS INDEX: 26.55 KG/M2 | RESPIRATION RATE: 18 BRPM | DIASTOLIC BLOOD PRESSURE: 89 MMHG | WEIGHT: 165.2 LBS | HEIGHT: 66 IN | OXYGEN SATURATION: 99 %

## 2020-03-16 DIAGNOSIS — G62.9 NEUROPATHY: ICD-10-CM

## 2020-03-16 DIAGNOSIS — L71.9 ROSACEA: ICD-10-CM

## 2020-03-16 DIAGNOSIS — G89.29 CHRONIC MIDLINE LOW BACK PAIN WITH SCIATICA, SCIATICA LATERALITY UNSPECIFIED: ICD-10-CM

## 2020-03-16 DIAGNOSIS — Z01.818 PREOP EXAMINATION: Primary | ICD-10-CM

## 2020-03-16 DIAGNOSIS — R03.0 ELEVATED BP WITHOUT DIAGNOSIS OF HYPERTENSION: ICD-10-CM

## 2020-03-16 DIAGNOSIS — G43.909 MIGRAINE WITHOUT STATUS MIGRAINOSUS, NOT INTRACTABLE, UNSPECIFIED MIGRAINE TYPE: ICD-10-CM

## 2020-03-16 DIAGNOSIS — M54.40 CHRONIC MIDLINE LOW BACK PAIN WITH SCIATICA, SCIATICA LATERALITY UNSPECIFIED: ICD-10-CM

## 2020-03-16 DIAGNOSIS — I10 ESSENTIAL HYPERTENSION: ICD-10-CM

## 2020-03-16 RX ORDER — ATENOLOL 50 MG/1
TABLET ORAL
Qty: 90 TAB | Refills: 3 | Status: SHIPPED | OUTPATIENT
Start: 2020-03-16 | End: 2021-03-11

## 2020-03-16 RX ORDER — CETIRIZINE HCL 10 MG
TABLET ORAL
Qty: 90 TAB | Refills: 1 | Status: SHIPPED | OUTPATIENT
Start: 2020-03-16 | End: 2020-10-13

## 2020-03-16 NOTE — PROGRESS NOTES
Chief Complaint   Patient presents with    Pre-op Exam     1. Have you been to the ER, urgent care clinic since your last visit? Hospitalized since your last visit? No    2. Have you seen or consulted any other health care providers outside of the [address] [place] [address] since your last visit? Include any pap smears or colon screening. Comfort Friedman comes in for preop evaluation  Referring physician: Dr. 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 Noted to have a left synovial cyst at L4/5. She has been having progressive pain that is getting worse and is now scheduled to have surgery to help manage this situation. She has been followed up by the neurosurgeon. She comes in for preop evaluation. She had labs done that are stable. Has been seen by her cardiologist and has been cleared to undergo planned procedure. EKG done in the past is stable. Patient is medically stabilized and cleared to proceed with planned procedure. Neuropathy: Patient has neuropathy with numbness and tingling lower extremity due to nerve compression. She takes gabapentin for this. Htn: Patient has hypertension. Blood pressure is stable. She is on lisinopril and atenolol. He was most recently seen by cardiologist for past history of a heart murmur. This was not heard by the cardiologist.  She is  hemodynamically stable. Plan to have an echocardiogram done later on. Migraine: Patient has a history of migraine headaches. She does do supportive care. Also on Maxalt as needed.   Dyslipidemia: Patient has a history of dyslipidemia and takes Crestor. Past Medical History  Past Medical History:   Diagnosis Date    Arthritis     Dyslipidemia     Heart murmur     Hx of migraines     Hypertension     Nausea & vomiting     Rosacea        Surgical History  Past Surgical History:   Procedure Laterality Date    FOOT/TOES SURGERY PROC UNLISTED      x6    HX BACK SURGERY      HX HYSTERECTOMY      due to heavy bleeding    HX KNEE ARTHROSCOPY      HX LUMBAR LAMINECTOMY  01/2011    L5/S1 Lami    HX ORTHOPAEDIC      [place] L[place] in [place]    HX ORTHOPAEDIC      Lumbar cyst removal L4-5        Medications  Current Outpatient Medications   Medication Sig Dispense Refill    atenoloL (TENORMIN) 50 mg tablet TAKE 1 TABLET DAILY 90 Tab 3    clindamycin (CLEOCIN T) 1 % lotion Apply  to affected area daily. use thin film on affected area 1 Bottle 3    lisinopril (PRINIVIL, ZESTRIL) 40 mg tablet Take 1 Tab by mouth daily. 90 Tab 1    cetirizine (ZYRTEC) 10 mg tablet TAKE 1 TABLET DAILY AS NEEDED FOR ALLERGIES 90 Tab 1    doxycycline (ADOXA) 100 mg tablet TAKE 1 TABLET EVERY TUESDAY, THURSDAY AND SATURDAY 40 Tab 2    turmeric/turmeric ext/pepr ext (TURMERIC-TURMERIC EXT-PEPPER) 500-3 mg cap Take  by mouth.  fluticasone propionate (FLONASE) 50 mcg/actuation nasal spray USE 2 SPRAYS IN EACH NOSTRIL DAILY (Patient taking differently: daily as needed.) 32 g 0    rosuvastatin (CRESTOR) 10 mg tablet TAKE 1 TABLET NIGHTLY 90 Tab 1    gabapentin (NEURONTIN) 300 mg capsule TAKE 1 CAPSULE IN THE MORNING AND 2 CAPSULES IN THE EVENING AS DIRECTED (Patient taking differently: TAKE 1 CAPSULE IN THE MORNING, I capsule in the afternoon AND 1 CAPSULE IN THE EVENING AS DIRECTED) 270 Cap 0    rizatriptan (MAXALT-MLT) 10 mg disintegrating tablet PLACE 1 TABLET ON TONGUE ONCE AS NEEDED FOR MIGRAINE 30 Tab 0    potassium 99 mg tablet Take 99 mg by mouth every seven (7) days.  vitamin E (AQUA GEMS) 400 unit capsule Take 400 Units by mouth daily.  ascorbic acid, vitamin C, (VITAMIN C) 500 mg tablet Take 1,000 mg by mouth daily.  calcium citrate-vitamin D3 (CITRACAL + D) tablet Take  by mouth daily.  multivitamin (ONE A DAY) tablet Take 1 Tab by mouth daily.  cholecalciferol, VITAMIN D3, (VITAMIN D3) 5,000 unit tab tablet Take 1,000 Units by mouth daily.  aspirin 81 mg chewable tablet Take 81 mg by mouth daily.  B.infantis-B.ani-B.long-B.bifi (PROBIOTIC 4X) 10-15 mg TbEC Take  by mouth daily.  cyclobenzaprine (FLEXERIL) 10 mg tablet Take 1 Tab by mouth three (3) times daily as needed for Muscle Spasm(s). (Patient taking differently: Take 10 mg by mouth three (3) times daily as needed for Muscle Spasm(s).  Not taking) 90 Tab 2    diclofenac EC (VOLTAREN) 75 mg EC tablet TAKE 1 TABLET TWICE A DAY AS NEEDED FOR PAIN WITH FOOD FOR OSTEOARTHRITIS (Patient taking differently: Not taking) 180 Tab 1       Allergies  Allergies   Allergen Reactions    Penicillin G Hives    Tylenol [Acetaminophen] Other (comments)     Causes headache       Family History  Family History   Problem Relation Age of Onset    Heart Attack Mother     Stroke Mother     Other Mother         CHF    Alcohol abuse Father     Other Father         CHF    Liver Disease Father        Social History  Social History     Socioeconomic History    Marital status:      Spouse name: Not on file    Number of children: Not on file    Years of education: Not on file    Highest education level: Not on file   Occupational History    Not on file   Social Needs    Financial resource strain: Not on file    Food insecurity     Worry: Not on file     Inability: Not on file    Transportation needs     Medical: Not on file     Non-medical: Not on file   Tobacco Use    Smoking status: Never Smoker    Smokeless tobacco: Never Used   Substance and Sexual Activity    Alcohol use: No    Drug use: No    Sexual activity: Yes     Partners: Male   Lifestyle    Physical activity     Days per week: Not on file     Minutes per session: Not on file    Stress: Not on file   Relationships    Social connections     Talks on phone: Not on file     Gets together: Not on file     Attends Church service: Not on file     Active member of club or organization: Not on file     Attends meetings of clubs or organizations: Not on file     Relationship status: Not on file    Intimate partner violence     Fear of current or ex partner: Not on file     Emotionally abused: Not on file     Physically abused: Not on file     Forced sexual activity: Not on file   Other Topics Concern     Service No    Blood Transfusions Yes    Caffeine Concern No    Occupational Exposure No    Hobby Hazards No    Sleep Concern No    Stress Concern No    Weight Concern No    Special Diet No    Back Care No    Exercise Yes    Bike Helmet No    Seat Belt Yes    Self-Exams Yes   Social History Narrative    Not on file       Review of Systems  Review of Systems - Review of all systems is negative except as noted above in the HPI.     Vital Signs  Visit Vitals  /89 (BP 1 Location: Left arm, BP Patient Position: Sitting)   Pulse 75   Temp 96.2 °F (35.7 °C) (Oral)   Resp 18   Ht 5' 6\" (1.676 m)   Wt 165 lb 3.2 oz (74.9 kg)   SpO2 99%   BMI 26.66 kg/m²         Physical Exam  Physical Examination: General appearance - alert, well appearing, and in no distress, oriented to person, place, and time and acyanotic, in no respiratory distress  Mental status - alert, oriented to person, place, and time, affect appropriate to mood  Neck - supple, no significant adenopathy  Lymphatics - no palpable lymphadenopathy, no hepatosplenomegaly  Chest - clear to auscultation, no wheezes, rales or rhonchi, symmetric air entry, no tachypnea, retractions or cyanosis  Heart - normal rate and regular rhythm, S1 and S2 normal  Abdomen - soft, nontender, nondistended, no masses or organomegaly  Back exam - limited range of motion, pain with motion noted during exam, tenderness noted paralumbar muscles and midline lumbar spine  Neurological - neck supple without rigidity  Musculoskeletal - osteoarthritic changes noted in both hands  Extremities - intact peripheral pulses    Results  Results for orders placed or performed during the hospital encounter of 03/12/20   CBC W/O DIFF   Result Value Ref Range    WBC 7.1 4.6 - 13.2 K/uL    RBC 4.90 4.20 - 5.30 M/uL    HGB 13.6 12.0 - 16.0 g/dL    HCT 41.3 35.0 - 45.0 %    MCV 84.3 74.0 - 97.0 FL    MCH 27.8 24.0 - 34.0 PG    MCHC 32.9 31.0 - 37.0 g/dL    RDW 14.5 11.6 - 14.5 %    PLATELET 568 197 - 259 K/uL    MPV 10.4 9.2 - 75.1 FL   METABOLIC PANEL, COMPREHENSIVE   Result Value Ref Range    Sodium 138 136 - 145 mmol/L    Potassium 4.3 3.5 - 5.5 mmol/L    Chloride 105 100 - 111 mmol/L    CO2 30 21 - 32 mmol/L    Anion gap 3 3.0 - 18 mmol/L    Glucose 82 74 - 99 mg/dL    BUN 10 7.0 - 18 MG/DL    Creatinine 0.45 (L) 0.6 - 1.3 MG/DL    BUN/Creatinine ratio 22 (H) 12 - 20      GFR est AA >60 >60 ml/min/1.73m2    GFR est non-AA >60 >60 ml/min/1.73m2    Calcium 8.9 8.5 - 10.1 MG/DL    Bilirubin, total 0.4 0.2 - 1.0 MG/DL    ALT (SGPT) 20 13 - 56 U/L    AST (SGOT) 19 10 - 38 U/L    Alk. phosphatase 99 45 - 117 U/L    Protein, total 6.9 6.4 - 8.2 g/dL    Albumin 3.5 3.4 - 5.0 g/dL    Globulin 3.4 2.0 - 4.0 g/dL    A-G Ratio 1.0 0.8 - 1.7         ASSESSMENT and PLAN    ICD-10-CM ICD-9-CM    1. Preop examination Z01.818 V72.84    2. Chronic midline low back pain with sciatica, sciatica laterality unspecified M54.40 724.2     G89.29 724.3      338.29    3. Essential hypertension I10 401.9    4. Rosacea L71.9 695.3    5. Neuropathy G62.9 355.9    6.  Migraine without status migrainosus, not intractable, unspecified migraine type G43.909 346.90      lab results and schedule of future lab studies reviewed with patient  reviewed diet, exercise and weight control  cardiovascular risk and specific lipid/LDL goals reviewed  reviewed medications and side effects in detail      I have discussed the diagnosis with the patient and the intended plan of care as seen in the above orders. The patient has received an after-visit summary and questions were answered concerning future plans. I have discussed medication, side effects, and warnings with the patient in detail. The patient verbalized understanding and is in agreement with the plan of care. The patient will contact the office with any additional concerns. Edith Rothman MD    PLEASE NOTE:   This document has been produced using voice recognition software.  Unrecognized errors in transcription may be present

## 2020-03-19 ENCOUNTER — OFFICE VISIT (OUTPATIENT)
Dept: ORTHOPEDIC SURGERY | Age: 65
End: 2020-03-19

## 2020-03-19 VITALS
SYSTOLIC BLOOD PRESSURE: 145 MMHG | HEART RATE: 67 BPM | WEIGHT: 163 LBS | TEMPERATURE: 98 F | OXYGEN SATURATION: 98 % | RESPIRATION RATE: 16 BRPM | HEIGHT: 66 IN | BODY MASS INDEX: 26.2 KG/M2 | DIASTOLIC BLOOD PRESSURE: 89 MMHG

## 2020-03-19 DIAGNOSIS — M71.30 SYNOVIAL CYST: Primary | ICD-10-CM

## 2020-03-19 NOTE — PROGRESS NOTES
[address]  [address], [address]  [facility], [address]  Phone: [phone]  Fax: [phone]  PROGRESS NOTE  Patient: Torri Rodriguez                MRN: [id number]       SSN: [id number]  YOB: 1955        AGE: 72 y.o. SEX: female  Body mass index is 26.31 kg/m². PCP: James Amaro MD  03/19/20    Chief Complaint   Patient presents with    Back Pain     lumbar pain, f/u appt. HISTORY OF PRESENT ILLNESS, RADIOGRAPHS, and PLAN:     Cresencio Figueroa returns today. I brought her in to discuss her surgery in light of the current health crisis. She is having severe radiculopathy and her pain is severe enough that I think would qualify her for urgent surgery but she has no neurologic deficit and she is 72years of age with an infirm  which is her at higher risk. I discussed the matter at length with her and I discussed our national and health system policies as well as what I think is prudent medical management for her. Risks benefits complications and alternatives of each approach were discussed at length and in detail. She wishes to postpone surgery at this point and we will make that scheduling changes. This dictation was created utilizing voice recognition software. Errors may be present.        Past Medical History:   Diagnosis Date    Arthritis     Cardiac murmur     Dyslipidemia     Heart murmur     Hx of migraines     Hypertension     Nausea & vomiting     Rosacea        Family History   Problem Relation Age of Onset    Heart Attack Mother     Stroke Mother     Other Mother         CHF    Alcohol abuse Father     Other Father         CHF    Liver Disease Father        Current Outpatient Medications   Medication Sig Dispense Refill    atenoloL (TENORMIN) 50 mg tablet TAKE 1 TABLET DAILY 90 Tab 3    cetirizine (ZYRTEC) 10 mg tablet TAKE 1 TABLET DAILY AS NEEDED FOR ALLERGIES 90 Tab 1    lisinopril (Oneyda Up) 40 mg tablet Take 1 Tab by mouth daily. 90 Tab 1    doxycycline (ADOXA) 100 mg tablet TAKE 1 TABLET EVERY TUESDAY, THURSDAY AND SATURDAY 40 Tab 2    rosuvastatin (CRESTOR) 10 mg tablet TAKE 1 TABLET NIGHTLY (Patient taking differently: Takes 1/2 tablet daily) 90 Tab 1    potassium 99 mg tablet Take 99 mg by mouth every seven (7) days.  vitamin E (AQUA GEMS) 400 unit capsule Take 400 Units by mouth daily.  ascorbic acid, vitamin C, (VITAMIN C) 500 mg tablet Take 1,000 mg by mouth daily. Takes 2000 daily      calcium citrate-vitamin D3 (CITRACAL + D) tablet Take  by mouth daily.  multivitamin (ONE A DAY) tablet Take 1 Tab by mouth daily.  cholecalciferol, VITAMIN D3, (VITAMIN D3) 5,000 unit tab tablet Take 1,000 Units by mouth daily.  B.infantis-B.ani-B.long-B.bifi (PROBIOTIC 4X) 10-15 mg TbEC Take  by mouth daily.  clindamycin (CLEOCIN T) 1 % lotion Apply  to affected area daily. use thin film on affected area (Patient taking differently: Apply  to affected area as needed. use thin film on affected area-used as needed) 1 Bottle 3    fluticasone propionate (FLONASE) 50 mcg/actuation nasal spray USE 2 SPRAYS IN EACH NOSTRIL DAILY (Patient taking differently: daily as needed.) 32 g 0    rizatriptan (MAXALT-MLT) 10 mg disintegrating tablet PLACE 1 TABLET ON TONGUE ONCE AS NEEDED FOR MIGRAINE 30 Tab 0    aspirin 81 mg chewable tablet Take 81 mg by mouth daily.          Allergies   Allergen Reactions    Penicillin G Hives    Tylenol [Acetaminophen] Other (comments)     Causes headache, denies recently       Past Surgical History:   Procedure Laterality Date    FOOT/TOES SURGERY PROC UNLISTED      x6    HX BACK SURGERY      HX HYSTERECTOMY      due to heavy bleeding    HX KNEE ARTHROSCOPY      HX LUMBAR LAMINECTOMY  01/2011    L5/S1 [place]    HX ORTHOPAEDIC      Lumbar Laminectomy in 2011 [place]    HX ORTHOPAEDIC      Lumbar cyst removal L4-5       Past Medical History:   Diagnosis Date    Arthritis     Cardiac murmur     Dyslipidemia     Heart murmur     Hx of migraines     Hypertension     Nausea & vomiting     Rosacea        Social History     Socioeconomic History    Marital status:      Spouse name: Not on file    Number of children: Not on file    Years of education: Not on file    Highest education level: Not on file   Occupational History    Not on file   Social Needs    Financial resource strain: Not on file    Food insecurity     Worry: Not on file     Inability: Not on file    Transportation needs     Medical: Not on file     Non-medical: Not on file   Tobacco Use    Smoking status: Never Smoker    Smokeless tobacco: Never Used   Substance and Sexual Activity    Alcohol use: No    Drug use: No    Sexual activity: Yes     Partners: Male   Lifestyle    Physical activity     Days per week: Not on file     Minutes per session: Not on file    Stress: Not on file   Relationships    Social connections     Talks on phone: Not on file     Gets together: Not on file     Attends Oriental orthodox service: Not on file     Active member of club or organization: Not on file     Attends meetings of clubs or organizations: Not on file     Relationship status: Not on file    Intimate partner violence     Fear of current or ex partner: Not on file     Emotionally abused: Not on file     Physically abused: Not on file     Forced sexual activity: Not on file   Other Topics Concern     Service No    Blood Transfusions Yes    Caffeine Concern No    Occupational Exposure No    Hobby Hazards No    Sleep Concern No    Stress Concern No    Weight Concern No    Special Diet No    Back Care No    Exercise Yes    Bike Helmet No    Seat Belt Yes    Self-Exams Yes   Social History Narrative    Not on file         REVIEW OF SYSTEMS:   CONSTITUTIONAL SYMPTOMS:  Negative. EYES:  Negative. EARS, NOSE, THROAT AND MOUTH:  Negative.    CARDIOVASCULAR: Negative. RESPIRATORY:  Negative. GENITOURINARY: Per HPI. GASTROINTESTINAL:  Per HPI. INTEGUMENTARY (SKIN AND/OR BREAST):  Negative. MUSCULOSKELETAL: Per HPI.   ENDOCRINE/RHEUMATOLOGIC:  Negative. NEUROLOGICAL:  Per HPI. HEMATOLOGIC/LYMPHATIC:  Negative. ALLERGIC/IMMUNOLOGIC:  Negative. PSYCHIATRIC:  Negative. PHYSICAL EXAMINATION:   Visit Vitals  /89 (BP 1 Location: Left arm, BP Patient Position: Sitting)   Pulse 67   Temp 98 °F (36.7 °C) (Oral)   Resp 16   Ht 5' 6\" (1.676 m)   Wt 163 lb (73.9 kg)   SpO2 98%   BMI 26.31 kg/m²    PAIN SCALE: 7/10    CONSTITUTIONAL: The patient is in no apparent distress and is alert and oriented x 3. HEENT: Normocephalic. Hearing grossly intact. NECK: Supple and symmetric. no tenderness, or masses were felt. RESPIRATORY: No labored breathing. CARDIOVASCULAR: The carotid pulses were normal. Peripheral pulses were 2+. CHEST: Normal AP diameter and normal contour without any kyphoscoliosis. LYMPHATIC: No lymphadenopathy was appreciated in the neck, axillae or groin. SKIN:  Negative for scars, rashes, lesions, or ulcers on the right upper, right lower, left upper, left lower and trunk. NEUROLOGICAL: Alert and oriented x 3. Ambulation without assistive device. FWB. EXTREMITIES: See musculoskeletal.  MUSCULOSKELETAL:   Head and Neck:  Negative for misalignment, asymmetry, crepitation, defects, tenderness masses or effusions.  Left Upper Extremity: Inspection, percussion and palpation performed. Bryans sign is negative.  Right Upper Extremity: Inspection, percussion and palpation performed. Bryans sign is negative.  Spine, Ribs and Pelvis: Back pain. Inspection, percussion and palpation performed. Negative for misalignment, asymmetry, crepitation, defects, tenderness masses or effusions.  Left Lower Extremity: Inspection, percussion and palpation performed. Negative straight leg raise.    Right Lower Extremity: Inspection, percussion and palpation performed. Negative straight leg raise. SPINE EXAM:     Lumbar spine: No rash, ecchymosis, or gross obliquity. No focal atrophy is noted. ASSESSMENT    ICD-10-CM ICD-9-CM    1. Synovial cyst M71.30 727.40        Written by Tracey Flower, as dictated by Sreekanth Anglin MD.    I, Dr. Sreekanth Anglin MD, confirm that all documentation is accurate. EKG completed

## 2020-03-19 NOTE — LETTER
3/19/20 Patient: Dar Ramirez YOB: 1955 Date of Visit: 3/19/2020 Valeria Isaac MD 
[facility] [id number] [address] [place] [address] VIA In Basket Dear Valeria Isaac MD, Thank you for referring Ms. Nanette Luevano to [facility] for evaluation. My notes for this consultation are attached. If you have questions, please do not hesitate to call me. I look forward to following your patient along with you.  
 
 
Sincerely, 
 
Matthew Adamson MD

## 2020-04-28 DIAGNOSIS — L71.9 ROSACEA: ICD-10-CM

## 2020-04-28 RX ORDER — CLINDAMYCIN PHOSPHATE 10 UG/ML
LOTION TOPICAL DAILY
Qty: 1 BOTTLE | Refills: 3 | Status: SHIPPED | OUTPATIENT
Start: 2020-04-28

## 2020-06-02 ENCOUNTER — TELEPHONE (OUTPATIENT)
Dept: MEDSURG UNIT | Age: 65
End: 2020-06-02

## 2020-06-08 RX ORDER — ROSUVASTATIN CALCIUM 10 MG/1
TABLET, COATED ORAL
Qty: 90 TAB | Refills: 3 | Status: SHIPPED | OUTPATIENT
Start: 2020-06-08 | End: 2022-06-30 | Stop reason: SDUPTHER

## 2020-06-08 RX ORDER — LISINOPRIL 10 MG/1
TABLET ORAL
Qty: 90 TAB | Refills: 3 | Status: SHIPPED | OUTPATIENT
Start: 2020-06-08 | End: 2020-10-07 | Stop reason: DRUGHIGH

## 2020-06-30 ENCOUNTER — OFFICE VISIT (OUTPATIENT)
Dept: ORTHOPEDIC SURGERY | Age: 65
End: 2020-06-30

## 2020-06-30 VITALS
RESPIRATION RATE: 16 BRPM | HEART RATE: 62 BPM | OXYGEN SATURATION: 98 % | DIASTOLIC BLOOD PRESSURE: 93 MMHG | BODY MASS INDEX: 26.68 KG/M2 | WEIGHT: 166 LBS | HEIGHT: 66 IN | SYSTOLIC BLOOD PRESSURE: 138 MMHG | TEMPERATURE: 97.5 F

## 2020-06-30 DIAGNOSIS — S83.8X2A MENISCAL INJURY, LEFT, INITIAL ENCOUNTER: Primary | ICD-10-CM

## 2020-06-30 DIAGNOSIS — M17.12 PRIMARY OSTEOARTHRITIS OF LEFT KNEE: ICD-10-CM

## 2020-06-30 NOTE — PROGRESS NOTES
1. Have you been to the ER, urgent care clinic since your last visit? Hospitalized since your last visit? No    2. Have you seen or consulted any other health care providers outside of the [address] [place] [address] since your last visit? Include any pap smears or colon screening.  No

## 2020-06-30 NOTE — PROGRESS NOTES
Cheyenne Ramirez  1955   Chief Complaint   Patient presents with    Knee Pain     Left knee pain        HISTORY OF PRESENT ILLNESS  Nanette Lowe is a 72 y.o. female who presents today for reevaluation of left knee pain. Patient rates pain as 7/10 today. Pain has been present for awhile. At last OV on 2/25/2020, patient had a cortisone injection and aspiration which provided only relief for around 2 months. The patient also had a cortisone injection on 10/01/19 which provided good relief. Pain with prolonged walking and standing. Pt reports swelling and giving way sensation. Associated symptom of swelling today. Pt is fairly active and likes to walk for exercise. Pt had her knee aspirated on 5/29/19 by Dr. Kourtney Patrick. She has taken Gabapentin and Voltaren for her back and nerve pain, stopped taking it secondary to an upset stomach. She had back surgery for spinal stenosis on 10/31/19. Patient denies any fever, chills, chest pain, shortness of breath or calf pain. The remainder of the review of systems is negative. There are no new illness or injuries to report since last seen in the office. There are no changes to medications, allergies, family or social history. Pain Assessment  6/30/2020   Location of Pain Knee   Location Modifiers Left   Severity of Pain 7   Quality of Pain Throbbing; Sharp;Dull;Aching;Burning   Quality of Pain Comment -   Duration of Pain Persistent   Duration of Pain Comment -   Frequency of Pain Constant   Aggravating Factors Bending;Stairs   Aggravating Factors Comment -   Limiting Behavior -   Relieving Factors Rest;Elevation   Relieving Factors Comment -   Result of Injury No     PHYSICAL EXAM:   Visit Vitals  BP (!) 138/93   Pulse 62   Temp 97.5 °F (36.4 °C) (Oral)   Resp 16   Ht 5' 6\" (1.676 m)   Wt 166 lb (75.3 kg)   SpO2 98%   BMI 26.79 kg/m²     The patient is a well-developed, well-nourished female   in no acute distress. The patient is alert and oriented times three. The patient is alert and oriented times three. Mood and affect are normal.  LYMPHATIC: lymph nodes are not enlarged and are within normal limits  SKIN: normal in color and non tender to palpation. There are no bruises or abrasions noted. NEUROLOGICAL: Motor sensory exam is within normal limits. Reflexes are equal bilaterally. There is normal sensation to pinprick and light touch  MUSCULOSKELETAL:  Examination Left knee   Skin Intact   Range of motion    Effusion +   Medial joint line tenderness +   Lateral joint line tenderness +   Tenderness Pes Bursa -   Tenderness insertion MCL -   Tenderness insertion LCL -   Tricias -   Patella crepitus +   Patella grind +   Lachman -   Pivot shift -   Anterior drawer -   Posterior drawer -   Varus stress -   Valgus stress -   Neurovascular Intact   Calf Swelling and Tenderness to Palpation -   Fanta's Test -   Hamstring Cord Tightness -      IMAGING: MRI of left knee dated 5/25/19 was reviewed and read:   IMPRESSION:  1.  Large ruptured Baker's cyst. A heterogeneous T2 hyperintense structure at the posterior aspect of the knee, located posterior to the medial head of the gastrocnemius and with a tail that likely communicates with the Baker's cyst, favored to reflect extension of the Baker's cyst. Given the unlikely possibility that this reflects a neoplastic process, given the noncontrast enhanced nature of the study, continued clinical follow-up is recommended. 2.  Maceration of the lateral meniscus. Intrasubstance signal within the medial meniscus does not meet MRI criteria for tear and is compatible with mucoid degeneration. 3.  Prior partial thickness ACL injury. Advanced popliteus insertional tendinosis. Otherwise intact PCL, MCL, and LCL.   4.  Tricompartmental chondrosis, including grade 4 chondrosis in the lateral tibiofemoral compartment, focal grade 2 fissure in the median ridge of the patella, and intra-articular bodies in the popliteus sheath and posterior knee joint, as above. IMPRESSION:      ICD-10-CM ICD-9-CM    1. Meniscal injury, left, initial encounter S83.8X2A 959.7 MRI KNEE LT WO CONT   2. Primary osteoarthritis of left knee M17.12 715.16 MRI KNEE LT WO CONT        PLAN:   1. Pt presents today with persistent left knee pain and swelling and I am ordering an MRI to r/o a meniscus tear. Risk factors include: n/a  2. No ultrasound exam indicated today  3. No cortisone injection indicated today  4. No Physical/Occupational Therapy indicated today  5. Yes diagnostic test indicated today: MRI L KNEE  6. No durable medical equipment indicated today  7. No referral indicated today   8. No medications indicated today:   9. No Narcotic indicated today       RTC following MRI      Scribed by Glen Watters) as dictated by Juan Jose Fuchs MD    I, Dr. Juan Jose Fuchs, confirm that all documentation is accurate.     Juan Jose Fuchs M.D.   [phone]

## 2020-07-14 ENCOUNTER — HOSPITAL ENCOUNTER (OUTPATIENT)
Age: 65
Discharge: HOME | End: 2020-07-14
Attending: ORTHOPAEDIC SURGERY
Payer: MEDICARE

## 2020-07-14 DIAGNOSIS — M17.12 PRIMARY OSTEOARTHRITIS OF LEFT KNEE: ICD-10-CM

## 2020-07-14 DIAGNOSIS — S83.8X2A MENISCAL INJURY, LEFT, INITIAL ENCOUNTER: ICD-10-CM

## 2020-07-14 PROCEDURE — 73721 MRI JNT OF LWR EXTRE W/O DYE: CPT

## 2020-07-15 NOTE — PROGRESS NOTES
Hoda Edmond  1955   Chief Complaint   Patient presents with    Knee Pain     left knee pain        HISTORY OF PRESENT ILLNESS  Nanette Johnson is a 72 y.o. female who presents today for reevaluation of left knee pain and MRI review. Patient rates pain as 7/10 today. Pain has been present for awhile. The patient had a cortisone injection and aspiration on 2/25/2020 which only provided relief for around 2 months. The patient also had a cortisone injection on 10/01/19 which provided good relief. Pain with prolonged sitting. Pt reports swelling and giving way sensation. Pt is fairly active and likes to walk for exercise. Pt had her knee aspirated on 5/29/19 by Dr. Kalie Merino. She has taken Gabapentin and Voltaren for her back and nerve pain, stopped taking it secondary to an upset stomach. She had back surgery for spinal stenosis on 10/31/19. Patient denies any fever, chills, chest pain, shortness of breath or calf pain. The remainder of the review of systems is negative. There are no new illness or injuries to report since last seen in the office. There are no changes to medications, allergies, family or social history. Pain Assessment  7/16/2020   Location of Pain Knee   Location Modifiers Left   Severity of Pain 7   Quality of Pain Throbbing; Sharp;Dull;Burning   Quality of Pain Comment -   Duration of Pain Persistent   Duration of Pain Comment -   Frequency of Pain Constant   Aggravating Factors Stairs; Walking;Standing;Bending;Stretching;Straightening   Aggravating Factors Comment -   Limiting Behavior -   Relieving Factors Elevation   Relieving Factors Comment -   Result of Injury No     PHYSICAL EXAM:   Visit Vitals  /88 (BP 1 Location: Left arm, BP Patient Position: Sitting)   Pulse 62   Temp 98.9 °F (37.2 °C) (Oral)   Resp 16   Ht 5' 6\" (1.676 m)   Wt 168 lb 6.4 oz (76.4 kg)   SpO2 100%   BMI 27.18 kg/m²     The patient is a well-developed, well-nourished female   in no acute distress. The patient is alert and oriented times three. The patient is alert and oriented times three. Mood and affect are normal.  LYMPHATIC: lymph nodes are not enlarged and are within normal limits  SKIN: normal in color and non tender to palpation. There are no bruises or abrasions noted. NEUROLOGICAL: Motor sensory exam is within normal limits. Reflexes are equal bilaterally. There is normal sensation to pinprick and light touch  MUSCULOSKELETAL:  Examination Left knee   Skin Intact   Range of motion    Effusion +   Medial joint line tenderness +   Lateral joint line tenderness +   Tenderness Pes Bursa -   Tenderness insertion MCL -   Tenderness insertion LCL -   Tricias -   Patella crepitus +   Patella grind +   Lachman -   Pivot shift -   Anterior drawer -   Posterior drawer -   Varus stress -   Valgus stress -   Neurovascular Intact   Calf Swelling and Tenderness to Palpation -   Fanta's Test -   Hamstring Cord Tightness -        IMAGING: MRI of left knee dated 7/14/2020 was reviewed and read by Dr. Roxana Ryan:   IMPRESSION:  1. Significant progression in the complex tearing of the lateral meniscus with only minimal portion remaining. Severe secondary osteoarthritis of the lateral compartment with lateral femoral and lateral tibial plateau marrow contusions  2. Multifocal chondromalacia and osteoarthritic changes of the medial compartment with likely oblique horizontal tear of the medial meniscus without displacement. 3.  Mucoid degeneration of the ACL. 4.  Progressive degenerative changes of the patellofemoral joint with subchondral edema adjacent the trochlea and multifocal grade II to grade III chondromalacia.       MRI of left knee dated 5/25/19 was reviewed and read:   IMPRESSION:  1.  Large ruptured Baker's cyst. A heterogeneous T2 hyperintense structure at the posterior aspect of the knee, located posterior to the medial head of the gastrocnemius and with a tail that likely communicates with the Baker's cyst, favored to reflect extension of the Baker's cyst. Given the unlikely possibility that this reflects a neoplastic process, given the noncontrast enhanced nature of the study, continued clinical follow-up is recommended. 2.  Maceration of the lateral meniscus. Intrasubstance signal within the medial meniscus does not meet MRI criteria for tear and is compatible with mucoid degeneration. 3.  Prior partial thickness ACL injury. Advanced popliteus insertional tendinosis. Otherwise intact PCL, MCL, and LCL. 4.  Tricompartmental chondrosis, including grade 4 chondrosis in the lateral tibiofemoral compartment, focal grade 2 fissure in the median ridge of the patella, and intra-articular bodies in the popliteus sheath and posterior knee joint, as above. IMPRESSION:      ICD-10-CM ICD-9-CM    1. Tear of lateral meniscus of left knee, current, unspecified tear type, initial encounter  S83.282A 836.1    2. Primary osteoarthritis of left knee  M17.12 715.16         PLAN:   1. Pt presents today with persistent left knee pain due to an MRI-documented lateral meniscus tear and primary OA. She has significant degenerative changes as demonstrated by the MRI and I advised the patient that a knee arthroscopy would likely not completely alleviate her pain. I am referring her to Dr. Ilana Carbone to discuss other surgical options. Risk factors include: htn, primary OA  2. No ultrasound exam indicated today  3. No cortisone injection indicated today  4. No Physical/Occupational Therapy indicated today  5. No diagnostic test indicated today  6. No durable medical equipment indicated today  7. Yes referral indicated today DR DIEHL  8. No medications indicated today:   9. No Narcotic indicated today       RTC prn      Scribed by [address]) as dictated by Azalia Hudson MD    I, Dr. Azalia Hudson, confirm that all documentation is accurate.     Azalia Hudson M.D.   [facility]

## 2020-07-16 ENCOUNTER — OFFICE VISIT (OUTPATIENT)
Dept: ORTHOPEDIC SURGERY | Age: 65
End: 2020-07-16

## 2020-07-16 VITALS
OXYGEN SATURATION: 100 % | HEIGHT: 66 IN | TEMPERATURE: 98.9 F | WEIGHT: 168.4 LBS | BODY MASS INDEX: 27.06 KG/M2 | SYSTOLIC BLOOD PRESSURE: 151 MMHG | DIASTOLIC BLOOD PRESSURE: 88 MMHG | RESPIRATION RATE: 16 BRPM | HEART RATE: 62 BPM

## 2020-07-16 DIAGNOSIS — S83.282A TEAR OF LATERAL MENISCUS OF LEFT KNEE, CURRENT, UNSPECIFIED TEAR TYPE, INITIAL ENCOUNTER: Primary | ICD-10-CM

## 2020-07-16 DIAGNOSIS — M17.12 PRIMARY OSTEOARTHRITIS OF LEFT KNEE: ICD-10-CM

## 2020-08-13 ENCOUNTER — OFFICE VISIT (OUTPATIENT)
Dept: CARDIOLOGY CLINIC | Age: 65
End: 2020-08-13

## 2020-08-13 VITALS
HEART RATE: 67 BPM | DIASTOLIC BLOOD PRESSURE: 100 MMHG | BODY MASS INDEX: 27 KG/M2 | OXYGEN SATURATION: 98 % | SYSTOLIC BLOOD PRESSURE: 158 MMHG | WEIGHT: 168 LBS | HEIGHT: 66 IN

## 2020-08-13 DIAGNOSIS — I10 ESSENTIAL HYPERTENSION: Primary | ICD-10-CM

## 2020-08-13 DIAGNOSIS — R00.2 PALPITATIONS: ICD-10-CM

## 2020-08-13 NOTE — PROGRESS NOTES
Travis Prader presents today for   Chief Complaint   Patient presents with    Follow-up     5 month follow up     Leg Swelling     mild       Nanette Lux preferred language for health care discussion is english/other. Is someone accompanying this pt? no    Is the patient using any DME equipment during [address]? no    Depression Screening:  3 most recent PHQ Screens 8/13/2020   Little interest or pleasure in doing things Not at all   Feeling down, depressed, irritable, or hopeless Not at all   Total Score PHQ 2 0       Learning Assessment:  No flowsheet data found. Abuse Screening:  Abuse Screening Questionnaire 8/13/2020   Do you ever feel afraid of your partner? N   Are you in a relationship with someone who physically or mentally threatens you? N   Is it safe for you to go home? Y       Fall Risk  Fall Risk Assessment, last 12 mths 8/13/2020   Able to walk? Yes   Fall in past 12 months? No       Pt currently taking Anticoagulant therapy? No    Coordination of Care:  1. Have you been to the ER, urgent care clinic since your last visit? Hospitalized since your last visit? no    2. Have you seen or consulted any other health care providers outside of the [address] [place] [address] since your last visit? Include any pap smears or colon screening.  no

## 2020-08-13 NOTE — PROGRESS NOTES
[facility]    Chief Complaint   Patient presents with    Follow-up     5 month follow up     Leg Swelling     mild       HPI    [address] is a 72 y.o. with history of hypertension, dyslipidemia, migraines, history of palpitations who I initially met for perioperative or stratification prior to back surgery. As you know this patient is quite active and healthy and most of her complaints are surrounding her orthopedic issues. No she follows quite closely with Dr. Alvin Gutierrez (and Dr. Lauren Durham) in regards to her knee and more recently Dr. Ivan Martino. She has no new cardiac complaints. Completely denies chest pain or exertional symptoms but again her orthopedic issues and most recently her back pain is what limits her activity. She does believe that 1 of her pain medications and it sounds like that was Voltaren she felt was increasing her palpitations. She has a long history of palpitations that feel more like flips flutters or skipping and they are typically very sporadic and rare and do not really bother her much. She does hypertension and is frustrated because it sounds like she has whitecoat hypertension. She checked her blood pressure the last couple days and they have been systolic 930Q to 063K as always.     CV RFs: HTN, HL    Past Medical History:   Diagnosis Date    Arthritis     Cardiac murmur     Dyslipidemia     Heart murmur     Hx of migraines     Hypertension     Nausea & vomiting     Rosacea        Past Surgical History:   Procedure Laterality Date    FOOT/TOES SURGERY PROC UNLISTED      x6    HX BACK SURGERY      HX HYSTERECTOMY      due to heavy bleeding    HX KNEE ARTHROSCOPY      HX LUMBAR LAMINECTOMY  01/2011    L5/S1 [place]    HX ORTHOPAEDIC      [place] in [place]    HX ORTHOPAEDIC      Lumbar cyst removal L4-5       Current Outpatient Medications   Medication Sig Dispense Refill    rosuvastatin (CRESTOR) 10 mg tablet TAKE 1 TABLET NIGHTLY 90 Tab 3    lisinopriL (PRINIVIL, ZESTRIL) 10 mg tablet TAKE 1 TABLET DAILY 90 Tab 3    clindamycin (CLEOCIN T) 1 % lotion Apply  to affected area daily. use thin film on affected area 1 Bottle 3    atenoloL (TENORMIN) 50 mg tablet TAKE 1 TABLET DAILY 90 Tab 3    cetirizine (ZYRTEC) 10 mg tablet TAKE 1 TABLET DAILY AS NEEDED FOR ALLERGIES 90 Tab 1    lisinopril (PRINIVIL, ZESTRIL) 40 mg tablet Take 1 Tab by mouth daily. 90 Tab 1    doxycycline (ADOXA) 100 mg tablet TAKE 1 TABLET EVERY TUESDAY, THURSDAY AND SATURDAY 40 Tab 2    fluticasone propionate (FLONASE) 50 mcg/actuation nasal spray USE 2 SPRAYS IN EACH NOSTRIL DAILY (Patient taking differently: daily as needed.) 32 g 0    rizatriptan (MAXALT-MLT) 10 mg disintegrating tablet PLACE 1 TABLET ON TONGUE ONCE AS NEEDED FOR MIGRAINE 30 Tab 0    potassium 99 mg tablet Take 99 mg by mouth every seven (7) days.  vitamin E (AQUA GEMS) 400 unit capsule Take 400 Units by mouth daily.  ascorbic acid, vitamin C, (VITAMIN C) 500 mg tablet Take 1,000 mg by mouth daily. Takes 2000 daily      calcium citrate-vitamin D3 (CITRACAL + D) tablet Take  by mouth daily.  multivitamin (ONE A DAY) tablet Take 1 Tab by mouth daily.  cholecalciferol, VITAMIN D3, (VITAMIN D3) 5,000 unit tab tablet Take 1,000 Units by mouth daily.  aspirin 81 mg chewable tablet Take 81 mg by mouth daily.  B.infantis-B.ani-B.long-B.bifi (PROBIOTIC 4X) 10-15 mg TbEC Take  by mouth daily.          Allergies   Allergen Reactions    Penicillin G Hives    Tylenol [Acetaminophen] Other (comments)     Causes headache, denies recently       Social History     Socioeconomic History    Marital status:      Spouse name: Not on file    Number of children: Not on file    Years of education: Not on file    Highest education level: Not on file   Occupational History    Not on file   Social Needs    Financial resource strain: Not on file    Food insecurity     Worry: Not on file Inability: Not on file    Transportation needs     Medical: Not on file     Non-medical: Not on file   Tobacco Use    Smoking status: Never Smoker    Smokeless tobacco: Never Used   Substance and Sexual Activity    Alcohol use: No    Drug use: No    Sexual activity: Yes     Partners: Male   Lifestyle    Physical activity     Days per week: Not on file     Minutes per session: Not on file    Stress: Not on file   Relationships    Social connections     Talks on phone: Not on file     Gets together: Not on file     Attends Restoration service: Not on file     Active member of club or organization: Not on file     Attends meetings of clubs or organizations: Not on file     Relationship status: Not on file    Intimate partner violence     Fear of current or ex partner: Not on file     Emotionally abused: Not on file     Physically abused: Not on file     Forced sexual activity: Not on file   Other Topics Concern     Service No    Blood Transfusions Yes    Caffeine Concern No    Occupational Exposure No    Hobby Hazards No    Sleep Concern No    Stress Concern No    Weight Concern No    Special Diet No    Back Care No    Exercise Yes    Bike Helmet No    Seat Belt Yes    Self-Exams Yes   Social History Narrative    Not on file    lives home with  in [place], he just had a \" maker\" MI in 2019     FH: neg premature CAD, no SCD, Mother  in late [de-identified] CHF, Father  upper 76s CHF    Review of Systems    15 pt Review of Systems is negative unless otherwise mentioned in the HPI.     Wt Readings from Last 3 Encounters:   20 76.2 kg (168 lb)   20 76.4 kg (168 lb 6.4 oz)   20 75.3 kg (166 lb)     Temp Readings from Last 3 Encounters:   20 98.9 °F (37.2 °C) (Oral)   20 97.5 °F (36.4 °C) (Oral)   20 98 °F (36.7 °C) (Oral)     BP Readings from Last 3 Encounters:   20 (!) 158/100   20 151/88   20 (!) 138/93     Pulse Readings from Last 3 Encounters:   08/13/20 67   07/16/20 62   06/30/20 62       Physical Exam:    Visit Vitals  BP (!) 158/100 (BP 1 Location: Left arm, BP Patient Position: Sitting)   Pulse 67   Ht 5' 6\" (1.676 m)   Wt 76.2 kg (168 lb)   SpO2 98%   BMI 27.12 kg/m²      Physical Exam  HENT:      Head: Normocephalic and atraumatic. Eyes:      Pupils: Pupils are equal, round, and reactive to light. Cardiovascular:      Rate and Rhythm: Normal rate and regular rhythm. Heart sounds: Normal heart sounds. No murmur. No friction rub. No gallop. Pulmonary:      Effort: Pulmonary effort is normal. No respiratory distress. Breath sounds: Normal breath sounds. No wheezing or rales. Chest:      Chest wall: No tenderness. Abdominal:      General: Bowel sounds are normal.      Palpations: Abdomen is soft. Musculoskeletal:         General: No tenderness. Skin:     General: Skin is warm and dry. Neurological:      Mental Status: She is alert and oriented to person, place, and time.        EKG today shows: NSR, normal axis and intervals, no ST segment abnormalities    Lab Results   Component Value Date/Time    Cholesterol, total 182 10/07/2019 08:52 AM    HDL Cholesterol 85 (H) 10/07/2019 08:52 AM    LDL, calculated 82.8 10/07/2019 08:52 AM    VLDL, calculated 14.2 10/07/2019 08:52 AM    Triglyceride 71 10/07/2019 08:52 AM    CHOL/HDL Ratio 2.1 10/07/2019 08:52 AM         Impression and Plan:  Elisa Galvez is a 72 y.o. with:    2.) HTN with white coat HTN  3.) Dyslipidemia, on Crestor 10 (more gave her leg cramps)  4.) Migraines (that's why Atenolol started)  5.) Sporadic rare palps  6.) Back/ joint pain    1.) No changes, doing well CV standpoint  2.) RTC yearly prevention appts  3.) She has delayed her back and knee surgeries, but if she decides to move fwd- she is low risk and can proceed without further testing, does not need another in person visit unless she develops concerning symptoms    >45 mins, all her questions answered    Thank you for allowing me to participate in the care of your patient, please do not hesitate to call with questions or concerns.     [address],    Phuong Parada, DO

## 2020-08-25 ENCOUNTER — OFFICE VISIT (OUTPATIENT)
Dept: ORTHOPEDIC SURGERY | Age: 65
End: 2020-08-25

## 2020-08-25 ENCOUNTER — HOSPITAL ENCOUNTER (OUTPATIENT)
Dept: MAMMOGRAPHY | Age: 65
Discharge: HOME | End: 2020-08-25
Attending: FAMILY MEDICINE
Payer: MEDICARE

## 2020-08-25 VITALS
WEIGHT: 170 LBS | SYSTOLIC BLOOD PRESSURE: 138 MMHG | DIASTOLIC BLOOD PRESSURE: 88 MMHG | TEMPERATURE: 96.8 F | OXYGEN SATURATION: 97 % | HEART RATE: 67 BPM | HEIGHT: 66 IN | BODY MASS INDEX: 27.32 KG/M2 | RESPIRATION RATE: 16 BRPM

## 2020-08-25 DIAGNOSIS — M51.26 HNP (HERNIATED NUCLEUS PULPOSUS), LUMBAR: ICD-10-CM

## 2020-08-25 DIAGNOSIS — Z12.31 ENCOUNTER FOR SCREENING MAMMOGRAM FOR BREAST CANCER: ICD-10-CM

## 2020-08-25 DIAGNOSIS — M54.16 LEFT LUMBAR RADICULITIS: Primary | ICD-10-CM

## 2020-08-25 DIAGNOSIS — M71.38 SYNOVIAL CYST OF LUMBAR FACET JOINT: ICD-10-CM

## 2020-08-25 PROCEDURE — 77063 BREAST TOMOSYNTHESIS BI: CPT

## 2020-08-25 NOTE — PATIENT INSTRUCTIONS
MRI of the Lumbar Spine: About This Test 
What is it? MRI (magnetic resonance imaging) is a test that uses a magnetic field and pulses of radio wave energy to make pictures of the organs and structures inside the body. An MRI can give your doctor information about the spine in your lower back (the lumbar spine). This can include the spine, the space around the spinal cord, and the vertebrae in your lower back. When you have an MRI, you lie on a table that moves into the MRI machine. Why is this test done? An MRI of the lumbar spine can help find the cause of symptoms like back pain or leg pain, weakness, or numbness. It can help find problems such as a herniated disc, a tumor, or an infection. How do you prepare for the test? 
In general, there's nothing you have to do before this test, unless your doctor tells you to. Tell your doctor if you get nervous in tight spaces. You may get a medicine to help you relax. If you think you'll get this medicine, be sure you have someone to take you home. How is the test done? · You may have contrast material (dye) put into your arm through a tube called an IV. · You will lie on a table that's part of the MRI scanner. · The table will slide into the space that contains the magnet. · Inside the scanner, you will hear a fan and feel air moving. You may hear tapping, thumping, or snapping noises. You may be given earplugs or headphones to reduce the noise. · You will be asked to hold still during the scan. You may be asked to hold your breath for short periods. · You may be alone in the scanning room. But a technologist will watch through a window and talk with you during the test. 
How does having an MRI of the spine feel? You won't have pain from the magnetic field or radio waves used for the MRI test. You may be tired or sore from lying in one position for a long time. If a contrast material is used, you may feel some coolness when it is put into your IV. In rare cases, you may feel: · Tingling in the mouth if you have metal dental fillings. · Warmth in the area being checked. This is normal. Tell the technologist if you have nausea, vomiting, a headache, dizziness, pain, burning, or breathing problems. How long does the test take? The test usually takes 30 to 60 minutes but can take as long as 2 hours. What are the risks of an MRI of the spine? There are no known harmful effects from the strong magnetic field used for an MRI. But the magnet is very powerful. It may affect any metal implants or other medical devices you have. Risks from contrast material 
Contrast material that contains gadolinium may be used in this test. But for most people, the benefit of its use in this test outweighs the risk. Be sure to tell your doctor if you have kidney problems or are pregnant. There is a slight chance of an allergic reaction if contrast material is used during the test. But most reactions are mild and can be treated using medicine. If you breastfeed and are concerned about whether the contrast material used in this test is safe, talk to your doctor. Most experts believe that very little dye passes into breast milk and even less is passed on to the baby. But if you are concerned, you can stop breastfeeding for up to 24 hours after the test. During this time, you can give your baby breast milk that you stored before the test. Don't use the breast milk you pump in the 24 hours after the test. Throw it out. What happens after the test? 
· You will probably be able to go home right away. It depends on the reason for the test. 
· You can go back to your usual activities right away. Follow-up care is a key part of your treatment and safety. Be sure to make and go to all appointments, and call your doctor if you are having problems. It's also a good idea to keep a list of the medicines you take. Ask your doctor when you can expect to have your test results. Where can you learn more? Go to http://www.gray.com/ Enter I920 in the search box to learn more about \"MRI of the Lumbar Spine: About This Test.\" Current as of: December 9, 2019               Content Version: 12.5 © 0251-5021 [employer]. Care instructions adapted under license by [employer] (which disclaims liability or warranty for this information). If you have questions about a medical condition or this instruction, always ask your healthcare professional. [address] any warranty or liability for your use of this information.

## 2020-08-25 NOTE — PROGRESS NOTES
[address]  [address], [address]  [place], [facility]  Phone: [phone]  Fax: [phone]  PROGRESS NOTE  Patient: Sandy Guzman                MRN: [id number]       SSN: [id number]  YOB: 1955        AGE: 72 y.o. SEX: female  Body mass index is 27.44 kg/m². PCP: Gala Vivar MD  08/25/20    Chief Complaint   Patient presents with    Back Pain     lower, discuss sx       HISTORY OF PRESENT ILLNESS:  Sandy Guzman is a 72 y.o.  female with history of progressive left leg pain. It is primarily a low lumbar sciatica down the posterior aspect of her buttock to her leg. As was recalled from her initial MRI, she has had a laminectomy about nine years ago elsewhere and has a recurrent disc herniation on the left at L5-S1 that is causing S1 nerve root compression. This seems the most likely source of this current, very low lumbar radiculopathy. However, the patient does have a small synovial cyst at L4-5 also on the left-hand side, which one cannot say is without issue in causing this pain. Dr. Jannette Corona last evaluated her 3/2020 and his surgical recommendations were \"would be a laminotomy on the left at L4-5 to resolve the synovial cyst and a redo laminectomy on the left at L5-S1 to manage the recurrent disc herniation. \" Today, she comes in because she is now ready to reconsider surgery. Her pain has remained unchanged in location and distribution since she saw Dr. Jannette Corona. It has been a yr since her last L MRI though. pain is aching, burning and numbing, pain is worse with manual/sedentary work, walking and affects recreational activities. Pain is better with nothing. Denies bladder/bowel dysfunction, saddle paresthesia, weakness, gait disturbance, or other neurological deficits. Medications: Failed Voltaren and Gabapentin. ASSESSMENT   Diagnoses and all orders for this visit:    1.  Left lumbar radiculitis  -     MRI LUMB SPINE W WO CONT; Future    2. HNP (herniated nucleus pulposus), lumbar  -     MRI LUMB SPINE W WO CONT; Future    3. Synovial cyst of lumbar facet joint  -     MRI LUMB SPINE W WO CONT; Future         IMPRESSION AND PLAN:  This is a pt with back and LLE     > Pt was given information on MRI   > Update L MRI  > Pt will call me after MRI is complete and I will discuss w/ Dr Adriana Ferris results and surgery  > Ms. Maria Victoria Coon has a reminder for a \"due or due soon\" health maintenance. I have asked that she contact her primary care provider, Amy Cassidy MD, for follow-up on this health maintenance.  > We have informed patient to notify us for immediate appointment if he has any worsening neurogical symptoms or if an emergency situation presents, then call 911  >  has been reviewed and is appropriate  > Pt will follow-up in via telephone. Subjective    Pain Scale: 7/10    Pain Assessment  8/25/2020   Location of Pain Back   Location Modifiers (No Data)   Severity of Pain 7   Quality of Pain Aching; Sharp   Quality of Pain Comment -   Duration of Pain Persistent   Duration of Pain Comment -   Frequency of Pain Constant   Aggravating Factors Other (Comment)   Aggravating Factors Comment daily activity   Limiting Behavior Yes   Relieving Factors Ice;Rest;Elevation   Relieving Factors Comment -   Result of Injury No         REVIEW OF SYSTEMS  Constitutional: Negative for fever, chills, or weight change. Respiratory: Negative for cough or shortness of breath. Cardiovascular: Negative for chest pain or palpitations. Gastrointestinal: Negative for incontinence, acid reflux, change in bowel habits, or constipation. Genitourinary: Negative for incontinence, dysuria and flank pain. Musculoskeletal: Positive for back and LLE pain. See HPI. Skin: Negative for rash. Neurological:LLE  radiculopathy. See HPI. Endo/Heme/Allergies: Negative. Psychiatric/Behavioral: Negative.       PHYSICAL EXAMINATION  Visit Vitals  BP 138/88 (BP 1 Location: Left arm, BP Patient Position: Sitting)   Pulse 67   Temp 96.8 °F (36 °C) (Oral)   Resp 16   Ht 5' 6\" (1.676 m)   Wt 170 lb (77.1 kg)   SpO2 97%   BMI 27.44 kg/m²         Accompanied by Self. Constitutional:  Well developed, well nourished, in no acute distress. Psychiatric: Affect and mood are appropriate. Integumentary: No rashes or abrasions noted on exposed areas. Cardiovascular/Peripheral Vascular: +2 radial & pedal pulses. No peripheral edema is noted. Lymphatic:  No evidence of lymphedema. No cervical lymphadenopathy. SPINE/MUSCULOSKELETAL EXAM     Lumbar spine:  No rash, ecchymosis, or gross obliquity. No fasciculations. No focal atrophy is noted. Range of motion is decreased with flexion, extension. Tenderness to palpation back. No tenderness to palpation at the sciatic notch. SI joints non-tender. Trochanters non tender. Straight leg raise neg  Hip Impingement neg    Sensation grossly intact to light touch. MOTOR:     Hip Flex Quads Hamstrings Ankle DF EHL Ankle PF   Right +4/5 +4/5 +4/5 +4/5 +4/5 +4/5   Left +4/5 +4/5 +4/5 +4/5 +4/5 +4/5       Ambulation without assistive device. FWB.    normal gait and station        PAST MEDICAL HISTORY   Past Medical History:   Diagnosis Date    Arthritis     Cardiac murmur     Dyslipidemia     Heart murmur     Hx of migraines     Hypertension     Nausea & vomiting     Rosacea        Past Surgical History:   Procedure Laterality Date    FOOT/TOES SURGERY PROC UNLISTED      x6    HX BACK SURGERY      HX HYSTERECTOMY      due to heavy bleeding    HX KNEE ARTHROSCOPY      HX LUMBAR LAMINECTOMY  01/2011    L5/S1 Lami    HX ORTHOPAEDIC      [place] in [place]    HX ORTHOPAEDIC      Lumbar cyst removal L4-5   . MEDICATIONS      Current Outpatient Medications   Medication Sig Dispense Refill    fish oil-omega-3 fatty acids (Fish Oil) 340-1,000 mg capsule Take 1 Cap by mouth daily.       rosuvastatin (CRESTOR) 10 mg tablet TAKE 1 TABLET NIGHTLY 90 Tab 3    lisinopriL (PRINIVIL, ZESTRIL) 10 mg tablet TAKE 1 TABLET DAILY 90 Tab 3    clindamycin (CLEOCIN T) 1 % lotion Apply  to affected area daily. use thin film on affected area 1 Bottle 3    atenoloL (TENORMIN) 50 mg tablet TAKE 1 TABLET DAILY 90 Tab 3    cetirizine (ZYRTEC) 10 mg tablet TAKE 1 TABLET DAILY AS NEEDED FOR ALLERGIES 90 Tab 1    lisinopril (PRINIVIL, ZESTRIL) 40 mg tablet Take 1 Tab by mouth daily. 90 Tab 1    doxycycline (ADOXA) 100 mg tablet TAKE 1 TABLET EVERY TUESDAY, THURSDAY AND SATURDAY 40 Tab 2    fluticasone propionate (FLONASE) 50 mcg/actuation nasal spray USE 2 SPRAYS IN EACH NOSTRIL DAILY (Patient taking differently: daily as needed.) 32 g 0    rizatriptan (MAXALT-MLT) 10 mg disintegrating tablet PLACE 1 TABLET ON TONGUE ONCE AS NEEDED FOR MIGRAINE 30 Tab 0    potassium 99 mg tablet Take 99 mg by mouth every seven (7) days.  vitamin E (AQUA GEMS) 400 unit capsule Take 400 Units by mouth daily.  ascorbic acid, vitamin C, (VITAMIN C) 500 mg tablet Take 1,000 mg by mouth daily. Takes 2000 daily      calcium citrate-vitamin D3 (CITRACAL + D) tablet Take  by mouth daily.  multivitamin (ONE A DAY) tablet Take 1 Tab by mouth daily.  cholecalciferol, VITAMIN D3, (VITAMIN D3) 5,000 unit tab tablet Take 1,000 Units by mouth daily.  B.infantis-B.ani-B.long-B.bifi (PROBIOTIC 4X) 10-15 mg TbEC Take  by mouth daily.  aspirin 81 mg chewable tablet Take 81 mg by mouth daily.           ALLERGIES    Allergies   Allergen Reactions    Penicillin G Hives    Tylenol [Acetaminophen] Other (comments)     Causes headache, denies recently          SOCIAL HISTORY    Social History     Socioeconomic History    Marital status:      Spouse name: Not on file    Number of children: Not on file    Years of education: Not on file    Highest education level: Not on file   Occupational History    Not on file Social Needs    Financial resource strain: Not on file    Food insecurity     Worry: Not on file     Inability: Not on file    Transportation needs     Medical: Not on file     Non-medical: Not on file   Tobacco Use    Smoking status: Never Smoker    Smokeless tobacco: Never Used   Substance and Sexual Activity    Alcohol use: No    Drug use: No    Sexual activity: Yes     Partners: Male   Lifestyle    Physical activity     Days per week: Not on file     Minutes per session: Not on file    Stress: Not on file   Relationships    Social connections     Talks on phone: Not on file     Gets together: Not on file     Attends Spiritism service: Not on file     Active member of club or organization: Not on file     Attends meetings of clubs or organizations: Not on file     Relationship status: Not on file    Intimate partner violence     Fear of current or ex partner: Not on file     Emotionally abused: Not on file     Physically abused: Not on file     Forced sexual activity: Not on file   Other Topics Concern     Service No    Blood Transfusions Yes    Caffeine Concern No    Occupational Exposure No    Hobby Hazards No    Sleep Concern No    Stress Concern No    Weight Concern No    Special Diet No    Back Care No    Exercise Yes    Bike Helmet No    Seat Belt Yes    Self-Exams Yes   Social History Narrative    Not on file     Socioeconomic History    Marital status:      Spouse name: Not on file    Number of children: Not on file    Years of education: Not on file    Highest education level: Not on file   Occupational History    Not on file   Social Needs    Financial resource strain: Not on file    Food insecurity     Worry: Not on file     Inability: Not on file    Transportation needs     Medical: Not on file     Non-medical: Not on file   Tobacco Use    Smoking status: Never Smoker    Smokeless tobacco: Never Used   Substance and Sexual Activity    Alcohol use: No    Drug use: No    Sexual activity: Yes     Partners: Male   Lifestyle    Physical activity     Days per week: Not on file     Minutes per session: Not on file    Stress: Not on file   Relationships    Social connections     Talks on phone: Not on file     Gets together: Not on file     Attends Buddhist service: Not on file     Active member of club or organization: Not on file     Attends meetings of clubs or organizations: Not on file     Relationship status: Not on file    Intimate partner violence     Fear of current or ex partner: Not on file     Emotionally abused: Not on file     Physically abused: Not on file     Forced sexual activity: Not on file   Other Topics Concern     Service No    Blood Transfusions Yes    Caffeine Concern No    Occupational Exposure No    Hobby Hazards No    Sleep Concern No    Stress Concern No    Weight Concern No    Special Diet No    Back Care No    Exercise Yes    Bike Helmet No    Seat Belt Yes    Self-Exams Yes   Social History Narrative    Not on file      Problem Relation Age of Onset    Heart Attack Mother     Stroke Mother     Other Mother         CHF    Alcohol abuse Father     Other Father         CHF    Liver Disease Father          Kasandra Joiner NP

## 2020-09-12 ENCOUNTER — HOSPITAL ENCOUNTER (OUTPATIENT)
Age: 65
Discharge: HOME | End: 2020-09-12
Attending: NURSE PRACTITIONER
Payer: MEDICARE

## 2020-09-12 DIAGNOSIS — M71.38 SYNOVIAL CYST OF LUMBAR FACET JOINT: ICD-10-CM

## 2020-09-12 DIAGNOSIS — M51.26 HNP (HERNIATED NUCLEUS PULPOSUS), LUMBAR: ICD-10-CM

## 2020-09-12 DIAGNOSIS — M54.16 LEFT LUMBAR RADICULITIS: ICD-10-CM

## 2020-09-12 LAB — CREAT UR-MCNC: 0.6 MG/DL (ref 0.6–1.3)

## 2020-09-12 PROCEDURE — 82565 ASSAY OF CREATININE: CPT

## 2020-09-12 PROCEDURE — 72158 MRI LUMBAR SPINE W/O & W/DYE: CPT

## 2020-09-12 PROCEDURE — 74011636320 HC RX REV CODE- 636/320: Performed by: NURSE PRACTITIONER

## 2020-09-12 PROCEDURE — A9575 INJ GADOTERATE MEGLUMI 0.1ML: HCPCS | Performed by: NURSE PRACTITIONER

## 2020-09-12 RX ADMIN — GADOTERATE MEGLUMINE 15 ML: 376.9 INJECTION INTRAVENOUS at 09:00

## 2020-09-18 ENCOUNTER — TELEPHONE (OUTPATIENT)
Dept: ORTHOPEDIC SURGERY | Age: 65
End: 2020-09-18

## 2020-09-18 NOTE — TELEPHONE ENCOUNTER
Spoke w/ Dr. Sixto Avalos regarding pt's request about getting back surgery. He is ok with getting her scheduled without seeing her since 3/2020 since I recently saw her. Surgical sheet filled out by Dr. Sixto Avalos and given to Erica Cordero.

## 2020-09-23 ENCOUNTER — TELEPHONE (OUTPATIENT)
Dept: ORTHOPEDIC SURGERY | Age: 65
End: 2020-09-23

## 2020-09-23 DIAGNOSIS — Z01.818 PRE-OP EXAM: ICD-10-CM

## 2020-09-23 DIAGNOSIS — Z01.818 PRE-OP EXAM: Primary | ICD-10-CM

## 2020-09-25 ENCOUNTER — HOSPITAL ENCOUNTER (OUTPATIENT)
Dept: LAB | Age: 65
Discharge: HOME | End: 2020-09-25
Payer: MEDICARE

## 2020-09-25 DIAGNOSIS — Z01.818 PRE-OP EXAM: ICD-10-CM

## 2020-09-25 LAB
ALBUMIN SERPL-MCNC: 3.6 G/DL (ref 3.4–5)
ALBUMIN/GLOB SERPL: 1.2 {RATIO} (ref 0.8–1.7)
ALP SERPL-CCNC: 90 U/L (ref 45–117)
ALT SERPL-CCNC: 33 U/L (ref 13–56)
ANION GAP SERPL CALC-SCNC: 3 MMOL/L (ref 3–18)
AST SERPL-CCNC: 24 U/L (ref 10–38)
BILIRUB SERPL-MCNC: 1.1 MG/DL (ref 0.2–1)
BUN SERPL-MCNC: 13 MG/DL (ref 7–18)
BUN/CREAT SERPL: 25 (ref 12–20)
CALCIUM SERPL-MCNC: 9 MG/DL (ref 8.5–10.1)
CHLORIDE SERPL-SCNC: 107 MMOL/L (ref 100–111)
CO2 SERPL-SCNC: 31 MMOL/L (ref 21–32)
CREAT SERPL-MCNC: 0.51 MG/DL (ref 0.6–1.3)
ERYTHROCYTE [DISTWIDTH] IN BLOOD BY AUTOMATED COUNT: 13.8 % (ref 11.6–14.5)
GLOBULIN SER CALC-MCNC: 3.1 G/DL (ref 2–4)
GLUCOSE SERPL-MCNC: 80 MG/DL (ref 74–99)
HCT VFR BLD AUTO: 41.3 % (ref 35–45)
HGB BLD-MCNC: 13.4 G/DL (ref 12–16)
MCH RBC QN AUTO: 28.1 PG (ref 24–34)
MCHC RBC AUTO-ENTMCNC: 32.4 G/DL (ref 31–37)
MCV RBC AUTO: 86.6 FL (ref 74–97)
PLATELET # BLD AUTO: 251 K/UL (ref 135–420)
PMV BLD AUTO: 11 FL (ref 9.2–11.8)
POTASSIUM SERPL-SCNC: 4.3 MMOL/L (ref 3.5–5.5)
PROT SERPL-MCNC: 6.7 G/DL (ref 6.4–8.2)
RBC # BLD AUTO: 4.77 M/UL (ref 4.2–5.3)
SODIUM SERPL-SCNC: 141 MMOL/L (ref 136–145)
WBC # BLD AUTO: 5.8 K/UL (ref 4.6–13.2)

## 2020-09-25 PROCEDURE — 36415 COLL VENOUS BLD VENIPUNCTURE: CPT

## 2020-09-25 PROCEDURE — 80053 COMPREHEN METABOLIC PANEL: CPT

## 2020-09-25 PROCEDURE — 93005 ELECTROCARDIOGRAM TRACING: CPT

## 2020-09-25 PROCEDURE — 85027 COMPLETE CBC AUTOMATED: CPT

## 2020-09-26 LAB
ATRIAL RATE: 61 BPM
CALCULATED P AXIS, ECG09: 0 DEGREES
CALCULATED R AXIS, ECG10: -7 DEGREES
CALCULATED T AXIS, ECG11: 9 DEGREES
DIAGNOSIS, 93000: NORMAL
P-R INTERVAL, ECG05: 168 MS
Q-T INTERVAL, ECG07: 402 MS
QRS DURATION, ECG06: 74 MS
QTC CALCULATION (BEZET), ECG08: 404 MS
VENTRICULAR RATE, ECG03: 61 BPM

## 2020-10-07 ENCOUNTER — HOSPITAL ENCOUNTER (OUTPATIENT)
Dept: PREADMISSION TESTING | Age: 65
Discharge: HOME | End: 2020-10-07
Payer: MEDICARE

## 2020-10-07 ENCOUNTER — OFFICE VISIT (OUTPATIENT)
Dept: FAMILY MEDICINE CLINIC | Age: 65
End: 2020-10-07
Payer: MEDICARE

## 2020-10-07 VITALS
DIASTOLIC BLOOD PRESSURE: 87 MMHG | TEMPERATURE: 97.1 F | SYSTOLIC BLOOD PRESSURE: 133 MMHG | WEIGHT: 172 LBS | OXYGEN SATURATION: 98 % | BODY MASS INDEX: 27.64 KG/M2 | HEART RATE: 71 BPM | RESPIRATION RATE: 18 BRPM | HEIGHT: 66 IN

## 2020-10-07 DIAGNOSIS — M81.0 POSTMENOPAUSAL OSTEOPOROSIS: ICD-10-CM

## 2020-10-07 DIAGNOSIS — I10 ESSENTIAL HYPERTENSION: ICD-10-CM

## 2020-10-07 DIAGNOSIS — Z71.89 ACP (ADVANCE CARE PLANNING): ICD-10-CM

## 2020-10-07 DIAGNOSIS — Z01.818 PRE-OP EXAM: ICD-10-CM

## 2020-10-07 DIAGNOSIS — E78.5 DYSLIPIDEMIA: ICD-10-CM

## 2020-10-07 DIAGNOSIS — Z00.00 WELCOME TO MEDICARE PREVENTIVE VISIT: ICD-10-CM

## 2020-10-07 DIAGNOSIS — Z01.818 PREOP EXAMINATION: Primary | ICD-10-CM

## 2020-10-07 DIAGNOSIS — M54.40 CHRONIC MIDLINE LOW BACK PAIN WITH SCIATICA, SCIATICA LATERALITY UNSPECIFIED: ICD-10-CM

## 2020-10-07 DIAGNOSIS — G89.29 CHRONIC MIDLINE LOW BACK PAIN WITH SCIATICA, SCIATICA LATERALITY UNSPECIFIED: ICD-10-CM

## 2020-10-07 DIAGNOSIS — Z13.39 SCREENING FOR ALCOHOLISM: ICD-10-CM

## 2020-10-07 PROCEDURE — G8536 NO DOC ELDER MAL SCRN: HCPCS | Performed by: FAMILY MEDICINE

## 2020-10-07 PROCEDURE — G8510 SCR DEP NEG, NO PLAN REQD: HCPCS | Performed by: FAMILY MEDICINE

## 2020-10-07 PROCEDURE — G8754 DIAS BP LESS 90: HCPCS | Performed by: FAMILY MEDICINE

## 2020-10-07 PROCEDURE — G8752 SYS BP LESS 140: HCPCS | Performed by: FAMILY MEDICINE

## 2020-10-07 PROCEDURE — G8417 CALC BMI ABV UP PARAM F/U: HCPCS | Performed by: FAMILY MEDICINE

## 2020-10-07 PROCEDURE — 1090F PRES/ABSN URINE INCON ASSESS: CPT | Performed by: FAMILY MEDICINE

## 2020-10-07 PROCEDURE — G8427 DOCREV CUR MEDS BY ELIG CLIN: HCPCS | Performed by: FAMILY MEDICINE

## 2020-10-07 PROCEDURE — 99214 OFFICE O/P EST MOD 30 MIN: CPT | Performed by: FAMILY MEDICINE

## 2020-10-07 PROCEDURE — 1101F PT FALLS ASSESS-DOCD LE1/YR: CPT | Performed by: FAMILY MEDICINE

## 2020-10-07 PROCEDURE — 3017F COLORECTAL CA SCREEN DOC REV: CPT | Performed by: FAMILY MEDICINE

## 2020-10-07 PROCEDURE — 87635 SARS-COV-2 COVID-19 AMP PRB: CPT

## 2020-10-07 PROCEDURE — G0402 INITIAL PREVENTIVE EXAM: HCPCS | Performed by: FAMILY MEDICINE

## 2020-10-07 PROCEDURE — G9899 SCRN MAM PERF RSLTS DOC: HCPCS | Performed by: FAMILY MEDICINE

## 2020-10-07 RX ORDER — LISINOPRIL 40 MG/1
40 TABLET ORAL DAILY
Qty: 90 TAB | Refills: 1 | Status: SHIPPED | OUTPATIENT
Start: 2020-10-07 | End: 2021-03-01

## 2020-10-07 RX ORDER — ZOSTER VACCINE RECOMBINANT, ADJUVANTED 50 MCG/0.5
0.5 KIT INTRAMUSCULAR ONCE
Qty: 0.5 ML | Refills: 0 | Status: SHIPPED | OUTPATIENT
Start: 2020-10-07 | End: 2020-10-07

## 2020-10-07 NOTE — PROGRESS NOTES
NIHARIKA  Karson Bobo comes in for preop evaluation. Referring physician: Dr. Antwan Esposito  Date of surgery: 10/12/2020  [facility] of surgery[facility]  Planned procedure: Redo left L5 laminectomy/discectomy  Indication for procedure: Herniated nucleus pulposus lumbar spine    Patient is a lady who has a history of low back pain with left-sided sciatica. Pain has persisted and is progressively getting worse. She denies bladder or bowel dysfunction. She has a history of lumbar laminectomy done about 9 years ago. She also had an MRI done that showed  L5-S1: There is evidence for a left laminotomy which appears to be successfully  healed. This is New from the preoperative study. There is also evidence for a  small area of recurrent disc which has extruded and causes mass effect on the  left L5 nerve root within the proximal neuroforamina. This is seen on image 5 series 8, marker has been placed Patent canal and  foramina. 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 Patient has been reviewed by the neurosurgeon and is now planned for above surgical procedure. She has had labs done recently that are stable. EKG stable. CBC and electrolytes are stable. She is medically stabilized and cleared to proceed with planned surgical procedure.     Left knee pain: Patient has pain in her left knee. Pain comes with walking up and down inclines and also flexion and extension of the knee. Occasionally it is also noted with weight application. She has been seen by the orthopedist.  Has been told that she has severe degenerative joint disease. Patient states that she will plan to have left total knee arthroplasty. HTN: Patient has hypertension. She is on lisinopril 40 mg daily. Blood pressure is stable. She denies headache, changes in vision or focal weakness. We will continue with this medication. I will send in a refill of the same.   Patient is also on atenolol 50 mg daily.  Dyslipidemia: Patient has dyslipidemia. She takes fish oil and rosuvastatin. She has held off on the fish oil due to upcoming surgery. She will take her medication after the surgery. Health maintenance: Patient will make an appointment to come in and get the flu shot and pneumonia shot. I will give a prescription for the shingles vaccine today. Past Medical History  Past Medical History:   Diagnosis Date    Arthritis     Cardiac murmur     Dyslipidemia     Heart murmur     Hx of migraines     Hypertension     Nausea & vomiting     Rosacea        Surgical History  Past Surgical History:   Procedure Laterality Date    FOOT/TOES SURGERY PROC UNLISTED      x6    HX BACK SURGERY      HX HYSTERECTOMY      due to heavy bleeding    HX KNEE ARTHROSCOPY      HX LUMBAR LAMINECTOMY  01/2011    L5/S1 [place]    HX ORTHOPAEDIC      Lumbar Laminectomy in 2011 [place]    HX ORTHOPAEDIC      Lumbar cyst removal L4-5        Medications  Current Outpatient Medications   Medication Sig Dispense Refill    varicella-zoster recombinant, PF, (Shingrix, PF,) 50 mcg/0.5 mL susr injection 0.5 mL by IntraMUSCular route once for 1 dose. 0.5 mL 0    lisinopriL (PRINIVIL, ZESTRIL) 40 mg tablet Take 1 Tab by mouth daily. 90 Tab 1    fish oil-omega-3 fatty acids (Fish Oil) 340-1,000 mg capsule Take 1 Cap by mouth daily.  rosuvastatin (CRESTOR) 10 mg tablet TAKE 1 TABLET NIGHTLY 90 Tab 3    clindamycin (CLEOCIN T) 1 % lotion Apply  to affected area daily.  use thin film on affected area 1 Bottle 3    atenoloL (TENORMIN) 50 mg tablet TAKE 1 TABLET DAILY 90 Tab 3    cetirizine (ZYRTEC) 10 mg tablet TAKE 1 TABLET DAILY AS NEEDED FOR ALLERGIES 90 Tab 1    doxycycline (ADOXA) 100 mg tablet TAKE 1 TABLET EVERY TUESDAY, THURSDAY AND SATURDAY 40 Tab 2    fluticasone propionate (FLONASE) 50 mcg/actuation nasal spray USE 2 SPRAYS IN EACH NOSTRIL DAILY (Patient taking differently: daily as needed.) 32 g 0    rizatriptan (MAXALT-MLT) 10 mg disintegrating tablet PLACE 1 TABLET ON TONGUE ONCE AS NEEDED FOR MIGRAINE 30 Tab 0    potassium 99 mg tablet Take 99 mg by mouth every seven (7) days.  vitamin E (AQUA GEMS) 400 unit capsule Take 400 Units by mouth daily.  ascorbic acid, vitamin C, (VITAMIN C) 500 mg tablet Take 1,000 mg by mouth daily. Takes 2000 daily      calcium citrate-vitamin D3 (CITRACAL + D) tablet Take  by mouth daily.  multivitamin (ONE A DAY) tablet Take 1 Tab by mouth daily.  cholecalciferol, VITAMIN D3, (VITAMIN D3) 5,000 unit tab tablet Take 1,000 Units by mouth daily.  B.infantis-B.ani-B.long-B.bifi (PROBIOTIC 4X) 10-15 mg TbEC Take  by mouth daily.  aspirin 81 mg chewable tablet Take 81 mg by mouth daily.          Allergies  Allergies   Allergen Reactions    Penicillin G Hives    Tylenol [Acetaminophen] Other (comments)     Causes headache, denies recently       Family History  Family History   Problem Relation Age of Onset    Heart Attack Mother     Stroke Mother     Other Mother         CHF    Alcohol abuse Father     Other Father         CHF    Liver Disease Father        Social History  Social History     Socioeconomic History    Marital status:      Spouse name: Not on file    Number of children: Not on file    Years of education: Not on file    Highest education level: Not on file   Occupational History    Not on file   Social Needs    Financial resource strain: Not on file    Food insecurity     Worry: Not on file     Inability: Not on file    Transportation needs     Medical: Not on file     Non-medical: Not on file   Tobacco Use    Smoking status: Never Smoker    Smokeless tobacco: Never Used   Substance and Sexual Activity    Alcohol use: No    Drug use: No    Sexual activity: Yes     Partners: Male   Lifestyle    Physical activity     Days per week: Not on file     Minutes per session: Not on file    Stress: Not on file   Relationships    Social connections     Talks on phone: Not on file     Gets together: Not on file     Attends Caodaism service: Not on file     Active member of club or organization: Not on file     Attends meetings of clubs or organizations: Not on file     Relationship status: Not on file    Intimate partner violence     Fear of current or ex partner: Not on file     Emotionally abused: Not on file     Physically abused: Not on file     Forced sexual activity: Not on file   Other Topics Concern     Service No    Blood Transfusions Yes    Caffeine Concern No    Occupational Exposure No    Hobby Hazards No    Sleep Concern No    Stress Concern No    Weight Concern No    Special Diet No    Back Care No    Exercise Yes    Bike Helmet No    Seat Belt Yes    Self-Exams Yes   Social History Narrative    Not on file       Review of Systems  Review of Systems - Review of all systems is negative except as noted above in the HPI.     Vital Signs  Visit Vitals  /87   Pulse 71   Temp 97.1 °F (36.2 °C) (Oral)   Resp 18   Ht 5' 6\" (1.676 m)   Wt 172 lb (78 kg)   SpO2 98%   BMI 27.76 kg/m²         Physical Exam  Physical Examination: General appearance - alert, well appearing, and in no distress, oriented to person, place, and time, acyanotic, in no respiratory distress and well hydrated  Mental status - alert, oriented to person, place, and time, affect appropriate to mood  Mouth - mucous membranes moist, pharynx normal without lesions  Neck - supple, no significant adenopathy  Lymphatics - no palpable lymphadenopathy, no hepatosplenomegaly  Chest - clear to auscultation, no wheezes, rales or rhonchi, symmetric air entry  Heart - normal rate and regular rhythm, S1 and S2 normal  Abdomen - no rebound tenderness noted  Back exam - limited range of motion, pain with motion noted during exam  Neurological - normal muscle tone, no tremors, strength 5/5  Musculoskeletal - osteoarthritic changes noted in both hands  Extremities - no pedal edema noted, intact peripheral pulses      Results  Results for orders placed or performed during the hospital encounter of 09/25/20   CBC W/O DIFF   Result Value Ref Range    WBC 5.8 4.6 - 13.2 K/uL    RBC 4.77 4.20 - 5.30 M/uL    HGB 13.4 12.0 - 16.0 g/dL    HCT 41.3 35.0 - 45.0 %    MCV 86.6 74.0 - 97.0 FL    MCH 28.1 24.0 - 34.0 PG    MCHC 32.4 31.0 - 37.0 g/dL    RDW 13.8 11.6 - 14.5 %    PLATELET 328 201 - 188 K/uL    MPV 11.0 9.2 - 31.9 FL   METABOLIC PANEL, COMPREHENSIVE   Result Value Ref Range    Sodium 141 136 - 145 mmol/L    Potassium 4.3 3.5 - 5.5 mmol/L    Chloride 107 100 - 111 mmol/L    CO2 31 21 - 32 mmol/L    Anion gap 3 3.0 - 18 mmol/L    Glucose 80 74 - 99 mg/dL    BUN 13 7.0 - 18 MG/DL    Creatinine 0.51 (L) 0.6 - 1.3 MG/DL    BUN/Creatinine ratio 25 (H) 12 - 20      GFR est AA >60 >60 ml/min/1.73m2    GFR est non-AA >60 >60 ml/min/1.73m2    Calcium 9.0 8.5 - 10.1 MG/DL    Bilirubin, total 1.1 (H) 0.2 - 1.0 MG/DL    ALT (SGPT) 33 13 - 56 U/L    AST (SGOT) 24 10 - 38 U/L    Alk. phosphatase 90 45 - 117 U/L    Protein, total 6.7 6.4 - 8.2 g/dL    Albumin 3.6 3.4 - 5.0 g/dL    Globulin 3.1 2.0 - 4.0 g/dL    A-G Ratio 1.2 0.8 - 1.7     EKG, 12 LEAD, INITIAL   Result Value Ref Range    Ventricular Rate 61 BPM    Atrial Rate 61 BPM    P-R Interval 168 ms    QRS Duration 74 ms    Q-T Interval 402 ms    QTC Calculation (Bezet) 404 ms    Calculated P Axis 0 degrees    Calculated R Axis -7 degrees    Calculated T Axis 9 degrees    Diagnosis       Normal sinus rhythm  Septal infarct , age undetermined  Abnormal ECG  When compared with ECG of 22-OCT-2019 12:55,  No significant change was found  Confirmed by Karli Roberts M.D., [address] [place] [address] ([id number]) on 9/26/2020 9:05:30 PM         ASSESSMENT and PLAN    ICD-10-CM ICD-9-CM    1. Preop examination  Z01.818 V72.84    2. Chronic midline low back pain with sciatica, sciatica laterality unspecified  M54.40 724.2     G89.29 724.3      338.29    3. Essential hypertension  I10 401.9    4. Dyslipidemia  E78.5 272.4 LIPID PANEL   5. Postmenopausal osteoporosis  M81.0 733.01 DEXA BONE DENSITY STUDY AXIAL     lab results and schedule of future lab studies reviewed with patient  reviewed diet, exercise and weight control  cardiovascular risk and specific lipid/LDL goals reviewed  reviewed medications and side effects in detail  radiology results and schedule of future radiology studies reviewed with patient      I have discussed the diagnosis with the patient and the intended plan of care as seen in the above orders. The patient has received an after-visit summary and questions were answered concerning future plans. I have discussed medication, side effects, and warnings with the patient in detail. The patient verbalized understanding and is in agreement with the plan of care. The patient will contact the office with any additional concerns. Michael Read MD    PLEASE NOTE:   This document has been produced using voice recognition software.  Unrecognized errors in transcription may be present

## 2020-10-07 NOTE — PATIENT INSTRUCTIONS
Medicare Wellness Visit, Female The best way to live healthy is to have a lifestyle where you eat a well-balanced diet, exercise regularly, limit alcohol use, and quit all forms of tobacco/nicotine, if applicable. Regular preventive services are another way to keep healthy. Preventive services (vaccines, screening tests, monitoring & exams) can help personalize your care plan, which helps you manage your own care. Screening tests can find health problems at the earliest stages, when they are easiest to treat. [facility] follows the current, evidence-based guidelines published by the [facility] ([facility]) when recommending preventive services for our patients. Because we follow these guidelines, sometimes recommendations change over time as research supports it. (For example, mammograms used to be recommended annually. Even though Medicare will still pay for an annual mammogram, the newer guidelines recommend a mammogram every two years for women of average risk). Of course, you and your doctor may decide to screen more often for some diseases, based on your risk and your co-morbidities (chronic disease you are already diagnosed with). Preventive services for you include: - Medicare offers their members a free annual wellness visit, which is time for you and your primary care provider to discuss and plan for your preventive service needs. Take advantage of this benefit every year! 
-All adults over the age of 72 should receive the recommended pneumonia vaccines. Current USPSTF guidelines recommend a series of two vaccines for the best pneumonia protection.  
-All adults should have a flu vaccine yearly and a tetanus vaccine every 10 years.  
-All adults age 48 and older should receive the shingles vaccines (series of two vaccines). -All adults age 38-68 who are overweight should have a diabetes screening test once every three years. -All adults born between 80 and 1965 should be screened once for Hepatitis C. 
-Other screening tests and preventive services for persons with diabetes include: an eye exam to screen for diabetic retinopathy, a kidney function test, a foot exam, and stricter control over your cholesterol.  
-Cardiovascular screening for adults with routine risk involves an electrocardiogram (ECG) at intervals determined by your doctor.  
-Colorectal cancer screenings should be done for adults age 54-65 with no increased risk factors for colorectal cancer. There are a number of acceptable methods of screening for this type of cancer. Each test has its own benefits and drawbacks. Discuss with your doctor what is most appropriate for you during your annual wellness visit. The different tests include: colonoscopy (considered the best screening method), a fecal occult blood test, a fecal DNA test, and sigmoidoscopy. 
 
-A bone mass density test is recommended when a woman turns 65 to screen for osteoporosis. This test is only recommended one time, as a screening. Some providers will use this same test as a disease monitoring tool if you already have osteoporosis. -Breast cancer screenings are recommended every other year for women of normal risk, age 54-69. 
-Cervical cancer screenings for women over age 72 are only recommended with certain risk factors. Here is a list of your current Health Maintenance items (your personalized list of preventive services) with a due date: 
Health Maintenance Due Topic Date Due  
 Annual Well Visit  03/03/2020  Glaucoma Screening   03/05/2020  Bone Mineral Density   03/05/2020  Pneumococcal Vaccine (1 of 1 - PPSV23) 03/05/2020  Yearly Flu Vaccine (1) 09/01/2020  Cholesterol Test   10/07/2020 Medicare Wellness Visit, Female The best way to live healthy is to have a lifestyle where you eat a well-balanced diet, exercise regularly, limit alcohol use, and quit all forms of tobacco/nicotine, if applicable. Regular preventive services are another way to keep healthy. Preventive services (vaccines, screening tests, monitoring & exams) can help personalize your care plan, which helps you manage your own care. Screening tests can find health problems at the earliest stages, when they are easiest to treat. [facility] follows the current, evidence-based guidelines published by the [facility] ([facility]) when recommending preventive services for our patients. Because we follow these guidelines, sometimes recommendations change over time as research supports it. (For example, mammograms used to be recommended annually. Even though Medicare will still pay for an annual mammogram, the newer guidelines recommend a mammogram every two years for women of average risk). Of course, you and your doctor may decide to screen more often for some diseases, based on your risk and your co-morbidities (chronic disease you are already diagnosed with). Preventive services for you include: - Medicare offers their members a free annual wellness visit, which is time for you and your primary care provider to discuss and plan for your preventive service needs. Take advantage of this benefit every year! 
-All adults over the age of 72 should receive the recommended pneumonia vaccines. Current USPSTF guidelines recommend a series of two vaccines for the best pneumonia protection.  
-All adults should have a flu vaccine yearly and a tetanus vaccine every 10 years.  
-All adults age 48 and older should receive the shingles vaccines (series of two vaccines).      
-All adults age 38-68 who are overweight should have a diabetes screening test once every three years.  
-All adults born between 80 and 1965 should be screened once for Hepatitis C. 
-Other screening tests and preventive services for persons with diabetes include: an eye exam to screen for diabetic retinopathy, a kidney function test, a foot exam, and stricter control over your cholesterol.  
-Cardiovascular screening for adults with routine risk involves an electrocardiogram (ECG) at intervals determined by your doctor.  
-Colorectal cancer screenings should be done for adults age 54-65 with no increased risk factors for colorectal cancer. There are a number of acceptable methods of screening for this type of cancer. Each test has its own benefits and drawbacks. Discuss with your doctor what is most appropriate for you during your annual wellness visit. The different tests include: colonoscopy (considered the best screening method), a fecal occult blood test, a fecal DNA test, and sigmoidoscopy. 
 
-A bone mass density test is recommended when a woman turns 65 to screen for osteoporosis. This test is only recommended one time, as a screening. Some providers will use this same test as a disease monitoring tool if you already have osteoporosis. -Breast cancer screenings are recommended every other year for women of normal risk, age 54-69. 
-Cervical cancer screenings for women over age 72 are only recommended with certain risk factors. Here is a list of your current Health Maintenance items (your personalized list of preventive services) with a due date: 
Health Maintenance Due Topic Date Due  
 Annual Well Visit  03/03/2020  Glaucoma Screening   03/05/2020  Bone Mineral Density   03/05/2020  Pneumococcal Vaccine (1 of 1 - PPSV23) 03/05/2020  Yearly Flu Vaccine (1) 09/01/2020  Cholesterol Test   10/07/2020 Medicare Wellness Visit, Female The best way to live healthy is to have a lifestyle where you eat a well-balanced diet, exercise regularly, limit alcohol use, and quit all forms of tobacco/nicotine, if applicable. Regular preventive services are another way to keep healthy.  Preventive services (vaccines, screening tests, monitoring & exams) can help personalize your care plan, which helps you manage your own care. Screening tests can find health problems at the earliest stages, when they are easiest to treat. [facility] follows the current, evidence-based guidelines published by the [facility] ([facility]) when recommending preventive services for our patients. Because we follow these guidelines, sometimes recommendations change over time as research supports it. (For example, mammograms used to be recommended annually. Even though Medicare will still pay for an annual mammogram, the newer guidelines recommend a mammogram every two years for women of average risk). Of course, you and your doctor may decide to screen more often for some diseases, based on your risk and your co-morbidities (chronic disease you are already diagnosed with). Preventive services for you include: - Medicare offers their members a free annual wellness visit, which is time for you and your primary care provider to discuss and plan for your preventive service needs. Take advantage of this benefit every year! 
-All adults over the age of 72 should receive the recommended pneumonia vaccines. Current USPSTF guidelines recommend a series of two vaccines for the best pneumonia protection.  
-All adults should have a flu vaccine yearly and a tetanus vaccine every 10 years.  
-All adults age 48 and older should receive the shingles vaccines (series of two vaccines). -All adults age 38-68 who are overweight should have a diabetes screening test once every three years.  
-All adults born between 80 and 1965 should be screened once for Hepatitis C. 
-Other screening tests and preventive services for persons with diabetes include: an eye exam to screen for diabetic retinopathy, a kidney function test, a foot exam, and stricter control over your cholesterol. -Cardiovascular screening for adults with routine risk involves an electrocardiogram (ECG) at intervals determined by your doctor.  
-Colorectal cancer screenings should be done for adults age 54-65 with no increased risk factors for colorectal cancer. There are a number of acceptable methods of screening for this type of cancer. Each test has its own benefits and drawbacks. Discuss with your doctor what is most appropriate for you during your annual wellness visit. The different tests include: colonoscopy (considered the best screening method), a fecal occult blood test, a fecal DNA test, and sigmoidoscopy. 
 
-A bone mass density test is recommended when a woman turns 65 to screen for osteoporosis. This test is only recommended one time, as a screening. Some providers will use this same test as a disease monitoring tool if you already have osteoporosis. -Breast cancer screenings are recommended every other year for women of normal risk, age 54-69. 
-Cervical cancer screenings for women over age 72 are only recommended with certain risk factors. Here is a list of your current Health Maintenance items (your personalized list of preventive services) with a due date: 
Health Maintenance Due Topic Date Due  
 Annual Well Visit  03/03/2020  Glaucoma Screening   03/05/2020  Bone Mineral Density   03/05/2020  Pneumococcal Vaccine (1 of 1 - PPSV23) 03/05/2020  Yearly Flu Vaccine (1) 09/01/2020  Cholesterol Test   10/07/2020

## 2020-10-07 NOTE — PROGRESS NOTES
Chief Complaint   Patient presents with    Pre-op Exam    Annual Wellness Visit    Medication Evaluation     clarification on Bp medication      1. Have you been to the ER, urgent care clinic since your last visit? Hospitalized since your last visit? No    2. Have you seen or consulted any other health care providers outside of the [address] [place] [address] since your last visit? Include any pap smears or colon screening. No    This is a \"Welcome to [employer]"  Initial Preventive Physical Examination (IPPE) providing Personalized Prevention Plan Services (Performed in the first 12 months of enrollment)    I have reviewed the patient's medical history in detail and updated the computerized patient record. History   Nanette Beckham comes in for welcome to Medicare exam.    Past Medical History:   Diagnosis Date    Arthritis     Cardiac murmur     Dyslipidemia     Heart murmur     Hx of migraines     Hypertension     Nausea & vomiting     Rosacea       Past Surgical History:   Procedure Laterality Date    FOOT/TOES SURGERY PROC UNLISTED      x6    HX BACK SURGERY      HX HYSTERECTOMY      due to heavy bleeding    HX KNEE ARTHROSCOPY      HX LUMBAR LAMINECTOMY  01/2011    L5/S1 Lami    HX ORTHOPAEDIC      [place] in [place]    HX ORTHOPAEDIC      Lumbar cyst removal L4-5     Current Outpatient Medications   Medication Sig Dispense Refill    fish oil-omega-3 fatty acids (Fish Oil) 340-1,000 mg capsule Take 1 Cap by mouth daily.  rosuvastatin (CRESTOR) 10 mg tablet TAKE 1 TABLET NIGHTLY 90 Tab 3    lisinopriL (PRINIVIL, ZESTRIL) 10 mg tablet TAKE 1 TABLET DAILY 90 Tab 3    clindamycin (CLEOCIN T) 1 % lotion Apply  to affected area daily.  use thin film on affected area 1 Bottle 3    atenoloL (TENORMIN) 50 mg tablet TAKE 1 TABLET DAILY 90 Tab 3    cetirizine (ZYRTEC) 10 mg tablet TAKE 1 TABLET DAILY AS NEEDED FOR ALLERGIES 90 Tab 1    lisinopril (PRINIVIL, ZESTRIL) 40 mg tablet Take 1 Tab by mouth daily. 90 Tab 1    doxycycline (ADOXA) 100 mg tablet TAKE 1 TABLET EVERY TUESDAY, THURSDAY AND SATURDAY 40 Tab 2    fluticasone propionate (FLONASE) 50 mcg/actuation nasal spray USE 2 SPRAYS IN EACH NOSTRIL DAILY (Patient taking differently: daily as needed.) 32 g 0    rizatriptan (MAXALT-MLT) 10 mg disintegrating tablet PLACE 1 TABLET ON TONGUE ONCE AS NEEDED FOR MIGRAINE 30 Tab 0    potassium 99 mg tablet Take 99 mg by mouth every seven (7) days.  vitamin E (AQUA GEMS) 400 unit capsule Take 400 Units by mouth daily.  ascorbic acid, vitamin C, (VITAMIN C) 500 mg tablet Take 1,000 mg by mouth daily. Takes 2000 daily      calcium citrate-vitamin D3 (CITRACAL + D) tablet Take  by mouth daily.  multivitamin (ONE A DAY) tablet Take 1 Tab by mouth daily.  cholecalciferol, VITAMIN D3, (VITAMIN D3) 5,000 unit tab tablet Take 1,000 Units by mouth daily.  B.infantis-B.ani-B.long-B.bifi (PROBIOTIC 4X) 10-15 mg TbEC Take  by mouth daily.  aspirin 81 mg chewable tablet Take 81 mg by mouth daily.        Allergies   Allergen Reactions    Penicillin G Hives    Tylenol [Acetaminophen] Other (comments)     Causes headache, denies recently       Family History   Problem Relation Age of Onset    Heart Attack Mother     Stroke Mother     Other Mother         CHF    Alcohol abuse Father     Other Father         CHF    Liver Disease Father      Social History     Tobacco Use    Smoking status: Never Smoker    Smokeless tobacco: Never Used   Substance Use Topics    Alcohol use: No       Depression Risk Screen     3 most recent PHQ Screens 10/7/2020   PHQ Not Done -   Little interest or pleasure in doing things Not at all   Feeling down, depressed, irritable, or hopeless Not at all   Total Score PHQ 2 0       Alcohol Risk Screen   Do you average more than 1 drink per night or more than 7 drinks a week:  No    On any one occasion in the past three months have you have had more than 3 drinks containing alcohol:  No          Functional Ability and Level of Safety   1. Was the patient's timed Up and GO test unsteady or longer than 30 seconds? Yes  2. Does the patient need help with the phone, transportation, shopping, preparing meals, housework, laundry, medications or managing money? No  3. Does the patients' home have rugs in the hallway, lack grab bars in the bathroom, lack handrails on the stairs or have poor lighting? No  4. Have you noticed any hearing difficulties? No  Hearing Evaluation:    Diet: No special diet     Hearing: Hearing is good. Vision Screening:  Vision is good. Visual Acuity Screening    Right eye Left eye Both eyes   Without correction:      With correction: 20/20 20/20 20/20        Activities of Daily Living: The home contains: no safety equipment. Patient does total self care     Ambulation: with no difficulty     Exercise level: moderately active     Fall Risk Screen:  Fall Risk Assessment, last 12 mths 10/7/2020   Able to walk? Yes   Fall in past 12 months? No     Abuse Screen:  Patient is not abused       Screening EKG   EKG order placed: No    Patient Care Team   Patient Care Team:  Shanta Solorio MD as PCP - General (Family Medicine)  Shanta Solorio MD as PCP - [address] [place] [address] Provider  Chetan Newberry MD (Orthopedic Surgery)  Yemi Pacheco MD as Physician (Physical Medicine and Rehabilitation)     End of Life Planning   Advanced care planning directives were discussed with the patient and /or family/caregiver. Assessment/Plan   Education and counseling provided:  Are appropriate based on today's review and evaluation  Cardiovascular screening blood test  Screening for glaucoma    1. Welcome to Medicare preventive visit  - [place] ANNUAL ALCOHOL SCREEN 15 MIN    2. Postmenopausal osteoporosis  - DEXA BONE DENSITY STUDY AXIAL; Future    3. Screening for alcoholism    4.  ACP (advance care planning)    Health Maintenance Due   Topic Date Due  Medicare Yearly Exam  03/03/2020    GLAUCOMA SCREENING Q2Y  03/05/2020    Bone Densitometry (Dexa) Screening  03/05/2020    Pneumococcal 65+ years (1 of 1 - PPSV23) 03/05/2020    Flu Vaccine (1) 09/01/2020    Lipid Screen  10/07/2020     I have discussed the diagnosis with the patient and the intended plan of care as seen in the above orders. The patient has received an after-visit summary and questions were answered concerning future plans. I have discussed medication, side effects, and warnings with the patient in detail. The patient verbalized understanding and is in agreement with the plan of care. The patient will contact the office with any additional concerns. Personalized preventative plan of care was discussed, printed and given to patient.     Coby Arthur MD

## 2020-10-08 LAB — SARS-COV-2, COV2NT: NOT DETECTED

## 2020-10-08 NOTE — H&P
Pre-Admission History and Physical    Patient: Mattie Gitelman   MRN: [id number]   SSN: [id number]   YOB: 1955   Age: 72 y.o. Sex: female     Patient scheduled for: refo Left L5 lami disc. Date of surgery: 10/12/2020. Location of surgery: [facility]. Surgeon: Marcelle Altamirano MD    HPI:  Mattie Gitelman is a 72 y.o. female with a previous laminectomy about 9 years ago. She has recurrent and progressive left leg pain.  It is primarily a low lumbar sciatica down the posterior aspect of her buttock to her leg. She  reports a pain level of 7/10. 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 Pain has impacted the patient's functional ability to to walk and do activities without pain. She  is being admitted for surgical intervention.          Past Medical History:   Diagnosis Date    Arthritis     Cardiac murmur     Dyslipidemia     Heart murmur     Hx of migraines     Hypertension     Nausea & vomiting     Rosacea      Social History     Socioeconomic History    Marital status:      Spouse name: Not on file    Number of children: Not on file    Years of education: Not on file    Highest education level: Not on file   Tobacco Use    Smoking status: Never Smoker    Smokeless tobacco: Never Used   Substance and Sexual Activity    Alcohol use: No    Drug use: No    Sexual activity: Yes     Partners: Male   Other Topics Concern     Service No    Blood Transfusions Yes    Caffeine Concern No    Occupational Exposure No    Hobby Hazards No    Sleep Concern No    Stress Concern No    Weight Concern No    Special Diet No    Back Care No    Exercise Yes    Bike Helmet No    Seat Belt Yes    Self-Exams Yes     Past Surgical History:   Procedure Laterality Date    FOOT/TOES SURGERY PROC UNLISTED      x6    HX BACK SURGERY      HX HYSTERECTOMY      due to heavy bleeding    HX KNEE ARTHROSCOPY      HX LUMBAR LAMINECTOMY  01/2011    L5/S1 Lami    HX ORTHOPAEDIC      [place] in [place]    HX ORTHOPAEDIC      Lumbar cyst removal L4-5     Family History   Problem Relation Age of Onset    Heart Attack Mother     Stroke Mother     Other Mother         CHF    Alcohol abuse Father     Other Father         CHF    Liver Disease Father      Allergies   Allergen Reactions    Penicillin G Hives    Tylenol [Acetaminophen] Other (comments)     Causes headache, denies recently     Current Outpatient Medications   Medication Sig Dispense Refill    lisinopriL (PRINIVIL, ZESTRIL) 40 mg tablet Take 1 Tab by mouth daily. 90 Tab 1    rosuvastatin (CRESTOR) 10 mg tablet TAKE 1 TABLET NIGHTLY 90 Tab 3    atenoloL (TENORMIN) 50 mg tablet TAKE 1 TABLET DAILY 90 Tab 3    cetirizine (ZYRTEC) 10 mg tablet TAKE 1 TABLET DAILY AS NEEDED FOR ALLERGIES 90 Tab 1    doxycycline (ADOXA) 100 mg tablet TAKE 1 TABLET EVERY TUESDAY, THURSDAY AND SATURDAY 40 Tab 2    rizatriptan (MAXALT-MLT) 10 mg disintegrating tablet PLACE 1 TABLET ON TONGUE ONCE AS NEEDED FOR MIGRAINE 30 Tab 0    potassium 99 mg tablet Take 99 mg by mouth every seven (7) days.  vitamin E (AQUA GEMS) 400 unit capsule Take 400 Units by mouth daily.  ascorbic acid, vitamin C, (VITAMIN C) 500 mg tablet Take 1,000 mg by mouth daily. Takes 2000 daily      calcium citrate-vitamin D3 (CITRACAL + D) tablet Take  by mouth daily.  multivitamin (ONE A DAY) tablet Take 1 Tab by mouth daily.  cholecalciferol, VITAMIN D3, (VITAMIN D3) 5,000 unit tab tablet Take 1,000 Units by mouth daily.  B.infantis-B.ani-B.long-B.bifi (PROBIOTIC 4X) 10-15 mg TbEC Take  by mouth daily.  fish oil-omega-3 fatty acids (Fish Oil) 340-1,000 mg capsule Take 1 Cap by mouth daily.  clindamycin (CLEOCIN T) 1 % lotion Apply  to affected area daily.  use thin film on affected area 1 Bottle 3    fluticasone propionate (FLONASE) 50 mcg/actuation nasal spray USE 2 SPRAYS IN EACH NOSTRIL DAILY (Patient taking differently: daily as needed.) 32 g 0       ROS:  Denies chills, fever,night sweats,  bowel or bladder dysfunction, unexplained weight loss/weight gain, chest pain, sob or anxiety. Physical Examination    Gen: Well developed, well nourished 72 y.o. female   /88 (BP 1 Location: Left arm, BP Patient Position: Sitting)   Pulse 67   Temp 96.8 °F (36 °C) (Oral)   Resp 16   Ht 5' 6\" (1.676 m)   Wt 170 lb (77.1 kg)   SpO2 97%   BMI 27.44 kg/m²            Accompanied by Self.        Constitutional:  Well developed, well nourished, in no acute distress. Psychiatric: Affect and mood are appropriate. Integumentary: No rashes or abrasions noted on exposed areas. Cardiovascular/Peripheral Vascular: +2 radial & pedal pulses. No peripheral edema is noted. Lymphatic:  No evidence of lymphedema. No cervical lymphadenopathy. SPINE/MUSCULOSKELETAL EXAM     Lumbar spine:  No rash, ecchymosis, or gross obliquity. No fasciculations. No focal atrophy is noted. Range of motion is decreased with flexion, extension. Tenderness to palpation back. No tenderness to palpation at the sciatic notch. SI joints non-tender. Trochanters non tender.       Straight leg raise neg  Hip Impingement neg     Sensation grossly intact to light touch.        MOTOR:      Hip Flex Quads Hamstrings Ankle DF EHL Ankle PF   Right +4/5 +4/5 +4/5 +4/5 +4/5 +4/5   Left +4/5 +4/5 +4/5 +4/5 +4/5 +4/5         Ambulation without assistive device. FWB.    normal gait and station      Assessment and Plan    Due to the pt's persistent symptoms unrelieved by conservative measure Nanette Glass is being admitted to [facility] to undergo surgical intervention. The post-operative plan of care consists of physical therapy, home health and a 2 week f/u office visit. We are pending medical clearance by vipul Sanford.   The risks, benefits, complications and alternatives to surgery have been discussed in detail with the patient. The patient understands and agrees to proceed.      FELICIANO Odonnell dictating for Sruthi Guaman MD

## 2020-10-09 ENCOUNTER — TELEPHONE (OUTPATIENT)
Dept: FAMILY MEDICINE CLINIC | Age: 65
End: 2020-10-09

## 2020-10-09 NOTE — TELEPHONE ENCOUNTER
Dr. Erwin [place] office called stating office note 10/7/20 clearing patient for surgery needs to be signed. She is having surgery 10/12/20 @ 11:00.

## 2020-10-11 ENCOUNTER — ANESTHESIA EVENT (OUTPATIENT)
Dept: SURGERY | Age: 65
End: 2020-10-11
Payer: MEDICARE

## 2020-10-11 NOTE — ACP (ADVANCE CARE PLANNING)
Advance Care Planning       Advance Care Planning (ACP) Physician/NP/PA (Provider) Conversation        Date of ACP Conversation: 10/7/2020    Conversation Conducted with:       Health Care Decision Maker:    Current Designated Health Care Decision Maker:       If no Authorized Decision Maker has previously been identified, then patient chooses Health Care Decision Maker:  Patient has an advanced directive/ACP and she has filled out paperwork and brought this in today. This will be scanned into the EMR chart. Her  would be her authorized decision maker.       Length of Voluntary ACP Conversation in minutes:  <16 minutes (Non-Billable)      Karlee Rodriguez MD

## 2020-10-12 ENCOUNTER — ANESTHESIA (OUTPATIENT)
Dept: SURGERY | Age: 65
End: 2020-10-12
Payer: MEDICARE

## 2020-10-12 ENCOUNTER — HOSPITAL ENCOUNTER (OUTPATIENT)
Age: 65
Setting detail: OUTPATIENT SURGERY
Discharge: HOME | End: 2020-10-12
Attending: ORTHOPAEDIC SURGERY | Admitting: ORTHOPAEDIC SURGERY
Payer: MEDICARE

## 2020-10-12 ENCOUNTER — APPOINTMENT (OUTPATIENT)
Dept: GENERAL RADIOLOGY | Age: 65
End: 2020-10-12
Attending: ORTHOPAEDIC SURGERY
Payer: MEDICARE

## 2020-10-12 VITALS
WEIGHT: 171 LBS | TEMPERATURE: 97.7 F | DIASTOLIC BLOOD PRESSURE: 80 MMHG | BODY MASS INDEX: 27.48 KG/M2 | SYSTOLIC BLOOD PRESSURE: 119 MMHG | HEIGHT: 66 IN | RESPIRATION RATE: 16 BRPM | HEART RATE: 63 BPM | OXYGEN SATURATION: 96 %

## 2020-10-12 DIAGNOSIS — Z98.890 S/P LUMBAR LAMINECTOMY: Primary | ICD-10-CM

## 2020-10-12 PROBLEM — M51.26 HNP (HERNIATED NUCLEUS PULPOSUS), LUMBAR: Status: ACTIVE | Noted: 2020-10-12

## 2020-10-12 PROCEDURE — 99218 HC RM OBSERVATION: CPT

## 2020-10-12 PROCEDURE — 74011250636 HC RX REV CODE- 250/636: Performed by: NURSE ANESTHETIST, CERTIFIED REGISTERED

## 2020-10-12 PROCEDURE — 77030027138 HC INCENT SPIROMETER -A: Performed by: ORTHOPAEDIC SURGERY

## 2020-10-12 PROCEDURE — 77030040361 HC SLV COMPR DVT MDII -B: Performed by: ORTHOPAEDIC SURGERY

## 2020-10-12 PROCEDURE — 76210000022 HC REC RM PH II 1.5 TO 2 HR: Performed by: ORTHOPAEDIC SURGERY

## 2020-10-12 PROCEDURE — 74011000258 HC RX REV CODE- 258: Performed by: NURSE ANESTHETIST, CERTIFIED REGISTERED

## 2020-10-12 PROCEDURE — 74011250636 HC RX REV CODE- 250/636: Performed by: ORTHOPAEDIC SURGERY

## 2020-10-12 PROCEDURE — 76010000149 HC OR TIME 1 TO 1.5 HR: Performed by: ORTHOPAEDIC SURGERY

## 2020-10-12 PROCEDURE — 76060000033 HC ANESTHESIA 1 TO 1.5 HR: Performed by: ORTHOPAEDIC SURGERY

## 2020-10-12 PROCEDURE — 74011000250 HC RX REV CODE- 250: Performed by: ORTHOPAEDIC SURGERY

## 2020-10-12 PROCEDURE — 74011000250 HC RX REV CODE- 250: Performed by: NURSE ANESTHETIST, CERTIFIED REGISTERED

## 2020-10-12 PROCEDURE — 76210000017 HC OR PH I REC 1.5 TO 2 HR: Performed by: ORTHOPAEDIC SURGERY

## 2020-10-12 PROCEDURE — 77030003029 HC SUT VCRL J&J -B: Performed by: ORTHOPAEDIC SURGERY

## 2020-10-12 PROCEDURE — 74011250637 HC RX REV CODE- 250/637: Performed by: NURSE ANESTHETIST, CERTIFIED REGISTERED

## 2020-10-12 PROCEDURE — 77030012890: Performed by: ORTHOPAEDIC SURGERY

## 2020-10-12 PROCEDURE — 77030002933 HC SUT MCRYL J&J -A: Performed by: ORTHOPAEDIC SURGERY

## 2020-10-12 PROCEDURE — 77030030163 HC BN WAX J&J -A: Performed by: ORTHOPAEDIC SURGERY

## 2020-10-12 PROCEDURE — 74011000272 HC RX REV CODE- 272: Performed by: ORTHOPAEDIC SURGERY

## 2020-10-12 PROCEDURE — 77030004402 HC BUR NEUR STRY -C: Performed by: ORTHOPAEDIC SURGERY

## 2020-10-12 PROCEDURE — 00630 ANES PX LUMBAR REGION NOS: CPT | Performed by: ANESTHESIOLOGY

## 2020-10-12 PROCEDURE — 77030040922 HC BLNKT HYPOTHRM STRY -A: Performed by: ORTHOPAEDIC SURGERY

## 2020-10-12 PROCEDURE — 2709999900 HC NON-CHARGEABLE SUPPLY: Performed by: ORTHOPAEDIC SURGERY

## 2020-10-12 PROCEDURE — 74011250636 HC RX REV CODE- 250/636

## 2020-10-12 RX ORDER — SODIUM CHLORIDE 0.9 % (FLUSH) 0.9 %
5-40 SYRINGE (ML) INJECTION AS NEEDED
Status: DISCONTINUED | OUTPATIENT
Start: 2020-10-12 | End: 2020-10-12 | Stop reason: HOSPADM

## 2020-10-12 RX ORDER — DEXAMETHASONE SODIUM PHOSPHATE 4 MG/ML
INJECTION, SOLUTION INTRA-ARTICULAR; INTRALESIONAL; INTRAMUSCULAR; INTRAVENOUS; SOFT TISSUE AS NEEDED
Status: DISCONTINUED | OUTPATIENT
Start: 2020-10-12 | End: 2020-10-12 | Stop reason: HOSPADM

## 2020-10-12 RX ORDER — SCOLOPAMINE TRANSDERMAL SYSTEM 1 MG/1
1 PATCH, EXTENDED RELEASE TRANSDERMAL
Status: DISCONTINUED | OUTPATIENT
Start: 2020-10-12 | End: 2020-10-12 | Stop reason: HOSPADM

## 2020-10-12 RX ORDER — INSULIN LISPRO 100 [IU]/ML
INJECTION, SOLUTION INTRAVENOUS; SUBCUTANEOUS ONCE
Status: DISCONTINUED | OUTPATIENT
Start: 2020-10-12 | End: 2020-10-12 | Stop reason: HOSPADM

## 2020-10-12 RX ORDER — NEOSTIGMINE METHYLSULFATE 1 MG/ML
INJECTION, SOLUTION INTRAVENOUS AS NEEDED
Status: DISCONTINUED | OUTPATIENT
Start: 2020-10-12 | End: 2020-10-12 | Stop reason: HOSPADM

## 2020-10-12 RX ORDER — DEXTROSE 50 % IN WATER (D50W) INTRAVENOUS SYRINGE
25-50 AS NEEDED
Status: DISCONTINUED | OUTPATIENT
Start: 2020-10-12 | End: 2020-10-12 | Stop reason: HOSPADM

## 2020-10-12 RX ORDER — MAGNESIUM SULFATE 100 %
4 CRYSTALS MISCELLANEOUS AS NEEDED
Status: DISCONTINUED | OUTPATIENT
Start: 2020-10-12 | End: 2020-10-12 | Stop reason: HOSPADM

## 2020-10-12 RX ORDER — LIDOCAINE HYDROCHLORIDE 20 MG/ML
INJECTION, SOLUTION EPIDURAL; INFILTRATION; INTRACAUDAL; PERINEURAL AS NEEDED
Status: DISCONTINUED | OUTPATIENT
Start: 2020-10-12 | End: 2020-10-12 | Stop reason: HOSPADM

## 2020-10-12 RX ORDER — SODIUM CHLORIDE 0.9 % (FLUSH) 0.9 %
5-40 SYRINGE (ML) INJECTION EVERY 8 HOURS
Status: DISCONTINUED | OUTPATIENT
Start: 2020-10-12 | End: 2020-10-12 | Stop reason: HOSPADM

## 2020-10-12 RX ORDER — KETOROLAC TROMETHAMINE 15 MG/ML
INJECTION, SOLUTION INTRAMUSCULAR; INTRAVENOUS AS NEEDED
Status: DISCONTINUED | OUTPATIENT
Start: 2020-10-12 | End: 2020-10-12 | Stop reason: HOSPADM

## 2020-10-12 RX ORDER — GLYCOPYRROLATE 0.2 MG/ML
INJECTION INTRAMUSCULAR; INTRAVENOUS AS NEEDED
Status: DISCONTINUED | OUTPATIENT
Start: 2020-10-12 | End: 2020-10-12 | Stop reason: HOSPADM

## 2020-10-12 RX ORDER — FAMOTIDINE 20 MG/1
20 TABLET, FILM COATED ORAL ONCE
Status: COMPLETED | OUTPATIENT
Start: 2020-10-12 | End: 2020-10-12

## 2020-10-12 RX ORDER — SODIUM CHLORIDE, SODIUM LACTATE, POTASSIUM CHLORIDE, CALCIUM CHLORIDE 600; 310; 30; 20 MG/100ML; MG/100ML; MG/100ML; MG/100ML
100 INJECTION, SOLUTION INTRAVENOUS CONTINUOUS
Status: DISCONTINUED | OUTPATIENT
Start: 2020-10-12 | End: 2020-10-12 | Stop reason: HOSPADM

## 2020-10-12 RX ORDER — FENTANYL CITRATE 50 UG/ML
50 INJECTION, SOLUTION INTRAMUSCULAR; INTRAVENOUS AS NEEDED
Status: DISCONTINUED | OUTPATIENT
Start: 2020-10-12 | End: 2020-10-12 | Stop reason: HOSPADM

## 2020-10-12 RX ORDER — VANCOMYCIN HYDROCHLORIDE 1 G/20ML
INJECTION, POWDER, LYOPHILIZED, FOR SOLUTION INTRAVENOUS AS NEEDED
Status: DISCONTINUED | OUTPATIENT
Start: 2020-10-12 | End: 2020-10-12 | Stop reason: HOSPADM

## 2020-10-12 RX ORDER — SUCCINYLCHOLINE CHLORIDE 20 MG/ML
INJECTION INTRAMUSCULAR; INTRAVENOUS AS NEEDED
Status: DISCONTINUED | OUTPATIENT
Start: 2020-10-12 | End: 2020-10-12 | Stop reason: HOSPADM

## 2020-10-12 RX ORDER — ONDANSETRON 2 MG/ML
INJECTION INTRAMUSCULAR; INTRAVENOUS AS NEEDED
Status: DISCONTINUED | OUTPATIENT
Start: 2020-10-12 | End: 2020-10-12 | Stop reason: HOSPADM

## 2020-10-12 RX ORDER — OXYCODONE HYDROCHLORIDE 5 MG/1
5 TABLET ORAL
Qty: 20 TAB | Refills: 0 | Status: SHIPPED | OUTPATIENT
Start: 2020-10-12 | End: 2020-10-17

## 2020-10-12 RX ORDER — PROPOFOL 10 MG/ML
INJECTION, EMULSION INTRAVENOUS AS NEEDED
Status: DISCONTINUED | OUTPATIENT
Start: 2020-10-12 | End: 2020-10-12 | Stop reason: HOSPADM

## 2020-10-12 RX ORDER — FENTANYL CITRATE 50 UG/ML
INJECTION, SOLUTION INTRAMUSCULAR; INTRAVENOUS AS NEEDED
Status: DISCONTINUED | OUTPATIENT
Start: 2020-10-12 | End: 2020-10-12 | Stop reason: HOSPADM

## 2020-10-12 RX ORDER — BUPIVACAINE HYDROCHLORIDE 5 MG/ML
INJECTION, SOLUTION EPIDURAL; INTRACAUDAL AS NEEDED
Status: DISCONTINUED | OUTPATIENT
Start: 2020-10-12 | End: 2020-10-12 | Stop reason: HOSPADM

## 2020-10-12 RX ORDER — MIDAZOLAM HYDROCHLORIDE 1 MG/ML
INJECTION, SOLUTION INTRAMUSCULAR; INTRAVENOUS AS NEEDED
Status: DISCONTINUED | OUTPATIENT
Start: 2020-10-12 | End: 2020-10-12 | Stop reason: HOSPADM

## 2020-10-12 RX ORDER — VECURONIUM BROMIDE FOR INJECTION 1 MG/ML
INJECTION, POWDER, LYOPHILIZED, FOR SOLUTION INTRAVENOUS AS NEEDED
Status: DISCONTINUED | OUTPATIENT
Start: 2020-10-12 | End: 2020-10-12 | Stop reason: HOSPADM

## 2020-10-12 RX ORDER — SODIUM CHLORIDE, SODIUM LACTATE, POTASSIUM CHLORIDE, CALCIUM CHLORIDE 600; 310; 30; 20 MG/100ML; MG/100ML; MG/100ML; MG/100ML
75 INJECTION, SOLUTION INTRAVENOUS CONTINUOUS
Status: DISCONTINUED | OUTPATIENT
Start: 2020-10-12 | End: 2020-10-12 | Stop reason: HOSPADM

## 2020-10-12 RX ADMIN — FAMOTIDINE 20 MG: 20 TABLET ORAL at 10:19

## 2020-10-12 RX ADMIN — ONDANSETRON 4 MG: 2 INJECTION INTRAMUSCULAR; INTRAVENOUS at 13:02

## 2020-10-12 RX ADMIN — LIDOCAINE HYDROCHLORIDE 100 MG: 20 INJECTION, SOLUTION EPIDURAL; INFILTRATION; INTRACAUDAL; PERINEURAL at 12:31

## 2020-10-12 RX ADMIN — Medication 5 MG: at 13:08

## 2020-10-12 RX ADMIN — PROPOFOL 180 MG: 10 INJECTION, EMULSION INTRAVENOUS at 12:31

## 2020-10-12 RX ADMIN — KETOROLAC TROMETHAMINE 15 MG: 15 INJECTION, SOLUTION INTRAMUSCULAR; INTRAVENOUS at 13:02

## 2020-10-12 RX ADMIN — GLYCOPYRROLATE 0.2 MG: 0.2 INJECTION INTRAMUSCULAR; INTRAVENOUS at 13:05

## 2020-10-12 RX ADMIN — MIDAZOLAM HYDROCHLORIDE 2 MG: 2 INJECTION, SOLUTION INTRAMUSCULAR; INTRAVENOUS at 12:23

## 2020-10-12 RX ADMIN — GLYCOPYRROLATE 0.6 MG: 0.2 INJECTION INTRAMUSCULAR; INTRAVENOUS at 13:08

## 2020-10-12 RX ADMIN — VECURONIUM BROMIDE 3 MG: 1 INJECTION, POWDER, LYOPHILIZED, FOR SOLUTION INTRAVENOUS at 12:41

## 2020-10-12 RX ADMIN — SODIUM CHLORIDE, SODIUM LACTATE, POTASSIUM CHLORIDE, AND CALCIUM CHLORIDE 75 ML/HR: 600; 310; 30; 20 INJECTION, SOLUTION INTRAVENOUS at 10:19

## 2020-10-12 RX ADMIN — FENTANYL CITRATE 150 MCG: 50 INJECTION, SOLUTION INTRAMUSCULAR; INTRAVENOUS at 12:30

## 2020-10-12 RX ADMIN — PHENYLEPHRINE HYDROCHLORIDE 200 MCG: 10 INJECTION INTRAVENOUS at 12:53

## 2020-10-12 RX ADMIN — SODIUM CHLORIDE, SODIUM LACTATE, POTASSIUM CHLORIDE, AND CALCIUM CHLORIDE: 600; 310; 30; 20 INJECTION, SOLUTION INTRAVENOUS at 13:10

## 2020-10-12 RX ADMIN — DEXAMETHASONE SODIUM PHOSPHATE 8 MG: 4 INJECTION, SOLUTION INTRAMUSCULAR; INTRAVENOUS at 12:45

## 2020-10-12 RX ADMIN — SUCCINYLCHOLINE CHLORIDE 100 MG: 20 INJECTION, SOLUTION INTRAMUSCULAR; INTRAVENOUS at 12:31

## 2020-10-12 RX ADMIN — PHENYLEPHRINE HYDROCHLORIDE 100 MCG: 10 INJECTION INTRAVENOUS at 12:47

## 2020-10-12 NOTE — DISCHARGE INSTRUCTIONS
Patient Education        Lumbar Laminectomy: What to Expect at Home  Your Recovery  A lumbar laminectomy is surgery to ease pressure on the spinal cord and nerves of the lower spine. The doctor took out pieces of bone that were squeezing the spinal cord and nerves. You can expect your back to feel stiff or sore after surgery. This should improve in the weeks after surgery. You may have trouble sitting or standing in one position for very long and may need pain medicine in the weeks after your surgery. Your doctor may advise you to work with a physical therapist to strengthen the muscles around your spine and trunk. You will need to learn how to lift, twist, and bend so that you don't put too much strain on your back. This care sheet gives you a general idea about how long it will take for you to recover. But each person recovers at a different pace. Follow the steps below to get better as quickly as possible. How can you care for yourself at home? Activity    · Rest when you feel tired. Getting enough sleep will help you recover.     · Try to walk each day. Start by walking a little more than you did the day before. Bit by bit, increase the amount you walk. Walking boosts blood flow and helps prevent pneumonia and constipation. Walking may also decrease your muscle soreness after surgery.     · If advised by your doctor, you may need to avoid lifting anything that would cause excessive strain on your back. This may include a child, heavy grocery bags and milk containers, a heavy briefcase or backpack, cat litter or dog food bags, or a vacuum .     · Avoid strenuous activities, such as bicycle riding, jogging, weight lifting, or aerobic exercise, until your doctor says it is okay.     · Do not drive for 2 to 4 weeks after your surgery or until your doctor says it is okay.     · Avoid riding in a car for more than 30 minutes at a time for 2 to 4 weeks after surgery.  If you must ride in a car for a longer distance, stop often to walk and stretch your legs.     · Try to change your position about every 30 minutes while sitting or standing. This will help decrease your back pain while you are healing.     · You will probably need to take 4 to 6 weeks off from work. It depends on the type of work you do and how you feel.     · You may have sex as soon as you feel able, but avoid positions that put stress on your back or cause pain. Diet    · You can eat your normal diet. If your stomach is upset, try bland, low-fat foods like plain rice, broiled chicken, toast, and yogurt.     · Drink plenty of fluids (unless your doctor tells you not to).     · You may notice that your bowel movements are not regular right after your surgery. This is common. Try to avoid constipation and straining with bowel movements. You may want to take a fiber supplement every day. If you have not had a bowel movement after a couple of days, ask your doctor about taking a mild laxative. Medicines    · Your doctor will tell you if and when you can restart your medicines. He or she will also give you instructions about taking any new medicines.     · If you take aspirin or some other blood thinner, ask your doctor if and when to start taking it again. Make sure that you understand exactly what your doctor wants you to do.     · Take pain medicines exactly as directed. ? If the doctor gave you a prescription medicine for pain, take it as prescribed. ? If you are not taking a prescription pain medicine, ask your doctor if you can take an over-the-counter medicine.     · If your doctor prescribed antibiotics, take them as directed. Do not stop taking them just because you feel better. You need to take the full course of antibiotics.     · If you think your pain medicine is making you sick to your stomach:  ? Take your medicine after meals (unless your doctor has told you not to). ? Ask your doctor for a different pain medicine.    Incision care    · If you have strips of tape on the cut (incision) the doctor made, leave the tape on for a week or until it falls off.     · Wash the area daily with warm, soapy water and pat it dry.     · Keep the area clean and dry. You may cover it with a gauze bandage if it weeps or rubs against clothing. Change the bandage every day. Exercise    · Do back exercises as instructed by your doctor.     · Your doctor may advise you to work with a physical therapist to improve the strength and flexibility of your back. Other instructions    · To reduce stiffness and help sore muscles, use a warm water bottle, a heating pad set on low, or a warm cloth on your back. Do not put heat right over the incision. Do not go to sleep with a heating pad on your skin. Follow-up care is a key part of your treatment and safety. Be sure to make and go to all appointments, and call your doctor if you are having problems. It's also a good idea to know your test results and keep a list of the medicines you take. When should you call for help? Call 911 anytime you think you may need emergency care. For example, call if:    · You passed out (lost consciousness).     · You have sudden chest pain and shortness of breath, or you cough up blood.     · You are unable to move a leg at all. Call your doctor now or seek immediate medical care if:    · You have new or worse symptoms in your legs or buttocks. Symptoms may include:  ? Numbness or tingling. ? Weakness. ? Pain.     · You lose bladder or bowel control.     · You have loose stitches, or your incision comes open.     · You have blood or fluid draining from the incision.     · You have signs of infection, such as:  ? Increased pain, swelling, warmth, or redness. ? Pus draining from the incision. ? A fever. ? Red streaks leading from the incision.    Watch closely for changes in your health, and be sure to contact your doctor if:    · You do not have a bowel movement after taking a laxative.     · You are not getting better as expected. Where can you learn more? Go to http://www.gray.com/  Enter M164 in the search box to learn more about \"Lumbar Laminectomy: What to Expect at Home. \"  Current as of: March 2, 2020               Content Version: 12.6  © 9367-4811 [employer]. Care instructions adapted under license by [employer] (which disclaims liability or warranty for this information). If you have questions about a medical condition or this instruction, always ask your healthcare professional. [address] any warranty or liability for your use of this information. DISCHARGE SUMMARY from Nurse    PATIENT INSTRUCTIONS:    After general anesthesia or intravenous sedation, for 24 hours or while taking prescription Narcotics:  · Limit your activities  · Do not drive and operate hazardous machinery  · Do not make important personal or business decisions  · Do  not drink alcoholic beverages  · If you have not urinated within 8 hours after discharge, please contact your surgeon on call. Report the following to your surgeon:  · Excessive pain, swelling, redness or odor of or around the surgical area  · Temperature over 100.5  · Nausea and vomiting lasting longer than 4 hours or if unable to take medications  · Any signs of decreased circulation or nerve impairment to extremity: change in color, persistent  numbness, tingling, coldness or increase pain  · Any questions    What to do at Home:  Recommended activity: Activity as tolerated and no driving for today. *  Please give a list of your current medications to your Primary Care Provider. *  Please update this list whenever your medications are discontinued, doses are      changed, or new medications (including over-the-counter products) are added. *  Please carry medication information at all times in case of emergency situations.     These are general instructions for a healthy lifestyle:    No smoking/ No tobacco products/ Avoid exposure to second hand smoke  Surgeon General's Warning:  Quitting smoking now greatly reduces serious risk to your health. Obesity, smoking, and sedentary lifestyle greatly increases your risk for illness    A healthy diet, regular physical exercise & weight monitoring are important for maintaining a healthy lifestyle    You may be retaining fluid if you have a history of heart failure or if you experience any of the following symptoms:  Weight gain of 3 pounds or more overnight or 5 pounds in a week, increased swelling in our hands or feet or shortness of breath while lying flat in bed. Please call your doctor as soon as you notice any of these symptoms; do not wait until your next office visit. The discharge information has been reviewed with the patient. The patient verbalized understanding. Discharge medications reviewed with the patient and appropriate educational materials and side effects teaching were provided.   ___________________________________________________________________________________________________________________________________

## 2020-10-12 NOTE — ANESTHESIA POSTPROCEDURE EVALUATION
Procedure(s):  REDO LEFT L5 LAMINECTOMY DISCECTOMY/C-ARM. general    Anesthesia Post Evaluation      Multimodal analgesia: multimodal analgesia used between 6 hours prior to anesthesia start to PACU discharge  Patient location during evaluation: PACU  Patient participation: complete - patient participated  Level of consciousness: awake and alert  Pain management: adequate  Airway patency: patent  Anesthetic complications: no  Cardiovascular status: acceptable  Respiratory status: acceptable  Hydration status: acceptable  Post anesthesia nausea and vomiting:  controlled  Final Post Anesthesia Temperature Assessment:  Normothermia (36.0-37.5 degrees C)      INITIAL Post-op Vital signs:   Vitals Value Taken Time   /85 10/12/2020  1:54 PM   Temp 36.3 °C (97.4 °F) 10/12/2020  1:24 PM   Pulse 57 10/12/2020  2:02 PM   Resp 10 10/12/2020  2:02 PM   SpO2 96 % 10/12/2020  2:03 PM   Vitals shown include unvalidated device data.

## 2020-10-12 NOTE — INTERVAL H&P NOTE
Update History & Physical 
 
The Patient's History and Physical of October 8, 2020 was reviewed with the patient and I examined the patient. There was no change. The surgical site was confirmed by the patient and me. Plan:  The risk, benefits, expected outcome, and alternative to the recommended procedure have been discussed with the patient. Patient understands and wants to proceed with the procedure.  
 
Electronically signed by Kirill Patino MD on 10/12/2020 at 10:23 AM

## 2020-10-12 NOTE — ANESTHESIA PREPROCEDURE EVALUATION
Relevant Problems   No relevant active problems       Anesthetic History     PONV          Review of Systems / Medical History  Patient summary reviewed and pertinent labs reviewed    Pulmonary  Within defined limits                 Neuro/Psych   Within defined limits           Cardiovascular    Hypertension                   GI/Hepatic/Renal  Within defined limits              Endo/Other        Arthritis     Other Findings              Physical Exam    Airway  Mallampati: II  TM Distance: 4 - 6 cm  Neck ROM: normal range of motion   Mouth opening: Normal     Cardiovascular  Regular rate and rhythm,  S1 and S2 normal,  no murmur, click, rub, or gallop             Dental  No notable dental hx       Pulmonary  Breath sounds clear to auscultation               Abdominal  GI exam deferred       Other Findings            Anesthetic Plan    ASA: 2  Anesthesia type: general          Induction: Intravenous  Anesthetic plan and risks discussed with: Patient Principal Discharge DX:	Alcohol withdrawal syndrome, with unspecified complication  Goal:	Treating your alcohol withdrawal  Instructions for follow-up, activity and diet:	You were treated for symptoms of alcohol withdrawal. You should return to the hospital or call your doctor with return of symptoms. You should abstain from drinking to prevent recurrence of symptoms.  Secondary Diagnosis:	Left ventricular thrombus without MI  Instructions for follow-up, activity and diet:	You were found to have a blood clot in your left ventricle of your heart. This is a dangerous condition that could lead to stroke if not treated appropriately. You should take the medication as directed and follow up with Dr. Levy.  Secondary Diagnosis:	Chronic systolic congestive heart failure  Instructions for follow-up, activity and diet:	Please continue taking your medications as directed and follow up with Dr. Levy.  Secondary Diagnosis:	Coronary artery disease without angina pectoris, unspecified vessel or lesion type, unspecified whether native or transplanted heart  Instructions for follow-up, activity and diet:	Please continue taking your medications as directed and follow up with Dr. Levy.  Secondary Diagnosis:	Atrial fibrillation, unspecified type  Instructions for follow-up, activity and diet:	Please keep taking the eliquis and your other medications as directed, please follow up with Dr. Levy. If you experience any bleeding, you speak with Dr. Levy about this or return to the hospital. Principal Discharge DX:	Alcohol withdrawal syndrome, with unspecified complication  Goal:	Treating your alcohol withdrawal  Instructions for follow-up, activity and diet:	You were treated for symptoms of alcohol withdrawal. You should return to the hospital or call your doctor with return of symptoms. You should abstain from drinking to prevent recurrence of symptoms.  Secondary Diagnosis:	Left ventricular thrombus without MI  Instructions for follow-up, activity and diet:	You were found to have a blood clot in your left ventricle of your heart. This is a dangerous condition that could lead to stroke if not treated appropriately. You should take the medication as directed and follow up with Dr. Lvey.  Secondary Diagnosis:	Chronic systolic congestive heart failure  Instructions for follow-up, activity and diet:	Please continue taking your medications as directed and follow up with Dr. Levy.  Secondary Diagnosis:	Coronary artery disease without angina pectoris, unspecified vessel or lesion type, unspecified whether native or transplanted heart  Instructions for follow-up, activity and diet:	Please continue taking your medications as directed and follow up with Dr. Levy.  Secondary Diagnosis:	Atrial fibrillation, unspecified type  Instructions for follow-up, activity and diet:	Please keep taking the eliquis and your other medications as directed, please follow up with Dr. Levy. If you experience any bleeding, you speak with Dr. Levy about this or return to the hospital. Principal Discharge DX:	Alcohol withdrawal syndrome, with unspecified complication  Goal:	Treating your alcohol withdrawal  Instructions for follow-up, activity and diet:	You were treated for symptoms of alcohol withdrawal. You should return to the hospital or call your doctor with return of symptoms. You should abstain from drinking to prevent recurrence of symptoms. Please continue taking the medication as directed and see Dr. Levy within one week.  Secondary Diagnosis:	Left ventricular thrombus without MI  Instructions for follow-up, activity and diet:	You were found to have a blood clot in your left ventricle of your heart. This is a dangerous condition that could lead to stroke if not treated appropriately. You should take the medication as directed and follow up with Dr. Levy. You should avoid physical activity that results in physical trauma as you have a risk of bleeding this medication.  Secondary Diagnosis:	Chronic systolic congestive heart failure  Instructions for follow-up, activity and diet:	Please continue taking your medications as directed and follow up with Dr. Levy.  Secondary Diagnosis:	Coronary artery disease without angina pectoris, unspecified vessel or lesion type, unspecified whether native or transplanted heart  Instructions for follow-up, activity and diet:	Please continue taking your medications as directed and follow up with Dr. Levy.  Secondary Diagnosis:	Atrial fibrillation, unspecified type  Instructions for follow-up, activity and diet:	Please keep taking the eliquis (apixaban) and your other medications as directed, please follow up with Dr. Levy. If you experience any bleeding, you speak with Dr. Levy about this or return to the hospital.

## 2020-10-12 NOTE — PERIOP NOTES
Neuro assessment within defined limits. Pt able to move all 4 extremities purposefully and spontaneously. Iced pack placed on surgical area.

## 2020-10-12 NOTE — BRIEF OP NOTE
Brief Postoperative Note    Patient: Mattie Gitelman  YOB: 1955  MRN: [id number]    Date of Procedure: 10/12/2020     Pre-Op Diagnosis: HNP (herniated nucleus pulposus), lumbar [M51.26]    Post-Op Diagnosis: Same as preoperative diagnosis.       Procedure(s):  REDO LEFT L5 LAMINECTOMY DISCECTOMY/C-ARM    Surgeon(s):  Clay Gutierrez MD    Surgical Assistant: None    Anesthesia: General     Estimated Blood Loss (mL): Minimal    Complications: None    Specimens: * No specimens in log *     Implants: * No implants in log *    Drains: * No LDAs found *    Findings: hnp    Electronically Signed by Daily Montgomery MD on 10/12/2020 at 1:05 PM

## 2020-10-13 RX ORDER — CETIRIZINE HCL 10 MG
TABLET ORAL
Qty: 90 TAB | Refills: 3 | Status: SHIPPED | OUTPATIENT
Start: 2020-10-13 | End: 2022-01-17

## 2020-10-13 RX ORDER — DOXYCYCLINE 100 MG/1
TABLET ORAL
Qty: 40 TAB | Refills: 3 | Status: SHIPPED | OUTPATIENT
Start: 2020-10-13 | End: 2020-11-11 | Stop reason: DRUGHIGH

## 2020-10-13 NOTE — OP NOTES
[address] [place] [address]   OPERATIVE REPORT    Name:  Casi Balbuena  MR#:   [id number]  :  1955  ACCOUNT #:  [de-identified]  DATE OF SERVICE:  10/12/2020    PREOPERATIVE DIAGNOSES:  Post laminectomy syndrome, lumbar; herniated nucleus pulposus, L5 on left. POSTOPERATIVE DIAGNOSES:  Post laminectomy syndrome, lumbar; herniated nucleus pulposus, L5 on left. PROCEDURE PERFORMED:  Left redo L5 laminectomy, diskectomy. SURGEON:  Riley Mandel MD    ASSISTANT:  None. ANESTHESIA:  General endotracheal.    COMPLICATIONS:  None. SPECIMENS REMOVED:  None. IMPLANTS:  None. ESTIMATED BLOOD LOSS:  5 mL. FINDINGS:  The patient had a contained disk herniation at L5 on the left causing compression to the S1 nerve root. PROCEDURE:  Following induction of general endotracheal anesthesia, the patient was turned to prone position on a spinal frame. The patient was prepped and draped in the usual fashion. Midline incision was made just inferior to the patient's previous incision. A paramedian incision was made in the lumbodorsal fascia, and subperiosteal dissection was done of the L5-S1 interval.  There was dense scar involved. A large hemilaminotomy was done. Scar was freed from the lamina edges. Minimal to no bone required resection. Palpation of the S1 pedicle, definition of the foramina was done. The S1 nerve root was mobilized. Underlying disk was incised, and an annular nuclear fragment expressed itself. Unstable nuclear and annular material removed until disk space was flat and stable. A pledget of Gelfoam and thrombin was placed over the laminotomy site. The lumbodorsal fascia was closed with number 1 Vicryl, subcutaneous tissues with 2-0 Vicryl, skin with a 4-0 Monocryl subcuticular suture and Dermabond. Sterile occlusive dressing was placed upon the wound. All counts were correct.       Camila Null MD      [id number]  D:  10/12/2020 13:19  T:  10/13/2020 0:57  JOB #: [id number]

## 2020-10-21 ENCOUNTER — TELEPHONE (OUTPATIENT)
Dept: ORTHOPEDIC SURGERY | Age: 65
End: 2020-10-21

## 2020-10-21 NOTE — TELEPHONE ENCOUNTER
Patient is asking if she can remove bandage over surgical area as it is starting to get itchy. Patient says we can leave a voicemail.

## 2020-10-23 NOTE — TELEPHONE ENCOUNTER
Spoke with pt, informed of the Provider message below. Pt stated understanding, that she removed it because it was itching so bad. She stated everything looks good, no further actions needed at this time.

## 2020-10-26 ENCOUNTER — TELEPHONE (OUTPATIENT)
Dept: SURGICAL ICU | Age: 65
End: 2020-10-26

## 2020-10-26 ENCOUNTER — OFFICE VISIT (OUTPATIENT)
Dept: ORTHOPEDIC SURGERY | Age: 65
End: 2020-10-26
Payer: MEDICARE

## 2020-10-26 VITALS
WEIGHT: 174 LBS | SYSTOLIC BLOOD PRESSURE: 136 MMHG | RESPIRATION RATE: 16 BRPM | BODY MASS INDEX: 28.08 KG/M2 | TEMPERATURE: 97.9 F | DIASTOLIC BLOOD PRESSURE: 88 MMHG | HEART RATE: 71 BPM

## 2020-10-26 DIAGNOSIS — M51.26 HNP (HERNIATED NUCLEUS PULPOSUS), LUMBAR: Primary | ICD-10-CM

## 2020-10-26 DIAGNOSIS — Z98.890 S/P LUMBAR LAMINECTOMY: ICD-10-CM

## 2020-10-26 PROCEDURE — 99024 POSTOP FOLLOW-UP VISIT: CPT | Performed by: NURSE PRACTITIONER

## 2020-10-26 NOTE — PROGRESS NOTES
Nikki Gutierrez presents today for   Chief Complaint   Patient presents with    Back Pain     post op       Is someone accompanying this pt? NO    Is the patient using any DME equipment during OV? NO    Depression Screening:  3 most recent PHQ Screens 10/26/2020   PHQ Not Done -   Little interest or pleasure in doing things Not at all   Feeling down, depressed, irritable, or hopeless Not at all   Total Score PHQ 2 0       Learning Assessment:  No flowsheet data found. Abuse Screening:  Abuse Screening Questionnaire 8/13/2020   Do you ever feel afraid of your partner? N   Are you in a relationship with someone who physically or mentally threatens you? N   Is it safe for you to go home? Y       Fall Risk  Fall Risk Assessment, last 12 mths 10/26/2020   Able to walk? Yes   Fall in past 12 months? No         Coordination of Care:  1. Have you been to the ER, urgent care clinic since your last visit? NO  Hospitalized since your last visit? NO    2. Have you seen or consulted any other health care providers outside of the [address] [place] [address] since your last visit? NO  Include any pap smears or colon screening.  NO    Last  Checked 10/26/2020 no

## 2020-10-26 NOTE — PROGRESS NOTES
[address]  [address], [address]  [place]  Phone: [phone]  Fax: [phone]  PROGRESS NOTE-Post Op  Patient: Saida Mota                MRN: [id number]       SSN: [id number]  YOB: 1955        AGE: 72 y.o. SEX: female  Body mass index is 28.08 kg/m². PCP: Jaja Domínguez MD  10/26/20    Chief Complaint   Patient presents with    Back Pain     post op       HISTORY OF PRESENT ILLNESS:  Saida Mota is a 72 y.o. female with history of back and LLE pain for 1 year who had a Left redo L5 laminectomy, diskectomy surgery on 10/12/20 for a HNP. Her first surgery was in 2011. She had a R-sided decompression in 2019. Today, she is doing very well from her surgery w/ complete resolution of her back and leg pain. Patient denies any fevers, wound drainage, bladder/bowel dysfunction, new onset weakness, new neuropathic pain, or other neurological deficits. Pt desires to continue with evaluation of back and leg pain and postoperative care. Current Medications: none     ASSESSMENT   Diagnoses and all orders for this visit:    1. HNP (herniated nucleus pulposus), lumbar    2. S/P lumbar laminectomy         IMPRESSION AND PLAN:  This is a pt who had a lumbar decompression surgery 2 weeks ago. She is doing well with improved back and leg pain. Her incision is healing nicely. > Pt was given information on lumbar lami post-operative and wound care.  > Reviewed activity   > Discussed DOs and DONTs  >   > Ms. Terese Mendenhall has a reminder for a \"due or due soon\" health maintenance.  I have asked that she contact her primary care provider, Jaja Domínguez MD, for follow-up on this health maintenance.  > We have informed the patient to notify us for immediate appointment if he has any worsening neurogical symptoms or if an emergency situation presents, then call 911  >  demonstrated consistency with prescribing.   > Pt will follow-up in 4 wks. SUBJECTIVE    Smoking Status non smoker  Work: doesn't work    Pain Scale: 1/10    Pain Assessment  10/26/2020   Location of Pain Back   Location Modifiers -   Severity of Pain 1   Quality of Pain Aching   Quality of Pain Comment soreness   Duration of Pain -   Duration of Pain Comment -   Frequency of Pain Intermittent   Aggravating Factors (No Data)   Aggravating Factors Comment posture   Limiting Behavior Some   Relieving Factors Ice   Relieving Factors Comment -   Result of Injury -           REVIEW OF SYSTEMS  Constitutional: Negative for fever, chills, or weight change. Respiratory: Negative for cough or shortness of breath. Cardiovascular: Negative for chest pain or palpitations. Gastrointestinal: Negative for incontinence, acid reflux, change in bowel habits, or constipation. Genitourinary: Negative for incontinence, dysuria and flank pain. Musculoskeletal: Positive for back pain. See HPI. Skin: Negative for rash. Neurological:no radiculopathy. See HPI. Endo/Heme/Allergies: Negative. Psychiatric/Behavioral: Negative. PHYSICAL EXAMINATION  Visit Vitals  /88   Pulse 71   Temp 97.9 °F (36.6 °C) (Temporal)   Resp 16   Wt 174 lb (78.9 kg)   BMI 28.08 kg/m²         Accompanied by self. Constitutional:  Well developed, well nourished, in no acute distress. Psychiatric: Affect and mood are appropriate. Integumentary: No rashes or abrasions noted on exposed areas. Wound: mid low back Incision healing well, no drainage, no erythema, no hernia, no seroma, no swelling, no dehiscence, incision well approximated. Cardiovascular/Peripheral Vascular: +2 radial & pedal pulses. No peripheral edema is noted. Lymphatic:  No evidence of lymphedema. No cervical lymphadenopathy. SPINE/MUSCULOSKELETAL EXAM    Lumbar spine:  No rash, ecchymosis, or gross obliquity. No fasciculations. No focal atrophy is noted. Range of motion is NT . Tenderness to palpation NONE.  No tenderness to palpation at the sciatic notch. Sensation grossly intact to light touch. Straight leg raise NEG      MOTOR:     Hip Flex Quads Hamstrings Ankle DF EHL Ankle PF   Right 5/5 5/5 5/5 5/5 5/5 5/5   Left 5/5 5/5 5/5 5/5 5/5 5/5       normal gait and station    Ambulation without assistive device. FWB. PAST MEDICAL HISTORY   Past Medical History:   Diagnosis Date    Arthritis     Cardiac murmur     Dyslipidemia     Heart murmur     Hx of migraines     Hypertension     Nausea & vomiting     Rosacea        Past Surgical History:   Procedure Laterality Date    FOOT/TOES SURGERY PROC UNLISTED      x6    HX BACK SURGERY      HX HYSTERECTOMY      due to heavy bleeding    HX KNEE ARTHROSCOPY      HX LUMBAR LAMINECTOMY  01/2011    L5/S1 Lami    HX ORTHOPAEDIC      Lumbar Laminectomy in 2011 [place]    HX ORTHOPAEDIC      Lumbar cyst removal L4-5   . MEDICATIONS      Current Outpatient Medications   Medication Sig Dispense Refill    doxycycline (ADOXA) 100 mg tablet TAKE 1 TABLET EVERY TUESDAY, THURSDAY AND SATURDAY 40 Tab 3    cetirizine (ZYRTEC) 10 mg tablet TAKE 1 TABLET DAILY AS NEEDED FOR ALLERGIES 90 Tab 3    lisinopriL (PRINIVIL, ZESTRIL) 40 mg tablet Take 1 Tab by mouth daily. 90 Tab 1    fish oil-omega-3 fatty acids (Fish Oil) 340-1,000 mg capsule Take 1 Cap by mouth daily.  rosuvastatin (CRESTOR) 10 mg tablet TAKE 1 TABLET NIGHTLY 90 Tab 3    clindamycin (CLEOCIN T) 1 % lotion Apply  to affected area daily. use thin film on affected area 1 Bottle 3    atenoloL (TENORMIN) 50 mg tablet TAKE 1 TABLET DAILY 90 Tab 3    fluticasone propionate (FLONASE) 50 mcg/actuation nasal spray USE 2 SPRAYS IN EACH NOSTRIL DAILY (Patient taking differently: daily as needed.) 32 g 0    rizatriptan (MAXALT-MLT) 10 mg disintegrating tablet PLACE 1 TABLET ON TONGUE ONCE AS NEEDED FOR MIGRAINE 30 Tab 0    potassium 99 mg tablet Take 99 mg by mouth every seven (7) days.       vitamin E (AQUA GEMS) 400 unit capsule Take 400 Units by mouth daily.  ascorbic acid, vitamin C, (VITAMIN C) 500 mg tablet Take 1,000 mg by mouth daily. Takes 2000 daily      calcium citrate-vitamin D3 (CITRACAL + D) tablet Take  by mouth daily.  multivitamin (ONE A DAY) tablet Take 1 Tab by mouth daily.  cholecalciferol, VITAMIN D3, (VITAMIN D3) 5,000 unit tab tablet Take 1,000 Units by mouth daily.  B.infantis-B.ani-B.long-B.bifi (PROBIOTIC 4X) 10-15 mg TbEC Take  by mouth daily.           ALLERGIES    Allergies   Allergen Reactions    Penicillin G Hives    Tylenol [Acetaminophen] Other (comments)     Causes headache, denies recently          SOCIAL HISTORY    Social History     Socioeconomic History    Marital status:      Spouse name: Not on file    Number of children: Not on file    Years of education: Not on file    Highest education level: Not on file   Occupational History    Not on file   Social Needs    Financial resource strain: Not on file    Food insecurity     Worry: Not on file     Inability: Not on file    Transportation needs     Medical: Not on file     Non-medical: Not on file   Tobacco Use    Smoking status: Never Smoker    Smokeless tobacco: Never Used   Substance and Sexual Activity    Alcohol use: No    Drug use: No    Sexual activity: Yes     Partners: Male   Lifestyle    Physical activity     Days per week: Not on file     Minutes per session: Not on file    Stress: Not on file   Relationships    Social connections     Talks on phone: Not on file     Gets together: Not on file     Attends Anabaptist service: Not on file     Active member of club or organization: Not on file     Attends meetings of clubs or organizations: Not on file     Relationship status: Not on file    Intimate partner violence     Fear of current or ex partner: Not on file     Emotionally abused: Not on file     Physically abused: Not on file     Forced sexual activity: Not on file   Other Topics Concern     Service No    Blood Transfusions Yes    Caffeine Concern No    Occupational Exposure No    Hobby Hazards No    Sleep Concern No    Stress Concern No    Weight Concern No    Special Diet No    Back Care No    Exercise Yes    Bike Helmet No    Seat Belt Yes    Self-Exams Yes   Social History Narrative    Not on file       FAMILY HISTORY    Family History   Problem Relation Age of Onset    Heart Attack Mother     Stroke Mother     Other Mother         CHF    Alcohol abuse Father     Other Father         CHF    Liver Disease Father          Carlos Thomas, GIACOMO

## 2020-10-26 NOTE — PATIENT INSTRUCTIONS
Lumbar Laminectomy: What to Expect at [place] [facility] A lumbar laminectomy is surgery to ease pressure on the spinal cord and nerves of the lower spine. The doctor took out pieces of bone that were squeezing the spinal cord and nerves. You can expect your back to feel stiff or sore after surgery. This should improve in the weeks after surgery. You may have trouble sitting or standing in one position for very long and may need pain medicine in the weeks after your surgery. Your doctor may advise you to work with a physical therapist to strengthen the muscles around your spine and trunk. You will need to learn how to lift, twist, and bend so that you don't put too much strain on your back. This care sheet gives you a general idea about how long it will take for you to recover. But each person recovers at a different pace. Follow the steps below to get better as quickly as possible. How can you care for yourself at home? Activity 
  · Rest when you feel tired. Getting enough sleep will help you recover.  
  · Try to walk each day. Start by walking a little more than you did the day before. Bit by bit, increase the amount you walk. Walking boosts blood flow and helps prevent pneumonia and constipation. Walking may also decrease your muscle soreness after surgery.  
  · If advised by your doctor, you may need to avoid lifting anything that would cause excessive strain on your back. This may include a child, heavy grocery bags and milk containers, a heavy briefcase or backpack, cat litter or dog food bags, or a vacuum .  
  · Avoid strenuous activities, such as bicycle riding, jogging, weight lifting, or aerobic exercise, until your doctor says it is okay.  
  · Do not drive for 2 to 4 weeks after your surgery or until your doctor says it is okay.  
  · Avoid riding in a car for more than 30 minutes at a time for 2 to 4 weeks after surgery.  If you must ride in a car for a longer distance, stop often to walk and stretch your legs.  
  · Try to change your position about every 30 minutes while sitting or standing. This will help decrease your back pain while you are healing.  
  · You will probably need to take 4 to 6 weeks off from work. It depends on the type of work you do and how you feel.  
  · You may have sex as soon as you feel able, but avoid positions that put stress on your back or cause pain. Diet 
  · You can eat your normal diet. If your stomach is upset, try bland, low-fat foods like plain rice, broiled chicken, toast, and yogurt.  
  · Drink plenty of fluids (unless your doctor tells you not to).  
  · You may notice that your bowel movements are not regular right after your surgery. This is common. Try to avoid constipation and straining with bowel movements. You may want to take a fiber supplement every day. If you have not had a bowel movement after a couple of days, ask your doctor about taking a mild laxative. Medicines 
  · Your doctor will tell you if and when you can restart your medicines. He or she will also give you instructions about taking any new medicines.  
  · If you take aspirin or some other blood thinner, ask your doctor if and when to start taking it again. Make sure that you understand exactly what your doctor wants you to do.  
  · Take pain medicines exactly as directed. ? If the doctor gave you a prescription medicine for pain, take it as prescribed. ? If you are not taking a prescription pain medicine, ask your doctor if you can take an over-the-counter medicine.  
  · If your doctor prescribed antibiotics, take them as directed. Do not stop taking them just because you feel better. You need to take the full course of antibiotics.  
  · If you think your pain medicine is making you sick to your stomach: 
? Take your medicine after meals (unless your doctor has told you not to). ? Ask your doctor for a different pain medicine. Incision care   · If you have strips of tape on the cut (incision) the doctor made, leave the tape on for a week or until it falls off.  
  · Wash the area daily with warm, soapy water and pat it dry.  
  · Keep the area clean and dry. You may cover it with a gauze bandage if it weeps or rubs against clothing. Change the bandage every day. Exercise 
  · Do back exercises as instructed by your doctor.  
  · Your doctor may advise you to work with a physical therapist to improve the strength and flexibility of your back. Other instructions 
  · To reduce stiffness and help sore muscles, use a warm water bottle, a heating pad set on low, or a warm cloth on your back. Do not put heat right over the incision. Do not go to sleep with a heating pad on your skin. Follow-up care is a key part of your treatment and safety. Be sure to make and go to all appointments, and call your doctor if you are having problems. It's also a good idea to know your test results and keep a list of the medicines you take. When should you call for help? Call 911 anytime you think you may need emergency care. For example, call if: 
  · You passed out (lost consciousness).  
  · You have sudden chest pain and shortness of breath, or you cough up blood.  
  · You are unable to move a leg at all. Call your doctor now or seek immediate medical care if: 
  · You have new or worse symptoms in your legs or buttocks. Symptoms may include: 
? Numbness or tingling. ? Weakness. ? Pain.  
  · You lose bladder or bowel control.  
  · You have loose stitches, or your incision comes open.  
  · You have blood or fluid draining from the incision.  
  · You have signs of infection, such as: 
? Increased pain, swelling, warmth, or redness. ? Pus draining from the incision. ? A fever. ? Red streaks leading from the incision. Watch closely for changes in your health, and be sure to contact your doctor if:   · You do not have a bowel movement after taking a laxative.  
  · You are not getting better as expected. Where can you learn more? Go to http://kendrick-emerald.info/ Enter O891 in the search box to learn more about \"Lumbar Laminectomy: What to Expect at Home. \" Current as of: March 2, 2020               Content Version: 12.6 © 8739-2581 [employer] [place]. Care instructions adapted under license by [employer] (which disclaims liability or warranty for this information). If you have questions about a medical condition or this instruction, always ask your healthcare professional. [place] [address] any warranty or liability for your use of this information.

## 2020-10-27 ENCOUNTER — HOSPITAL ENCOUNTER (OUTPATIENT)
Dept: BONE DENSITY | Age: 65
Discharge: HOME | End: 2020-10-27
Attending: FAMILY MEDICINE
Payer: MEDICARE

## 2020-10-27 DIAGNOSIS — M81.0 POSTMENOPAUSAL OSTEOPOROSIS: ICD-10-CM

## 2020-10-27 PROCEDURE — 77080 DXA BONE DENSITY AXIAL: CPT

## 2020-11-11 ENCOUNTER — HOSPITAL ENCOUNTER (OUTPATIENT)
Dept: LAB | Age: 65
Discharge: HOME | End: 2020-11-11
Payer: MEDICARE

## 2020-11-11 ENCOUNTER — OFFICE VISIT (OUTPATIENT)
Dept: FAMILY MEDICINE CLINIC | Age: 65
End: 2020-11-11
Payer: MEDICARE

## 2020-11-11 VITALS
DIASTOLIC BLOOD PRESSURE: 82 MMHG | OXYGEN SATURATION: 96 % | TEMPERATURE: 98.1 F | SYSTOLIC BLOOD PRESSURE: 128 MMHG | WEIGHT: 172 LBS | HEART RATE: 60 BPM | HEIGHT: 66 IN | BODY MASS INDEX: 27.64 KG/M2 | RESPIRATION RATE: 18 BRPM

## 2020-11-11 DIAGNOSIS — I10 ESSENTIAL HYPERTENSION: Primary | ICD-10-CM

## 2020-11-11 DIAGNOSIS — E78.5 DYSLIPIDEMIA: ICD-10-CM

## 2020-11-11 DIAGNOSIS — Z23 ENCOUNTER FOR IMMUNIZATION: ICD-10-CM

## 2020-11-11 DIAGNOSIS — H92.02 LEFT EAR PAIN: ICD-10-CM

## 2020-11-11 DIAGNOSIS — G89.29 CHRONIC MIDLINE LOW BACK PAIN WITH SCIATICA, SCIATICA LATERALITY UNSPECIFIED: ICD-10-CM

## 2020-11-11 DIAGNOSIS — G43.909 MIGRAINE WITHOUT STATUS MIGRAINOSUS, NOT INTRACTABLE, UNSPECIFIED MIGRAINE TYPE: ICD-10-CM

## 2020-11-11 DIAGNOSIS — E66.3 OVERWEIGHT (BMI 25.0-29.9): ICD-10-CM

## 2020-11-11 DIAGNOSIS — M25.562 CHRONIC PAIN OF LEFT KNEE: ICD-10-CM

## 2020-11-11 DIAGNOSIS — M54.40 CHRONIC MIDLINE LOW BACK PAIN WITH SCIATICA, SCIATICA LATERALITY UNSPECIFIED: ICD-10-CM

## 2020-11-11 DIAGNOSIS — L71.9 ROSACEA: ICD-10-CM

## 2020-11-11 DIAGNOSIS — G89.29 CHRONIC PAIN OF LEFT KNEE: ICD-10-CM

## 2020-11-11 LAB
CHOLEST SERPL-MCNC: 181 MG/DL
HDLC SERPL-MCNC: 77 MG/DL (ref 40–60)
HDLC SERPL: 2.4 {RATIO} (ref 0–5)
LDLC SERPL CALC-MCNC: 89.2 MG/DL (ref 0–100)
LIPID PROFILE,FLP: ABNORMAL
TRIGL SERPL-MCNC: 74 MG/DL (ref ?–150)
VLDLC SERPL CALC-MCNC: 14.8 MG/DL

## 2020-11-11 PROCEDURE — 1101F PT FALLS ASSESS-DOCD LE1/YR: CPT | Performed by: FAMILY MEDICINE

## 2020-11-11 PROCEDURE — G8419 CALC BMI OUT NRM PARAM NOF/U: HCPCS | Performed by: FAMILY MEDICINE

## 2020-11-11 PROCEDURE — G0009 ADMIN PNEUMOCOCCAL VACCINE: HCPCS | Performed by: FAMILY MEDICINE

## 2020-11-11 PROCEDURE — 36415 COLL VENOUS BLD VENIPUNCTURE: CPT

## 2020-11-11 PROCEDURE — G0008 ADMIN INFLUENZA VIRUS VAC: HCPCS | Performed by: FAMILY MEDICINE

## 2020-11-11 PROCEDURE — 80061 LIPID PANEL: CPT

## 2020-11-11 PROCEDURE — 99214 OFFICE O/P EST MOD 30 MIN: CPT | Performed by: FAMILY MEDICINE

## 2020-11-11 PROCEDURE — G8752 SYS BP LESS 140: HCPCS | Performed by: FAMILY MEDICINE

## 2020-11-11 PROCEDURE — G8536 NO DOC ELDER MAL SCRN: HCPCS | Performed by: FAMILY MEDICINE

## 2020-11-11 PROCEDURE — G8510 SCR DEP NEG, NO PLAN REQD: HCPCS | Performed by: FAMILY MEDICINE

## 2020-11-11 PROCEDURE — G8399 PT W/DXA RESULTS DOCUMENT: HCPCS | Performed by: FAMILY MEDICINE

## 2020-11-11 PROCEDURE — G8427 DOCREV CUR MEDS BY ELIG CLIN: HCPCS | Performed by: FAMILY MEDICINE

## 2020-11-11 PROCEDURE — G8754 DIAS BP LESS 90: HCPCS | Performed by: FAMILY MEDICINE

## 2020-11-11 PROCEDURE — 1090F PRES/ABSN URINE INCON ASSESS: CPT | Performed by: FAMILY MEDICINE

## 2020-11-11 PROCEDURE — 90694 VACC AIIV4 NO PRSRV 0.5ML IM: CPT | Performed by: FAMILY MEDICINE

## 2020-11-11 PROCEDURE — G9899 SCRN MAM PERF RSLTS DOC: HCPCS | Performed by: FAMILY MEDICINE

## 2020-11-11 PROCEDURE — 90732 PPSV23 VACC 2 YRS+ SUBQ/IM: CPT | Performed by: FAMILY MEDICINE

## 2020-11-11 PROCEDURE — 3017F COLORECTAL CA SCREEN DOC REV: CPT | Performed by: FAMILY MEDICINE

## 2020-11-11 RX ORDER — DOXYCYCLINE 50 MG/1
50 TABLET ORAL DAILY
Qty: 90 TAB | Refills: 1 | Status: SHIPPED | OUTPATIENT
Start: 2020-11-11 | End: 2021-08-20

## 2020-11-11 NOTE — PROGRESS NOTES
Chief Complaint   Patient presents with    Hypertension     1. Have you been to the ER, urgent care clinic since your last visit? Hospitalized since your last visit? No    2. Have you seen or consulted any other health care providers outside of the [address] [place] [address] since your last visit? Include any pap smears or colon screening. No     HPI  Nanette Roland comes in for f/u care. HTN: Patient has hypertension. She is on lisinopril 40 mg daily. She denies headache, changes in vision or focal weakness. Has been stable on this medication. We will continue with the current treatment plan. Knee pain: Patient has left knee pain. Pain comes with walking long distances. Occasionally gets a catching sensation in the knee. She plans to see her orthopedist.  May need to have surgery to the knee. States that she would have to wait for 3 months as she had recent low back surgery. In the meantime she will take Tylenol arthritis for the pain as needed. We discussed supportive care measures and activity as needed. Rosacea: Patient has rosacea and has been followed up with a dermatologist.  She has been on doxycycline. States that the dermatologist informed her that she could use a different dosage of the medication. Dermatologist had sent in brand prescription that was not covered by her insurance. She would like me to send the doxycycline 50 mg daily. Previously she was on 100 mg every other day. I will send in the medication. Patient is also on clindamycin topically. Dyslipidemia: Patient has dyslipidemia. She is on Crestor. I will recheck lipid panel today. Migraine headache: Patient has a history of migraine headaches. These have been better controlled and rare lately. She is on Maxalt as needed for acute headache. Ear discomfort: Patient has bilateral ear discomfort with stuffed up sensation that occurs on and off. She uses eardrops with peroxide. The exam is stable.   She will let us know if symptoms recur or persist.  Back pain: Patient has a history of chronic low back pain. She had herniated nucleus pulposus and recently had lumbar laminectomy. She has done well. Minimal pain. She feels improved. Overweight: Patient has a BMI of 27.76. She will intensify lifestyle and dietary modification. Health maintenance: Patient will have the flu and the pneumonia vaccine today. Past Medical History  Past Medical History:   Diagnosis Date    Arthritis     Cardiac murmur     Dyslipidemia     Heart murmur     Hx of migraines     Hypertension     Nausea & vomiting     Rosacea        Surgical History  Past Surgical History:   Procedure Laterality Date    FOOT/TOES SURGERY PROC UNLISTED      x6    HX BACK SURGERY      HX HYSTERECTOMY      due to heavy bleeding    HX KNEE ARTHROSCOPY      HX LUMBAR LAMINECTOMY  01/2011    L5/S1 [place]    HX ORTHOPAEDIC      Lumbar Laminectomy in 2011 [place]    HX ORTHOPAEDIC      Lumbar cyst removal L4-5        Medications  Current Outpatient Medications   Medication Sig Dispense Refill    doxycycline (ADOXA) 100 mg tablet TAKE 1 TABLET EVERY TUESDAY, THURSDAY AND SATURDAY 40 Tab 3    cetirizine (ZYRTEC) 10 mg tablet TAKE 1 TABLET DAILY AS NEEDED FOR ALLERGIES 90 Tab 3    lisinopriL (PRINIVIL, ZESTRIL) 40 mg tablet Take 1 Tab by mouth daily. 90 Tab 1    fish oil-omega-3 fatty acids (Fish Oil) 340-1,000 mg capsule Take 1 Cap by mouth daily.  rosuvastatin (CRESTOR) 10 mg tablet TAKE 1 TABLET NIGHTLY 90 Tab 3    clindamycin (CLEOCIN T) 1 % lotion Apply  to affected area daily.  use thin film on affected area 1 Bottle 3    atenoloL (TENORMIN) 50 mg tablet TAKE 1 TABLET DAILY 90 Tab 3    fluticasone propionate (FLONASE) 50 mcg/actuation nasal spray USE 2 SPRAYS IN EACH NOSTRIL DAILY (Patient taking differently: daily as needed.) 32 g 0    rizatriptan (MAXALT-MLT) 10 mg disintegrating tablet PLACE 1 TABLET ON TONGUE ONCE AS NEEDED FOR MIGRAINE 30 Tab 0    potassium 99 mg tablet Take 99 mg by mouth every seven (7) days.  vitamin E (AQUA GEMS) 400 unit capsule Take 400 Units by mouth daily.  ascorbic acid, vitamin C, (VITAMIN C) 500 mg tablet Take 1,000 mg by mouth daily. Takes 2000 daily      calcium citrate-vitamin D3 (CITRACAL + D) tablet Take  by mouth daily.  multivitamin (ONE A DAY) tablet Take 1 Tab by mouth daily.  cholecalciferol, VITAMIN D3, (VITAMIN D3) 5,000 unit tab tablet Take 1,000 Units by mouth daily.  B.infantis-B.ani-B.long-B.bifi (PROBIOTIC 4X) 10-15 mg TbEC Take  by mouth daily.          Allergies  Allergies   Allergen Reactions    Penicillin G Hives    Tylenol [Acetaminophen] Other (comments)     Causes headache, denies recently       Family History  Family History   Problem Relation Age of Onset    Heart Attack Mother     Stroke Mother     Other Mother         CHF    Alcohol abuse Father     Other Father         CHF    Liver Disease Father        Social History  Social History     Socioeconomic History    Marital status:      Spouse name: Not on file    Number of children: Not on file    Years of education: Not on file    Highest education level: Not on file   Occupational History    Not on file   Social Needs    Financial resource strain: Not on file    Food insecurity     Worry: Not on file     Inability: Not on file    Transportation needs     Medical: Not on file     Non-medical: Not on file   Tobacco Use    Smoking status: Never Smoker    Smokeless tobacco: Never Used   Substance and Sexual Activity    Alcohol use: No    Drug use: No    Sexual activity: Yes     Partners: Male   Lifestyle    Physical activity     Days per week: Not on file     Minutes per session: Not on file    Stress: Not on file   Relationships    Social connections     Talks on phone: Not on file     Gets together: Not on file     Attends Sikhism service: Not on file     Active member of club or organization: Not on file     Attends meetings of clubs or organizations: Not on file     Relationship status: Not on file    Intimate partner violence     Fear of current or ex partner: Not on file     Emotionally abused: Not on file     Physically abused: Not on file     Forced sexual activity: Not on file   Other Topics Concern     Service No    Blood Transfusions Yes    Caffeine Concern No    Occupational Exposure No    Hobby Hazards No    Sleep Concern No    Stress Concern No    Weight Concern No    Special Diet No    Back Care No    Exercise Yes    Bike Helmet No    Seat Belt Yes    Self-Exams Yes   Social History Narrative    Not on file       Review of Systems  Review of Systems - Review of all systems is negative except as noted above in the HPI.     Vital Signs  Visit Vitals  /82 (BP 1 Location: Left arm, BP Patient Position: Sitting)   Pulse 60   Temp 98.1 °F (36.7 °C) (Oral)   Resp 18   Ht 5' 6\" (1.676 m)   Wt 172 lb (78 kg)   SpO2 96%   BMI 27.76 kg/m²         Physical Exam  Physical Examination: General appearance - alert, well appearing, and in no distress, oriented to person, place, and time and acyanotic, in no respiratory distress  Mental status - alert, oriented to person, place, and time, affect appropriate to mood  Ears - bilateral TM's and external ear canals normal  Nose - normal and patent, no erythema, discharge or polyps  Mouth - mucous membranes moist, pharynx normal without lesions  Neck - supple, no significant adenopathy  Lymphatics - no palpable lymphadenopathy, no hepatosplenomegaly  Chest - clear to auscultation, no wheezes, rales or rhonchi, symmetric air entry  Heart - normal rate and regular rhythm, S1 and S2 normal  Abdomen - no rebound tenderness noted  Back exam - limited range of motion  Neurological - abnormal neurological exam unchanged from prior examinations  Musculoskeletal - osteoarthritic changes noted in both hands, discomfort lateral margins left knee, no limitation in range of motion. Extremities - no pedal edema noted, intact peripheral pulses  Skin -facial rosacea          Results  Results for orders placed or performed during the hospital encounter of 10/07/20   NOVEL CORONAVIRUS (COVID-19)   Result Value Ref Range    SARS-CoV-2 Not Detected Not Detected         ASSESSMENT and PLAN    ICD-10-CM ICD-9-CM    1. Essential hypertension  I10 401.9    2. Chronic midline low back pain with sciatica, sciatica laterality unspecified  M54.40 724.2     G89.29 724.3      338.29    3. Encounter for immunization  Z23 V03.89 FLU (FLUAD QUAD INFLUENZA VACCINE,QUAD,ADJUVANTED)      PNEUMOCOCCAL POLYSACCHARIDE VACCINE, 23-VALENT, ADULT OR IMMUNOSUPPRESSED PT DOSE,      ADMIN INFLUENZA VIRUS VAC      ADMIN PNEUMOCOCCAL VACCINE   4. Dyslipidemia  E78.5 272.4    5. Rosacea  L71.9 695.3 doxycycline (ADOXA) 50 mg tablet   6. Migraine without status migrainosus, not intractable, unspecified migraine type  G43.909 346.90    7. Chronic pain of left knee  M25.562 719.46     G89.29 338.29    8. Left ear pain  H92.02 388.70    9. Overweight (BMI 25.0-29. 9)  E66.3 278.02      lab results and schedule of future lab studies reviewed with patient  reviewed diet, exercise and weight control  cardiovascular risk and specific lipid/LDL goals reviewed  reviewed medications and side effects in detail      I have discussed the diagnosis with the patient and the intended plan of care as seen in the above orders. The patient has received an after-visit summary and questions were answered concerning future plans. I have discussed medication, side effects, and warnings with the patient in detail. The patient verbalized understanding and is in agreement with the plan of care. The patient will contact the office with any additional concerns. William Alexis MD    PLEASE NOTE:   This document has been produced using voice recognition software.  Unrecognized errors in transcription may be present

## 2020-11-20 ENCOUNTER — DOCUMENTATION ONLY (OUTPATIENT)
Dept: FAMILY MEDICINE CLINIC | Age: 65
End: 2020-11-20

## 2020-11-20 ENCOUNTER — TELEPHONE (OUTPATIENT)
Dept: FAMILY MEDICINE CLINIC | Age: 65
End: 2020-11-20

## 2020-11-20 NOTE — TELEPHONE ENCOUNTER
----- Message from Julio Cesar Navarro MD sent at 11/16/2020  8:03 AM EST -----  Please let patient know her lipid panel is stable.   Julio Cesar Navarro MD    11/20/2020  Called patient , advised Lipid Panel is stable, patient verbalized understanding

## 2020-11-20 NOTE — TELEPHONE ENCOUNTER
----- Message from Natali Dunn MD sent at 10/29/2020  2:18 PM EDT -----  Bone density scan is within normal limits.   Natali Dunn MD

## 2020-11-20 NOTE — PROGRESS NOTES
Called patient to advise of recent Lipid results and she stated she recently came in for a flu shot and Pne shot. She thinks she may have had a reaction to the Pne shot as her gland under her arm swelled to the size of a baseball and she had welps around the site.   She took benadryl for 2 days and it is gone now but wanted you to know

## 2020-11-30 ENCOUNTER — VIRTUAL VISIT (OUTPATIENT)
Dept: ORTHOPEDIC SURGERY | Age: 65
End: 2020-11-30
Payer: MEDICARE

## 2020-11-30 DIAGNOSIS — Z98.890 S/P LUMBAR LAMINECTOMY: Primary | ICD-10-CM

## 2020-11-30 DIAGNOSIS — G89.29 CHRONIC PAIN OF LEFT KNEE: ICD-10-CM

## 2020-11-30 DIAGNOSIS — M25.562 CHRONIC PAIN OF LEFT KNEE: ICD-10-CM

## 2020-11-30 PROCEDURE — 99024 POSTOP FOLLOW-UP VISIT: CPT | Performed by: NURSE PRACTITIONER

## 2020-11-30 NOTE — PROGRESS NOTES
History of Present Illness:    Consent: Jimbo Dougherty, who was seen by synchronous (real-time) audio-video technology, and/or her healthcare decision maker, is aware that this patient-initiated, Telehealth encounter on 11/30/2020 is a billable service, with coverage as determined by her insurance carrier. She is aware that she may receive a bill and has provided verbal consent to proceed: Yes. The provider was located at [facility]  and the patient was located at their their home. No one else participated in the visit with the patient. The platform used was telephone call. Patient was evaluated for her left redo L5 laminectomy done on October 12, 2020. We tried to do it with Doxy. me but her computer would not allow her to do it so we converted to a telephone call visit. She states she is doing very well and that her back and left leg pain have resolved. Her main pain now is left knee pain for which she needs a total knee replacement. She sees Dr. Sandeep Almazan has an appointment with him on January 4, 2021. She uses Voltaren for her knee pain which she feels like probably helps her back some also. She does do a home exercise program.  She does not work. She is a non-smoker. She denies fever bowel bladder dysfunction. Physical Exam: Patient is a 42-year-old female who is alert and oriented. She spoke with fluency. She did not appear to be short of breath or having any acute issues. Vital Signs: (As obtained by patient/caregiver at home)  There were no vitals taken for this visit. Assessment & Plan: This is a patient 6 weeks out from her left redo L5 laminectomy. She is very happy with the outcome of her surgery. Her first left laminectomy was in 2011. She then had a right laminectomy in 2019. We went over activity level and the importance of protecting her back. We will see her back as needed. Diagnoses and all orders for this visit:    1. S/P lumbar laminectomy    2. Chronic pain of left knee          Pain Scale: /10         We discussed the expected course, resolution and complications of the diagnosis(es) in detail. Medication risks, benefits, costs, interactions, and alternatives were discussed as indicated. I advised her to contact the office if her condition worsens, changes or fails to improve as anticipated. For emergencies or worsening neurological symptoms the patient was instructed to call 911. She expressed understanding with the diagnosis(es) and plan. Follow-up and Dispositions    · Return if symptoms worsen or fail to improve. 1    Nanette Roland is a 72 y.o. female being evaluated by a video visit encounter for concerns as above. A caregiver was present when appropriate. Due to this being a TeleHealth encounter (During [facility]-86 public health emergency), evaluation of the following organ systems was limited: Vitals/Constitutional/EENT/Resp/CV/GI//MS/Neuro/Skin/Heme-Lymph-Imm. Pursuant to the emergency declaration under the [address] [facility] [place], [address]5 waiver authority and the [employer] Act, this Virtual  Visit was conducted, with patient's (and/or legal guardian's) consent, to reduce the patient's risk of exposure to COVID-19 and provide necessary medical care. CPT Codes 85999-14241 for Established Patients may apply to this Telehealth Visit  Time-based coding, delete if not needed: I spent at least 15 minutes with this established patient, and >50% of the time was spent counseling and/or coordinating care regarding her post op recovery  Start time:  9:45 am and Stop time:  10:04 am.        Due to this being a TeleHealth evaluation, many elements of the physical examination are unable to be assessed.        Pursuant to the emergency declaration under the [address] [place], 1135 waiver authority and the Coronavirus Preparedness and Response Supplemental Appropriations Act, this Virtual  Visit was conducted, with patient's consent, to reduce the patient's risk of exposure to COVID-19 and provide continuity of care for an established patient. Services were provided through a video synchronous discussion virtually to substitute for in-person clinic visit.     Hanh Catherine, NP

## 2021-03-01 RX ORDER — LISINOPRIL 40 MG/1
TABLET ORAL
Qty: 90 TAB | Refills: 3 | Status: SHIPPED | OUTPATIENT
Start: 2021-03-01 | End: 2022-02-28

## 2021-03-09 ENCOUNTER — TRANSCRIBE ORDER (OUTPATIENT)
Dept: REGISTRATION | Age: 66
End: 2021-03-09

## 2021-03-09 ENCOUNTER — HOSPITAL ENCOUNTER (OUTPATIENT)
Dept: PREADMISSION TESTING | Age: 66
Discharge: HOME | End: 2021-03-09
Payer: MEDICARE

## 2021-03-09 DIAGNOSIS — M17.12 OSTEOARTHRITIS OF LEFT KNEE: Primary | ICD-10-CM

## 2021-03-09 DIAGNOSIS — M17.12 OSTEOARTHRITIS OF LEFT KNEE: ICD-10-CM

## 2021-03-09 LAB
ALBUMIN SERPL-MCNC: 3.6 G/DL (ref 3.4–5)
ALBUMIN/GLOB SERPL: 1.1 {RATIO} (ref 0.8–1.7)
ALP SERPL-CCNC: 106 U/L (ref 45–117)
ALT SERPL-CCNC: 41 U/L (ref 13–56)
ANION GAP SERPL CALC-SCNC: 4 MMOL/L (ref 3–18)
APPEARANCE UR: CLEAR
APTT PPP: 35.1 SEC (ref 23–36.4)
AST SERPL-CCNC: 24 U/L (ref 10–38)
BACTERIA URNS QL MICRO: ABNORMAL /HPF
BASOPHILS # BLD: 0 K/UL (ref 0–0.1)
BASOPHILS NFR BLD: 0 % (ref 0–2)
BILIRUB SERPL-MCNC: 0.4 MG/DL (ref 0.2–1)
BILIRUB UR QL: NEGATIVE
BUN SERPL-MCNC: 11 MG/DL (ref 7–18)
BUN/CREAT SERPL: 21 (ref 12–20)
CALCIUM SERPL-MCNC: 9 MG/DL (ref 8.5–10.1)
CHLORIDE SERPL-SCNC: 106 MMOL/L (ref 100–111)
CO2 SERPL-SCNC: 31 MMOL/L (ref 21–32)
COLOR UR: YELLOW
CREAT SERPL-MCNC: 0.52 MG/DL (ref 0.6–1.3)
DIFFERENTIAL METHOD BLD: NORMAL
EOSINOPHIL # BLD: 0.2 K/UL (ref 0–0.4)
EOSINOPHIL NFR BLD: 3 % (ref 0–5)
EPITH CASTS URNS QL MICRO: NEGATIVE /LPF (ref 0–5)
ERYTHROCYTE [DISTWIDTH] IN BLOOD BY AUTOMATED COUNT: 14.2 % (ref 11.6–14.5)
ERYTHROCYTE [SEDIMENTATION RATE] IN BLOOD: 9 MM/HR (ref 0–30)
GLOBULIN SER CALC-MCNC: 3.4 G/DL (ref 2–4)
GLUCOSE SERPL-MCNC: 87 MG/DL (ref 74–99)
GLUCOSE UR STRIP.AUTO-MCNC: NEGATIVE MG/DL
HBA1C MFR BLD: 5.7 % (ref 4.2–5.6)
HCT VFR BLD AUTO: 41.2 % (ref 35–45)
HGB BLD-MCNC: 13.1 G/DL (ref 12–16)
HGB UR QL STRIP: NEGATIVE
INR PPP: 1.1 (ref 0.8–1.2)
KETONES UR QL STRIP.AUTO: NEGATIVE MG/DL
LEUKOCYTE ESTERASE UR QL STRIP.AUTO: NEGATIVE
LYMPHOCYTES # BLD: 1.9 K/UL (ref 0.9–3.6)
LYMPHOCYTES NFR BLD: 30 % (ref 21–52)
MCH RBC QN AUTO: 28.2 PG (ref 24–34)
MCHC RBC AUTO-ENTMCNC: 31.8 G/DL (ref 31–37)
MCV RBC AUTO: 88.8 FL (ref 74–97)
MONOCYTES # BLD: 0.6 K/UL (ref 0.05–1.2)
MONOCYTES NFR BLD: 9 % (ref 3–10)
NEUTS SEG # BLD: 3.6 K/UL (ref 1.8–8)
NEUTS SEG NFR BLD: 58 % (ref 40–73)
NITRITE UR QL STRIP.AUTO: NEGATIVE
PH UR STRIP: 7.5 [PH] (ref 5–8)
PLATELET # BLD AUTO: 271 K/UL (ref 135–420)
PMV BLD AUTO: 10.2 FL (ref 9.2–11.8)
POTASSIUM SERPL-SCNC: 4.2 MMOL/L (ref 3.5–5.5)
PROT SERPL-MCNC: 7 G/DL (ref 6.4–8.2)
PROT UR STRIP-MCNC: NEGATIVE MG/DL
PROTHROMBIN TIME: 14 SEC (ref 11.5–15.2)
RBC # BLD AUTO: 4.64 M/UL (ref 4.2–5.3)
RBC #/AREA URNS HPF: ABNORMAL /HPF (ref 0–5)
SODIUM SERPL-SCNC: 141 MMOL/L (ref 136–145)
SP GR UR REFRACTOMETRY: 1.01 (ref 1–1.03)
UROBILINOGEN UR QL STRIP.AUTO: 0.2 EU/DL (ref 0.2–1)
WBC # BLD AUTO: 6.2 K/UL (ref 4.6–13.2)
WBC URNS QL MICRO: NEGATIVE /HPF (ref 0–5)

## 2021-03-09 PROCEDURE — 85610 PROTHROMBIN TIME: CPT

## 2021-03-09 PROCEDURE — 80053 COMPREHEN METABOLIC PANEL: CPT

## 2021-03-09 PROCEDURE — 81001 URINALYSIS AUTO W/SCOPE: CPT

## 2021-03-09 PROCEDURE — 85025 COMPLETE CBC W/AUTO DIFF WBC: CPT

## 2021-03-09 PROCEDURE — 93005 ELECTROCARDIOGRAM TRACING: CPT

## 2021-03-09 PROCEDURE — 85652 RBC SED RATE AUTOMATED: CPT

## 2021-03-09 PROCEDURE — 36415 COLL VENOUS BLD VENIPUNCTURE: CPT

## 2021-03-09 PROCEDURE — 83036 HEMOGLOBIN GLYCOSYLATED A1C: CPT

## 2021-03-09 PROCEDURE — 85730 THROMBOPLASTIN TIME PARTIAL: CPT

## 2021-03-09 PROCEDURE — 87086 URINE CULTURE/COLONY COUNT: CPT

## 2021-03-10 LAB
ATRIAL RATE: 64 BPM
BACTERIA SPEC CULT: NORMAL
BACTERIA SPEC CULT: NORMAL
CALCULATED P AXIS, ECG09: 48 DEGREES
CALCULATED R AXIS, ECG10: 45 DEGREES
CALCULATED T AXIS, ECG11: 60 DEGREES
DIAGNOSIS, 93000: NORMAL
P-R INTERVAL, ECG05: 170 MS
Q-T INTERVAL, ECG07: 402 MS
QRS DURATION, ECG06: 78 MS
QTC CALCULATION (BEZET), ECG08: 414 MS
SERVICE CMNT-IMP: NORMAL
VENTRICULAR RATE, ECG03: 64 BPM

## 2021-03-11 DIAGNOSIS — R03.0 ELEVATED BP WITHOUT DIAGNOSIS OF HYPERTENSION: ICD-10-CM

## 2021-03-11 DIAGNOSIS — G43.909 MIGRAINE WITHOUT STATUS MIGRAINOSUS, NOT INTRACTABLE, UNSPECIFIED MIGRAINE TYPE: ICD-10-CM

## 2021-03-11 LAB
BACTERIA SPEC CULT: NORMAL
CC UR VC: NORMAL
SERVICE CMNT-IMP: NORMAL

## 2021-03-11 RX ORDER — ATENOLOL 50 MG/1
TABLET ORAL
Qty: 90 TAB | Refills: 3 | Status: SHIPPED | OUTPATIENT
Start: 2021-03-11 | End: 2022-01-17 | Stop reason: ALTCHOICE

## 2021-03-14 NOTE — NURSE NAVIGATOR
[facility] watched the [place] and received a preoperative education booklet in anticipation of joint replacement surgery.      Orthopaedic Nurse Navigator

## 2021-03-18 ENCOUNTER — OFFICE VISIT (OUTPATIENT)
Dept: FAMILY MEDICINE CLINIC | Age: 66
End: 2021-03-18
Payer: MEDICARE

## 2021-03-18 ENCOUNTER — TELEPHONE (OUTPATIENT)
Dept: FAMILY MEDICINE CLINIC | Age: 66
End: 2021-03-18

## 2021-03-18 VITALS
DIASTOLIC BLOOD PRESSURE: 76 MMHG | WEIGHT: 176 LBS | SYSTOLIC BLOOD PRESSURE: 139 MMHG | BODY MASS INDEX: 28.28 KG/M2 | TEMPERATURE: 97.2 F | HEART RATE: 66 BPM | RESPIRATION RATE: 24 BRPM | HEIGHT: 66 IN | OXYGEN SATURATION: 99 %

## 2021-03-18 DIAGNOSIS — I10 ESSENTIAL HYPERTENSION: ICD-10-CM

## 2021-03-18 DIAGNOSIS — R01.1 CARDIAC MURMUR: ICD-10-CM

## 2021-03-18 DIAGNOSIS — G89.29 CHRONIC PAIN OF LEFT KNEE: ICD-10-CM

## 2021-03-18 DIAGNOSIS — R73.9 HYPERGLYCEMIA: Primary | ICD-10-CM

## 2021-03-18 DIAGNOSIS — G89.29 CHRONIC MIDLINE LOW BACK PAIN WITH SCIATICA, SCIATICA LATERALITY UNSPECIFIED: ICD-10-CM

## 2021-03-18 DIAGNOSIS — E78.5 DYSLIPIDEMIA: ICD-10-CM

## 2021-03-18 DIAGNOSIS — Z01.818 PREOP EXAMINATION: Primary | ICD-10-CM

## 2021-03-18 DIAGNOSIS — M25.562 CHRONIC PAIN OF LEFT KNEE: ICD-10-CM

## 2021-03-18 DIAGNOSIS — L71.9 ROSACEA: ICD-10-CM

## 2021-03-18 DIAGNOSIS — M54.40 CHRONIC MIDLINE LOW BACK PAIN WITH SCIATICA, SCIATICA LATERALITY UNSPECIFIED: ICD-10-CM

## 2021-03-18 PROCEDURE — G8754 DIAS BP LESS 90: HCPCS | Performed by: FAMILY MEDICINE

## 2021-03-18 PROCEDURE — G8399 PT W/DXA RESULTS DOCUMENT: HCPCS | Performed by: FAMILY MEDICINE

## 2021-03-18 PROCEDURE — 3017F COLORECTAL CA SCREEN DOC REV: CPT | Performed by: FAMILY MEDICINE

## 2021-03-18 PROCEDURE — G8419 CALC BMI OUT NRM PARAM NOF/U: HCPCS | Performed by: FAMILY MEDICINE

## 2021-03-18 PROCEDURE — G9899 SCRN MAM PERF RSLTS DOC: HCPCS | Performed by: FAMILY MEDICINE

## 2021-03-18 PROCEDURE — G8510 SCR DEP NEG, NO PLAN REQD: HCPCS | Performed by: FAMILY MEDICINE

## 2021-03-18 PROCEDURE — G8427 DOCREV CUR MEDS BY ELIG CLIN: HCPCS | Performed by: FAMILY MEDICINE

## 2021-03-18 PROCEDURE — G8752 SYS BP LESS 140: HCPCS | Performed by: FAMILY MEDICINE

## 2021-03-18 PROCEDURE — 99214 OFFICE O/P EST MOD 30 MIN: CPT | Performed by: FAMILY MEDICINE

## 2021-03-18 PROCEDURE — 1090F PRES/ABSN URINE INCON ASSESS: CPT | Performed by: FAMILY MEDICINE

## 2021-03-18 PROCEDURE — 1101F PT FALLS ASSESS-DOCD LE1/YR: CPT | Performed by: FAMILY MEDICINE

## 2021-03-18 PROCEDURE — G8536 NO DOC ELDER MAL SCRN: HCPCS | Performed by: FAMILY MEDICINE

## 2021-03-18 NOTE — TELEPHONE ENCOUNTER
Pt stated she discussed with Dr Hawa Cherry that she wanted to get her labs recheck because she didn't feel that her last lab results were in normal range. Pt has been scheduled for 06/24 for labs.  Please be advised

## 2021-03-18 NOTE — PROGRESS NOTES
NIHARIKA  Jin Esposito comes in for preop evaluation. Referring physician: Dr. Paris George  Date of procedure: 04/05/2021  Planned procedure: left total knee arthroplasty  Indication for procedure: left knee pain and DJD. Patient has left knee pain and osteoarthritis. She has been seen by the orthopedist in the past.  Conservative measures have been tried but pain persists. She now has trouble walking and this limits her activities of daily living. She is plans to have left total knee arthroplasty. She comes in for preop evaluation. She has had her labs done. EKG is stable. CBC and the BMP are stable. Urine culture is stable. She is medically stabilized and cleared to proceed with planned procedure. Hypertension: Patient has hypertension. She is on Tenormin and lisinopril. Denies headache, changes in vision or focal weakness. We will continue with current treatment plan. Cardiac: Patient has a history of palpitations in the past and had normal.  Currently she has been stable. She is on atenolol and has been followed up by the cardiologist.  She has been cleared from a cardiac standpoint to proceed with planned procedure. Rosacea: Patient has rosacea. She is on doxycycline, clindamycin T and has been followed up by the dermatologist.  Stable on the medication. Dyslipidemia: Patient has dyslipidemia. She is on rosuvastatin. Stable on the medication. Continue current treatment plan. Gastrointestinal: Patient has a history of bloating and abdominal discomfort. Currently she takes a probiotic and this has helped with symptoms. She will hold off on the probiotic 1 week prior to surgery. Back pain: Patient has a history of back pain. Was seen by the neurosurgeon and had laminectomy done. She has been stable. She is no longer on NSAIDs. She only takes Tylenol as needed.     Past Medical History  Past Medical History:   Diagnosis Date    Arthritis     Cardiac murmur     Dyslipidemia     Heart murmur     Hx of migraines     Hypertension     Nausea & vomiting     Rosacea        Surgical History  Past Surgical History:   Procedure Laterality Date    FOOT/TOES SURGERY PROC UNLISTED      x6    HX BACK SURGERY      HX HYSTERECTOMY      due to heavy bleeding    HX KNEE ARTHROSCOPY      HX LUMBAR LAMINECTOMY  01/2011    L5/S1 [place]    HX ORTHOPAEDIC      Lumbar Laminectomy in 2011 [place]    HX ORTHOPAEDIC      Lumbar cyst removal L4-5        Medications  Current Outpatient Medications   Medication Sig Dispense Refill    atenoloL (TENORMIN) 50 mg tablet TAKE 1 TABLET DAILY 90 Tab 3    lisinopriL (PRINIVIL, ZESTRIL) 40 mg tablet TAKE 1 TABLET DAILY 90 Tab 3    doxycycline (ADOXA) 50 mg tablet Take 1 Tab by mouth daily. 90 Tab 1    rosuvastatin (CRESTOR) 10 mg tablet TAKE 1 TABLET NIGHTLY 90 Tab 3    clindamycin (CLEOCIN T) 1 % lotion Apply  to affected area daily. use thin film on affected area 1 Bottle 3    rizatriptan (MAXALT-MLT) 10 mg disintegrating tablet PLACE 1 TABLET ON TONGUE ONCE AS NEEDED FOR MIGRAINE 30 Tab 0    potassium 99 mg tablet Take 99 mg by mouth every seven (7) days.  vitamin E (AQUA GEMS) 400 unit capsule Take 400 Units by mouth daily.  ascorbic acid, vitamin C, (VITAMIN C) 500 mg tablet Take 1,000 mg by mouth daily. Takes 2000 daily      calcium citrate-vitamin D3 (CITRACAL + D) tablet Take  by mouth daily.  multivitamin (ONE A DAY) tablet Take 1 Tab by mouth daily.  cholecalciferol, VITAMIN D3, (VITAMIN D3) 5,000 unit tab tablet Take 1,000 Units by mouth daily.  B.infantis-B.ani-B.long-B.bifi (PROBIOTIC 4X) 10-15 mg TbEC Take  by mouth daily.  cetirizine (ZYRTEC) 10 mg tablet TAKE 1 TABLET DAILY AS NEEDED FOR ALLERGIES 90 Tab 3    fish oil-omega-3 fatty acids (Fish Oil) 340-1,000 mg capsule Take 1 Cap by mouth daily.       fluticasone propionate (FLONASE) 50 mcg/actuation nasal spray USE 2 SPRAYS IN EACH NOSTRIL DAILY (Patient taking differently: daily as needed.) 32 g 0       Allergies  Allergies   Allergen Reactions    Penicillin G Hives    Tylenol [Acetaminophen] Other (comments)     Causes headache, denies recently       Family History  Family History   Problem Relation Age of Onset    Heart Attack Mother     Stroke Mother     Other Mother         CHF    Alcohol abuse Father     Other Father         CHF    Liver Disease Father        Social History  Social History     Socioeconomic History    Marital status:      Spouse name: Not on file    Number of children: Not on file    Years of education: Not on file    Highest education level: Not on file   Occupational History    Not on file   Social Needs    Financial resource strain: Not on file    Food insecurity     Worry: Not on file     Inability: Not on file    Transportation needs     Medical: Not on file     Non-medical: Not on file   Tobacco Use    Smoking status: Never Smoker    Smokeless tobacco: Never Used   Substance and Sexual Activity    Alcohol use: No    Drug use: No    Sexual activity: Yes     Partners: Male   Lifestyle    Physical activity     Days per week: Not on file     Minutes per session: Not on file    Stress: Not on file   Relationships    Social connections     Talks on phone: Not on file     Gets together: Not on file     Attends Congregation service: Not on file     Active member of club or organization: Not on file     Attends meetings of clubs or organizations: Not on file     Relationship status: Not on file    Intimate partner violence     Fear of current or ex partner: Not on file     Emotionally abused: Not on file     Physically abused: Not on file     Forced sexual activity: Not on file   Other Topics Concern     Service No    Blood Transfusions Yes    Caffeine Concern No    Occupational Exposure No    Hobby Hazards No    Sleep Concern No    Stress Concern No    Weight Concern No    Special Diet No    Back Care No    Exercise Yes    Bike Helmet No    Seat Belt Yes    Self-Exams Yes   Social History Narrative    Not on file       Review of Systems  Review of Systems - Review of all systems is negative except as noted above in the HPI. Vital Signs  Visit Vitals  /76 (BP 1 Location: Left upper arm, BP Patient Position: Sitting, BP Cuff Size: Adult long)   Pulse 66   Temp 97.2 °F (36.2 °C) (Temporal)   Resp 24   Ht 5' 6\" (1.676 m)   Wt 176 lb (79.8 kg)   SpO2 99%   BMI 28.41 kg/m²         Physical Exam  Physical Examination: General appearance - alert, well appearing, and in no distress, oriented to person, place, and time, acyanotic, in no respiratory distress and well hydrated  Mental status - alert, oriented to person, place, and time, affect appropriate to mood  Mouth - mucous membranes moist, pharynx normal without lesions  Neck - supple, no significant adenopathy  Lymphatics - no palpable lymphadenopathy  Chest - clear to auscultation, no wheezes, rales or rhonchi, symmetric air entry, no tachypnea, retractions or cyanosis  Heart - S1 and S2 normal  Abdomen - no rebound tenderness noted  Back exam - limited range of motion  Neurological - normal muscle tone, no tremors, strength 5/5  Musculoskeletal -discomfort to palpation around the left knee margins with limitation in flexion and extension due to pain.   Extremities - intact peripheral pulses      Results  Results for orders placed or performed during the hospital encounter of 03/09/21   CULTURE, URINE    Specimen: Urine   Result Value Ref Range    Special Requests: NO SPECIAL REQUESTS      [place] Count [id number]  COLONIES/mL        Culture result: MIXED UROGENITAL ELANA ISOLATED     CULTURE, MRSA    Specimen: Nasal   Result Value Ref Range    Special Requests: NO SPECIAL REQUESTS      Culture result: MRSA NOT PRESENT      Culture result:        Screening of patient nares for MRSA is for surveillance purposes and, if positive, to facilitate isolation considerations in high risk settings. It is not intended for automatic decolonization interventions per se as regimens are not sufficiently effective to warrant routine use. CBC WITH AUTOMATED DIFF   Result Value Ref Range    WBC 6.2 4.6 - 13.2 K/uL    RBC 4.64 4.20 - 5.30 M/uL    HGB 13.1 12.0 - 16.0 g/dL    HCT 41.2 35.0 - 45.0 %    MCV 88.8 74.0 - 97.0 FL    MCH 28.2 24.0 - 34.0 PG    MCHC 31.8 31.0 - 37.0 g/dL    RDW 14.2 11.6 - 14.5 %    PLATELET 084 178 - 776 K/uL    MPV 10.2 9.2 - 11.8 FL    NEUTROPHILS 58 40 - 73 %    LYMPHOCYTES 30 21 - 52 %    MONOCYTES 9 3 - 10 %    EOSINOPHILS 3 0 - 5 %    BASOPHILS 0 0 - 2 %    ABS. NEUTROPHILS 3.6 1.8 - 8.0 K/UL    ABS. LYMPHOCYTES 1.9 0.9 - 3.6 K/UL    ABS. MONOCYTES 0.6 0.05 - 1.2 K/UL    ABS. EOSINOPHILS 0.2 0.0 - 0.4 K/UL    ABS. BASOPHILS 0.0 0.0 - 0.1 K/UL    DF AUTOMATED     HEMOGLOBIN A1C W/O EAG   Result Value Ref Range    Hemoglobin A1c 5.7 (H) 4.2 - 5.6 %   METABOLIC PANEL, COMPREHENSIVE   Result Value Ref Range    Sodium 141 136 - 145 mmol/L    Potassium 4.2 3.5 - 5.5 mmol/L    Chloride 106 100 - 111 mmol/L    CO2 31 21 - 32 mmol/L    Anion gap 4 3.0 - 18 mmol/L    Glucose 87 74 - 99 mg/dL    BUN 11 7.0 - 18 MG/DL    Creatinine 0.52 (L) 0.6 - 1.3 MG/DL    BUN/Creatinine ratio 21 (H) 12 - 20      GFR est AA >60 >60 ml/min/1.73m2    GFR est non-AA >60 >60 ml/min/1.73m2    Calcium 9.0 8.5 - 10.1 MG/DL    Bilirubin, total 0.4 0.2 - 1.0 MG/DL    ALT (SGPT) 41 13 - 56 U/L    AST (SGOT) 24 10 - 38 U/L    Alk.  phosphatase 106 45 - 117 U/L    Protein, total 7.0 6.4 - 8.2 g/dL    Albumin 3.6 3.4 - 5.0 g/dL    Globulin 3.4 2.0 - 4.0 g/dL    A-G Ratio 1.1 0.8 - 1.7     URINALYSIS W/MICROSCOPIC   Result Value Ref Range    Color YELLOW      Appearance CLEAR      Specific gravity 1.009 1.005 - 1.030      pH (UA) 7.5 5.0 - 8.0      Protein Negative NEG mg/dL    Glucose Negative NEG mg/dL    Ketone Negative NEG mg/dL    Bilirubin Negative NEG      Blood Negative NEG      Urobilinogen 0.2 0.2 - 1.0 EU/dL    Nitrites Negative NEG      Leukocyte Esterase Negative NEG      WBC Negative 0 - 5 /hpf    RBC 4 to 10 0 - 5 /hpf    Epithelial cells Negative 0 - 5 /lpf    Bacteria FEW (A) NEG /hpf   PROTHROMBIN TIME + INR   Result Value Ref Range    Prothrombin time 14.0 11.5 - 15.2 sec    INR 1.1 0.8 - 1.2     PTT   Result Value Ref Range    aPTT 35.1 23.0 - 36.4 SEC   SED RATE (ESR)   Result Value Ref Range    Sed rate, automated 9 0 - 30 mm/hr   EKG, 12 LEAD, INITIAL   Result Value Ref Range    Ventricular Rate 64 BPM    Atrial Rate 64 BPM    P-R Interval 170 ms    QRS Duration 78 ms    Q-T Interval 402 ms    QTC Calculation (Bezet) 414 ms    Calculated P Axis 48 degrees    Calculated R Axis 45 degrees    Calculated T Axis 60 degrees    Diagnosis       Normal sinus rhythm  Normal ECG  Confirmed by Rahul Andrea MD, Ruddy Garcia ([id number]) on 3/10/2021 5:56:46 PM         ASSESSMENT and PLAN    ICD-10-CM ICD-9-CM    1. Preop examination  Z01.818 V72.84    2. Chronic pain of left knee  M25.562 719.46     G89.29 338.29    3. Essential hypertension  I10 401.9    4. Dyslipidemia  E78.5 272.4    5. Rosacea  L71.9 695.3    6. Chronic midline low back pain with sciatica, sciatica laterality unspecified  M54.40 724.2     G89.29 724.3      338.29    7. Cardiac murmur  R01.1 785.2      lab results and schedule of future lab studies reviewed with patient  reviewed diet, exercise and weight control  cardiovascular risk and specific lipid/LDL goals reviewed  reviewed medications and side effects in detail      I have discussed the diagnosis with the patient and the intended plan of care as seen in the above orders. The patient has received an after-visit summary and questions were answered concerning future plans. I have discussed medication, side effects, and warnings with the patient in detail. The patient verbalized understanding and is in agreement with the plan of care.  The patient will contact the office with any additional concerns. Roxana Aguilera MD    PLEASE NOTE:   This document has been produced using voice recognition software.  Unrecognized errors in transcription may be present

## 2021-03-18 NOTE — PROGRESS NOTES
Tonja Spurling presents today for   Chief Complaint   Patient presents with    Pre-op Exam     knee replacement       Is someone accompanying this pt? no    Is the patient using any DME equipment during OV? no    Depression Screening:  3 most recent PHQ Screens 3/18/2021   PHQ Not Done -   Little interest or pleasure in doing things Not at all   Feeling down, depressed, irritable, or hopeless Not at all   Total Score PHQ 2 0   Trouble falling or staying asleep, or sleeping too much Not at all   Feeling tired or having little energy Not at all   Poor appetite, weight loss, or overeating Not at all   Feeling bad about yourself - or that you are a failure or have let yourself or your family down Not at all   Trouble concentrating on things such as school, work, reading, or watching TV Not at all   Moving or speaking so slowly that other people could have noticed; or the opposite being so fidgety that others notice Not at all   Thoughts of being better off dead, or hurting yourself in some way Not at all   PHQ 9 Score 0   How difficult have these problems made it for you to do your work, take care of your home and get along with others Not difficult at all       Learning Assessment:  No flowsheet data found. Abuse Screening:  Abuse Screening Questionnaire 8/13/2020   Do you ever feel afraid of your partner? N   Are you in a relationship with someone who physically or mentally threatens you? N   Is it safe for you to go home? Y       Fall Risk  Fall Risk Assessment, last 12 mths 3/18/2021   Able to walk? Yes   Fall in past 12 months? 0   Do you feel unsteady? 0   Are you worried about falling 0       Health Maintenance reviewed and discussed and ordered per Provider. There are no preventive care reminders to display for this patient. .      Coordination of Care:  1. Have you been to the ER, urgent care clinic since your last visit? Hospitalized since your last visit? no    2.  Have you seen or consulted any other health care providers outside of the [address] [place] [address] since your last visit? Include any pap smears or colon screening.  no      Last  Checked no  Last UDS Checked no  Last Pain contract signed: no

## 2021-03-18 NOTE — TELEPHONE ENCOUNTER
The only out of range lab results was high HbA1c. I will place request for recheck for this. Let her know that it takes about 8 to 12 weeks before we see any major changes.   Sommer Anaya MD

## 2021-03-19 NOTE — TELEPHONE ENCOUNTER
Spoke with patient. Patient was informed that her HBA1C was the only result out of range and she has an appointment scheduled in June.

## 2021-03-30 ENCOUNTER — HOSPITAL ENCOUNTER (OUTPATIENT)
Dept: PREADMISSION TESTING | Age: 66
Discharge: HOME | End: 2021-03-30
Payer: MEDICARE

## 2021-03-30 PROBLEM — M17.12 PRIMARY LOCALIZED OSTEOARTHRITIS OF LEFT KNEE: Chronic | Status: ACTIVE | Noted: 2021-03-30

## 2021-03-30 PROCEDURE — U0003 INFECTIOUS AGENT DETECTION BY NUCLEIC ACID (DNA OR RNA); SEVERE ACUTE RESPIRATORY SYNDROME CORONAVIRUS 2 (SARS-COV-2) (CORONAVIRUS DISEASE [COVID-19]), AMPLIFIED PROBE TECHNIQUE, MAKING USE OF HIGH THROUGHPUT TECHNOLOGIES AS DESCRIBED BY CMS-2020-01-R: HCPCS

## 2021-03-30 RX ORDER — ATENOLOL 50 MG/1
50 TABLET ORAL DAILY
Status: CANCELLED | OUTPATIENT
Start: 2021-03-31

## 2021-03-30 RX ORDER — ROSUVASTATIN CALCIUM 10 MG/1
10 TABLET, COATED ORAL DAILY
Status: CANCELLED | OUTPATIENT
Start: 2021-03-31

## 2021-03-30 RX ORDER — FLUTICASONE PROPIONATE 50 MCG
2 SPRAY, SUSPENSION (ML) NASAL
Status: CANCELLED | OUTPATIENT
Start: 2021-03-30

## 2021-03-30 NOTE — H&P
[address] [facility] [address] Medicine  History and Physical Exam    Patient: Tito Taylor MRN: [id number]  SSN: [id number]    YOB: 1955  Age: 77 y.o. Sex: female      Subjective:      Chief Complaint: Left knee pain    History of Present Illness:  Patient complains of pain to the left knee and difficulty ambulating, which has progressively worsened over several months. X-rays showed osteoarthritis of the joint. The patient's pain has persisted and progressed despite conservative treatments and therapies. The patient has been previously treated with nsaids. The patient has at this time opted for surgical intervention. Past Medical History:   Diagnosis Date    Arthritis     Cardiac murmur     Dyslipidemia     Heart murmur     Hx of migraines     Hypertension 2008    Nausea & vomiting     severe    Primary localized osteoarthritis of left knee 3/30/2021    Rosacea      Past Surgical History:   Procedure Laterality Date    FOOT/TOES SURGERY PROC UNLISTED      x6    HX HEENT      jaw surgery    HX HEENT      dental surgery    HX HYSTERECTOMY      due to heavy bleeding    HX KNEE ARTHROSCOPY Left     HX LUMBAR DISKECTOMY  2019    HX LUMBAR LAMINECTOMY  01/2011    L5/S1 [place]    HX LUMBAR LAMINECTOMY      left L5   redo from 1st surgery    HX OOPHORECTOMY      HX ORTHOPAEDIC Left     2nd toes joint removal    HX ORTHOPAEDIC Right 2001    repeat bunion    HX ORTHOPAEDIC Bilateral     bunions    HX ORTHOPAEDIC Left 2011    foot fusion from bunion surgery    HX TONSILLECTOMY       Social History     Occupational History    Not on file   Tobacco Use    Smoking status: Never Smoker    Smokeless tobacco: Never Used   Substance and Sexual Activity    Alcohol use: No    Drug use: Never    Sexual activity: Yes     Partners: Male     Prior to Admission medications    Medication Sig Start Date End Date Taking?  Authorizing Provider   Cielo Chavez 3   Pro pre post   Biotic    Provider, Historical   atenoloL (TENORMIN) 50 mg tablet TAKE 1 TABLET DAILY 3/11/21   Karlee Flores MD   lisinopriL (PRINIVIL, ZESTRIL) 40 mg tablet TAKE 1 TABLET DAILY 3/1/21   Karlee Flores MD   doxycycline (ADOXA) 50 mg tablet Take 1 Tab by mouth daily. 11/11/20   Karlee Flores MD   cetirizine (ZYRTEC) 10 mg tablet TAKE 1 TABLET DAILY AS NEEDED FOR ALLERGIES 10/13/20   Karlee Flores MD   rosuvastatin (CRESTOR) 10 mg tablet TAKE 1 TABLET NIGHTLY  Patient taking differently: Take 10 mg by mouth daily. 6/8/20   Karlee Flores MD   clindamycin (CLEOCIN T) 1 % lotion Apply  to affected area daily. use thin film on affected area 4/28/20   Karlee Flores MD   fluticasone propionate (FLONASE) 50 mcg/actuation nasal spray USE 2 SPRAYS IN EACH NOSTRIL DAILY  Patient taking differently: daily as needed. 7/8/19   Karlee Flores MD   rizatriptan (MAXALT-MLT) 10 mg disintegrating tablet PLACE 1 TABLET ON TONGUE ONCE AS NEEDED FOR MIGRAINE 8/30/18   Karlee Flores MD   potassium 99 mg tablet Take 99 mg by mouth every seven (7) days. Provider, Historical   vitamin E (AQUA GEMS) 400 unit capsule Take 400 Units by mouth daily. Provider, Historical   ascorbic acid, vitamin C, (VITAMIN C) 500 mg tablet Take 1,000 mg by mouth daily. Takes 2000 daily    Provider, Historical   calcium citrate-vitamin D3 (CITRACAL + D) tablet Take  by mouth daily. Provider, Historical   multivitamin (ONE A DAY) tablet Take 1 Tab by mouth daily. Provider, Historical   cholecalciferol, VITAMIN D3, (VITAMIN D3) 5,000 unit tab tablet Take 1,000 Units by mouth daily. Provider, Historical   B.infantis-B.ani-B.long-B.bifi (PROBIOTIC 4X) 10-15 mg TbEC Take  by mouth daily. Provider, Historical       Allergies:    Allergies   Allergen Reactions    Penicillin G Hives    Tylenol [Acetaminophen] Other (comments)      denies recently        Review of Systems:  A comprehensive review of systems was negative except for that written in the History of Present Illness. Objective:       Physical Exam:  HEENT: Normocephalic, atraumatic  Lungs:  Clear to auscultation  Heart:   Regular rate and rhythm  Abdomen: Soft  Extremities:  Pain with range of motion of the left knee. Active extension decreased, active flexion decreased   Tenderness generalized. No deformity. No effusion. Positive crepitus. Antalgic gait. Assessment:      Arthritis of the left knee. Plan:       Proceed with scheduled LEFT TOTAL KNEE ARTHROPLASTY. The various methods of treatment have been discussed with the patient and family. After consideration of risks, benefits, and other options for treatment, the patient has consented to surgical interventions. Questions were answered and preoperative teaching was done by Dr Suzy Gee.      Signed By: MALICK Koroma     March 30, 2021

## 2021-03-30 NOTE — DISCHARGE INSTRUCTIONS
[address] [place] [address] Sports Medicine   Patient Discharge Instructions    Nanette Valerio / [id number] : 1955    Admitted (Not on file) Discharged: 3/30/2021     IF YOU HAVE ANY PROBLEMS ONCE YOU ARE  [facility]:   Main office number: [phone]    Your follow up appointment to see either Dr. Ledy Lr PA-C, or [facility] as scheduled in 2 weeks. If you are unsure of your appointment date call the office at [phone]. Medication Instructions     · Resume your home medictions as directed, you may have directed not to resume supplements until after your follow up. · A prescription for pain medication has been given   · It is important that you take the medication exactly as they are prescribed. · Keep your medication in the bottles provided by the pharmacist and keep a list of the medication names, dosages, and times to be taken in your wallet. · Do not take other medications without consulting your doctor. What to do at [address] [place] [address] Ave your prehospital diet. If you have excessive nausea or vomitting call your doctor's office. Be sure to maintain adequate fluid intake. Some pain medications may cause constipation. Remember to drink fluids, stay as active as possible, and eat plenty of fiber-rich foods. Begin In-Home Physical Therapy; 3 times a week to work on gait training, range of motion, strengthening, and weight bearing exercises as tolerable. Continue to use your walker or cane when walking. May progress from the walker to a cane to complete total bearing as tolerable. Patient may shower. Wrap incision with plastic wrap/covering to prevent incision from getting wet. Avoid complete immersion. YOUR DRESSING SHOULD BE CHANGED BY YOUR HOME HEALTH NURSE 5-7 AFTER SURGERY ACCORDING TO THE DATE WRITTEN ON YOUR DRESSING.       When to Call    - Call if you have a temperature greater then 101  - Unable to keep food down  - Are unable to bear any wieght   - Need a pain medication refill     Information obtained by :  I understand that if any problems occur once I am at home I am to contact my physician. I understand and acknowledge receipt of the instructions indicated above.                                                                                                                                            Physician's or R.N.'s Signature                                                                  Date/Time                                                                                                                                              Patient or Representative Signature                                                          Date/Time

## 2021-03-31 LAB — SARS-COV-2, COV2NT: NOT DETECTED

## 2021-04-02 NOTE — NURSE NAVIGATOR
[facility] watched the [employer] and received a preoperative education booklet in anticipation of joint replacement surgery.      Orthopaedic Nurse Navigator

## 2021-04-05 ENCOUNTER — HOSPITAL ENCOUNTER (OUTPATIENT)
Age: 66
Discharge: HOME HEALTH | End: 2021-04-05
Attending: ORTHOPAEDIC SURGERY | Admitting: ORTHOPAEDIC SURGERY
Payer: MEDICARE

## 2021-04-05 ENCOUNTER — APPOINTMENT (OUTPATIENT)
Dept: GENERAL RADIOLOGY | Age: 66
End: 2021-04-05
Attending: PHYSICIAN ASSISTANT
Payer: MEDICARE

## 2021-04-05 ENCOUNTER — ANESTHESIA (OUTPATIENT)
Dept: SURGERY | Age: 66
End: 2021-04-05
Payer: MEDICARE

## 2021-04-05 ENCOUNTER — ANESTHESIA EVENT (OUTPATIENT)
Dept: SURGERY | Age: 66
End: 2021-04-05
Payer: MEDICARE

## 2021-04-05 VITALS
BODY MASS INDEX: 27.4 KG/M2 | HEART RATE: 73 BPM | RESPIRATION RATE: 15 BRPM | OXYGEN SATURATION: 95 % | WEIGHT: 170.5 LBS | TEMPERATURE: 97.9 F | SYSTOLIC BLOOD PRESSURE: 128 MMHG | HEIGHT: 66 IN | DIASTOLIC BLOOD PRESSURE: 57 MMHG

## 2021-04-05 DIAGNOSIS — M17.12 PRIMARY LOCALIZED OSTEOARTHRITIS OF LEFT KNEE: Primary | Chronic | ICD-10-CM

## 2021-04-05 LAB
ABO + RH BLD: NORMAL
BLOOD GROUP ANTIBODIES SERPL: NORMAL
GLUCOSE BLD STRIP.AUTO-MCNC: 98 MG/DL (ref 70–110)
SPECIMEN EXP DATE BLD: NORMAL

## 2021-04-05 PROCEDURE — 77030039760: Performed by: ORTHOPAEDIC SURGERY

## 2021-04-05 PROCEDURE — 76210000024 HC REC RM PH II 2.5 TO 3 HR

## 2021-04-05 PROCEDURE — 76060000034 HC ANESTHESIA 1.5 TO 2 HR: Performed by: ORTHOPAEDIC SURGERY

## 2021-04-05 PROCEDURE — 77030037713 HC CLOSR DEV INCIS ZIP STRY -B: Performed by: ORTHOPAEDIC SURGERY

## 2021-04-05 PROCEDURE — 82962 GLUCOSE BLOOD TEST: CPT

## 2021-04-05 PROCEDURE — 74011250636 HC RX REV CODE- 250/636: Performed by: NURSE ANESTHETIST, CERTIFIED REGISTERED

## 2021-04-05 PROCEDURE — C1713 ANCHOR/SCREW BN/BN,TIS/BN: HCPCS | Performed by: ORTHOPAEDIC SURGERY

## 2021-04-05 PROCEDURE — 77030011628: Performed by: ORTHOPAEDIC SURGERY

## 2021-04-05 PROCEDURE — 77030020782 HC GWN BAIR PAWS FLX 3M -B: Performed by: ORTHOPAEDIC SURGERY

## 2021-04-05 PROCEDURE — 77030013708 HC HNDPC SUC IRR PULS STRY –B: Performed by: ORTHOPAEDIC SURGERY

## 2021-04-05 PROCEDURE — 97535 SELF CARE MNGMENT TRAINING: CPT

## 2021-04-05 PROCEDURE — 76010000153 HC OR TIME 1.5 TO 2 HR: Performed by: ORTHOPAEDIC SURGERY

## 2021-04-05 PROCEDURE — 77030027138 HC INCENT SPIROMETER -A: Performed by: ORTHOPAEDIC SURGERY

## 2021-04-05 PROCEDURE — 64450 NJX AA&/STRD OTHER PN/BRANCH: CPT | Performed by: SPECIALIST

## 2021-04-05 PROCEDURE — 2709999900 HC NON-CHARGEABLE SUPPLY: Performed by: ORTHOPAEDIC SURGERY

## 2021-04-05 PROCEDURE — 74011000250 HC RX REV CODE- 250: Performed by: SPECIALIST

## 2021-04-05 PROCEDURE — 74011000250 HC RX REV CODE- 250: Performed by: ORTHOPAEDIC SURGERY

## 2021-04-05 PROCEDURE — 77010033678 HC OXYGEN DAILY

## 2021-04-05 PROCEDURE — 76210000006 HC OR PH I REC 0.5 TO 1 HR: Performed by: ORTHOPAEDIC SURGERY

## 2021-04-05 PROCEDURE — 73560 X-RAY EXAM OF KNEE 1 OR 2: CPT

## 2021-04-05 PROCEDURE — 77030002934 HC SUT MCRYL J&J -B: Performed by: ORTHOPAEDIC SURGERY

## 2021-04-05 PROCEDURE — 77030038010: Performed by: ORTHOPAEDIC SURGERY

## 2021-04-05 PROCEDURE — 74011000250 HC RX REV CODE- 250: Performed by: NURSE ANESTHETIST, CERTIFIED REGISTERED

## 2021-04-05 PROCEDURE — 77030016060 HC NDL NRV BLK TELE -A: Performed by: SPECIALIST

## 2021-04-05 PROCEDURE — 74011000258 HC RX REV CODE- 258: Performed by: ORTHOPAEDIC SURGERY

## 2021-04-05 PROCEDURE — 74011250637 HC RX REV CODE- 250/637: Performed by: PHYSICIAN ASSISTANT

## 2021-04-05 PROCEDURE — 74011250636 HC RX REV CODE- 250/636: Performed by: PHYSICIAN ASSISTANT

## 2021-04-05 PROCEDURE — 74011250637 HC RX REV CODE- 250/637: Performed by: ORTHOPAEDIC SURGERY

## 2021-04-05 PROCEDURE — 74011250636 HC RX REV CODE- 250/636: Performed by: ORTHOPAEDIC SURGERY

## 2021-04-05 PROCEDURE — 74011250637 HC RX REV CODE- 250/637: Performed by: SPECIALIST

## 2021-04-05 PROCEDURE — 77030012893: Performed by: ORTHOPAEDIC SURGERY

## 2021-04-05 PROCEDURE — 77030031139 HC SUT VCRL2 J&J -A: Performed by: ORTHOPAEDIC SURGERY

## 2021-04-05 PROCEDURE — 97161 PT EVAL LOW COMPLEX 20 MIN: CPT

## 2021-04-05 PROCEDURE — 77030033263 HC DRSG MEPILEX 16-48IN BORD MOLN -B: Performed by: ORTHOPAEDIC SURGERY

## 2021-04-05 PROCEDURE — 77030012508 HC MSK AIRWY LMA AMBU -A: Performed by: SPECIALIST

## 2021-04-05 PROCEDURE — 97116 GAIT TRAINING THERAPY: CPT

## 2021-04-05 PROCEDURE — 86901 BLOOD TYPING SEROLOGIC RH(D): CPT

## 2021-04-05 PROCEDURE — 77030003666 HC NDL SPINAL BD -A: Performed by: ORTHOPAEDIC SURGERY

## 2021-04-05 PROCEDURE — 76942 ECHO GUIDE FOR BIOPSY: CPT | Performed by: ORTHOPAEDIC SURGERY

## 2021-04-05 PROCEDURE — 74011250636 HC RX REV CODE- 250/636: Performed by: SPECIALIST

## 2021-04-05 PROCEDURE — 97165 OT EVAL LOW COMPLEX 30 MIN: CPT

## 2021-04-05 PROCEDURE — 77030034694 HC SCPL CANADY PLSM DISP USMD -E: Performed by: ORTHOPAEDIC SURGERY

## 2021-04-05 PROCEDURE — C1776 JOINT DEVICE (IMPLANTABLE): HCPCS | Performed by: ORTHOPAEDIC SURGERY

## 2021-04-05 PROCEDURE — 64447 NJX AA&/STRD FEMORAL NRV IMG: CPT | Performed by: ORTHOPAEDIC SURGERY

## 2021-04-05 PROCEDURE — 36415 COLL VENOUS BLD VENIPUNCTURE: CPT

## 2021-04-05 DEVICE — LT SIZE 4 FEMORAL CR NONPOROUS
Type: IMPLANTABLE DEVICE | Site: KNEE | Status: FUNCTIONAL
Brand: BKS TRIMAX

## 2021-04-05 DEVICE — CEMENT BNE 40GM FULL DOSE PMMA W/O ANTIBIO HI VISC N RADPQ: Type: IMPLANTABLE DEVICE | Site: KNEE | Status: FUNCTIONAL

## 2021-04-05 DEVICE — SIZE 4 TIBIAL TRAY NONPOROUS
Type: IMPLANTABLE DEVICE | Site: KNEE | Status: FUNCTIONAL
Brand: BALANCED KNEE SYSTEM

## 2021-04-05 DEVICE — KNEE K1 TOT HEMI STD CEM IMPL CAPPED K1 OD: Type: IMPLANTABLE DEVICE | Site: KNEE | Status: FUNCTIONAL

## 2021-04-05 DEVICE — SIZE 4 14MM TIBIAL INSERT UC
Type: IMPLANTABLE DEVICE | Site: KNEE | Status: FUNCTIONAL
Brand: BKS E-VITALIZE

## 2021-04-05 DEVICE — 32MM PATELLA, VITAMIN E
Type: IMPLANTABLE DEVICE | Site: KNEE | Status: FUNCTIONAL
Brand: BKS E-VITALIZE

## 2021-04-05 RX ORDER — ACETAMINOPHEN 325 MG/1
650 TABLET ORAL EVERY 6 HOURS
Status: DISCONTINUED | OUTPATIENT
Start: 2021-04-05 | End: 2021-04-05 | Stop reason: HOSPADM

## 2021-04-05 RX ORDER — SODIUM CHLORIDE 0.9 % (FLUSH) 0.9 %
5-40 SYRINGE (ML) INJECTION AS NEEDED
Status: DISCONTINUED | OUTPATIENT
Start: 2021-04-05 | End: 2021-04-05 | Stop reason: HOSPADM

## 2021-04-05 RX ORDER — KETAMINE HYDROCHLORIDE 10 MG/ML
INJECTION, SOLUTION INTRAMUSCULAR; INTRAVENOUS AS NEEDED
Status: DISCONTINUED | OUTPATIENT
Start: 2021-04-05 | End: 2021-04-05 | Stop reason: HOSPADM

## 2021-04-05 RX ORDER — KETOROLAC TROMETHAMINE 30 MG/ML
15 INJECTION, SOLUTION INTRAMUSCULAR; INTRAVENOUS EVERY 6 HOURS
Status: DISCONTINUED | OUTPATIENT
Start: 2021-04-05 | End: 2021-04-05 | Stop reason: HOSPADM

## 2021-04-05 RX ORDER — FENTANYL CITRATE 50 UG/ML
25 INJECTION, SOLUTION INTRAMUSCULAR; INTRAVENOUS
Status: DISCONTINUED | OUTPATIENT
Start: 2021-04-05 | End: 2021-04-05 | Stop reason: HOSPADM

## 2021-04-05 RX ORDER — ONDANSETRON 2 MG/ML
INJECTION INTRAMUSCULAR; INTRAVENOUS AS NEEDED
Status: DISCONTINUED | OUTPATIENT
Start: 2021-04-05 | End: 2021-04-05 | Stop reason: HOSPADM

## 2021-04-05 RX ORDER — DEXAMETHASONE SODIUM PHOSPHATE 4 MG/ML
4 INJECTION, SOLUTION INTRA-ARTICULAR; INTRALESIONAL; INTRAMUSCULAR; INTRAVENOUS; SOFT TISSUE ONCE
Status: COMPLETED | OUTPATIENT
Start: 2021-04-05 | End: 2021-04-05

## 2021-04-05 RX ORDER — NALOXONE HYDROCHLORIDE 0.4 MG/ML
0.1 INJECTION, SOLUTION INTRAMUSCULAR; INTRAVENOUS; SUBCUTANEOUS
Status: DISCONTINUED | OUTPATIENT
Start: 2021-04-05 | End: 2021-04-05 | Stop reason: HOSPADM

## 2021-04-05 RX ORDER — CELECOXIB 100 MG/1
200 CAPSULE ORAL ONCE
Status: COMPLETED | OUTPATIENT
Start: 2021-04-05 | End: 2021-04-05

## 2021-04-05 RX ORDER — ONDANSETRON 2 MG/ML
4 INJECTION INTRAMUSCULAR; INTRAVENOUS
Status: DISCONTINUED | OUTPATIENT
Start: 2021-04-05 | End: 2021-04-05 | Stop reason: HOSPADM

## 2021-04-05 RX ORDER — SCOLOPAMINE TRANSDERMAL SYSTEM 1 MG/1
1 PATCH, EXTENDED RELEASE TRANSDERMAL ONCE
Status: DISCONTINUED | OUTPATIENT
Start: 2021-04-05 | End: 2021-04-05 | Stop reason: HOSPADM

## 2021-04-05 RX ORDER — SODIUM CHLORIDE, SODIUM LACTATE, POTASSIUM CHLORIDE, CALCIUM CHLORIDE 600; 310; 30; 20 MG/100ML; MG/100ML; MG/100ML; MG/100ML
50 INJECTION, SOLUTION INTRAVENOUS CONTINUOUS
Status: DISCONTINUED | OUTPATIENT
Start: 2021-04-05 | End: 2021-04-05 | Stop reason: HOSPADM

## 2021-04-05 RX ORDER — SODIUM CHLORIDE 0.9 % (FLUSH) 0.9 %
5-40 SYRINGE (ML) INJECTION EVERY 8 HOURS
Status: DISCONTINUED | OUTPATIENT
Start: 2021-04-05 | End: 2021-04-05 | Stop reason: HOSPADM

## 2021-04-05 RX ORDER — LANOLIN ALCOHOL/MO/W.PET/CERES
1 CREAM (GRAM) TOPICAL 3 TIMES DAILY
Status: DISCONTINUED | OUTPATIENT
Start: 2021-04-05 | End: 2021-04-05 | Stop reason: HOSPADM

## 2021-04-05 RX ORDER — CEFADROXIL 500 MG/1
500 CAPSULE ORAL 2 TIMES DAILY
Qty: 10 CAP | Refills: 0 | Status: SHIPPED | OUTPATIENT
Start: 2021-04-05 | End: 2021-04-10

## 2021-04-05 RX ORDER — PANTOPRAZOLE SODIUM 40 MG/1
40 TABLET, DELAYED RELEASE ORAL DAILY
Status: DISCONTINUED | OUTPATIENT
Start: 2021-04-05 | End: 2021-04-05 | Stop reason: HOSPADM

## 2021-04-05 RX ORDER — HYDROMORPHONE HYDROCHLORIDE 1 MG/ML
0.5 INJECTION, SOLUTION INTRAMUSCULAR; INTRAVENOUS; SUBCUTANEOUS
Status: DISCONTINUED | OUTPATIENT
Start: 2021-04-05 | End: 2021-04-05 | Stop reason: HOSPADM

## 2021-04-05 RX ORDER — ZOLPIDEM TARTRATE 5 MG/1
5-10 TABLET ORAL
Status: DISCONTINUED | OUTPATIENT
Start: 2021-04-05 | End: 2021-04-05 | Stop reason: HOSPADM

## 2021-04-05 RX ORDER — SODIUM CHLORIDE 9 MG/ML
300 INJECTION, SOLUTION INTRAVENOUS CONTINUOUS
Status: DISCONTINUED | OUTPATIENT
Start: 2021-04-05 | End: 2021-04-05 | Stop reason: HOSPADM

## 2021-04-05 RX ORDER — ONDANSETRON 2 MG/ML
4 INJECTION INTRAMUSCULAR; INTRAVENOUS ONCE
Status: DISCONTINUED | OUTPATIENT
Start: 2021-04-05 | End: 2021-04-05 | Stop reason: HOSPADM

## 2021-04-05 RX ORDER — DEXMEDETOMIDINE HYDROCHLORIDE 100 UG/ML
INJECTION, SOLUTION INTRAVENOUS AS NEEDED
Status: DISCONTINUED | OUTPATIENT
Start: 2021-04-05 | End: 2021-04-05 | Stop reason: HOSPADM

## 2021-04-05 RX ORDER — ROPIVACAINE HYDROCHLORIDE 5 MG/ML
INJECTION, SOLUTION EPIDURAL; INFILTRATION; PERINEURAL AS NEEDED
Status: DISCONTINUED | OUTPATIENT
Start: 2021-04-05 | End: 2021-04-05 | Stop reason: HOSPADM

## 2021-04-05 RX ORDER — OXYCODONE HYDROCHLORIDE 5 MG/1
5-10 TABLET ORAL
Status: DISCONTINUED | OUTPATIENT
Start: 2021-04-05 | End: 2021-04-05 | Stop reason: HOSPADM

## 2021-04-05 RX ORDER — CEFAZOLIN SODIUM/WATER 2 G/20 ML
2 SYRINGE (ML) INTRAVENOUS EVERY 8 HOURS
Status: DISCONTINUED | OUTPATIENT
Start: 2021-04-05 | End: 2021-04-05 | Stop reason: HOSPADM

## 2021-04-05 RX ORDER — NALOXONE HYDROCHLORIDE 0.4 MG/ML
0.4 INJECTION, SOLUTION INTRAMUSCULAR; INTRAVENOUS; SUBCUTANEOUS AS NEEDED
Status: DISCONTINUED | OUTPATIENT
Start: 2021-04-05 | End: 2021-04-05 | Stop reason: HOSPADM

## 2021-04-05 RX ORDER — SODIUM CHLORIDE 9 MG/ML
125 INJECTION, SOLUTION INTRAVENOUS CONTINUOUS
Status: DISCONTINUED | OUTPATIENT
Start: 2021-04-05 | End: 2021-04-05 | Stop reason: HOSPADM

## 2021-04-05 RX ORDER — HYDROMORPHONE HYDROCHLORIDE 2 MG/ML
INJECTION, SOLUTION INTRAMUSCULAR; INTRAVENOUS; SUBCUTANEOUS AS NEEDED
Status: DISCONTINUED | OUTPATIENT
Start: 2021-04-05 | End: 2021-04-05 | Stop reason: HOSPADM

## 2021-04-05 RX ORDER — DIPHENHYDRAMINE HYDROCHLORIDE 50 MG/ML
12.5 INJECTION, SOLUTION INTRAMUSCULAR; INTRAVENOUS
Status: DISCONTINUED | OUTPATIENT
Start: 2021-04-05 | End: 2021-04-05 | Stop reason: HOSPADM

## 2021-04-05 RX ORDER — LABETALOL HCL 20 MG/4 ML
SYRINGE (ML) INTRAVENOUS AS NEEDED
Status: DISCONTINUED | OUTPATIENT
Start: 2021-04-05 | End: 2021-04-05 | Stop reason: HOSPADM

## 2021-04-05 RX ORDER — TRANEXAMIC ACID 650 1/1
1950 TABLET ORAL ONCE
Status: COMPLETED | OUTPATIENT
Start: 2021-04-05 | End: 2021-04-05

## 2021-04-05 RX ORDER — CEFAZOLIN SODIUM/WATER 2 G/20 ML
2 SYRINGE (ML) INTRAVENOUS ONCE
Status: COMPLETED | OUTPATIENT
Start: 2021-04-05 | End: 2021-04-05

## 2021-04-05 RX ORDER — ASPIRIN 325 MG
325 TABLET ORAL 2 TIMES DAILY
Qty: 42 TAB | Refills: 0 | Status: SHIPPED | OUTPATIENT
Start: 2021-04-05 | End: 2021-04-26

## 2021-04-05 RX ORDER — DEXAMETHASONE SODIUM PHOSPHATE 4 MG/ML
8 INJECTION, SOLUTION INTRA-ARTICULAR; INTRALESIONAL; INTRAMUSCULAR; INTRAVENOUS; SOFT TISSUE ONCE
Status: DISCONTINUED | OUTPATIENT
Start: 2021-04-05 | End: 2021-04-05

## 2021-04-05 RX ORDER — METOCLOPRAMIDE HYDROCHLORIDE 5 MG/ML
10 INJECTION INTRAMUSCULAR; INTRAVENOUS
Status: DISCONTINUED | OUTPATIENT
Start: 2021-04-05 | End: 2021-04-05 | Stop reason: HOSPADM

## 2021-04-05 RX ORDER — LIDOCAINE HYDROCHLORIDE 20 MG/ML
INJECTION, SOLUTION EPIDURAL; INFILTRATION; INTRACAUDAL; PERINEURAL AS NEEDED
Status: DISCONTINUED | OUTPATIENT
Start: 2021-04-05 | End: 2021-04-05 | Stop reason: HOSPADM

## 2021-04-05 RX ORDER — ASPIRIN 325 MG
325 TABLET, DELAYED RELEASE (ENTERIC COATED) ORAL 2 TIMES DAILY
Status: DISCONTINUED | OUTPATIENT
Start: 2021-04-05 | End: 2021-04-05 | Stop reason: HOSPADM

## 2021-04-05 RX ORDER — MIDAZOLAM HYDROCHLORIDE 1 MG/ML
INJECTION, SOLUTION INTRAMUSCULAR; INTRAVENOUS AS NEEDED
Status: DISCONTINUED | OUTPATIENT
Start: 2021-04-05 | End: 2021-04-05 | Stop reason: HOSPADM

## 2021-04-05 RX ORDER — SODIUM CHLORIDE, SODIUM LACTATE, POTASSIUM CHLORIDE, CALCIUM CHLORIDE 600; 310; 30; 20 MG/100ML; MG/100ML; MG/100ML; MG/100ML
125 INJECTION, SOLUTION INTRAVENOUS CONTINUOUS
Status: DISCONTINUED | OUTPATIENT
Start: 2021-04-05 | End: 2021-04-05 | Stop reason: HOSPADM

## 2021-04-05 RX ORDER — MELOXICAM 7.5 MG/1
7.5 TABLET ORAL 2 TIMES DAILY
Qty: 28 TAB | Refills: 0 | Status: SHIPPED | OUTPATIENT
Start: 2021-04-05 | End: 2021-04-19

## 2021-04-05 RX ORDER — ROPIVACAINE HYDROCHLORIDE 2 MG/ML
INJECTION, SOLUTION EPIDURAL; INFILTRATION; PERINEURAL AS NEEDED
Status: DISCONTINUED | OUTPATIENT
Start: 2021-04-05 | End: 2021-04-05 | Stop reason: HOSPADM

## 2021-04-05 RX ORDER — ACETAMINOPHEN 500 MG
1000 TABLET ORAL ONCE
Status: COMPLETED | OUTPATIENT
Start: 2021-04-05 | End: 2021-04-05

## 2021-04-05 RX ORDER — EPHEDRINE SULFATE/0.9% NACL/PF 50 MG/5 ML
SYRINGE (ML) INTRAVENOUS AS NEEDED
Status: DISCONTINUED | OUTPATIENT
Start: 2021-04-05 | End: 2021-04-05 | Stop reason: HOSPADM

## 2021-04-05 RX ORDER — OXYCODONE HYDROCHLORIDE 5 MG/1
5 TABLET ORAL
Qty: 60 TAB | Refills: 0 | Status: SHIPPED | OUTPATIENT
Start: 2021-04-05 | End: 2021-04-12

## 2021-04-05 RX ORDER — DOCUSATE SODIUM 100 MG/1
100 CAPSULE, LIQUID FILLED ORAL 2 TIMES DAILY
Status: DISCONTINUED | OUTPATIENT
Start: 2021-04-05 | End: 2021-04-05 | Stop reason: HOSPADM

## 2021-04-05 RX ORDER — PROPOFOL 10 MG/ML
INJECTION, EMULSION INTRAVENOUS AS NEEDED
Status: DISCONTINUED | OUTPATIENT
Start: 2021-04-05 | End: 2021-04-05 | Stop reason: HOSPADM

## 2021-04-05 RX ADMIN — DEXAMETHASONE SODIUM PHOSPHATE 4 MG: 4 INJECTION, SOLUTION INTRAMUSCULAR; INTRAVENOUS at 11:06

## 2021-04-05 RX ADMIN — MIDAZOLAM 2 MG: 1 INJECTION INTRAMUSCULAR; INTRAVENOUS at 12:22

## 2021-04-05 RX ADMIN — HYDROMORPHONE HYDROCHLORIDE 1 MG: 2 INJECTION, SOLUTION INTRAMUSCULAR; INTRAVENOUS; SUBCUTANEOUS at 13:15

## 2021-04-05 RX ADMIN — LABETALOL 20 MG/4 ML (5 MG/ML) INTRAVENOUS SYRINGE 10 MG: at 13:22

## 2021-04-05 RX ADMIN — KETAMINE HYDROCHLORIDE 20 MG: 10 INJECTION, SOLUTION INTRAMUSCULAR; INTRAVENOUS at 13:09

## 2021-04-05 RX ADMIN — SODIUM CHLORIDE, SODIUM LACTATE, POTASSIUM CHLORIDE, AND CALCIUM CHLORIDE 1000 ML: 600; 310; 30; 20 INJECTION, SOLUTION INTRAVENOUS at 11:00

## 2021-04-05 RX ADMIN — SODIUM CHLORIDE 300 ML/HR: 900 INJECTION, SOLUTION INTRAVENOUS at 15:25

## 2021-04-05 RX ADMIN — ROPIVACAINE HYDROCHLORIDE 25 ML: 5 INJECTION, SOLUTION EPIDURAL; INFILTRATION; PERINEURAL at 12:25

## 2021-04-05 RX ADMIN — MIDAZOLAM 2 MG: 1 INJECTION INTRAMUSCULAR; INTRAVENOUS at 12:40

## 2021-04-05 RX ADMIN — SODIUM CHLORIDE, SODIUM LACTATE, POTASSIUM CHLORIDE, AND CALCIUM CHLORIDE: 600; 310; 30; 20 INJECTION, SOLUTION INTRAVENOUS at 13:13

## 2021-04-05 RX ADMIN — SODIUM CHLORIDE, SODIUM LACTATE, POTASSIUM CHLORIDE, AND CALCIUM CHLORIDE: 600; 310; 30; 20 INJECTION, SOLUTION INTRAVENOUS at 14:03

## 2021-04-05 RX ADMIN — DEXMEDETOMIDINE HYDROCHLORIDE 20 MCG: 100 INJECTION, SOLUTION INTRAVENOUS at 12:25

## 2021-04-05 RX ADMIN — METOCLOPRAMIDE 10 MG: 5 INJECTION, SOLUTION INTRAMUSCULAR; INTRAVENOUS at 16:50

## 2021-04-05 RX ADMIN — PROPOFOL 50 MG: 10 INJECTION, EMULSION INTRAVENOUS at 13:15

## 2021-04-05 RX ADMIN — CEFAZOLIN 2 G: 1 INJECTION, POWDER, FOR SOLUTION INTRAVENOUS at 12:49

## 2021-04-05 RX ADMIN — LIDOCAINE HYDROCHLORIDE 100 MG: 20 INJECTION, SOLUTION EPIDURAL; INFILTRATION; INTRACAUDAL; PERINEURAL at 12:42

## 2021-04-05 RX ADMIN — CELECOXIB 200 MG: 100 CAPSULE ORAL at 12:14

## 2021-04-05 RX ADMIN — ONDANSETRON HYDROCHLORIDE 4 MG: 2 INJECTION INTRAMUSCULAR; INTRAVENOUS at 13:39

## 2021-04-05 RX ADMIN — KETAMINE HYDROCHLORIDE 30 MG: 10 INJECTION, SOLUTION INTRAMUSCULAR; INTRAVENOUS at 12:49

## 2021-04-05 RX ADMIN — ACETAMINOPHEN 1000 MG: 500 TABLET ORAL at 12:14

## 2021-04-05 RX ADMIN — SODIUM CHLORIDE, SODIUM LACTATE, POTASSIUM CHLORIDE, AND CALCIUM CHLORIDE 125 ML/HR: 600; 310; 30; 20 INJECTION, SOLUTION INTRAVENOUS at 11:00

## 2021-04-05 RX ADMIN — TRANEXAMIC ACID 1950 MG: 650 TABLET ORAL at 10:49

## 2021-04-05 RX ADMIN — ROPIVACAINE HYDROCHLORIDE 20 ML: 2 INJECTION EPIDURAL; INFILTRATION; PERINEURAL at 12:30

## 2021-04-05 RX ADMIN — PROPOFOL 150 MG: 10 INJECTION, EMULSION INTRAVENOUS at 12:42

## 2021-04-05 RX ADMIN — PANTOPRAZOLE SODIUM 40 MG: 40 TABLET, DELAYED RELEASE ORAL at 10:49

## 2021-04-05 RX ADMIN — Medication 10 MG: at 13:47

## 2021-04-05 RX ADMIN — HYDROMORPHONE HYDROCHLORIDE 1 MG: 2 INJECTION, SOLUTION INTRAMUSCULAR; INTRAVENOUS; SUBCUTANEOUS at 12:56

## 2021-04-05 NOTE — PROGRESS NOTES
1525  Same Day Discharge     - Patient arrives to unit at this time. Dual skin assessment completed with MATTHEW chapman rn, no abnormalities noted other than surgical incision to left knee. Mepilex silver dressing CDI. Assumed care of pt at this time. Assessment complete. Pt alert and oriented x 4. Shows no sign of distress. Fall risk arm band in place. Denies SOB and chest pain. Pt lungs clear bilaterally. Cap refill  less than 3 seconds. Pt states numbness and tingling to all extremities. Stated pain 2/10. Pt has 18 G IV to right hand. Patient oriented to room to include use of call bell, meal ordering, and use of incentive spirometer, patient able to get IS to 2000. Patient instructed to order lunch and given explanation of home for dinner plan. Phone and call bell left within reach. Educated on pain medication availability and possible side effects, as well as need to call for assistance before getting out of bed. Patient verbalized understanding.      Symptoms present on arrival:  [] Pain 2/10   [] Medicated  [] Nausea   [] Medicated   [] Hypotension/Hypertension   [] Provider notified

## 2021-04-05 NOTE — ANESTHESIA PREPROCEDURE EVALUATION
Relevant Problems   No relevant active problems       Anesthetic History     PONV (does well with scopolamine patch)          Review of Systems / Medical History  Patient summary reviewed and nursing notes reviewed    Pulmonary  Within defined limits                 Neuro/Psych   Within defined limits           Cardiovascular    Hypertension          Hyperlipidemia    Exercise tolerance: >4 METS     GI/Hepatic/Renal  Within defined limits              Endo/Other        Arthritis     Other Findings              Physical Exam    Airway  Mallampati: II  TM Distance: 4 - 6 cm  Neck ROM: normal range of motion   Mouth opening: Normal     Cardiovascular               Dental  No notable dental hx       Pulmonary                 Abdominal         Other Findings            Anesthetic Plan    ASA: 2  Anesthesia type: general      Post-op pain plan if not by surgeon: peripheral nerve block single    Induction: Intravenous  Anesthetic plan and risks discussed with: Patient      Prefers GA over spinal  ACB/iPACK - risks/benefits explained: infection, nerve injury, bleeding, failed blocks - she wishes to proceed.

## 2021-04-05 NOTE — PROGRESS NOTES
Problem: Mobility Impaired (Adult and Pediatric)  Goal: *Acute Goals and Plan of Care (Insert Text)  Description: PT goals to be met in 1 day:  Pt will be able to perform supine<>sit SBA for transfers at home. Pt will be able to perform sit<>stand SBA for increased ability to transfer at home safely. Pt will be able to participate in gt training >100' w/ RW, WBAT, GB and CGA/SBA for improved ability in home upon d/c. Pt will be able to perform stair training step to pattern, B/U rail and CGA to obtain safe entry into home upon d/c. Pt will be educated regarding HEP per MD protocol for optimal AROM/strength outcomes. Note: [x]  Patient has met MD mobilization critieria for d/c home   [x]  Recommend HH with 24 hour adult care   []  Benefit from additional acute PT session to address:      PHYSICAL THERAPY EVALUATION    Patient: William Hernadez (13 y.o. female)  Date: 4/5/2021  Primary Diagnosis: Localized osteoarthritis of left knee [M17.12]  Procedure(s) (LRB):  LEFT TOTAL KNEE REPLACEMENT (Left) Day of Surgery   Precautions:   Fall, WBAT    PLOF: Independent functional mobility w/o use of AD    ASSESSMENT :  Based on the objective data described below, the patient presents with decreased mobility in regards to bed mobility, transfers, gt quality and tolerance, balance, stair negotiation and safety due to L TKA surgery. Decreased AROM of L knee, dec strength of L knee, pain in L knee, dec sensation of L knee also impacting pt functional mobility. Pt rating pain on numerical pain scale pre/post and during session 4/10. Pt ed regarding mobility safety, WB, HEP, ice application/use, elevation, environmental safety and home safe techniques. Pt able to perform sit<>stand w/ CGA/SBA. Safety vc required throughout session to reinforce safety. Pt able to participate in gt training using RW, GB, WBAT and CGA w/ antalgic gt pattern.   Pt was able to participate in stair training using step to pattern, B rails and CGA. Pt c/o nausea throughout session. Answered questions by pt in regards to PT and mobility. Pt left sitting in recliner w/ all needs within reach and ice pack to L knee. Nurse Afshan aware of session and outcomes. Recommend HHPT with responsible adult care at least 24 hours upon hospital d/c. Patient will benefit from skilled intervention to address the above impairments. Patient's rehabilitation potential is considered to be Good  Factors which may influence rehabilitation potential include:   []         None noted  []         Mental ability/status  []         Medical condition  []         Home/family situation and support systems  []         Safety awareness  [x]         Pain tolerance/management  []         Other:      PLAN :  Recommendations and Planned Interventions:   [x]           Bed Mobility Training             []    Neuromuscular Re-Education  [x]           Transfer Training                   []    Orthotic/Prosthetic Training  [x]           Gait Training                          [x]    Modalities  [x]           Therapeutic Exercises           [x]    Edema Management/Control  [x]           Therapeutic Activities            [x]    Family Training/Education  [x]           Patient Education  []           Other (comment):    Frequency/Duration: Patient will be followed by physical therapy twice daily to address goals. Discharge Recommendations: Home Health  Further Equipment Recommendations for Discharge: N/A     SUBJECTIVE:   Patient stated I am just so sleepy.     OBJECTIVE DATA SUMMARY:     Past Medical History:   Diagnosis Date    Arthritis     Cardiac murmur     Dyslipidemia     Heart murmur     Hx of migraines     Hypertension 2008    Nausea & vomiting     severe    Primary localized osteoarthritis of left knee 3/30/2021    Rosacea      Past Surgical History:   Procedure Laterality Date    FOOT/TOES SURGERY PROC UNLISTED      x6    HX HEENT      jaw surgery    HX HEENT      dental surgery    HX HYSTERECTOMY      due to heavy bleeding    HX KNEE ARTHROSCOPY Left     HX LUMBAR DISKECTOMY  2019    HX LUMBAR LAMINECTOMY  01/2011    L5/S1 [place]    HX LUMBAR LAMINECTOMY      left L5   redo from 1st surgery    HX OOPHORECTOMY      HX ORTHOPAEDIC Left     2nd toes joint removal    HX ORTHOPAEDIC Right 2001    repeat bunion    HX ORTHOPAEDIC Bilateral     bunions    HX ORTHOPAEDIC Left 2011    foot fusion from bunion surgery    HX TONSILLECTOMY       Barriers to Learning/Limitations: yes;  anesthesia  Compensate with: Visual Cues, Verbal Cues, and Tactile Cues  Home Situation:  Home Situation  Home Environment: Private residence  # Steps to Enter: 4  Rails to Enter: Yes  Hand Rails : Bilateral  One/Two Story Residence: One story  Living Alone: No  Support Systems: Spouse/Significant Other/Partner  Patient Expects to be Discharged to[de-identified] Private residence  Current DME Used/Available at Home: [address] [facility], [place], straight, Grab bars, Shower chair, Raised toilet seat, Walker, rolling  Tub or Shower Type: Shower  Critical Behavior:  Neurologic State: Alert;Drowsy  Orientation Level: Oriented to person;Oriented to place;Oriented to situation  Cognition: Follows commands  Safety/Judgement: Awareness of environment  Psychosocial  Patient Behaviors: Calm; Cooperative  Skin Condition/Temp: Dry;Warm  Skin Integrity: Incision (comment)(L knee)  Skin Integumentary  Skin Color: Appropriate for ethnicity  Skin Condition/Temp: Dry;Warm  Skin Integrity: Incision (comment)(L knee)  Strength:    Strength: Generally decreased, functional  Tone & Sensation:   Tone: Normal  Sensation: Impaired(L knee)  Range Of Motion:  AROM: Generally decreased, functional  Functional Mobility:  Bed Mobility:  Scooting: Stand-by assistance  Transfers:  Sit to Stand: Contact guard assistance;Stand-by assistance(vc)  Stand to Sit: Contact guard assistance;Stand-by assistance(vc)  Balance:   Sitting: Intact  Standing: Intact; With support  Ambulation/Gait Training:  Distance (ft): 150 Feet (ft)  Assistive Device: Walker, rolling;Gait belt  Ambulation - Level of Assistance: Contact guard assistance(vc)  Gait Abnormalities: Antalgic;Decreased step clearance; Step to gait  Left Side Weight Bearing: As tolerated  Base of Support: Shift to right  Stance: Left decreased  Speed/Porsha: Slow  Step Length: Left shortened;Right shortened  Swing Pattern: Left asymmetrical;Right asymmetrical  Interventions: Safety awareness training; Tactile cues; Verbal cues; Visual/Demos  Stairs:  Number of Stairs Trained: 5  Stairs - Level of Assistance: Contact guard assistance(vc)  Rail Use: Both     Therapeutic Exercises:   Encouraged and reviewed HEP  Pain:  Pain level pre-treatment: 4/10   Pain level post-treatment: 4/10   Pain Intervention(s) : Medication (see MAR); Rest, Ice, Repositioning  Response to intervention: Nurse notified, See doc flow    Activity Tolerance:   Fair   Please refer to the flowsheet for vital signs taken during this treatment. After treatment:   [x]         Patient left in no apparent distress sitting up in chair  []         Patient left in no apparent distress in bed  [x]         Call bell left within reach  [x]         Nursing notified  []         Caregiver present  []         Bed alarm activated  []         SCDs applied    COMMUNICATION/EDUCATION:   [x]         Role of Physical Therapy in the acute care setting. [x]         Fall prevention education was provided and the patient/caregiver indicated understanding. [x]         Patient/family have participated as able in goal setting and plan of care. [x]         Patient/family agree to work toward stated goals and plan of care. []         Patient understands intent and goals of therapy, but is neutral about his/her participation. []         Patient is unable to participate in goal setting/plan of care: ongoing with therapy staff.  []         Other:     Thank you for this referral.  Agueda Pemberton, PT   Time Calculation: 24 mins      Eval Complexity: History: HIGH Complexity :3+ comorbidities / personal factors will impact the outcome/ POC Exam:MEDIUM Complexity : 3 Standardized tests and measures addressing body structure, function, activity limitation and / or participation in recreation  Presentation: LOW Complexity : Stable, uncomplicated  Clinical Decision Making:Low Complexity    Overall Complexity:LOW

## 2021-04-05 NOTE — PERIOP NOTES
Reviewed PTA medication list with patient/caregiver and patient/caregiver denies any additional medications. Patient admits to having a responsible adult care for them at home for at least 24 hours after surgery. Patient encouraged to use gown warming system and informed that using said warming gown to regulate body temperature prior to a procedure has been shown to help reduce the risks of blood clots and infection. Patient's pharmacy of choice verified and documented in PTA medication section. Dual skin assessment & fall risk band verification completed with Tennille Scales RN.

## 2021-04-05 NOTE — PROGRESS NOTES
Progression of Symptoms:    [] Pain 2/10   [] Improvement post medication administration   [] No improvement, provider notified   [] Nausea   [] Improvement post medication administration   [] No improvement, provider notified   [] Hypotension/Hypertension   [] Improved post medication administration   [] No improvement, provider notified     Readiness for Discharge:  [x] Vital signs stable  [x] + Voiding  [x] Wound intact, drainage minimal  [] Tolerating PO intake  [x] Cleared by PT (OT if applicable)  [x] Discharge education completed with patient and family member to specifically include   [x] Recommended stool softener  [] Proper elevation visual demonstration

## 2021-04-05 NOTE — ROUTINE PROCESS
[address] [place] TRANSFER - IN REPORT: 
 
Verbal report received from MATTHEW rich rn(name) on [place]  being received from [place]) for routine post - op Report consisted of patients Situation, Background, Assessment and  
Recommendations(SBAR). Information from the following report(s) SBAR, Kardex, [facility] and Recent Results was reviewed with the receiving nurse. Opportunity for questions and clarification was provided. Assessment completed upon patients arrival to unit and care assumed.

## 2021-04-05 NOTE — ANESTHESIA PROCEDURE NOTES
Peripheral Block    Start time: 4/5/2021 12:22 PM  End time: 4/5/2021 12:30 PM  Performed by: Kayleen Padilla MD  Authorized by: Kayleen Padilla MD       Pre-procedure:    Indications: at surgeon's request and post-op pain management    Preanesthetic Checklist: patient identified, risks and benefits discussed, site marked, timeout performed, anesthesia consent given and patient being monitored    Timeout Time: 12:22          Block Type:   Block Type:  Ipack  Laterality:  Left  Monitoring:  Standard ASA monitoring, continuous pulse ox, frequent vital sign checks, heart rate, oxygen and responsive to questions  Injection Technique:  Single shot  Procedures: ultrasound guided    Patient Position: supine  Prep: chlorhexidine    Location:  Lower thigh  Needle Type:  Stimuplex  Needle Gauge:  20 G  Needle Localization:  Ultrasound guidance    Assessment:  Number of attempts:  1  Injection Assessment:  Incremental injection every 5 mL, local visualized surrounding nerve on ultrasound, negative aspiration for blood, no intravascular symptoms, no paresthesia and ultrasound image on chart  Patient tolerance:  Patient tolerated the procedure well with no immediate complications

## 2021-04-05 NOTE — OP NOTES
[address] [facility] [address] [facility]  Total Knee Arthroplasty    Patient: Sherwin Wu MRN: [id number]  SSN: [id number]    YOB: 1955  Age: 77 y.o. Sex: female      Date of Surgery: 4/5/2021   Preoperative Diagnosis: UNILATERAL PRIMARY OSTEOARTHRITIS LEFT KNEE   Postoperative Diagnosis: UNILATERAL PRIMARY OSTEOARTHRITIS LEFT KNEE   Location: [facility]  Surgeon: Camille Issa MD  Assistant: Leopoldo Plate, PA-C    Anesthesia: General and adductor canal Nerve Block    Procedure: Total Knee Arthroplasty:   The complexity of the total joint surgery requires the use of a first assistant for positioning, retraction and assistance in closure. Tourniquet Time: Tourniquet not used. Estimated Blood Loss: Less than 100cc     Implants:   Implant Name Type Inv. Item Serial No.  Lot No. LRB No. Used Action   CEMENT BNE 40GM FULL DOSE PMMA W/O ANTIBIO HI VISC N RADPQ - [id number]  CEMENT BNE 40GM FULL DOSE PMMA W/O ANTIBIO HI VISC N RADPQ  [employer] ORTHOPEDICS_WD [id number] Left 2 Implanted   TRAY TIB SZ 4 NP A BAL KNEE - [id number]  TRAY TIB SZ 4 NP A BAL KNEE  [employer] [id number] Left 1 Implanted   COMPONENT PAT H32MM STD E VITALIZE WILLIAM GERRI RND BAL SYS - [id number]  COMPONENT PAT H32MM STD E VITALIZE WILLIAM GERRI RND BAL SYS  [employer] [id number] Left 1 Implanted   COMPONENT FEM SZ 4 L KNEE NP CRUCE RET BKS TRIMAX - [id number]  COMPONENT FEM SZ 4 L KNEE NP CRUCE RET BKS TRIMAX  [employer] [id number] Left 1 Implanted   INSERT TIB SZ 4 H14MM UCONG E VITALIZE - [id number]  INSERT TIB SZ 4 H14MM UCONG E VITALIZE  [employer] [id number] Left 1 Implanted        Specimens: None    Additional Findings: None     Complications: none    Body Mass Index: Body mass index is 27.52 kg/m².     Procedure Detail:  Prior to the surgery the patient was administered a femoral nerve block in the preoperative holding area by the anesthesiologist. Nikki Reyes was brought to the operating room and positioned on the operating table. She was anesthetized with anesthesia. Intravenous antibiotics were administered. Prior to the incision being made a timeout was called identifying the patient, procedure, operative side, and surgeon. A pneumatic tourniquet was placed about the limb and the left leg was prepped and draped in the usual sterile manner. The tourniquet was not inflated throughout the case. A midline anterior incision made over the knee. The incision was carried down through the subcutaneous tissue to the underlying capsule. A medial parapatellar capsular incision was performed. The medial capsular flap was carefully elevated around to the posterior medial corner protecting the medial collateral ligaments and the fibers. The patella was sized with a caliper, and approximately 10-12 mm was resected with an oscillating saw allowing the patella to be slid into the lateral gutter. It was not everted throughout the case. Our attention was first turned to the distal femur and using intermedullary instrumentation, a 5-degree valgus cut was on the distal end of the femur. The distal end of the femur was sized to a size 4 femoral component. Pins were inserted through the sizer and the corresponding 4-in-1 block was slid into place and pinned for stability. Anterior and posterior and chamfer cuts were made to accommodate the femoral component. The medial and lateral menisci were excised as were the anterior cruciate ligaments. Our attention was then turned to the tibia. Using extramedullary instrumentation, a 3-degree cut was made on the proximal end of the tibia. A spacer block was placed to show gaps had been balanced and a size 4  tibial base plate was placed on the tibia and pinned into place.  Intramedullary reaming guide was placed on the tibia and the appropriate reamer was used followed by the keel punch to complete the preparation of the tibia. A trial femoral component was then impacted on to the distal end of the femur. Trial reduction was then performed with incremental size trial bearing surfaces. The orthodevelopment BKS trimax UC 14mm bearing surface was inserted and allowed for full extension, good medial, lateral stability at 90 degrees of flexion, especially medially. Our attention was then turned to the patella. The patella was sized to a 32mm patella. The guide was pinned, placed over patella, 3 holes were drilled. Trial patella button was inserted and the patella was reduced in the knee. The patella tracked normally using no-touch technique. The trial components were then all removed. The real components were opened on the back. The cut surfaces of the bone were prepared using the PulsaVac lavage. Prior to this, the Aquamantys was used to cauterize any soft tissue bleeding. 40 grams of [place] HV cement was mixed. The femoral and tibial components  and patellar component was cemented into place. Excess cement was removed from around the edge of bone using plastic curette. Once the cement had hardened, the knee was placed through range of motion and noted to be stable as mentioned above with the trail components. The wound was dry, therefore no drain was used. The operative knee was injected with 20 cc of exparel. The knee was then soaked with a diluted betadine solution for approximately 3 min. This was then thoroughly irrigated. The capsular layer was closed using a #2 stratafix suture with the knee flexed 90 degrees, while subcutaneous layers were closed using 2-0 Vicryl suture. Finally the skin was closed using Prineo, which were applied in occlusive fashion and sterile bandage applied. All sponge and needle counts were correct. She was taken to the recovery room extubated in stable condition.     Signed By: Guillermo James MD     April 5, 2021

## 2021-04-05 NOTE — INTERVAL H&P NOTE
Update History & Physical 
 
The Patient's History and Physical of March 30, 2021 was reviewed with the patient and I examined the patient. There was no change. The surgical site was confirmed by the patient and me. Plan:  The risk, benefits, expected outcome, and alternative to the recommended procedure have been discussed with the patient. Patient understands and wants to proceed with the procedure.  
 
Electronically signed by Miles Maldonado MD on 4/5/2021 at 10:34 AM

## 2021-04-05 NOTE — PERIOP NOTES
TRANSFER - OUT REPORT:    Verbal report given to WAI Cavanaugh(name) on Nicole Layton  being transferred to [facility](unit) for routine post - op       Report consisted of patients Situation, Background, Assessment and   Recommendations(SBAR). Information from the following report(s) SBAR, Kardex, OR Summary, MAR and Cardiac Rhythm NSR was reviewed with the receiving nurse. Lines:   Peripheral IV 04/05/21 Right Hand (Active)   Site Assessment Clean, dry, & intact 04/05/21 1430   Phlebitis Assessment 0 04/05/21 1430   Infiltration Assessment 0 04/05/21 1430   Dressing Status Clean, dry, & intact 04/05/21 1430   Dressing Type Tape;Transparent 04/05/21 1430   Hub Color/Line Status Green; Infusing;Patent 04/05/21 1430   Alcohol Cap Used No 04/05/21 1101        Opportunity for questions and clarification was provided.       Patient transported with:   O2 @ 2 liters  Registered Nurse

## 2021-04-05 NOTE — ANESTHESIA POSTPROCEDURE EVALUATION
Post-Anesthesia Evaluation and Assessment    Cardiovascular Function/Vital Signs  Visit Vitals  BP (!) 143/71   Pulse 90   Temp 36.5 °C (97.7 °F)   Resp 12   Ht 5' 6\" (1.676 m)   Wt 77.3 kg (170 lb 8 oz)   SpO2 100%   BMI 27.52 kg/m²       Patient is status post Procedure(s):  LEFT TOTAL KNEE REPLACEMENT. Nausea/Vomiting: Controlled. Postoperative hydration reviewed and adequate. Pain:  Pain Scale 1: Numeric (0 - 10) (04/05/21 1452)  Pain Intensity 1: 3 (04/05/21 1452)   Managed. Neurological Status:   Neuro (WDL): Exceptions to WDL (04/05/21 1430)   At baseline. Mental Status and Level of Consciousness: Baseline and appropriate for discharge. Pulmonary Status:   O2 Device: Nasal cannula (04/05/21 8451)   Adequate oxygenation and airway patent. Complications related to anesthesia: None    Post-anesthesia assessment completed. No concerns. Patient has met all discharge requirements.     Signed By: Cris Diaz MD    April 5, 2021

## 2021-04-05 NOTE — ANESTHESIA PROCEDURE NOTES
Peripheral Block    Start time: 4/5/2021 12:22 PM  End time: 4/5/2021 12:30 PM  Performed by: Jenniffer Piña MD  Authorized by: Jenniffer Piña MD       Pre-procedure: Indications: at surgeon's request and post-op pain management    Preanesthetic Checklist: site marked    Timeout Time: 12:22          Block Type:   Block Type:   Adductor canal  Laterality:  Left  Monitoring:  Standard ASA monitoring, continuous pulse ox, frequent vital sign checks, heart rate, responsive to questions and oxygen  Injection Technique:  Single shot  Procedures: ultrasound guided    Patient Position: supine  Prep: chlorhexidine    Location:  Mid thigh  Needle Type:  Stimuplex  Needle Gauge:  20 G  Needle Localization:  Ultrasound guidance    Assessment:  Number of attempts:  1  Injection Assessment:  Incremental injection every 5 mL, local visualized surrounding nerve on ultrasound, negative aspiration for blood, no intravascular symptoms, no paresthesia and ultrasound image on chart  Patient tolerance:  Patient tolerated the procedure well with no immediate complications  precedex added to injectate

## 2021-04-05 NOTE — PROGRESS NOTES
Problem: Self Care Deficits Care Plan (Adult)  Goal: *Acute Goals and Plan of Care (Insert Text)  Description: Initial Occupational Therapy Goals (4/5/2021) Within 7 day(s):    1. Patient will perform grooming standing sinkside with supervision for increased independence with ADLs. 2. Patient will perform LB dressing with supervision & A/E PRN for increased independence with ADLs. 3. Patient will perform toilet transfer with supervision for increased independence with ADLs. 4. Patient will perform all aspects of toileting with supervision for increased independence with ADLs. 5. Patient will independently apply energy conservation techniques with 1 verbal cue(s)for increased independence with ADLs. 6. Patient will perform bathroom mobility with supervision for increased independence/safety with ADLs. Outcome: Progressing Towards Goal   OCCUPATIONAL THERAPY EVALUATION    Patient: Saintclair Law (89 y.o. female)  Date: 4/5/2021  Primary Diagnosis: Localized osteoarthritis of left knee [M17.12]  Procedure(s) (LRB):  LEFT TOTAL KNEE REPLACEMENT (Left) Day of Surgery   Precautions: Fall, WBAT  PLOF: pt mod I for ADLs/functional mobility    ASSESSMENT AND RECOMMENDATIONS:  Based on the objective data described below, the patient presents with LLE decreased ROM and strength affecting LE ADLs. Pt found seated in recliner chair, vitals assessed and WNL, pt reporting pain 3/10, pt nauseous throughout session. Pt completed upper body dressing with supervision seated in chair. Patient able to utilize bending forward method with BLE for sock donning with SBA. Pt able to thread B feet through underwear/pants without assist, and CGA when standing to pull up to waist. Pt donned B shoes with cloth laces with min A to tie laces. Pt CGA/SBA for STS/bathroom mobility with occasional vc for proper body mechanics. Pt ambulated back to recliner, ice applied to L knee.  Provided opportunity for pt to voice questions on ADL performance when home, pt has no further concerns. Patient will benefit from skilled Occupational Therapy intervention to maximize safety/independence with ADLs at d/c.    Education: Reviewed home safety, body mechanics, importance of moving every hour to prevent joint stiffness, role of ice for edema/pain control, Rolling Walker management/safety, and adaptive dressing techniques with patient verbalizing  understanding at this time     Patient will benefit from skilled intervention to address the above impairments. Patient's rehabilitation potential is considered to be Good  Factors which may influence rehabilitation potential include:   [x]             None noted  []             Mental ability/status  []             Medical condition  []             Home/family situation and support systems  []             Safety awareness  []             Pain tolerance/management  []             Other:        PLAN :  Recommendations and Planned Interventions:   [x]               Self Care Training                  [x]      Therapeutic Activities  [x]               Functional Mobility Training   []      Cognitive Retraining  []               Therapeutic Exercises           []      Endurance Activities  []               Balance Training                    []      Neuromuscular Re-Education  []               Visual/Perceptual Training     [x]      Home Safety Training  [x]               Patient Education                   [x]      Family Training/Education  []               Other (comment):    Frequency/Duration: Patient will be followed by Occupational Therapy 1-2 times per day/4-7 days per week to address goals. Discharge Recommendations: Home health with adult supervision at least 24 hours after d/c  Further Equipment Recommendations for Discharge: N/A     SUBJECTIVE:   Patient stated i'm just feeling kind of sick.     OBJECTIVE DATA SUMMARY:     Past Medical History:   Diagnosis Date    Arthritis     Cardiac murmur Dyslipidemia     Heart murmur     Hx of migraines     Hypertension 2008    Nausea & vomiting     severe    Primary localized osteoarthritis of left knee 3/30/2021    Rosacea      Past Surgical History:   Procedure Laterality Date    FOOT/TOES SURGERY PROC UNLISTED      x6    HX HEENT      jaw surgery    HX HEENT      dental surgery    HX HYSTERECTOMY      due to heavy bleeding    HX KNEE ARTHROSCOPY Left     HX LUMBAR DISKECTOMY  2019    HX LUMBAR LAMINECTOMY  01/2011    L5/S1 Lami    HX LUMBAR LAMINECTOMY      left L5   redo from 1st surgery    HX OOPHORECTOMY      HX ORTHOPAEDIC Left     2nd toes joint removal    HX ORTHOPAEDIC Right 2001    repeat bunion    HX ORTHOPAEDIC Bilateral     bunions    HX ORTHOPAEDIC Left 2011    foot fusion from bunion surgery    HX TONSILLECTOMY       Barriers to Learning/Limitations: yes;  physical and altered mental status (i.e.Sedation, Confusion)  Compensate with: visual, verbal, tactile, kinesthetic cues/model    Home Situation/Prior Level of Function:   Home Situation  Home Environment: Private residence  # Steps to Enter: 4  Rails to Enter: Yes  Hand Rails : Bilateral  One/Two Story Residence: One story  Living Alone: No  Support Systems: Spouse/Significant Other/Partner  Patient Expects to be Discharged to[de-identified] Private residence  Current DME Used/Available at Home: Walker, rolling, Raised toilet seat, Shower chair, Cane, straight  Tub or Shower Type: Shower  []  Right hand dominant   []  Left hand dominant    Cognitive/Behavioral Status:  Neurologic State: Alert;Drowsy  Orientation Level: Oriented to person;Oriented to place;Oriented to situation  Cognition: Follows commands  Safety/Judgement: Awareness of environment    Skin: L knee incision w/ Mepilex   Edema: compression hose in place & applied ice     Coordination: BUE  Coordination: Within functional limits  Fine Motor Skills-Upper: Left Intact; Right Intact    Gross Motor Skills-Upper: Left Intact; Right Intact    Balance:  Sitting: Intact  Standing: Intact; With support    Strength: BUE  Strength: Generally decreased, functional    Tone & Sensation:BUE  Tone: Normal  Sensation: Impaired(L knee)    Range of Motion: BUE  AROM: Generally decreased, functional    Functional Mobility and Transfers for ADLs:  Bed Mobility:  Scooting: Stand-by assistance    Transfers:  Sit to Stand: Contact guard assistance;Stand-by assistance(vc)    ADL Assessment:  Feeding: Modified independent  Oral Facial Hygiene/Grooming: Stand-by assistance  Bathing: Contact guard assistance  Upper Body Dressing: Supervision  Lower Body Dressing: Contact guard assistance  Toileting: Stand by assistance    ADL Intervention:  Upper Body Dressing Assistance  Dressing Assistance: Supervision  Bra: Supervision  Pullover Shirt: Supervision    Lower Body Dressing Assistance  Dressing Assistance: Contact guard assistance  Underpants: Contact guard assistance  Pants With Elastic Waist: Contact guard assistance  Socks: Stand-by assistance  Shoes with Cloth Laces: Minimum assistance  Leg Crossed Method Used: No  Position Performed: Seated in chair  Cues: Verbal cues provided;Visual cues provided    Cognitive Retraining  Safety/Judgement: Awareness of environment    Pain:  Pain level pre-treatment: 3/10  Pain level post-treatment: 3/10  Pain Intervention(s): Medication administer by Nursing (see MAR); Rest, Ice, Repositioning   Response to intervention: Nurse notified, see doc flow     Activity Tolerance:   Fair. Patient able to stand ~5 minute(s). Patient able to complete ADLs with intermittent rest breaks. Patient limited by pain, strength, ROM, nausea. Patient unsteady. Please refer to the flowsheet for vital signs taken during this treatment.   After treatment:   [x]  Patient left in no apparent distress sitting up in chair  []  Patient sitting on EOB  []  Patient left in no apparent distress in bed  [x]  Call bell left within reach  [x]  Nursing notified  []  Caregiver present  [x]  Ice applied  []  SCD's on while back in bed  [] Bed alarm activated    COMMUNICATION/EDUCATION:   Communication/Collaboration:  [x]       Role of Occupational Therapy in the acute care setting. [x]      Home safety education was provided and the patient/caregiver indicated understanding. [x]      Patient/family have participated as able in goal setting and plan of care. [x]      Patient/family agree to work toward stated goals and plan of care. []      Patient understands intent and goals of therapy, but is neutral about his/her participation. []      Patient is unable to participate in plan of care at this time. Thank you for this referral.  Dede Oocnnell, OTR/L  Time Calculation: 23 mins    Eval Complexity: History: MEDIUM Complexity : Expanded review of history including physical, cognitive and psychosocial  history ; Examination: LOW Complexity : 1-3 performance deficits relating to physical, cognitive , or psychosocial skils that result in activity limitations and / or participation restrictions ;    Decision Making:LOW Complexity : No comorbidities that affect functional and no verbal or physical assistance needed to complete eval tasks

## 2021-04-05 NOTE — PERIOP NOTES
[facility] made aware that SBAR is ready for review. Patient assigned room #239.  Afshan will be the nurse

## 2021-04-05 NOTE — PROGRESS NOTES
0  Pt resting quietly in recliner  alongside     1700  Pt ambulated to bathroom and voided post op    1745  Pt passed PT and voided post op. Pt ready for d/c    1749  Dual AVS reviewed with MATTHEW chapman rn. All medications reviewed individually with patient. Opportunities for questions and concerns provided. Patient to be discharged via (mode of transport ie. Car, ambulance or air transport) car. Patient's arm band appropriately discarded.     IV removed

## 2021-04-06 NOTE — DISCHARGE SUMMARY
[address] [facility] [place] [address]     DISCHARGE SUMMARY     PATIENT: Erica Chow     MRN: [id number]   ADMIT DATE: 2021   BILLIN   DISCHARGE DATE: 2021     ATTENDING: Anthony Ramirez MD   DICTATING: MALICK Avendaño         ADMISSION DIAGNOSIS: Localized osteoarthritis of left knee [M17.12]    DISCHARGE DIAGNOSIS: Status post LEFT TOTAL KNEE ARTHROPLASTY    HISTORY OF PRESENT ILLNESS: The patient is a 77y.o. year-old female   with ongoing left knee pain secondary to osteoarthritis of her left knee. The patient's pain has persisted and progressed despite conservative treatments and therapies. The patient has at this time opted for surgical intervention.     PAST MEDICAL HISTORY:   Past Medical History:   Diagnosis Date    Arthritis     Cardiac murmur     Dyslipidemia     Heart murmur     Hx of migraines     Hypertension     Nausea & vomiting     severe    Primary localized osteoarthritis of left knee 3/30/2021    Rosacea        PAST SURGICAL HISTORY:   Past Surgical History:   Procedure Laterality Date    FOOT/TOES SURGERY PROC UNLISTED      x6    HX HEENT      jaw surgery    HX HEENT      dental surgery    HX HYSTERECTOMY      due to heavy bleeding    HX KNEE ARTHROSCOPY Left     HX LUMBAR DISKECTOMY  2019    HX LUMBAR LAMINECTOMY  2011    L5/S1 Lami    HX LUMBAR LAMINECTOMY      left L5   redo from 1st surgery    HX OOPHORECTOMY      HX ORTHOPAEDIC Left     2nd toes joint removal    HX ORTHOPAEDIC Right     repeat bunion    HX ORTHOPAEDIC Bilateral     bunions    HX ORTHOPAEDIC Left     foot fusion from bunion surgery    HX TONSILLECTOMY         ALLERGIES:   Allergies   Allergen Reactions    Penicillin G Hives    Tylenol [Acetaminophen] Other (comments)      denies recently        CURRENT MEDICATIONS:  A list of medications prior to the time of admission include:  Prior to Admission medications    Medication Sig Start Date End Date Taking? Authorizing Provider   aspirin (ASPIRIN) 325 mg tablet Take 1 Tab by mouth two (2) times a day for 21 days. 4/5/21 4/26/21 Yes Norbert Acuña PA   cefadroxil (DURICEF) 500 mg capsule Take 1 Cap by mouth two (2) times a day for 5 days. 4/5/21 4/10/21 Yes Norbert Acuña PA   meloxicam (MOBIC) 7.5 mg tablet Take 1 Tab by mouth two (2) times a day for 14 days. 4/5/21 4/19/21 Yes Norbert Acuña PA   oxyCODONE IR (ROXICODONE) 5 mg immediate release tablet Take 1 Tab by mouth every four (4) hours as needed for Pain for up to 7 days. Max Daily Amount: 30 mg. 4/5/21 4/12/21 Yes Norbert Acuña PA   OTHER,NON-FORMULARY, Bio complete 3   Pro pre post   Biotic   Yes Provider, Historical   atenoloL (TENORMIN) 50 mg tablet TAKE 1 TABLET DAILY 3/11/21  Yes Briseyda Cuenca MD   lisinopriL (PRINIVIL, ZESTRIL) 40 mg tablet TAKE 1 TABLET DAILY 3/1/21  Yes Karlee Flores MD   doxycycline (ADOXA) 50 mg tablet Take 1 Tab by mouth daily. 11/11/20  Yes Briseyda Cuenca MD   cetirizine (ZYRTEC) 10 mg tablet TAKE 1 TABLET DAILY AS NEEDED FOR ALLERGIES 10/13/20  Yes Karlee Mitchell MD   rosuvastatin (CRESTOR) 10 mg tablet TAKE 1 TABLET NIGHTLY  Patient taking differently: Take 10 mg by mouth daily. 6/8/20  Yes Briseyda Cuenca MD   clindamycin (CLEOCIN T) 1 % lotion Apply  to affected area daily. use thin film on affected area 4/28/20  Yes Briseyda Cuenca MD   rizatriptan (MAXALT-MLT) 10 mg disintegrating tablet PLACE 1 TABLET ON TONGUE ONCE AS NEEDED FOR MIGRAINE 8/30/18  Yes Karlee Flores MD   multivitamin (ONE A DAY) tablet Take 1 Tab by mouth daily. Yes Provider, Historical   fluticasone propionate (FLONASE) 50 mcg/actuation nasal spray USE 2 SPRAYS IN EACH NOSTRIL DAILY  Patient taking differently: daily as needed. 7/8/19   Karlee Flores MD   potassium 99 mg tablet Take 99 mg by mouth every seven (7) days.     Provider, Historical   ascorbic acid, vitamin C, (VITAMIN C) 500 mg tablet Take 1,000 mg by mouth daily. Takes 2000 daily    Provider, Historical   calcium citrate-vitamin D3 (CITRACAL + D) tablet Take  by mouth daily. Provider, Historical   cholecalciferol, VITAMIN D3, (VITAMIN D3) 5,000 unit tab tablet Take 1,000 Units by mouth daily. Provider, Historical   B.infantis-B.ani-B.long-B.bifi (PROBIOTIC 4X) 10-15 mg TbEC Take  by mouth daily. Provider, Historical       FAMILY HISTORY:   Family History   Problem Relation Age of Onset    Heart Attack Mother     Stroke Mother     Other Mother         CHF    Alcohol abuse Father     Other Father         CHF    Liver Disease Father        SOCIAL HISTORY:   Social History     Socioeconomic History    Marital status:      Spouse name: Not on file    Number of children: Not on file    Years of education: Not on file    Highest education level: Not on file   Tobacco Use    Smoking status: Never Smoker    Smokeless tobacco: Never Used   Substance and Sexual Activity    Alcohol use: No    Drug use: Never    Sexual activity: Yes     Partners: Male   Other Topics Concern     Service No    Blood Transfusions Yes    Caffeine Concern No    Occupational Exposure No    Hobby Hazards No    Sleep Concern No    Stress Concern No    Weight Concern No    Special Diet No    Back Care No    Exercise Yes    Bike Helmet No    Seat Belt Yes    Self-Exams Yes       REVIEW OF SYSTEMS: All review of systems are negative. PHYSICAL EXAMINATION: For a detailed physical exam, please refer to the patient's chart. HOSPITAL COURSE: The patient was taken to surgery the day of admission. she underwent a left total knee replacement. Operative course was benign. Estimated blood loss was approximately 150 cc. The patient was taken to the PACU in stable condition and was later taken to the floor in stable condition.     During her hospital stay, the patient progressed well with physical therapy and occupational therapy, adherent to instructions. she had been cleared by physical therapy with stair training. she was placed on Aspirin for DVT prophylaxis. her pain has been well controlled with oral pain medications. her vitals have remained stable. she has also remained hemodynamically stable. The patient has been recommended for discharge home. DISCHARGE INSTRUCTIONS: The patient is to be discharged home. she is to continue on her prior medications per the medication reconciliation form, to which we will add:  1. Aspirin 325 mg; 1 tablet po bid x 21 days  2. Mobic 7.5 mg; 1 tablet po bid x 14 days  3. Roxicodone 5 mg; 1 tablets p.o. every 4 hours p.r.n. for pain  4. Duricef 500 mg; 1 tablet p.o. every 12 hours x 5 days    The patient is to continue at home with home physical therapy 3 times a week to work on gait training, range of motion, strengthening, and weightbearing exercises as tolerated on her left lower extremity. The patient is to progress from a walker to a cane to complete total weightbearing as tolerable. The patient is to continue to keep her incision dry. The patient is to followup with Dr. Alia Fraga, Iman Robbins PA-C and/or Cali Rosa PA-C in the office approximately 10-14 days status post for x-rays and further evaluation.     Evangelista Pedersen 1723, [place]  4/2/89121:08 AM

## 2021-04-07 ENCOUNTER — TELEPHONE (OUTPATIENT)
Dept: OTHER | Age: 66
End: 2021-04-07

## 2021-04-07 NOTE — TELEPHONE ENCOUNTER
Post Discharge Phone placed after Joint Replacement surgery   Spoke with [address] [facility]    Patient general feeling since discharge okay at home. Pain Control (0-10 score): 10  Pain management reviewed: How      Reviewed pain medications including Tylenol, Meloxicam and Asprin. Patient states swelling is manageable. Patient reminded to elevate leg above heart level and use ice to help with swelling and pain relief. Reviewed walking every hour while awake     PT has not been out to the house. Marleen has contacted her about the paperwork being sent to them so should reach out. Denies N/V, appetite is fair. Reviewed the importance of eating  healthy to heal and have energy. Constipation is being managed with Miralax. Reviewed the importance of good nutrition to heal and have energy. Patient states dressing is intact without drainage.    Denies fever, cough, chest pain, SOB  Denies any unusual symptoms    Follow up appointment is scheduled with surgeon     States discharge instructions were clear and easy to understand has no questions    Discussed calling surgeon or PCP for concerns    Reminded patient to fill out survey    Opportunity given for patient to ask questions

## 2021-08-13 ENCOUNTER — TRANSCRIBE ORDER (OUTPATIENT)
Dept: SCHEDULING | Age: 66
End: 2021-08-13

## 2021-08-13 DIAGNOSIS — Z12.31 SCREENING MAMMOGRAM FOR HIGH-RISK PATIENT: Primary | ICD-10-CM

## 2021-08-16 DIAGNOSIS — L71.9 ROSACEA: ICD-10-CM

## 2021-08-20 RX ORDER — DOXYCYCLINE 50 MG/1
TABLET ORAL
Qty: 90 TABLET | Refills: 3 | Status: SHIPPED | OUTPATIENT
Start: 2021-08-20 | End: 2022-08-24

## 2021-09-07 ENCOUNTER — HOSPITAL ENCOUNTER (OUTPATIENT)
Dept: MAMMOGRAPHY | Age: 66
Discharge: HOME | End: 2021-09-07
Attending: FAMILY MEDICINE
Payer: MEDICARE

## 2021-09-07 DIAGNOSIS — Z12.31 SCREENING MAMMOGRAM FOR HIGH-RISK PATIENT: ICD-10-CM

## 2021-09-07 PROCEDURE — 77063 BREAST TOMOSYNTHESIS BI: CPT

## 2021-09-27 ENCOUNTER — TELEPHONE (OUTPATIENT)
Dept: FAMILY MEDICINE CLINIC | Age: 66
End: 2021-09-27

## 2021-09-27 NOTE — TELEPHONE ENCOUNTER
Pt want to know if this office do the Colour guard kit. Ms Hieu Block wanted to come by the office tomorrow to pick it up.  Please contact this patient to inform once available

## 2021-09-30 DIAGNOSIS — Z12.11 SCREEN FOR COLON CANCER: Primary | ICD-10-CM

## 2021-09-30 NOTE — TELEPHONE ENCOUNTER
I have placed a request for Cologuard kit to screen for colon cancer. Kit will be sent to the patient with instructions.   Dionisio Leahy MD

## 2021-10-13 ENCOUNTER — OFFICE VISIT (OUTPATIENT)
Dept: FAMILY MEDICINE CLINIC | Age: 66
End: 2021-10-13
Payer: MEDICARE

## 2021-10-13 ENCOUNTER — HOSPITAL ENCOUNTER (OUTPATIENT)
Dept: LAB | Age: 66
Discharge: HOME | End: 2021-10-13
Payer: MEDICARE

## 2021-10-13 VITALS
HEIGHT: 66 IN | SYSTOLIC BLOOD PRESSURE: 133 MMHG | WEIGHT: 169 LBS | DIASTOLIC BLOOD PRESSURE: 88 MMHG | TEMPERATURE: 97.3 F | RESPIRATION RATE: 18 BRPM | OXYGEN SATURATION: 97 % | HEART RATE: 65 BPM | BODY MASS INDEX: 27.16 KG/M2

## 2021-10-13 DIAGNOSIS — E78.5 DYSLIPIDEMIA: ICD-10-CM

## 2021-10-13 DIAGNOSIS — R73.9 HYPERGLYCEMIA: ICD-10-CM

## 2021-10-13 DIAGNOSIS — Z00.00 INITIAL MEDICARE ANNUAL WELLNESS VISIT: ICD-10-CM

## 2021-10-13 DIAGNOSIS — G89.29 CHRONIC MIDLINE LOW BACK PAIN WITH SCIATICA, SCIATICA LATERALITY UNSPECIFIED: ICD-10-CM

## 2021-10-13 DIAGNOSIS — E66.3 OVERWEIGHT (BMI 25.0-29.9): ICD-10-CM

## 2021-10-13 DIAGNOSIS — Z71.89 ADVANCED DIRECTIVES, COUNSELING/DISCUSSION: ICD-10-CM

## 2021-10-13 DIAGNOSIS — M54.40 CHRONIC MIDLINE LOW BACK PAIN WITH SCIATICA, SCIATICA LATERALITY UNSPECIFIED: ICD-10-CM

## 2021-10-13 DIAGNOSIS — I10 ESSENTIAL HYPERTENSION: Primary | ICD-10-CM

## 2021-10-13 DIAGNOSIS — I10 ESSENTIAL HYPERTENSION: ICD-10-CM

## 2021-10-13 DIAGNOSIS — G43.909 MIGRAINE WITHOUT STATUS MIGRAINOSUS, NOT INTRACTABLE, UNSPECIFIED MIGRAINE TYPE: ICD-10-CM

## 2021-10-13 DIAGNOSIS — R01.1 CARDIAC MURMUR: ICD-10-CM

## 2021-10-13 LAB
ALBUMIN SERPL-MCNC: 3.7 G/DL (ref 3.4–5)
ALBUMIN/GLOB SERPL: 1.1 {RATIO} (ref 0.8–1.7)
ALP SERPL-CCNC: 113 U/L (ref 45–117)
ALT SERPL-CCNC: 36 U/L (ref 13–56)
ANION GAP SERPL CALC-SCNC: 4 MMOL/L (ref 3–18)
AST SERPL-CCNC: 23 U/L (ref 10–38)
BASOPHILS # BLD: 0 K/UL (ref 0–0.1)
BASOPHILS NFR BLD: 1 % (ref 0–2)
BILIRUB SERPL-MCNC: 0.4 MG/DL (ref 0.2–1)
BUN SERPL-MCNC: 11 MG/DL (ref 7–18)
BUN/CREAT SERPL: 20 (ref 12–20)
CALCIUM SERPL-MCNC: 9.3 MG/DL (ref 8.5–10.1)
CHLORIDE SERPL-SCNC: 108 MMOL/L (ref 100–111)
CHOLEST SERPL-MCNC: 195 MG/DL
CO2 SERPL-SCNC: 29 MMOL/L (ref 21–32)
CREAT SERPL-MCNC: 0.54 MG/DL (ref 0.6–1.3)
DIFFERENTIAL METHOD BLD: ABNORMAL
EOSINOPHIL # BLD: 0.2 K/UL (ref 0–0.4)
EOSINOPHIL NFR BLD: 3 % (ref 0–5)
ERYTHROCYTE [DISTWIDTH] IN BLOOD BY AUTOMATED COUNT: 15.2 % (ref 11.6–14.5)
EST. AVERAGE GLUCOSE BLD GHB EST-MCNC: 114 MG/DL
GLOBULIN SER CALC-MCNC: 3.3 G/DL (ref 2–4)
GLUCOSE SERPL-MCNC: 96 MG/DL (ref 74–99)
HBA1C MFR BLD: 5.6 % (ref 4.2–5.6)
HCT VFR BLD AUTO: 41.5 % (ref 35–45)
HDLC SERPL-MCNC: 79 MG/DL (ref 40–60)
HDLC SERPL: 2.5 {RATIO} (ref 0–5)
HGB BLD-MCNC: 13.2 G/DL (ref 12–16)
LDLC SERPL CALC-MCNC: 100.4 MG/DL (ref 0–100)
LIPID PROFILE,FLP: ABNORMAL
LYMPHOCYTES # BLD: 1.5 K/UL (ref 0.9–3.6)
LYMPHOCYTES NFR BLD: 25 % (ref 21–52)
MCH RBC QN AUTO: 27.6 PG (ref 24–34)
MCHC RBC AUTO-ENTMCNC: 31.8 G/DL (ref 31–37)
MCV RBC AUTO: 86.6 FL (ref 78–100)
MONOCYTES # BLD: 0.6 K/UL (ref 0.05–1.2)
MONOCYTES NFR BLD: 10 % (ref 3–10)
NEUTS SEG # BLD: 3.7 K/UL (ref 1.8–8)
NEUTS SEG NFR BLD: 61 % (ref 40–73)
PLATELET # BLD AUTO: 256 K/UL (ref 135–420)
PMV BLD AUTO: 10.1 FL (ref 9.2–11.8)
POTASSIUM SERPL-SCNC: 4.4 MMOL/L (ref 3.5–5.5)
PROT SERPL-MCNC: 7 G/DL (ref 6.4–8.2)
RBC # BLD AUTO: 4.79 M/UL (ref 4.2–5.3)
SODIUM SERPL-SCNC: 141 MMOL/L (ref 136–145)
TRIGL SERPL-MCNC: 78 MG/DL (ref ?–150)
VLDLC SERPL CALC-MCNC: 15.6 MG/DL
WBC # BLD AUTO: 6 K/UL (ref 4.6–13.2)

## 2021-10-13 PROCEDURE — 1090F PRES/ABSN URINE INCON ASSESS: CPT | Performed by: FAMILY MEDICINE

## 2021-10-13 PROCEDURE — 99214 OFFICE O/P EST MOD 30 MIN: CPT | Performed by: FAMILY MEDICINE

## 2021-10-13 PROCEDURE — G0438 PPPS, INITIAL VISIT: HCPCS | Performed by: FAMILY MEDICINE

## 2021-10-13 PROCEDURE — 83036 HEMOGLOBIN GLYCOSYLATED A1C: CPT

## 2021-10-13 PROCEDURE — G8752 SYS BP LESS 140: HCPCS | Performed by: FAMILY MEDICINE

## 2021-10-13 PROCEDURE — 3017F COLORECTAL CA SCREEN DOC REV: CPT | Performed by: FAMILY MEDICINE

## 2021-10-13 PROCEDURE — 80053 COMPREHEN METABOLIC PANEL: CPT

## 2021-10-13 PROCEDURE — 80061 LIPID PANEL: CPT

## 2021-10-13 PROCEDURE — G9899 SCRN MAM PERF RSLTS DOC: HCPCS | Performed by: FAMILY MEDICINE

## 2021-10-13 PROCEDURE — 85025 COMPLETE CBC W/AUTO DIFF WBC: CPT

## 2021-10-13 PROCEDURE — 36415 COLL VENOUS BLD VENIPUNCTURE: CPT

## 2021-10-13 PROCEDURE — G8510 SCR DEP NEG, NO PLAN REQD: HCPCS | Performed by: FAMILY MEDICINE

## 2021-10-13 PROCEDURE — G8419 CALC BMI OUT NRM PARAM NOF/U: HCPCS | Performed by: FAMILY MEDICINE

## 2021-10-13 PROCEDURE — G8536 NO DOC ELDER MAL SCRN: HCPCS | Performed by: FAMILY MEDICINE

## 2021-10-13 PROCEDURE — 1101F PT FALLS ASSESS-DOCD LE1/YR: CPT | Performed by: FAMILY MEDICINE

## 2021-10-13 PROCEDURE — G8399 PT W/DXA RESULTS DOCUMENT: HCPCS | Performed by: FAMILY MEDICINE

## 2021-10-13 PROCEDURE — G8754 DIAS BP LESS 90: HCPCS | Performed by: FAMILY MEDICINE

## 2021-10-13 PROCEDURE — G8427 DOCREV CUR MEDS BY ELIG CLIN: HCPCS | Performed by: FAMILY MEDICINE

## 2021-10-13 NOTE — ACP (ADVANCE CARE PLANNING)
Advance Care Planning     Advance Care Planning (ACP) Physician/NP/PA Conversation      Date of Conversation: 10/13/2021  Conducted with: Patient with Decision Making Capacity    Healthcare Decision Maker:     Primary Decision Maker (Active): Low Luevano - [facility] - [phone]  Click here to complete [address] including selection of the Healthcare Decision Maker Relationship (ie \"Primary\")      Today we documented Decision Maker(s) consistent with ACP documents on file.       Conversation Outcomes / Follow-Up Plan:   ACP complete - no further action today    Length of Voluntary ACP Conversation in minutes:  <16 minutes (Non-Billable)    Karlee Parson MD

## 2021-10-13 NOTE — PROGRESS NOTES
This is an Initial Medicare Annual Wellness Exam (AWV) (Performed 12 months after IPPE or effective date of Medicare Part B enrollment, Once in a lifetime)    I have reviewed the patient's medical history in detail and updated the computerized patient record. Chief Complaint   Patient presents with   Adán Barker Annual Wellness Visit     1. Have you been to the ER, urgent care clinic since your last visit? Hospitalized since your last visit? No    2. Have you seen or consulted any other health care providers outside of the [address] [place] [address] since your last visit? Include any pap smears or colon screening. No      Assessment/Plan   Education and counseling provided:  Are appropriate based on today's review and evaluation  Influenza Vaccine    1. Initial Medicare annual wellness visit    2. Advanced directives, counseling/discussion       Depression Risk Factor Screening     3 most recent PHQ Screens 10/13/2021   PHQ Not Done -   Little interest or pleasure in doing things Not at all   Feeling down, depressed, irritable, or hopeless Not at all   Total Score PHQ 2 0   Trouble falling or staying asleep, or sleeping too much Nearly every day   Feeling tired or having little energy Not at all   Poor appetite, weight loss, or overeating Not at all   Feeling bad about yourself - or that you are a failure or have let yourself or your family down Not at all   Trouble concentrating on things such as school, work, reading, or watching TV Not at all   Moving or speaking so slowly that other people could have noticed; or the opposite being so fidgety that others notice Not at all   Thoughts of being better off dead, or hurting yourself in some way Not at all   PHQ 9 Score 3   How difficult have these problems made it for you to do your work, take care of your home and get along with others Not difficult at all   8 minutes spent on depression screening.     Alcohol Risk Screen    Do you average more than 1 drink per night or more than 7 drinks a week:  No    On any one occasion in the past three months have you have had more than 3 drinks containing alcohol:  No         Functional Ability and Level of Safety         1. Was the patient's timed Up and GO test unsteady or longer than 30 seconds? No  2. Does the patient need help with the phone, transportation, shopping, preparing meals, housework, laundry, medications or managing money? No  3. Does the patients' home have rugs in the hallway, lack grab bars in the bathroom, lack handrails on the stairs or have poor lighting? No  4. Have you noticed any hearing difficulties? No  Hearing Evaluation:  Hearing: Hearing is good. Activities of Daily Living: The home contains: no safety equipment. Patient does total self care     Ambulation: with no difficulty      Fall Risk:  Fall Risk Assessment, last 12 mths 10/13/2021   Able to walk? Yes   Fall in past 12 months? 0   Do you feel unsteady?  0   Are you worried about falling 0      Abuse Screen:  Patient is not abused       Cognitive Screening    Has your family/caregiver stated any concerns about your memory: no     Cognitive Screening: Normal - Verbal Fluency Test    Health Maintenance Due     Health Maintenance Due   Topic Date Due    COVID-19 Vaccine (1) Never done    Shingrix Vaccine Age 50> (1 of 2) Never done    Flu Vaccine (1) 09/01/2021    Medicare Yearly Exam  10/08/2021    Colorectal Cancer Screening Combo  10/14/2021       Patient Care Team   Patient Care Team:  Joann Smith MD as PCP - General (Family Medicine)  Joann Smith MD as PCP - [address] [place] [address] [facility]  Katie Colmenares MD (Orthopedic Surgery)  Fredrick Palma MD as Physician (Physical Medicine and Rehabilitation)    Mara Whittington comes in for Medicare wellness exam.    Patient Active Problem List   Diagnosis Code    Migraine without status migrainosus, not intractable G43.909    Elevated BP without diagnosis of hypertension R03.0    Essential hypertension I10    Rosacea L71.9    Dyslipidemia E78.5    Spinal stenosis M48.00    Synovial cyst M71.30    HNP (herniated nucleus pulposus), lumbar M51.26    Primary localized osteoarthritis of left knee M17.12     Past Medical History:   Diagnosis Date    Arthritis     Cardiac murmur     Dyslipidemia     Heart murmur     Hx of migraines     Hypertension 2008    Nausea & vomiting     severe    Primary localized osteoarthritis of left knee 3/30/2021    Rosacea       Past Surgical History:   Procedure Laterality Date    FOOT/TOES SURGERY PROC UNLISTED      x6    HX HEENT      jaw surgery    HX HEENT      dental surgery    HX HYSTERECTOMY      due to heavy bleeding    HX KNEE ARTHROSCOPY Left     HX LUMBAR DISKECTOMY  2019    HX LUMBAR LAMINECTOMY  01/2011    L5/S1 Lami    HX LUMBAR LAMINECTOMY      left L5   redo from 1st surgery    HX OOPHORECTOMY      HX ORTHOPAEDIC Left     2nd toes joint removal    HX ORTHOPAEDIC Right 2001    repeat bunion    HX ORTHOPAEDIC Bilateral     bunions    HX ORTHOPAEDIC Left 2011    foot fusion from bunion surgery    HX TONSILLECTOMY       Current Outpatient Medications   Medication Sig Dispense Refill    doxycycline (ADOXA) 50 mg tablet TAKE 1 TABLET DAILY 90 Tablet 3    OTHER,NON-FORMULARY, Bio complete 3   Pro pre post   Biotic      atenoloL (TENORMIN) 50 mg tablet TAKE 1 TABLET DAILY 90 Tab 3    lisinopriL (PRINIVIL, ZESTRIL) 40 mg tablet TAKE 1 TABLET DAILY 90 Tab 3    cetirizine (ZYRTEC) 10 mg tablet TAKE 1 TABLET DAILY AS NEEDED FOR ALLERGIES 90 Tab 3    rosuvastatin (CRESTOR) 10 mg tablet TAKE 1 TABLET NIGHTLY (Patient taking differently: Take 10 mg by mouth daily.) 90 Tab 3    clindamycin (CLEOCIN T) 1 % lotion Apply  to affected area daily.  use thin film on affected area 1 Bottle 3    fluticasone propionate (FLONASE) 50 mcg/actuation nasal spray USE 2 SPRAYS IN EACH NOSTRIL DAILY (Patient taking differently: daily as needed.) 32 g 0    rizatriptan (MAXALT-MLT) 10 mg disintegrating tablet PLACE 1 TABLET ON TONGUE ONCE AS NEEDED FOR MIGRAINE 30 Tab 0    potassium 99 mg tablet Take 99 mg by mouth every seven (7) days.  ascorbic acid, vitamin C, (VITAMIN C) 500 mg tablet Take 1,000 mg by mouth daily. Takes 2000 daily      calcium citrate-vitamin D3 (CITRACAL + D) tablet Take  by mouth daily.  multivitamin (ONE A DAY) tablet Take 1 Tab by mouth daily.  cholecalciferol, VITAMIN D3, (VITAMIN D3) 5,000 unit tab tablet Take 1,000 Units by mouth daily.  B.infantis-B.ani-B.long-B.bifi (PROBIOTIC 4X) 10-15 mg TbEC Take  by mouth daily. Allergies   Allergen Reactions    Penicillin G Hives    Tylenol [Acetaminophen] Other (comments)      denies recently       Family History   Problem Relation Age of Onset    Heart Attack Mother     Stroke Mother     Other Mother         CHF    Alcohol abuse Father     Other Father         CHF    Liver Disease Father      Social History     Tobacco Use    Smoking status: Never Smoker    Smokeless tobacco: Never Used   Substance Use Topics    Alcohol use: No   I have discussed the diagnosis with the patient and the intended plan of care as seen in the above orders. The patient has received an after-visit summary and questions were answered concerning future plans. I have discussed medication, side effects, and warnings with the patient in detail. The patient verbalized understanding and is in agreement with the plan of care. The patient will contact the office with any additional concerns. Personalized preventative plan of care was discussed, printed and given to patient.     Karen Schultz MD

## 2021-10-13 NOTE — PATIENT INSTRUCTIONS
Medicare Wellness Visit, Female     The best way to live healthy is to have a lifestyle where you eat a well-balanced diet, exercise regularly, limit alcohol use, and quit all forms of tobacco/nicotine, if applicable. Regular preventive services are another way to keep healthy. Preventive services (vaccines, screening tests, monitoring & exams) can help personalize your care plan, which helps you manage your own care. Screening tests can find health problems at the earliest stages, when they are easiest to treat. [facility] follows the current, evidence-based guidelines published by the [facility]ian Gonz[facility]les ([facility]) when recommending preventive services for our patients. Because we follow these guidelines, sometimes recommendations change over time as research supports it. (For example, mammograms used to be recommended annually. Even though Medicare will still pay for an annual mammogram, the newer guidelines recommend a mammogram every two years for women of average risk). Of course, you and your doctor may decide to screen more often for some diseases, based on your risk and your co-morbidities (chronic disease you are already diagnosed with). Preventive services for you include:  - Medicare offers their members a free annual wellness visit, which is time for you and your primary care provider to discuss and plan for your preventive service needs. Take advantage of this benefit every year!  -All adults over the age of 72 should receive the recommended pneumonia vaccines. Current USPSTF guidelines recommend a series of two vaccines for the best pneumonia protection.   -All adults should have a flu vaccine yearly and a tetanus vaccine every 10 years.   -All adults age 48 and older should receive the shingles vaccines (series of two vaccines).       -All adults age 38-68 who are overweight should have a diabetes screening test once every three years.   -All adults born between [birth year] and 1965 should be screened once for Hepatitis C.  -Other screening tests and preventive services for persons with diabetes include: an eye exam to screen for diabetic retinopathy, a kidney function test, a foot exam, and stricter control over your cholesterol.   -Cardiovascular screening for adults with routine risk involves an electrocardiogram (ECG) at intervals determined by your doctor.   -Colorectal cancer screenings should be done for adults age 54-65 with no increased risk factors for colorectal cancer. There are a number of acceptable methods of screening for this type of cancer. Each test has its own benefits and drawbacks. Discuss with your doctor what is most appropriate for you during your annual wellness visit. The different tests include: colonoscopy (considered the best screening method), a fecal occult blood test, a fecal DNA test, and sigmoidoscopy.    -A bone mass density test is recommended when a woman turns 65 to screen for osteoporosis. This test is only recommended one time, as a screening. Some providers will use this same test as a disease monitoring tool if you already have osteoporosis. -Breast cancer screenings are recommended every other year for women of normal risk, age 54-69.  -Cervical cancer screenings for women over age 72 are only recommended with certain risk factors. Here is a list of your current Health Maintenance items (your personalized list of preventive services) with a due date:  Health Maintenance Due   Topic Date Due    COVID-19 Vaccine (1) Never done    Shingles Vaccine (1 of 2) Never done    Yearly Flu Vaccine (1) 09/01/2021    Annual Well Visit  10/08/2021    Colorectal Screening  10/14/2021         Medicare Wellness Visit, Female     The best way to live healthy is to have a lifestyle where you eat a well-balanced diet, exercise regularly, limit alcohol use, and quit all forms of tobacco/nicotine, if applicable.      Regular preventive services are another way to keep healthy. Preventive services (vaccines, screening tests, monitoring & exams) can help personalize your care plan, which helps you manage your own care. Screening tests can find health problems at the earliest stages, when they are easiest to treat. [facility] follows the current, evidence-based guidelines published by the [facility] Gonz[facility]les ([facility]) when recommending preventive services for our patients. Because we follow these guidelines, sometimes recommendations change over time as research supports it. (For example, mammograms used to be recommended annually. Even though Medicare will still pay for an annual mammogram, the newer guidelines recommend a mammogram every two years for women of average risk). Of course, you and your doctor may decide to screen more often for some diseases, based on your risk and your co-morbidities (chronic disease you are already diagnosed with). Preventive services for you include:  - Medicare offers their members a free annual wellness visit, which is time for you and your primary care provider to discuss and plan for your preventive service needs. Take advantage of this benefit every year!  -All adults over the age of 72 should receive the recommended pneumonia vaccines. Current USPSTF guidelines recommend a series of two vaccines for the best pneumonia protection.   -All adults should have a flu vaccine yearly and a tetanus vaccine every 10 years.   -All adults age 48 and older should receive the shingles vaccines (series of two vaccines).       -All adults age 38-68 who are overweight should have a diabetes screening test once every three years.   -All adults born between 80 and 1965 should be screened once for Hepatitis C.  -Other screening tests and preventive services for persons with diabetes include: an eye exam to screen for diabetic retinopathy, a kidney function test, a foot exam, and stricter control over your cholesterol.   -Cardiovascular screening for adults with routine risk involves an electrocardiogram (ECG) at intervals determined by your doctor.   -Colorectal cancer screenings should be done for adults age 54-65 with no increased risk factors for colorectal cancer. There are a number of acceptable methods of screening for this type of cancer. Each test has its own benefits and drawbacks. Discuss with your doctor what is most appropriate for you during your annual wellness visit. The different tests include: colonoscopy (considered the best screening method), a fecal occult blood test, a fecal DNA test, and sigmoidoscopy.    -A bone mass density test is recommended when a woman turns 65 to screen for osteoporosis. This test is only recommended one time, as a screening. Some providers will use this same test as a disease monitoring tool if you already have osteoporosis. -Breast cancer screenings are recommended every other year for women of normal risk, age 54-69.  -Cervical cancer screenings for women over age 72 are only recommended with certain risk factors.      Here is a list of your current Health Maintenance items (your personalized list of preventive services) with a due date:  Health Maintenance Due   Topic Date Due    Shingles Vaccine (1 of 2) Never done    Yearly Flu Vaccine (1) 09/01/2021    Colorectal Screening  10/14/2021

## 2021-10-13 NOTE — PROGRESS NOTES
[facility]  Clive David comes in for f/u care. HTN: Patient has hypertension. She is on atenolol and lisinopril. Denies headache, changes in vision or focal weakness. We will continue with the current treatment plan. We will check labs today. Dyslipidemia: Patient has dyslipidemia. She is on Crestor. Stable on medication. She continues to exercise and take a diet low in polysaturated fats. We will recheck lipid panel today. Cardiac murmur: Patient has a history of cardiac murmur. Has been stable. Denies chest pain, shortness of breath or diaphoresis. Denies syncope. Headache: Patient has a history of migraine headache. She takes Maxalt as needed for this. Currently stable. Overweight: Patient has a BMI of 27.28. She will intensify lifestyle and dietary modification. Low back pain: Patient has a history of chronic low back pain and has had back surgery done in the past.  Currently stable. Health maintenance: Patient has received the flu vaccine, COVID-19 vaccine, shingles vaccine. She recently did a Cologuard. We will follow up with the results.       Past Medical History  Past Medical History:   Diagnosis Date    Arthritis     Cardiac murmur     Dyslipidemia     Heart murmur     Hx of migraines     Hypertension 2008    Nausea & vomiting     severe    Primary localized osteoarthritis of left knee 3/30/2021    Rosacea        Surgical History  Past Surgical History:   Procedure Laterality Date    FOOT/TOES SURGERY PROC UNLISTED      x6    HX HEENT      jaw surgery    HX HEENT      dental surgery    HX HYSTERECTOMY      due to heavy bleeding    HX KNEE ARTHROSCOPY Left     HX LUMBAR DISKECTOMY  2019    HX LUMBAR LAMINECTOMY  01/2011    L5/S1 Lami    HX LUMBAR LAMINECTOMY      left L5   redo from 1st surgery    HX OOPHORECTOMY      HX ORTHOPAEDIC Left     2nd toes joint removal    HX ORTHOPAEDIC Right 2001    repeat bunion    HX ORTHOPAEDIC Bilateral     bunions    HX ORTHOPAEDIC Left 2011    foot fusion from bunion surgery    HX TONSILLECTOMY          Medications  Current Outpatient Medications   Medication Sig Dispense Refill    doxycycline (ADOXA) 50 mg tablet TAKE 1 TABLET DAILY 90 Tablet 3    OTHER,NON-FORMULARY, Bio complete 3   Pro pre post   Biotic      atenoloL (TENORMIN) 50 mg tablet TAKE 1 TABLET DAILY 90 Tab 3    lisinopriL (PRINIVIL, ZESTRIL) 40 mg tablet TAKE 1 TABLET DAILY 90 Tab 3    cetirizine (ZYRTEC) 10 mg tablet TAKE 1 TABLET DAILY AS NEEDED FOR ALLERGIES 90 Tab 3    rosuvastatin (CRESTOR) 10 mg tablet TAKE 1 TABLET NIGHTLY (Patient taking differently: Take 10 mg by mouth daily.) 90 Tab 3    clindamycin (CLEOCIN T) 1 % lotion Apply  to affected area daily. use thin film on affected area 1 Bottle 3    fluticasone propionate (FLONASE) 50 mcg/actuation nasal spray USE 2 SPRAYS IN EACH NOSTRIL DAILY (Patient taking differently: daily as needed.) 32 g 0    rizatriptan (MAXALT-MLT) 10 mg disintegrating tablet PLACE 1 TABLET ON TONGUE ONCE AS NEEDED FOR MIGRAINE 30 Tab 0    potassium 99 mg tablet Take 99 mg by mouth every seven (7) days.  ascorbic acid, vitamin C, (VITAMIN C) 500 mg tablet Take 1,000 mg by mouth daily. Takes 2000 daily      calcium citrate-vitamin D3 (CITRACAL + D) tablet Take  by mouth daily.  multivitamin (ONE A DAY) tablet Take 1 Tab by mouth daily.  cholecalciferol, VITAMIN D3, (VITAMIN D3) 5,000 unit tab tablet Take 1,000 Units by mouth daily.  B.infantis-B.ani-B.long-B.bifi (PROBIOTIC 4X) 10-15 mg TbEC Take  by mouth daily.          Allergies  Allergies   Allergen Reactions    Penicillin G Hives    Tylenol [Acetaminophen] Other (comments)      denies recently       Family History  Family History   Problem Relation Age of Onset    Heart Attack Mother     Stroke Mother     Other Mother         CHF    Alcohol abuse Father     Other Father         CHF    Liver Disease Father        Social History  Social History     Socioeconomic History    Marital status:      Spouse name: Not on file    Number of children: Not on file    Years of education: Not on file    Highest education level: Not on file   Occupational History    Not on file   Tobacco Use    Smoking status: Never Smoker    Smokeless tobacco: Never Used   Vaping Use    Vaping Use: Never used   Substance and Sexual Activity    Alcohol use: No    Drug use: Never    Sexual activity: Yes     Partners: Male   Other Topics Concern     Service No    Blood Transfusions Yes    Caffeine Concern No    Occupational Exposure No    Hobby Hazards No    Sleep Concern No    Stress Concern No    Weight Concern No    Special Diet No    Back Care No    Exercise Yes    Bike Helmet No    Seat Belt Yes    Self-Exams Yes   Social History Narrative    Not on file     Social Determinants of Health     Financial Resource Strain:     Difficulty of Paying Living Expenses:    Food Insecurity:     Worried About Running Out of Food in the Last Year:     Ran Out of Food in the Last Year:    Transportation Needs:     Lack of Transportation (Medical):  Lack of Transportation (Non-Medical):    Physical Activity:     Days of Exercise per Week:     Minutes of Exercise per Session:    Stress:     Feeling of Stress :    Social Connections:     Frequency of Communication with Friends and Family:     Frequency of Social Gatherings with Friends and Family:     Attends Jewish Services:     Active Member of Clubs or Organizations:     Attends Club or Organization Meetings:     Marital Status:    Intimate Partner Violence:     Fear of Current or Ex-Partner:     Emotionally Abused:     Physically Abused:     Sexually Abused:        Review of Systems  Review of Systems - Review of all systems is negative except as noted above in the HPI.     Vital Signs  Visit Vitals  /88   Pulse 65   Temp 97.3 °F (36.3 °C) (Oral)   Resp 18   Ht 5' 6\" (1.676 m)   Wt 169 lb (76.7 kg)   SpO2 97%   BMI 27.28 kg/m²         Physical Exam  Physical Examination: General appearance - alert, well appearing, and in no distress, oriented to person, place, and time and acyanotic, in no respiratory distress  Mental status - alert, oriented to person, place, and time  Neck - supple, no significant adenopathy  Lymphatics - no palpable lymphadenopathy, no hepatosplenomegaly  Chest - no tachypnea, retractions or cyanosis  Heart - S1 and S2 normal  Abdomen - no rebound tenderness noted  Back exam - limited range of motion  Neurological - neck supple without rigidity  Musculoskeletal - osteoarthritic changes noted in both hands  Extremities - no pedal edema noted, intact peripheral pulses      Results  Results for orders placed or performed during the hospital encounter of 04/05/21   GLUCOSE, POC   Result Value Ref Range    Glucose (POC) 98 70 - 110 mg/dL   TYPE & SCREEN   Result Value Ref Range    Crossmatch Expiration 04/08/2021,2359     ABO/Rh(D) O POSITIVE     Antibody screen NEG        ASSESSMENT and PLAN    ICD-10-CM ICD-9-CM    1. Essential hypertension  F76 202.4 METABOLIC PANEL, COMPREHENSIVE      CBC WITH AUTOMATED DIFF   2. Dyslipidemia  E78.5 272.4 LIPID PANEL   3. Hyperglycemia  R73.9 790.29 HEMOGLOBIN A1C WITH EAG   4. Cardiac murmur  R01.1 785.2    5. Migraine without status migrainosus, not intractable, unspecified migraine type  G43.909 346.90    6. Chronic midline low back pain with sciatica, sciatica laterality unspecified  M54.40 724.2     G89.29 724.3      338.29    7. Overweight (BMI 25.0-29. 9)  E66.3 278.02    8. Initial Medicare annual wellness visit  Z00.00 V70.0    9.  Advanced directives, counseling/discussion  Z71.89 V65.49      lab results and schedule of future lab studies reviewed with patient  reviewed diet, exercise and weight control  cardiovascular risk and specific lipid/LDL goals reviewed  reviewed medications and side effects in detail      I have discussed the diagnosis with the patient and the intended plan of care as seen in the above orders. The patient has received an after-visit summary and questions were answered concerning future plans. I have discussed medication, side effects, and warnings with the patient in detail. The patient verbalized understanding and is in agreement with the plan of care. The patient will contact the office with any additional concerns. Pamla Kawasaki, MD    PLEASE NOTE:   This document has been produced using voice recognition software.  Unrecognized errors in transcription may be present

## 2022-01-14 ENCOUNTER — TELEPHONE (OUTPATIENT)
Dept: FAMILY MEDICINE CLINIC | Age: 67
End: 2022-01-14

## 2022-01-14 NOTE — TELEPHONE ENCOUNTER
Patient was scheduled to have a procedure done and blood pressure was 180/110,  After taking three times. Advised that the PCP was out of the office today but recommended having her go to the ED for evaluation and treatment then follow up with PCP.

## 2022-01-17 ENCOUNTER — OFFICE VISIT (OUTPATIENT)
Dept: FAMILY MEDICINE CLINIC | Age: 67
End: 2022-01-17
Payer: MEDICARE

## 2022-01-17 VITALS
WEIGHT: 177 LBS | RESPIRATION RATE: 18 BRPM | SYSTOLIC BLOOD PRESSURE: 157 MMHG | BODY MASS INDEX: 27.78 KG/M2 | HEIGHT: 67 IN | DIASTOLIC BLOOD PRESSURE: 94 MMHG | OXYGEN SATURATION: 98 % | HEART RATE: 79 BPM | TEMPERATURE: 98.1 F

## 2022-01-17 DIAGNOSIS — R07.9 CHEST PAIN, UNSPECIFIED TYPE: ICD-10-CM

## 2022-01-17 DIAGNOSIS — F41.9 ANXIETY: ICD-10-CM

## 2022-01-17 DIAGNOSIS — R01.1 CARDIAC MURMUR: ICD-10-CM

## 2022-01-17 DIAGNOSIS — I10 ESSENTIAL HYPERTENSION: Primary | ICD-10-CM

## 2022-01-17 DIAGNOSIS — E78.5 DYSLIPIDEMIA: ICD-10-CM

## 2022-01-17 DIAGNOSIS — G43.909 MIGRAINE WITHOUT STATUS MIGRAINOSUS, NOT INTRACTABLE, UNSPECIFIED MIGRAINE TYPE: ICD-10-CM

## 2022-01-17 DIAGNOSIS — M25.50 ARTHRALGIA, UNSPECIFIED JOINT: ICD-10-CM

## 2022-01-17 PROCEDURE — G8510 SCR DEP NEG, NO PLAN REQD: HCPCS | Performed by: FAMILY MEDICINE

## 2022-01-17 PROCEDURE — 3017F COLORECTAL CA SCREEN DOC REV: CPT | Performed by: FAMILY MEDICINE

## 2022-01-17 PROCEDURE — 1101F PT FALLS ASSESS-DOCD LE1/YR: CPT | Performed by: FAMILY MEDICINE

## 2022-01-17 PROCEDURE — G8754 DIAS BP LESS 90: HCPCS | Performed by: FAMILY MEDICINE

## 2022-01-17 PROCEDURE — 1090F PRES/ABSN URINE INCON ASSESS: CPT | Performed by: FAMILY MEDICINE

## 2022-01-17 PROCEDURE — G8536 NO DOC ELDER MAL SCRN: HCPCS | Performed by: FAMILY MEDICINE

## 2022-01-17 PROCEDURE — G8399 PT W/DXA RESULTS DOCUMENT: HCPCS | Performed by: FAMILY MEDICINE

## 2022-01-17 PROCEDURE — G8427 DOCREV CUR MEDS BY ELIG CLIN: HCPCS | Performed by: FAMILY MEDICINE

## 2022-01-17 PROCEDURE — G8419 CALC BMI OUT NRM PARAM NOF/U: HCPCS | Performed by: FAMILY MEDICINE

## 2022-01-17 PROCEDURE — G9899 SCRN MAM PERF RSLTS DOC: HCPCS | Performed by: FAMILY MEDICINE

## 2022-01-17 PROCEDURE — 99215 OFFICE O/P EST HI 40 MIN: CPT | Performed by: FAMILY MEDICINE

## 2022-01-17 PROCEDURE — G8753 SYS BP > OR = 140: HCPCS | Performed by: FAMILY MEDICINE

## 2022-01-17 PROCEDURE — 93000 ELECTROCARDIOGRAM COMPLETE: CPT | Performed by: FAMILY MEDICINE

## 2022-01-17 RX ORDER — HYDROXYZINE 25 MG/1
25 TABLET, FILM COATED ORAL
Qty: 90 TABLET | Refills: 1 | Status: SHIPPED | OUTPATIENT
Start: 2022-01-17 | End: 2022-06-22

## 2022-01-17 RX ORDER — ATENOLOL 100 MG/1
100 TABLET ORAL DAILY
Qty: 90 TABLET | Refills: 3 | Status: SHIPPED | OUTPATIENT
Start: 2022-01-17

## 2022-01-17 NOTE — PROGRESS NOTES
HPI  Maria Antonia Platt comes in for f/u care. HTN: Patient has hypertension. Blood pressure slightly elevated today. She is on lisinopril and atenolol. She was seen at her dermatologist office for procedure last week but this could not be done due to elevated blood pressure. She comes in with a blood pressure log that is stable. We discussed lifestyle and dietary modification. Discussed medication adjustment. I will go up on the atenolol to 100 mg daily. She will continue with lisinopril. She will keep a blood pressure log. We will follow-up at next visit. She denies headache, changes in vision or focal weakness. Dyslipidemia: Patient has dyslipidemia. She is on Crestor. She also does exercise and take a diet low in polysaturated fats. We will recheck lipid panel at next visit. Chest pain: Patient has chest pain that is retrosternal.  This comes on and off. She can feel the pain when she presses on her chest.  Suspect costochondritis. We discussed this. Did an EKG that is stable. Anxiety: Patient has anxiety. This is due to events around her life. She would like some medication to help with anxiety. This is only situational.  I will give hydroxyzine to take only as needed. Arthralgia/bursitis: Patient has generalized joint aches and pain. This affects the upper extremities especially the hands, shoulders and elbows. She has early morning stiffness. She takes Tylenol arthritis for pain as needed. She prefers to do supportive care. Migraine headache: Patient has a history of migraine headaches. These come on and off. They are rare however. She uses Maxalt when she has a migraine headache and that helps resolve the migraine headache. She will continue current treatment plan. Skin lesion: Patient has been seen for actinic keratosis lesion of the forehead. She was due to have Mohs surgery but this has been withheld due to elevated blood pressure.   She will follow-up with a dermatologist.  No erythema or skin darkening noted. Past Medical History  Past Medical History:   Diagnosis Date    Arthritis     Cardiac murmur     Dyslipidemia     Heart murmur     Hx of migraines     Hypertension 2008    Nausea & vomiting     severe    Primary localized osteoarthritis of left knee 3/30/2021    Rosacea        Surgical History  Past Surgical History:   Procedure Laterality Date    FOOT/TOES SURGERY PROC UNLISTED      x6    HX HEENT      jaw surgery    HX HEENT      dental surgery    HX HYSTERECTOMY      due to heavy bleeding    HX KNEE ARTHROSCOPY Left     HX LUMBAR DISKECTOMY  2019    HX LUMBAR LAMINECTOMY  01/2011    L5/S1 [place]    HX LUMBAR LAMINECTOMY      left L5   redo from 1st surgery    HX OOPHORECTOMY      HX ORTHOPAEDIC Left     2nd toes joint removal    HX ORTHOPAEDIC Right 2001    repeat bunion    HX ORTHOPAEDIC Bilateral     bunions    HX ORTHOPAEDIC Left 2011    foot fusion from bunion surgery    HX TONSILLECTOMY          Medications  Current Outpatient Medications   Medication Sig Dispense Refill    doxycycline (ADOXA) 50 mg tablet TAKE 1 TABLET DAILY 90 Tablet 3    OTHER,NON-FORMULARY, Bio complete 3   Pro pre post   Biotic      atenoloL (TENORMIN) 50 mg tablet TAKE 1 TABLET DAILY 90 Tab 3    lisinopriL (PRINIVIL, ZESTRIL) 40 mg tablet TAKE 1 TABLET DAILY 90 Tab 3    cetirizine (ZYRTEC) 10 mg tablet TAKE 1 TABLET DAILY AS NEEDED FOR ALLERGIES 90 Tab 3    clindamycin (CLEOCIN T) 1 % lotion Apply  to affected area daily. use thin film on affected area 1 Bottle 3    fluticasone propionate (FLONASE) 50 mcg/actuation nasal spray USE 2 SPRAYS IN EACH NOSTRIL DAILY (Patient taking differently: daily as needed.) 32 g 0    rizatriptan (MAXALT-MLT) 10 mg disintegrating tablet PLACE 1 TABLET ON TONGUE ONCE AS NEEDED FOR MIGRAINE 30 Tab 0    potassium 99 mg tablet Take 99 mg by mouth every seven (7) days.       ascorbic acid, vitamin C, (VITAMIN C) 500 mg tablet Take 1,000 mg by mouth daily. Takes 2000 daily      calcium citrate-vitamin D3 (CITRACAL + D) tablet Take  by mouth daily.  multivitamin (ONE A DAY) tablet Take 1 Tab by mouth daily.  cholecalciferol, VITAMIN D3, (VITAMIN D3) 5,000 unit tab tablet Take 1,000 Units by mouth daily.  B.infantis-B.ani-B.long-B.bifi (PROBIOTIC 4X) 10-15 mg TbEC Take  by mouth daily.       rosuvastatin (CRESTOR) 10 mg tablet TAKE 1 TABLET NIGHTLY (Patient taking differently: Take 10 mg by mouth daily.) 90 Tab 3       Allergies  Allergies   Allergen Reactions    Penicillin G Hives    Tylenol [Acetaminophen] Other (comments)      denies recently       Family History  Family History   Problem Relation Age of Onset    Heart Attack Mother     Stroke Mother     Other Mother         CHF    Alcohol abuse Father     Other Father         CHF    Liver Disease Father        Social History  Social History     Socioeconomic History    Marital status:      Spouse name: Not on file    Number of children: Not on file    Years of education: Not on file    Highest education level: Not on file   Occupational History    Not on file   Tobacco Use    Smoking status: Never Smoker    Smokeless tobacco: Never Used   Vaping Use    Vaping Use: Never used   Substance and Sexual Activity    Alcohol use: No    Drug use: Never    Sexual activity: Yes     Partners: Male   Other Topics Concern     Service No    Blood Transfusions Yes    Caffeine Concern No    Occupational Exposure No    Hobby Hazards No    Sleep Concern No    Stress Concern No    Weight Concern No    Special Diet No    Back Care No    Exercise Yes    Bike Helmet No    Seat Belt Yes    Self-Exams Yes   Social History Narrative    Not on file     Social Determinants of Health     Financial Resource Strain:     Difficulty of Paying Living Expenses: Not on file   Food Insecurity:     Worried About Running Out of Food in the Last Year: Not on file    Ran Out of Food in the Last Year: Not on file   Transportation Needs:     Lack of Transportation (Medical): Not on file    Lack of Transportation (Non-Medical): Not on file   Physical Activity:     Days of Exercise per Week: Not on file    Minutes of Exercise per Session: Not on file   Stress:     Feeling of Stress : Not on file   Social Connections:     Frequency of Communication with Friends and Family: Not on file    Frequency of Social Gatherings with Friends and Family: Not on file    Attends Voodoo Services: Not on file    Active Member of [address] [place] [address] [employer] Card or Organizations: Not on file    Attends Club or Organization Meetings: Not on file    Marital Status: Not on file   Intimate Partner Violence:     Fear of Current or Ex-Partner: Not on file    Emotionally Abused: Not on file    Physically Abused: Not on file    Sexually Abused: Not on file   Housing Stability:     Unable to Pay for Housing in the Last Year: Not on file    Number of [place] in the Last Year: Not on file    Unstable Housing in the Last Year: Not on file       Review of Systems  Review of Systems - Review of all systems is negative except as noted above in the HPI.     Vital Signs  Visit Vitals  BP (!) 157/94   Pulse 79   Temp 98.1 °F (36.7 °C) (Temporal)   Resp 18   Ht 5' 7\" (1.702 m)   Wt 177 lb (80.3 kg)   SpO2 98%   BMI 27.72 kg/m²         Physical Exam  Physical Examination: General appearance - alert, well appearing, and in no distress, oriented to person, place, and time, acyanotic, in no respiratory distress and well hydrated  Mental status - alert, oriented to person, place, and time, affect appropriate to mood  Neck - supple, no significant adenopathy  Lymphatics - no palpable lymphadenopathy, no hepatosplenomegaly  Chest - clear to auscultation, no wheezes, rales or rhonchi, symmetric air entry  Heart - systolic murmur 3/6 at 2nd right intercostal space  Abdomen - soft, nontender, nondistended, no masses or organomegaly  Back exam - limited range of motion  Neurological - abnormal neurological exam unchanged from prior examinations  Musculoskeletal - osteoarthritic changes noted in both hands  Extremities - no pedal edema noted, intact peripheral pulses      Results  Results for orders placed or performed during the hospital encounter of 10/13/21   LIPID PANEL   Result Value Ref Range    LIPID PROFILE          Cholesterol, total 195 <200 MG/DL    Triglyceride 78 <150 MG/DL    HDL Cholesterol 79 (H) 40 - 60 MG/DL    LDL, calculated 100.4 (H) 0 - 100 MG/DL    VLDL, calculated 15.6 MG/DL    CHOL/HDL Ratio 2.5 0 - 5.0     METABOLIC PANEL, COMPREHENSIVE   Result Value Ref Range    Sodium 141 136 - 145 mmol/L    Potassium 4.4 3.5 - 5.5 mmol/L    Chloride 108 100 - 111 mmol/L    CO2 29 21 - 32 mmol/L    Anion gap 4 3.0 - 18 mmol/L    Glucose 96 74 - 99 mg/dL    BUN 11 7.0 - 18 MG/DL    Creatinine 0.54 (L) 0.6 - 1.3 MG/DL    BUN/Creatinine ratio 20 12 - 20      GFR est AA >60 >60 ml/min/1.73m2    GFR est non-AA >60 >60 ml/min/1.73m2    Calcium 9.3 8.5 - 10.1 MG/DL    Bilirubin, total 0.4 0.2 - 1.0 MG/DL    ALT (SGPT) 36 13 - 56 U/L    AST (SGOT) 23 10 - 38 U/L    Alk. phosphatase 113 45 - 117 U/L    Protein, total 7.0 6.4 - 8.2 g/dL    Albumin 3.7 3.4 - 5.0 g/dL    Globulin 3.3 2.0 - 4.0 g/dL    A-G Ratio 1.1 0.8 - 1.7     HEMOGLOBIN A1C WITH EAG   Result Value Ref Range    Hemoglobin A1c 5.6 4.2 - 5.6 %    Est. average glucose 114 mg/dL   CBC WITH AUTOMATED DIFF   Result Value Ref Range    WBC 6.0 4.6 - 13.2 K/uL    RBC 4.79 4.20 - 5.30 M/uL    HGB 13.2 12.0 - 16.0 g/dL    HCT 41.5 35.0 - 45.0 %    MCV 86.6 78.0 - 100.0 FL    MCH 27.6 24.0 - 34.0 PG    MCHC 31.8 31.0 - 37.0 g/dL    RDW 15.2 (H) 11.6 - 14.5 %    PLATELET 383 305 - 285 K/uL    MPV 10.1 9.2 - 11.8 FL    NEUTROPHILS 61 40 - 73 %    LYMPHOCYTES 25 21 - 52 %    MONOCYTES 10 3 - 10 %    EOSINOPHILS 3 0 - 5 %    BASOPHILS 1 0 - 2 %    ABS.  NEUTROPHILS 3.7 1.8 - 8.0 K/UL    ABS. LYMPHOCYTES 1.5 0.9 - 3.6 K/UL    ABS. MONOCYTES 0.6 0.05 - 1.2 K/UL    ABS. EOSINOPHILS 0.2 0.0 - 0.4 K/UL    ABS. BASOPHILS 0.0 0.0 - 0.1 K/UL    DF AUTOMATED         ASSESSMENT and PLAN    ICD-10-CM ICD-9-CM    1. Essential hypertension  I10 401.9 REFERRAL TO CARDIOLOGY      atenoloL (TENORMIN) 100 mg tablet   2. Dyslipidemia  E78.5 272.4    3. Cardiac murmur  R01.1 785.2 REFERRAL TO CARDIOLOGY      AMB POC EKG ROUTINE W/ 12 LEADS, INTER & REP   4. Migraine without status migrainosus, not intractable, unspecified migraine type  G43.909 346.90    5. Arthralgia, unspecified joint  M25.50 719.40    6. Anxiety  F41.9 300.00 hydrOXYzine HCL (ATARAX) 25 mg tablet   7. Chest pain, unspecified type  R07.9 786.50      lab results and schedule of future lab studies reviewed with patient  reviewed diet, exercise and weight control  cardiovascular risk and specific lipid/LDL goals reviewed  reviewed medications and side effects in detail  use of aspirin to prevent MI and TIA's discussed  radiology results and schedule of future radiology studies reviewed with patient      I have discussed the diagnosis with the patient and the intended plan of care as seen in the above orders. The patient has received an after-visit summary and questions were answered concerning future plans. I have discussed medication, side effects, and warnings with the patient in detail. The patient verbalized understanding and is in agreement with the plan of care. The patient will contact the office with any additional concerns. I spent at least 42 minutes on this visit with this established patient. Lianna Winston MD    PLEASE NOTE:   This document has been produced using voice recognition software.  Unrecognized errors in transcription may be present

## 2022-01-17 NOTE — PROGRESS NOTES
Chief Complaint   Patient presents with    Hypertension     1. \"Have you been to the ER, urgent care clinic since your last visit? Hospitalized since your last visit? \" No    2. \"Have you seen or consulted any other health care providers outside of the [address] [place] [address] since your last visit? \" No     3. For patients aged 39-70: Has the patient had a colonoscopy / FIT/ Cologuard? Yes - no Care Gap present      If the patient is female:    4. For patients aged 41-77: Has the patient had a mammogram within the past 2 years? Yes - no Care Gap present  See top three    5. For patients aged 21-65: Has the patient had a pap smear?  NA - based on age or sex

## 2022-02-28 RX ORDER — LISINOPRIL 40 MG/1
TABLET ORAL
Qty: 90 TABLET | Refills: 3 | Status: SHIPPED | OUTPATIENT
Start: 2022-02-28

## 2022-03-18 PROBLEM — I10 ESSENTIAL HYPERTENSION: Status: ACTIVE | Noted: 2017-07-28

## 2022-03-18 PROBLEM — R03.0 ELEVATED BP WITHOUT DIAGNOSIS OF HYPERTENSION: Status: ACTIVE | Noted: 2017-04-28

## 2022-03-19 PROBLEM — M51.26 HNP (HERNIATED NUCLEUS PULPOSUS), LUMBAR: Status: ACTIVE | Noted: 2020-10-12

## 2022-03-19 PROBLEM — L71.9 ROSACEA: Status: ACTIVE | Noted: 2017-07-28

## 2022-03-19 PROBLEM — M48.00 SPINAL STENOSIS: Status: ACTIVE | Noted: 2019-10-31

## 2022-03-19 PROBLEM — E78.5 DYSLIPIDEMIA: Status: ACTIVE | Noted: 2017-10-30

## 2022-03-19 PROBLEM — G43.909 MIGRAINE WITHOUT STATUS MIGRAINOSUS, NOT INTRACTABLE: Status: ACTIVE | Noted: 2017-04-10

## 2022-03-19 PROBLEM — M17.12 PRIMARY LOCALIZED OSTEOARTHRITIS OF LEFT KNEE: Status: ACTIVE | Noted: 2021-03-30

## 2022-03-20 PROBLEM — M71.30 SYNOVIAL CYST: Status: ACTIVE | Noted: 2019-10-31

## 2022-05-10 NOTE — PROGRESS NOTES
AMBULATORY PROGRESS NOTE      Patient: Dodie Alvarez             MRN: [id number]     SSN: [id number] Body mass index is 27.41 kg/m². YOB: 1955     AGE: 79 y.o. EX: female    PCP: Humphrey Dwyer MD       IMPRESSION //  DIAGNOSIS AND TREATMENT PLAN        Nanette Michael has a diagnosis of:      DIAGNOSES    1. Primary osteoarthritis of both feet    2. Right foot pain    3. Left foot pain        Orders Placed This Encounter    AMB POC XRAY, FOOT; COMPLETE, 3+ VIEW     ASK ALL FEMALE PATIENTS IF PREGNANT     Order Specific Question:   Reason for Exam     Answer:   PAIN    AMB POC XRAY, FOOT; COMPLETE, 3+ VIEW     ASK ALL FEMALE PATIENTS IF PREGNANT     Order Specific Question:   Reason for Exam     Answer:   PAIN            PLAN:    1. Obtain 3-View XR of both feet. 2. AVS: [employer]  3. Rx of Lac Hydrin    RTO : 6-8 weeks    Patient Instructions   [employer]          Please follow up with your PCP for any health maintenance as recommended         Nanette Michael  expresses understanding of the diagnosis, treatment plan, and all of their proposed questions were answered to their satisfaction. Patient education has been provided re the diagnoses. HPI //  [address] IS A 79 y.o. female who is a/an  new patient, presenting to my outpatient office for evaluation of  the following chief complaint(s):     Chief Complaint   Patient presents with    Foot Pain     right foot       Nanette Michael presents today w/ atraumatic right forefoot pain onset 1 week ago and a callus on her left Great toe. She cannot walk barefoot w/o pain. H/O right toe surgery. Visit Vitals  Pulse 78   Ht 5' 7\" (1.702 m)   Wt 175 lb (79.4 kg)   SpO2 98%   BMI 27.41 kg/m²       Appearance: Alert, well appearing and pleasant patient who is in no distress, oriented to person, place/time, and who follows commands.  Normal dress/motor activity/thought processes/memory. This patient is accompanied in the examination room by her  self. Patient arrives to office via: without assistive device:     Psychiatric:  Normal Affect/mood. Judgement, behavior, and conduct are appropriate. Speech normal in context and clarity, memory intact grossly, no involuntary movements - tremors. H EENT (2): Head normocephalic & atraumatic. Eye: pupils are round// EOM are intact // Neck: ROM WNL  // Hearings Intact   Respiratory: Breathing non labored     ANKLE/FOOT bilateral    Gait: normal  Tenderness: mild over  right 2nd MTP joint  callus along left Great toe  Cutaneous: swelling at  Right 2nd MTP and callus along left great toe  Joint Motion:  WNL   Joint / Tendon Stability:  No Ankle or Subtalar instability or joint laxity. No peroneal sublux ability or dislocation  Alignment: neutral Hindfoot,    Neuro Motor/Sensory: NL/NL  Vascular: NL foot/ankle pulses,   Lymphatics: No extremity lymphedema, No calf swelling, no tenderness to calf muscles. CHART REVIEW     Nanette Becker has been experiencing pain and discomfort confirmed as outlined in the pain assessment outlined below.  was reviewed by Abel Delcid MD on 5/11/2022.      Pain Assessment  5/11/2022   Location of Pain Foot   Pain Location Comment -   Location Modifiers Right   Severity of Pain 6   Quality of Pain Burning   Quality of Pain Comment swelling   Duration of Pain -   Duration of Pain Comment -   Frequency of Pain Intermittent   Date Pain First Started (No Data)   Date Pain First Started Comment 1 week ago   Aggravating Factors Standing;Walking   Aggravating Factors Comment -   Limiting Behavior Some   Relieving Factors Ice   Relieving Factors Comment -   Result of Injury No        Nanette Becker  has a past medical history of Arthritis, Cardiac murmur, Dyslipidemia, Heart murmur, migraines, Hypertension (2008), Nausea & vomiting, Primary localized osteoarthritis of left knee (3/30/2021), and Rosacea. She has no past medical history of Malignant hyperthermia due to anesthesia. Patients is employed at:          has a past medical history of Arthritis, Cardiac murmur, Dyslipidemia, Heart murmur, migraines, Hypertension (2008), Nausea & vomiting, Primary localized osteoarthritis of left knee (3/30/2021), and Rosacea. She has no past medical history of Malignant hyperthermia due to anesthesia. has a past surgical history that includes foot/toes surgery proc unlisted; hx hysterectomy; hx oophorectomy; hx tonsillectomy; hx heent; hx heent; hx knee arthroscopy (Left); hx orthopaedic (Left); hx orthopaedic (Right, 2001); hx orthopaedic (Bilateral); hx orthopaedic (Left, 2011); hx lumbar laminectomy (01/2011); hx lumbar diskectomy (2019); and hx lumbar laminectomy. family history includes Alcohol abuse in her father; Heart Attack in her mother; Liver Disease in her father; Other in her father and mother; Stroke in her mother. Current Outpatient Medications   Medication Sig    lisinopriL (PRINIVIL, ZESTRIL) 40 mg tablet TAKE 1 TABLET DAILY    atenoloL (TENORMIN) 100 mg tablet Take 1 Tablet by mouth daily.  doxycycline (ADOXA) 50 mg tablet TAKE 1 TABLET DAILY    OTHER,NON-FORMULARY, Bio complete 3   Pro pre post   Biotic    rosuvastatin (CRESTOR) 10 mg tablet TAKE 1 TABLET NIGHTLY (Patient taking differently: Take 10 mg by mouth daily.)    clindamycin (CLEOCIN T) 1 % lotion Apply  to affected area daily. use thin film on affected area    rizatriptan (MAXALT-MLT) 10 mg disintegrating tablet PLACE 1 TABLET ON TONGUE ONCE AS NEEDED FOR MIGRAINE    potassium 99 mg tablet Take 99 mg by mouth every seven (7) days.  ascorbic acid, vitamin C, (VITAMIN C) 500 mg tablet Take 1,000 mg by mouth daily. Takes 2000 daily    calcium citrate-vitamin D3 (CITRACAL + D) tablet Take  by mouth daily.  multivitamin (ONE A DAY) tablet Take 1 Tab by mouth daily.  cholecalciferol, VITAMIN D3, (VITAMIN D3) 5,000 unit tab tablet Take 1,000 Units by mouth daily.  hydrOXYzine HCL (ATARAX) 25 mg tablet Take 1 Tablet by mouth three (3) times daily as needed for Anxiety.  fluticasone propionate (FLONASE) 50 mcg/actuation nasal spray USE 2 SPRAYS IN EACH NOSTRIL DAILY (Patient taking differently: daily as needed.)    B.infantis-B.ani-B.long-B.bifi (PROBIOTIC 4X) 10-15 mg TbEC Take  by mouth daily. No current facility-administered medications for this visit. Allergies   Allergen Reactions    Penicillin G Hives    Tylenol [Acetaminophen] Other (comments)      denies recently     Social History     Occupational History    Not on file   Tobacco Use    Smoking status: Never Smoker    Smokeless tobacco: Never Used   Vaping Use    Vaping Use: Never used   Substance and Sexual Activity    Alcohol use: No    Drug use: Never    Sexual activity: Yes     Partners: Male       reports that she has never smoked. She has never used smokeless tobacco. She reports that she does not drink alcohol and does not use drugs. DIAGNOSTIC LAB DATA      Lab Results   Component Value Date/Time    Hemoglobin A1c 5.6 10/13/2021 08:59 AM    Hemoglobin A1c 5.7 (H) 03/09/2021 11:46 AM    //   Lab Results   Component Value Date/Time    Glucose 96 10/13/2021 08:59 AM    Glucose (POC) 98 04/05/2021 10:59 AM        No results found for: [id number], [id number]      Lab Results   Component Value Date/Time    Vitamin D 25-Hydroxy 38.4 07/28/2017 09:40 AM        Drug Screen Most Recent Result Date    No resulted procedures found. REVIEW OF SYSTEMS : 5/11/2022  ALL BELOW ARE Negative except : SEE HPI     All other systems reviewed and are negative. 12 point review of systems otherwise negative unless noted in HPI.       RADIOGRAPHS// IMAGING//DIAGNOSTIC DATA     Orders Placed This Encounter    AMB POC XRAY, FOOT; COMPLETE, 3+ VIEW    AMB POC XRAY, FOOT; COMPLETE, 3+ VIEW        No notes on file     Left foot x-rays, 3 views, AP/LAT/OBL completed 5/11/2022 AT [facility] weightbearing x-rays    X-rays reveal   no acute fracture//there are no dislocation or subluxation noted. Overall alignment is such that there is postop changes from a left great toe first MTP arthrodesis with some slight valgus alignment of the left great toe first MTP, looks to get solid left first MTP arthrodesis, with dorsal plate and interfragmentary screws, as well as a dorsal second TMT extra-articular bridge plate . Soft tissue swelling is no significant degree of swelling is noted. No osteolytic or osteoblastic lesions noted. Mineralization suggests no osteopenia. Degenerative changes are mild to #3 4 TMT joint articulation noted. Calcified vessels are not present. Right foot: 3 views, AP, lateral, oblique completed today, 5/10/2022 [facility]: Weightbearing     The alignment is such that: There is a hallux valgus alignment seen, some OA changes. There is severe OA changes seen across right #2 toe MTP joint and which is a trumpet shaped proximal phalanx of the right #2 toe at the MTP region. Severe OA of the right M2 toe MTP region. Otherwise soft to swelling, is more pronounced #2 toe MTP region, mineralizations shows no osteopenia, and there is no calcified blood vessels seen. I have personally reviewed the images of the above study. The interpretation of this study is my professional opinion             I have spent 30 minutes reviewing the previous notes, reviewing diagnostic studies [Advanced  Imaging, Diagnostic test results (x-rays)] and had a direct face to face with the patient discussing the diagnosis and importance of compliance with the treatment and plan. There is  discussion for the potential for surgery, answering all questions, as well as documenting patient care coordination for this individual on the day of the visit.           Disclaimer:     Sections of this note are dictated using utilizing voice recognition software, which may have resulted in some phonetic based errors in grammar and contents. Even though attempts were made to correct all the mistakes, some may have been missed, and remained in the body of the document. If questions arise, please contact our department. An electronic signature was used to authenticate this note. Pam Pires may have a reminder for a \"due or due soon\" health maintenance. I have asked that she contact her primary care provider for follow-up on this health maintenance. Patient Instructions   Lovely Rich          Please follow up with your PCP for any health maintenance as recommended.               Sandie Lin, as dictated by Bijan  5/11/2022  1:09 PM

## 2022-05-11 ENCOUNTER — OFFICE VISIT (OUTPATIENT)
Dept: ORTHOPEDIC SURGERY | Age: 67
End: 2022-05-11
Payer: MEDICARE

## 2022-05-11 VITALS — BODY MASS INDEX: 27.47 KG/M2 | WEIGHT: 175 LBS | OXYGEN SATURATION: 98 % | HEART RATE: 78 BPM | HEIGHT: 67 IN

## 2022-05-11 DIAGNOSIS — M79.671 RIGHT FOOT PAIN: ICD-10-CM

## 2022-05-11 DIAGNOSIS — M79.672 LEFT FOOT PAIN: ICD-10-CM

## 2022-05-11 DIAGNOSIS — M19.071 PRIMARY OSTEOARTHRITIS OF BOTH FEET: Primary | ICD-10-CM

## 2022-05-11 DIAGNOSIS — M19.072 PRIMARY OSTEOARTHRITIS OF BOTH FEET: Primary | ICD-10-CM

## 2022-05-11 PROCEDURE — G8536 NO DOC ELDER MAL SCRN: HCPCS | Performed by: ORTHOPAEDIC SURGERY

## 2022-05-11 PROCEDURE — G9899 SCRN MAM PERF RSLTS DOC: HCPCS | Performed by: ORTHOPAEDIC SURGERY

## 2022-05-11 PROCEDURE — 73630 X-RAY EXAM OF FOOT: CPT | Performed by: ORTHOPAEDIC SURGERY

## 2022-05-11 PROCEDURE — G8432 DEP SCR NOT DOC, RNG: HCPCS | Performed by: ORTHOPAEDIC SURGERY

## 2022-05-11 PROCEDURE — 99213 OFFICE O/P EST LOW 20 MIN: CPT | Performed by: ORTHOPAEDIC SURGERY

## 2022-05-11 PROCEDURE — G8427 DOCREV CUR MEDS BY ELIG CLIN: HCPCS | Performed by: ORTHOPAEDIC SURGERY

## 2022-05-11 PROCEDURE — G8756 NO BP MEASURE DOC: HCPCS | Performed by: ORTHOPAEDIC SURGERY

## 2022-05-11 PROCEDURE — G8399 PT W/DXA RESULTS DOCUMENT: HCPCS | Performed by: ORTHOPAEDIC SURGERY

## 2022-05-11 PROCEDURE — 1090F PRES/ABSN URINE INCON ASSESS: CPT | Performed by: ORTHOPAEDIC SURGERY

## 2022-05-11 PROCEDURE — 3017F COLORECTAL CA SCREEN DOC REV: CPT | Performed by: ORTHOPAEDIC SURGERY

## 2022-05-11 PROCEDURE — 1101F PT FALLS ASSESS-DOCD LE1/YR: CPT | Performed by: ORTHOPAEDIC SURGERY

## 2022-05-11 PROCEDURE — G8419 CALC BMI OUT NRM PARAM NOF/U: HCPCS | Performed by: ORTHOPAEDIC SURGERY

## 2022-05-11 RX ORDER — AMMONIUM LACTATE 12 G/100G
CREAM TOPICAL 2 TIMES DAILY
Qty: 280 G | Refills: 0 | Status: SHIPPED | OUTPATIENT
Start: 2022-05-11

## 2022-06-22 ENCOUNTER — OFFICE VISIT (OUTPATIENT)
Dept: FAMILY MEDICINE CLINIC | Age: 67
End: 2022-06-22
Payer: MEDICARE

## 2022-06-22 VITALS
WEIGHT: 172 LBS | OXYGEN SATURATION: 98 % | HEIGHT: 67 IN | BODY MASS INDEX: 27 KG/M2 | SYSTOLIC BLOOD PRESSURE: 153 MMHG | DIASTOLIC BLOOD PRESSURE: 92 MMHG | TEMPERATURE: 97.2 F | RESPIRATION RATE: 18 BRPM | HEART RATE: 61 BPM

## 2022-06-22 DIAGNOSIS — R05.9 COUGH: Primary | ICD-10-CM

## 2022-06-22 DIAGNOSIS — H60.93 OTITIS EXTERNA OF BOTH EARS, UNSPECIFIED CHRONICITY, UNSPECIFIED TYPE: ICD-10-CM

## 2022-06-22 DIAGNOSIS — Z12.11 SCREEN FOR COLON CANCER: ICD-10-CM

## 2022-06-22 DIAGNOSIS — I10 ESSENTIAL HYPERTENSION: ICD-10-CM

## 2022-06-22 DIAGNOSIS — M25.511 RIGHT SHOULDER PAIN, UNSPECIFIED CHRONICITY: ICD-10-CM

## 2022-06-22 PROCEDURE — 1101F PT FALLS ASSESS-DOCD LE1/YR: CPT | Performed by: FAMILY MEDICINE

## 2022-06-22 PROCEDURE — G8427 DOCREV CUR MEDS BY ELIG CLIN: HCPCS | Performed by: FAMILY MEDICINE

## 2022-06-22 PROCEDURE — 1090F PRES/ABSN URINE INCON ASSESS: CPT | Performed by: FAMILY MEDICINE

## 2022-06-22 PROCEDURE — G8755 DIAS BP > OR = 90: HCPCS | Performed by: FAMILY MEDICINE

## 2022-06-22 PROCEDURE — 1123F ACP DISCUSS/DSCN MKR DOCD: CPT | Performed by: FAMILY MEDICINE

## 2022-06-22 PROCEDURE — 3017F COLORECTAL CA SCREEN DOC REV: CPT | Performed by: FAMILY MEDICINE

## 2022-06-22 PROCEDURE — G8536 NO DOC ELDER MAL SCRN: HCPCS | Performed by: FAMILY MEDICINE

## 2022-06-22 PROCEDURE — G8399 PT W/DXA RESULTS DOCUMENT: HCPCS | Performed by: FAMILY MEDICINE

## 2022-06-22 PROCEDURE — G8753 SYS BP > OR = 140: HCPCS | Performed by: FAMILY MEDICINE

## 2022-06-22 PROCEDURE — G9899 SCRN MAM PERF RSLTS DOC: HCPCS | Performed by: FAMILY MEDICINE

## 2022-06-22 PROCEDURE — G8510 SCR DEP NEG, NO PLAN REQD: HCPCS | Performed by: FAMILY MEDICINE

## 2022-06-22 PROCEDURE — 99214 OFFICE O/P EST MOD 30 MIN: CPT | Performed by: FAMILY MEDICINE

## 2022-06-22 PROCEDURE — G8417 CALC BMI ABV UP PARAM F/U: HCPCS | Performed by: FAMILY MEDICINE
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
## 2022-06-22 NOTE — PROGRESS NOTES
Chief Complaint   Patient presents with    Confidential     1. \"Have you been to the ER, urgent care clinic since your last visit? Hospitalized since your last visit? \" No    2. \"Have you seen or consulted any other health care providers outside of the [address] [place] [address] since your last visit? \" No     3. For patients aged 39-70: Has the patient had a colonoscopy / FIT/ Cologuard? No      If the patient is female:    4. For patients aged 41-77: Has the patient had a mammogram within the past 2 years? Yes - no Care Gap present      5. For patients aged 21-65: Has the patient had a pap smear?  Yes - no Care Gap present

## 2022-06-27 ENCOUNTER — TELEPHONE (OUTPATIENT)
Dept: FAMILY MEDICINE CLINIC | Age: 67
End: 2022-06-27

## 2022-06-27 RX ORDER — AZITHROMYCIN 250 MG/1
TABLET, FILM COATED ORAL
Qty: 6 TABLET | Refills: 0 | Status: SHIPPED | OUTPATIENT
Start: 2022-06-27 | End: 2022-07-02

## 2022-06-27 NOTE — TELEPHONE ENCOUNTER
Pt stated she spoke with Dr Mira Jesus about prescribing an antibiotics and at the time she did not want it but since then she has congestion and sinus issues and would like the antibiotic, She currently has Zrytec and that does not work for her.  Please follow up when available

## 2022-06-30 DIAGNOSIS — E78.5 DYSLIPIDEMIA: Primary | ICD-10-CM

## 2022-06-30 RX ORDER — ROSUVASTATIN CALCIUM 10 MG/1
10 TABLET, COATED ORAL
Qty: 90 TABLET | Refills: 3 | Status: SHIPPED | OUTPATIENT
Start: 2022-06-30 | End: 2022-09-28

## 2022-06-30 NOTE — TELEPHONE ENCOUNTER
Requested Prescriptions     Pending Prescriptions Disp Refills    rosuvastatin (CRESTOR) 10 mg tablet 90 Tablet 3     Si Tablet nightly.

## 2022-08-23 DIAGNOSIS — L71.9 ROSACEA: ICD-10-CM

## 2022-08-24 RX ORDER — DOXYCYCLINE 50 MG/1
TABLET ORAL
Qty: 90 TABLET | Refills: 3 | Status: SHIPPED | OUTPATIENT
Start: 2022-08-24
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
## 2023-08-30 RX ORDER — DOXYCYCLINE 50 MG/1
TABLET ORAL
Qty: 90 TABLET | Refills: 3 | Status: SHIPPED | OUTPATIENT
Start: 2023-08-30

## 2023-10-23 RX ORDER — AMMONIUM LACTATE 12 G/100G
CREAM TOPICAL
Qty: 280 G | Refills: 4 | Status: SHIPPED | OUTPATIENT
Start: 2023-10-23

## 2024-04-09 NOTE — TELEPHONE ENCOUNTER
Per Dr. Rios Raritan patient may resume walking for exercise. Patient notified and understood. [FreeTextEntry1] : 59 year old woman with PFO closure 20 years ago for recurrent cryptogenic stroke.  She has had no recurrent events sincew the procedure.  She has risk factors for CAD including diabetes, HLD and previous smoker.  She has severe back problems and is sedentary.  She has no chest discomfort or MARTINEZ.

## 2024-04-19 ENCOUNTER — ESTABLISHED PATIENT (OUTPATIENT)
Dept: RURAL CLINIC 2 | Facility: CLINIC | Age: 69
End: 2024-04-19

## 2024-04-19 DIAGNOSIS — H52.4: ICD-10-CM

## 2024-04-19 DIAGNOSIS — H16.223: ICD-10-CM

## 2024-04-19 DIAGNOSIS — H43.813: ICD-10-CM

## 2024-04-19 DIAGNOSIS — H25.813: ICD-10-CM

## 2024-04-19 DIAGNOSIS — H52.03: ICD-10-CM

## 2024-04-19 PROCEDURE — 92134 CPTRZ OPH DX IMG PST SGM RTA: CPT

## 2024-04-19 PROCEDURE — 99214 OFFICE O/P EST MOD 30 MIN: CPT

## 2024-04-19 PROCEDURE — 92015 DETERMINE REFRACTIVE STATE: CPT

## 2024-04-19 ASSESSMENT — VISUAL ACUITY
OD_SC: 20/30
OS_SC: 20/25-1

## 2024-04-19 ASSESSMENT — TONOMETRY
OS_IOP_MMHG: 16
OD_IOP_MMHG: 16

## 2024-05-31 ENCOUNTER — ESTABLISHED PATIENT (OUTPATIENT)
Dept: RURAL CLINIC 2 | Facility: CLINIC | Age: 69
End: 2024-05-31

## 2024-05-31 DIAGNOSIS — H25.813: ICD-10-CM

## 2024-05-31 DIAGNOSIS — H43.813: ICD-10-CM

## 2024-05-31 DIAGNOSIS — H16.223: ICD-10-CM

## 2024-05-31 PROCEDURE — 99214 OFFICE O/P EST MOD 30 MIN: CPT

## 2024-05-31 ASSESSMENT — TONOMETRY
OS_IOP_MMHG: 15
OD_IOP_MMHG: 15

## 2024-05-31 ASSESSMENT — VISUAL ACUITY
OD_SC: 20/25-1
OS_SC: 20/30-2

## 2024-06-12 ENCOUNTER — CONSULTATION/EVALUATION (OUTPATIENT)
Dept: RURAL CLINIC 1 | Facility: CLINIC | Age: 69
End: 2024-06-12

## 2024-06-12 DIAGNOSIS — H25.813: ICD-10-CM

## 2024-06-12 PROCEDURE — 99214 OFFICE O/P EST MOD 30 MIN: CPT

## 2024-06-12 PROCEDURE — 92136 OPHTHALMIC BIOMETRY: CPT

## 2024-06-12 PROCEDURE — 92025 CPTRIZED CORNEAL TOPOGRAPHY: CPT | Mod: NC

## 2024-06-12 PROCEDURE — 92134 CPTRZ OPH DX IMG PST SGM RTA: CPT | Mod: NC

## 2024-06-12 ASSESSMENT — VISUAL ACUITY
OD_SC: 20/40
OS_SC: 20/40
OS_SC: 20/200
OD_CC: 20/30
OD_PH: 20/30-1
OS_PH: 20/30
OS_CC: 20/25
OD_SC: 20/200

## 2024-06-12 ASSESSMENT — TONOMETRY
OS_IOP_MMHG: 15
OD_IOP_MMHG: 15

## 2024-08-01 ENCOUNTER — PRE-OP/H&P (OUTPATIENT)
Dept: RURAL CLINIC 1 | Facility: CLINIC | Age: 69
End: 2024-08-01

## 2024-08-01 VITALS
WEIGHT: 162 LBS | SYSTOLIC BLOOD PRESSURE: 123 MMHG | BODY MASS INDEX: 26.03 KG/M2 | HEART RATE: 65 BPM | HEIGHT: 66 IN | DIASTOLIC BLOOD PRESSURE: 79 MMHG

## 2024-08-01 DIAGNOSIS — Z01.818: ICD-10-CM

## 2024-08-01 DIAGNOSIS — H25.813: ICD-10-CM

## 2024-08-01 PROCEDURE — 99499 UNLISTED E&M SERVICE: CPT

## 2024-08-05 ENCOUNTER — SURGERY/PROCEDURE (OUTPATIENT)
Dept: URBAN - METROPOLITAN AREA SURGERY 3 | Facility: SURGERY | Age: 69
End: 2024-08-05

## 2024-08-05 DIAGNOSIS — H57.11: ICD-10-CM

## 2024-08-05 DIAGNOSIS — H25.811: ICD-10-CM

## 2024-08-05 PROCEDURE — 68841 INSJ RX ELUT IMPLT LAC CANAL: CPT | Mod: 51,RT

## 2024-08-05 PROCEDURE — 66984 XCAPSL CTRC RMVL W/O ECP: CPT

## 2024-08-06 ENCOUNTER — POST-OP (OUTPATIENT)
Dept: RURAL CLINIC 2 | Facility: CLINIC | Age: 69
End: 2024-08-06

## 2024-08-06 DIAGNOSIS — Z96.1: ICD-10-CM

## 2024-08-06 PROCEDURE — 99024 POSTOP FOLLOW-UP VISIT: CPT

## 2024-08-06 ASSESSMENT — VISUAL ACUITY
OD_SC: 20/25
OS_SC: 20/20

## 2024-08-06 ASSESSMENT — TONOMETRY: OD_IOP_MMHG: 15

## 2024-08-13 ENCOUNTER — POST OP/EVAL OF SECOND EYE (OUTPATIENT)
Dept: RURAL CLINIC 2 | Facility: CLINIC | Age: 69
End: 2024-08-13

## 2024-08-13 DIAGNOSIS — Z96.1: ICD-10-CM

## 2024-08-13 PROCEDURE — 99024 POSTOP FOLLOW-UP VISIT: CPT

## 2024-08-13 ASSESSMENT — VISUAL ACUITY
OS_SC: 20/30+1
OS_BAT: 20/50
OS_AM: 20/20
OD_SC: 20/20

## 2024-08-13 ASSESSMENT — TONOMETRY
OS_IOP_MMHG: 14
OD_IOP_MMHG: 14

## 2024-08-19 ENCOUNTER — SURGERY/PROCEDURE (OUTPATIENT)
Dept: URBAN - METROPOLITAN AREA SURGERY 3 | Facility: SURGERY | Age: 69
End: 2024-08-19

## 2024-08-19 DIAGNOSIS — H25.812: ICD-10-CM

## 2024-08-19 DIAGNOSIS — H57.12: ICD-10-CM

## 2024-08-19 PROCEDURE — 68841 INSJ RX ELUT IMPLT LAC CANAL: CPT | Mod: 51,LT,79,LT

## 2024-08-19 PROCEDURE — 66984 XCAPSL CTRC RMVL W/O ECP: CPT | Mod: 79,LT

## 2024-08-20 ENCOUNTER — POST-OP (OUTPATIENT)
Dept: RURAL CLINIC 2 | Facility: CLINIC | Age: 69
End: 2024-08-20

## 2024-08-20 DIAGNOSIS — Z96.1: ICD-10-CM

## 2024-08-20 PROCEDURE — 99024 POSTOP FOLLOW-UP VISIT: CPT

## 2024-08-20 ASSESSMENT — VISUAL ACUITY
OS_SC: 20/20
OD_SC: 20/20

## 2024-08-20 ASSESSMENT — TONOMETRY
OS_IOP_MMHG: 16
OD_IOP_MMHG: 13

## 2024-08-27 ENCOUNTER — POST-OP (OUTPATIENT)
Dept: RURAL CLINIC 2 | Facility: CLINIC | Age: 69
End: 2024-08-27

## 2024-08-27 DIAGNOSIS — Z96.1: ICD-10-CM

## 2024-08-27 PROCEDURE — 99024 POSTOP FOLLOW-UP VISIT: CPT

## 2024-08-27 ASSESSMENT — VISUAL ACUITY
OS_SC: 20/20
OD_SC: 20/20

## 2024-08-27 ASSESSMENT — TONOMETRY
OS_IOP_MMHG: 14
OD_IOP_MMHG: 14

## 2024-09-12 ENCOUNTER — POST-OP (OUTPATIENT)
Dept: RURAL CLINIC 2 | Facility: CLINIC | Age: 69
End: 2024-09-12

## 2024-09-12 DIAGNOSIS — Z96.1: ICD-10-CM

## 2024-09-12 PROCEDURE — 99024 POSTOP FOLLOW-UP VISIT: CPT

## 2025-03-13 ENCOUNTER — DIAGNOSTICS ONLY (OUTPATIENT)
Age: 70
End: 2025-03-13

## 2025-03-13 DIAGNOSIS — H35.013: ICD-10-CM

## 2025-03-13 DIAGNOSIS — H16.223: ICD-10-CM

## 2025-03-13 DIAGNOSIS — H26.493: ICD-10-CM

## 2025-03-13 DIAGNOSIS — H43.813: ICD-10-CM

## 2025-03-13 DIAGNOSIS — Z96.1: ICD-10-CM

## 2025-03-13 PROCEDURE — 92014 COMPRE OPH EXAM EST PT 1/>: CPT

## 2025-03-13 PROCEDURE — 92134 CPTRZ OPH DX IMG PST SGM RTA: CPT

## (undated) DEVICE — (D)PREP SKN CHLRAPRP APPL 26ML -- CONVERT TO ITEM 371833

## (undated) DEVICE — SOLUTION SCRB 4OZ 10% PVP I POVIDONE IOD TOP PAINT EXIDINE

## (undated) DEVICE — JACKSON TABLE POSITIONER KIT: Brand: MEDLINE INDUSTRIES, INC.

## (undated) DEVICE — NEEDLE SPNL 20GA L3.5IN YEL HUB S STL REG WALL FIT STYL W/

## (undated) DEVICE — SUTURE STRATAFIX SYMMETRIC PDS + SZ 2-0 L18IN ABSRB VLT SXPP1A403

## (undated) DEVICE — PIN GUIDE FIX 3.2X62 MM SCREW [GS9030620324P] [KOMET MEDICAL]

## (undated) DEVICE — FLEX ADVANTAGE 3000CC: Brand: FLEX ADVANTAGE

## (undated) DEVICE — NEEDLE HYPO 22GA L1.5IN BLK S STL HUB POLYPR SHLD REG BVL

## (undated) DEVICE — SOLUTION IV 1000ML 0.9% SOD CHL

## (undated) DEVICE — PREP SKN CHLRAPRP APL 26ML STR --

## (undated) DEVICE — (D)BNDG ADHESIVE FABRIC 3/4X3 -- DISC BY MFR USE ITEM 357960

## (undated) DEVICE — 3.0MM PRECISION NEURO (MATCH HEAD)

## (undated) DEVICE — KIT POS W/ FOAM ARM CRADL SHEARGUARD CHST PD CVR FOR SPNL

## (undated) DEVICE — INTENDED FOR TISSUE SEPARATION, AND OTHER PROCEDURES THAT REQUIRE A SHARP SURGICAL BLADE TO PUNCTURE OR CUT.: Brand: BARD-PARKER SAFETY BLADES SIZE 20, STERILE

## (undated) DEVICE — SOL IRRIGATION INJ NACL 0.9% 500ML BTL

## (undated) DEVICE — BLADE SAW 1.19X20X90 MM FOR LG BNE

## (undated) DEVICE — GARMENT,MEDLINE,DVT,SEQUENTIAL,CALF,MD: Brand: MEDLINE

## (undated) DEVICE — GEL BAG FOR WRAPS --

## (undated) DEVICE — PIN GUIDE FIX 3.2X62 MM SCREW [GS903A0620322P] [KOMET MEDICAL]

## (undated) DEVICE — BLADE SAW W13XL90MM 1.19MM PARA

## (undated) DEVICE — SYR 10ML LUER LOK 1/5ML GRAD --

## (undated) DEVICE — GARMENT,MEDLINE,DVT,INT,CALF,MED, GEN2: Brand: MEDLINE

## (undated) DEVICE — SUTURE VCRL SZ 2-0 L18IN ABSRB VLT CT-1 L36MM 1/2 CIR J739D

## (undated) DEVICE — STERILE POLYISOPRENE POWDER-FREE SURGICAL GLOVES: Brand: PROTEXIS

## (undated) DEVICE — DRSG MEPILES BORDER AG 4X12 -- 5/BX

## (undated) DEVICE — INTENDED FOR TISSUE SEPARATION, AND OTHER PROCEDURES THAT REQUIRE A SHARP SURGICAL BLADE TO PUNCTURE OR CUT.: Brand: BARD-PARKER SAFETY BLADES SIZE 15, STERILE

## (undated) DEVICE — NDL SPNE QNCKE 18GX3.5IN LF --

## (undated) DEVICE — WATERPROOF, BACTERIA PROOF DRESSING WITH ABSORBENT SEE THROUGH PAD: Brand: OPSITE POST-OP VISIBLE 10X8CM CTN 20

## (undated) DEVICE — STOCKING COMPR M L29-31IN REG 19MMHG ANK 8-9IN CALF 12-15IN

## (undated) DEVICE — SUT VCRL + 2-0 36IN CT1 UD --

## (undated) DEVICE — TRAY MYEL SFTY +

## (undated) DEVICE — ZIP 16 SURGICAL SKIN CLOSURE DEVICE: Brand: ZIP 16 SURGICAL SKIN CLOSURE DEVICE

## (undated) DEVICE — WAX SURG 2.5GM HEMSTAT BNE BEESWAX PARAFFIN ISO PALMITATE

## (undated) DEVICE — STOCKING ANTIEMB KNEE MED REG --

## (undated) DEVICE — 3M™ WARMING BLANKET, UPPER BODY, 10 PER CASE, 42268: Brand: BAIR HUGGER™

## (undated) DEVICE — Device

## (undated) DEVICE — SUT VCRL + 1 36IN CT1 VIO --

## (undated) DEVICE — 3 BONE CEMENT MIXER: Brand: MIXEVAC

## (undated) DEVICE — MEDIA CONTRAST 10ML 200MG/ML 41%

## (undated) DEVICE — SUTURE VCRL SZ 1 L18IN ABSRB VLT CT-1 L36MM 1/2 CIR J741D

## (undated) DEVICE — REM POLYHESIVE ADULT PATIENT RETURN ELECTRODE: Brand: VALLEYLAB

## (undated) DEVICE — WRAP COMPR BK

## (undated) DEVICE — KIT CLN UP BON SECOURS MARYV

## (undated) DEVICE — SOL INJ L R 1000ML BG --

## (undated) DEVICE — THE CANADY HYBRID PLASMA SCALPEL IS AN ELECTROSURGICAL PLASMA SCALPEL THAT USES AN 85MM BENDABLE PADDLE BLADE TIP. THE ELECTROSURGICAL PLASMA SCALPEL IS USED TO SIMULTANEOUSLY CUT AND COAGULATE BIOLOGICAL TISSUE.: Brand: CANADY HYBRID PLASMA PADDLE BLADE

## (undated) DEVICE — (D)NDL SPNE 22GX15CM -- DISC BY MFR W/NO SUB

## (undated) DEVICE — SUTURE MCRYL SZ 3-0 L27IN ABSRB UD L24MM PS-1 3/8 CIR PRIM Y936H

## (undated) DEVICE — SUTURE STRATAFIX SYMMETRIC PDS + ETHIGUARD SZ 1 L18IN ABSRB SXPP1A300

## (undated) DEVICE — SOLUTION IV 100ML 0.9% SOD CHL DIL INJ

## (undated) DEVICE — SPIROMETER INCENT 2500ML W ONE W VLV

## (undated) DEVICE — HANDPIECE SET WITH HIGH FLOW TIP AND SUCTION TUBE: Brand: INTERPULSE

## (undated) DEVICE — SUTURE STRATAFIX SYMMETRIC PDS + 1 SGL ARMED CT 18 IN LEN SXPP1A405

## (undated) DEVICE — OPTIFOAM GENTLE SA, POSTOP, 4X8: Brand: MEDLINE

## (undated) DEVICE — BLANKET WRM AD W50XL85.8IN PACU FULL BODY FORC AIR